# Patient Record
Sex: MALE | Race: WHITE | NOT HISPANIC OR LATINO | URBAN - METROPOLITAN AREA
[De-identification: names, ages, dates, MRNs, and addresses within clinical notes are randomized per-mention and may not be internally consistent; named-entity substitution may affect disease eponyms.]

---

## 2023-10-23 ENCOUNTER — HOSPITAL ENCOUNTER (INPATIENT)
Facility: CLINIC | Age: 58
LOS: 11 days | Discharge: NURSING, REHAB OR LONG-TERM CARE | End: 2023-11-03
Attending: EMERGENCY MEDICINE | Admitting: INTERNAL MEDICINE
Payer: MEDICAID

## 2023-10-23 ENCOUNTER — APPOINTMENT (OUTPATIENT)
Dept: GENERAL RADIOLOGY | Facility: CLINIC | Age: 58
End: 2023-10-23
Attending: EMERGENCY MEDICINE
Payer: MEDICAID

## 2023-10-23 ENCOUNTER — APPOINTMENT (OUTPATIENT)
Dept: CT IMAGING | Facility: CLINIC | Age: 58
End: 2023-10-23
Attending: EMERGENCY MEDICINE
Payer: MEDICAID

## 2023-10-23 DIAGNOSIS — K63.89 CECUM MASS: ICD-10-CM

## 2023-10-23 DIAGNOSIS — K56.609 SBO (SMALL BOWEL OBSTRUCTION) (H): ICD-10-CM

## 2023-10-23 DIAGNOSIS — C18.9 COLON ADENOCARCINOMA (H): Primary | ICD-10-CM

## 2023-10-23 DIAGNOSIS — D64.9 ANEMIA, UNSPECIFIED TYPE: ICD-10-CM

## 2023-10-23 DIAGNOSIS — R16.0 LIVER MASS: ICD-10-CM

## 2023-10-23 LAB
ABO/RH(D): NORMAL
ALBUMIN SERPL BCG-MCNC: 4 G/DL (ref 3.5–5.2)
ALP SERPL-CCNC: 135 U/L (ref 40–129)
ALT SERPL W P-5'-P-CCNC: 18 U/L (ref 0–70)
ANION GAP SERPL CALCULATED.3IONS-SCNC: 14 MMOL/L (ref 7–15)
ANTIBODY SCREEN: NEGATIVE
APTT PPP: 34 SECONDS (ref 22–38)
AST SERPL W P-5'-P-CCNC: 36 U/L (ref 0–45)
BASO+EOS+MONOS # BLD AUTO: ABNORMAL 10*3/UL
BASO+EOS+MONOS NFR BLD AUTO: ABNORMAL %
BASOPHILS # BLD AUTO: 0.1 10E3/UL (ref 0–0.2)
BASOPHILS NFR BLD AUTO: 1 %
BILIRUB SERPL-MCNC: 0.8 MG/DL
BLD PROD TYP BPU: NORMAL
BLOOD COMPONENT TYPE: NORMAL
BUN SERPL-MCNC: 22.4 MG/DL (ref 6–20)
CALCIUM SERPL-MCNC: 8.7 MG/DL (ref 8.6–10)
CEA SERPL-MCNC: 6.7 NG/ML
CHLORIDE SERPL-SCNC: 100 MMOL/L (ref 98–107)
CODING SYSTEM: NORMAL
CREAT SERPL-MCNC: 0.7 MG/DL (ref 0.67–1.17)
CROSSMATCH: NORMAL
DEPRECATED HCO3 PLAS-SCNC: 23 MMOL/L (ref 22–29)
EGFRCR SERPLBLD CKD-EPI 2021: >90 ML/MIN/1.73M2
EOSINOPHIL # BLD AUTO: 0.2 10E3/UL (ref 0–0.7)
EOSINOPHIL NFR BLD AUTO: 4 %
ERYTHROCYTE [DISTWIDTH] IN BLOOD BY AUTOMATED COUNT: 21.4 % (ref 10–15)
GLUCOSE SERPL-MCNC: 92 MG/DL (ref 70–99)
HCT VFR BLD AUTO: 23.4 % (ref 40–53)
HGB BLD-MCNC: 5.9 G/DL (ref 13.3–17.7)
IMM GRANULOCYTES # BLD: 0 10E3/UL
IMM GRANULOCYTES NFR BLD: 0 %
INR PPP: 1.18 (ref 0.85–1.15)
ISSUE DATE AND TIME: NORMAL
LIPASE SERPL-CCNC: 10 U/L (ref 13–60)
LYMPHOCYTES # BLD AUTO: 0.8 10E3/UL (ref 0.8–5.3)
LYMPHOCYTES NFR BLD AUTO: 19 %
MAGNESIUM SERPL-MCNC: 1.8 MG/DL (ref 1.7–2.3)
MCH RBC QN AUTO: 14.7 PG (ref 26.5–33)
MCHC RBC AUTO-ENTMCNC: 25.2 G/DL (ref 31.5–36.5)
MCV RBC AUTO: 58 FL (ref 78–100)
MONOCYTES # BLD AUTO: 0.6 10E3/UL (ref 0–1.3)
MONOCYTES NFR BLD AUTO: 14 %
NEUTROPHILS # BLD AUTO: 2.6 10E3/UL (ref 1.6–8.3)
NEUTROPHILS NFR BLD AUTO: 62 %
NRBC # BLD AUTO: 0 10E3/UL
NRBC BLD AUTO-RTO: 0 /100
PLATELET # BLD AUTO: 473 10E3/UL (ref 150–450)
POTASSIUM SERPL-SCNC: 3.7 MMOL/L (ref 3.4–5.3)
PROT SERPL-MCNC: 6.2 G/DL (ref 6.4–8.3)
RBC # BLD AUTO: 4.01 10E6/UL (ref 4.4–5.9)
SODIUM SERPL-SCNC: 137 MMOL/L (ref 135–145)
SPECIMEN EXPIRATION DATE: NORMAL
UNIT ABO/RH: NORMAL
UNIT NUMBER: NORMAL
UNIT STATUS: NORMAL
UNIT TYPE ISBT: 5100
WBC # BLD AUTO: 4.2 10E3/UL (ref 4–11)

## 2023-10-23 PROCEDURE — 999N000065 XR ABDOMEN PORT 1 VIEW

## 2023-10-23 PROCEDURE — 250N000013 HC RX MED GY IP 250 OP 250 PS 637: Performed by: COLON & RECTAL SURGERY

## 2023-10-23 PROCEDURE — 86923 COMPATIBILITY TEST ELECTRIC: CPT | Performed by: COLON & RECTAL SURGERY

## 2023-10-23 PROCEDURE — 82378 CARCINOEMBRYONIC ANTIGEN: CPT | Performed by: PHYSICIAN ASSISTANT

## 2023-10-23 PROCEDURE — 250N000011 HC RX IP 250 OP 636: Mod: JZ | Performed by: INTERNAL MEDICINE

## 2023-10-23 PROCEDURE — 258N000001 HC RX 258: Performed by: INTERNAL MEDICINE

## 2023-10-23 PROCEDURE — 74177 CT ABD & PELVIS W/CONTRAST: CPT

## 2023-10-23 PROCEDURE — 85025 COMPLETE CBC W/AUTO DIFF WBC: CPT | Performed by: EMERGENCY MEDICINE

## 2023-10-23 PROCEDURE — 83540 ASSAY OF IRON: CPT | Performed by: HOSPITALIST

## 2023-10-23 PROCEDURE — 85730 THROMBOPLASTIN TIME PARTIAL: CPT | Performed by: EMERGENCY MEDICINE

## 2023-10-23 PROCEDURE — 85610 PROTHROMBIN TIME: CPT | Performed by: EMERGENCY MEDICINE

## 2023-10-23 PROCEDURE — 250N000011 HC RX IP 250 OP 636: Performed by: COLON & RECTAL SURGERY

## 2023-10-23 PROCEDURE — 83550 IRON BINDING TEST: CPT | Performed by: HOSPITALIST

## 2023-10-23 PROCEDURE — 36415 COLL VENOUS BLD VENIPUNCTURE: CPT | Performed by: COLON & RECTAL SURGERY

## 2023-10-23 PROCEDURE — 85018 HEMOGLOBIN: CPT | Performed by: COLON & RECTAL SURGERY

## 2023-10-23 PROCEDURE — 99285 EMERGENCY DEPT VISIT HI MDM: CPT | Mod: 25

## 2023-10-23 PROCEDURE — 83690 ASSAY OF LIPASE: CPT | Performed by: EMERGENCY MEDICINE

## 2023-10-23 PROCEDURE — 250N000011 HC RX IP 250 OP 636: Performed by: EMERGENCY MEDICINE

## 2023-10-23 PROCEDURE — 36415 COLL VENOUS BLD VENIPUNCTURE: CPT | Performed by: EMERGENCY MEDICINE

## 2023-10-23 PROCEDURE — 120N000001 HC R&B MED SURG/OB

## 2023-10-23 PROCEDURE — 86923 COMPATIBILITY TEST ELECTRIC: CPT | Performed by: PHYSICIAN ASSISTANT

## 2023-10-23 PROCEDURE — P9016 RBC LEUKOCYTES REDUCED: HCPCS | Performed by: EMERGENCY MEDICINE

## 2023-10-23 PROCEDURE — 86900 BLOOD TYPING SEROLOGIC ABO: CPT | Performed by: EMERGENCY MEDICINE

## 2023-10-23 PROCEDURE — 86923 COMPATIBILITY TEST ELECTRIC: CPT | Performed by: EMERGENCY MEDICINE

## 2023-10-23 PROCEDURE — 83735 ASSAY OF MAGNESIUM: CPT | Performed by: EMERGENCY MEDICINE

## 2023-10-23 PROCEDURE — 250N000009 HC RX 250: Performed by: EMERGENCY MEDICINE

## 2023-10-23 PROCEDURE — 99223 1ST HOSP IP/OBS HIGH 75: CPT | Mod: AI | Performed by: INTERNAL MEDICINE

## 2023-10-23 PROCEDURE — P9016 RBC LEUKOCYTES REDUCED: HCPCS | Performed by: PHYSICIAN ASSISTANT

## 2023-10-23 PROCEDURE — 82247 BILIRUBIN TOTAL: CPT | Performed by: EMERGENCY MEDICINE

## 2023-10-23 RX ORDER — PROCHLORPERAZINE 25 MG
25 SUPPOSITORY, RECTAL RECTAL EVERY 12 HOURS PRN
Status: DISCONTINUED | OUTPATIENT
Start: 2023-10-23 | End: 2023-10-24

## 2023-10-23 RX ORDER — NALOXONE HYDROCHLORIDE 0.4 MG/ML
0.4 INJECTION, SOLUTION INTRAMUSCULAR; INTRAVENOUS; SUBCUTANEOUS
Status: DISCONTINUED | OUTPATIENT
Start: 2023-10-23 | End: 2023-11-03 | Stop reason: HOSPADM

## 2023-10-23 RX ORDER — DEXTROSE MONOHYDRATE, SODIUM CHLORIDE, AND POTASSIUM CHLORIDE 50; 1.49; 9 G/1000ML; G/1000ML; G/1000ML
100 INJECTION, SOLUTION INTRAVENOUS CONTINUOUS
Status: DISCONTINUED | OUTPATIENT
Start: 2023-10-23 | End: 2023-10-24

## 2023-10-23 RX ORDER — PROCHLORPERAZINE MALEATE 10 MG
10 TABLET ORAL EVERY 6 HOURS PRN
Status: DISCONTINUED | OUTPATIENT
Start: 2023-10-23 | End: 2023-10-24

## 2023-10-23 RX ORDER — ONDANSETRON 2 MG/ML
4 INJECTION INTRAMUSCULAR; INTRAVENOUS EVERY 6 HOURS PRN
Status: DISCONTINUED | OUTPATIENT
Start: 2023-10-23 | End: 2023-10-24

## 2023-10-23 RX ORDER — ONDANSETRON 4 MG/1
4 TABLET, ORALLY DISINTEGRATING ORAL EVERY 6 HOURS PRN
Status: DISCONTINUED | OUTPATIENT
Start: 2023-10-23 | End: 2023-10-24

## 2023-10-23 RX ORDER — NALOXONE HYDROCHLORIDE 0.4 MG/ML
0.2 INJECTION, SOLUTION INTRAMUSCULAR; INTRAVENOUS; SUBCUTANEOUS
Status: DISCONTINUED | OUTPATIENT
Start: 2023-10-23 | End: 2023-11-03 | Stop reason: HOSPADM

## 2023-10-23 RX ORDER — IOPAMIDOL 755 MG/ML
75 INJECTION, SOLUTION INTRAVASCULAR ONCE
Status: COMPLETED | OUTPATIENT
Start: 2023-10-23 | End: 2023-10-23

## 2023-10-23 RX ORDER — HYDROMORPHONE HYDROCHLORIDE 1 MG/ML
0.5 INJECTION, SOLUTION INTRAMUSCULAR; INTRAVENOUS; SUBCUTANEOUS
Status: DISCONTINUED | OUTPATIENT
Start: 2023-10-23 | End: 2023-10-24 | Stop reason: ALTCHOICE

## 2023-10-23 RX ADMIN — Medication 1 ML: at 22:15

## 2023-10-23 RX ADMIN — SODIUM CHLORIDE 62 ML: 9 INJECTION, SOLUTION INTRAVENOUS at 11:49

## 2023-10-23 RX ADMIN — IOPAMIDOL 75 ML: 755 INJECTION, SOLUTION INTRAVENOUS at 11:48

## 2023-10-23 RX ADMIN — POTASSIUM CHLORIDE, DEXTROSE MONOHYDRATE AND SODIUM CHLORIDE 100 ML/HR: 150; 5; 900 INJECTION, SOLUTION INTRAVENOUS at 16:56

## 2023-10-23 RX ADMIN — ONDANSETRON 4 MG: 2 INJECTION INTRAMUSCULAR; INTRAVENOUS at 14:17

## 2023-10-23 ASSESSMENT — ACTIVITIES OF DAILY LIVING (ADL)
ADLS_ACUITY_SCORE: 35
ADLS_ACUITY_SCORE: 18
ADLS_ACUITY_SCORE: 33
ADLS_ACUITY_SCORE: 18
ADLS_ACUITY_SCORE: 18
ADLS_ACUITY_SCORE: 35
ADLS_ACUITY_SCORE: 18

## 2023-10-23 NOTE — CONSULTS
[facility]  Colon and Rectal Surgery Consult Note  Name: Brady Lenz    MRN: [id number]  YOB: 1965    Age: 58 year old  Date of admission: 10/23/2023  Primary care provider: No Ref-Primary, Physician     Requesting Physician:  Dr. Saenz  Reason for consult:  SBO secondary to cecal mass           History of Present Illness:   Brady Lenz is a 58 year old male with no known past medical history who does not regularly see a doctor, seen at the request of Dr. Saenz, who presents with abdominal pain.  For the last 2 weeks he has had intermittent abdominal pain and worsening distention.  He had a small bowel movement this morning but prior to that his last normal bowel movement was about a week ago.  He has had similar symptoms as well as decreased appetite and diarrhea for the last 1 to 2 years.  He presented to the ED today for evaluation.  He was he hemodynamically stable upon presentation.  Labs in the ED were notable for Hgb 5.9, MCV 58, platelets 473k, normal WBC, alk phos 135, INR 1.18, otherwise unremarkable.  CT abdomen/pelvis was done which showed a 7 cm mass in the cecum causing a small bowel obstruction.  There are also masses in the liver measuring 10.6 cm and 4.9 cm concerning for metastatic disease. 1 U PRBC is being given in the ED and he is being admitted for further evaluation and management. An NGT has been placed.     This afternoon, Brady's answers are quite limited as he says it is difficult to talk with the NGT in place.  His abdominal pain is a little better this afternoon.  He is not passing any gas today.  He does endorse intermittent abdominal pain and discomfort for the last year or 2 and feels it has slowly gotten progressively worse.  He has noted some nausea lately and vomited Friday a few times, as well as a few weeks ago.  He has had intermittent diarrhea for a while, denies constipation, melena, or hematochezia.  He has had a poor appetite and has not  been able to eat much for the last few weeks. He has never had a colonoscopy.  Remains hemodynamically stable.      Colonoscopy History:  None    Past abdominal surgery: None            Past Medical History:   No past medical history on file.          Past Surgical History:   No past surgical history on file.            Social History:     Social History     Tobacco Use    Smoking status: Not on file    Smokeless tobacco: Not on file   Substance Use Topics    Alcohol use: Not on file             Family History:   No family history on file.          Allergies:   No Known Allergies          Medications:             Review of Systems:   A comprehensive greater than 10 system review of systems was carried out.  Pertinent positives and negatives are noted above.  Otherwise negative for contributory info.            Physical Exam:     Blood pressure 135/78, pulse 76, temperature 98.5  F (36.9  C), temperature source Oral, resp. rate 16, weight 68 kg (150 lb), SpO2 100%.  No intake or output data in the 24 hours ending 10/23/23 1454  Exam:  General - Awake alert and oriented, appears  stated age  Pulm - Non-labored breathing with normal respiratory effort  CVS - reg rate and rhythm, no peripheral edema  Abd - softly distended, minimally tender.  No guarding, rigidity or peritoneal signs. NGT in place with light clear/yellow output.   Neuro - CN II-XII grossly intact  Musculoskeletal - extremities with no clubbing, cyanosis or edema; able to ambulate  Psych - Flat affect. Responsive, alert, cooperative; oriented x3; appropriate mood and affect  External/skin - inspection reveals no rashes, lesions or ulcers, normal coloring             Data Reviewed:     Recent Results (from the past 24 hour(s))   CT Abdomen Pelvis w Contrast    Narrative    CT ABDOMEN PELVIS WITH CONTRAST 10/23/2023 11:57 AM    CLINICAL HISTORY: Abdominal pain, distension, rule out small-bowel  obstruction      TECHNIQUE: CT scan of the abdomen and pelvis  was performed following  injection of IV contrast. Multiplanar reformats were obtained. Dose  reduction techniques were used.  CONTRAST: 75mL Isovue-370    COMPARISON: None.    FINDINGS:   LOWER CHEST: No infiltrates or effusions.    HEPATOBILIARY: 10.6 cm mass in the right lobe of the liver. 4.9 cm  mass in the left lobe of the liver.    PANCREAS: No significant mass, duct dilatation, or inflammatory  change.    SPLEEN: Hypodensity in the spleen may be related to an infarct or  lesion. Probable cyst posteriorly in the spleen.     ADRENAL GLANDS: No significant nodules.    KIDNEYS/BLADDER: No significant mass, stones, or hydronephrosis. There  are simple or benign cysts. No follow up is needed.    BOWEL: Small bowel obstruction secondary to a cecal mass causing  obstruction at the ileocecal junction.    PELVIC ORGANS: No pelvic masses.    ADDITIONAL FINDINGS: Small amount of ascites in the pelvis and around  the liver.    MUSCULOSKELETAL: No frankly destructive bony lesions.      Impression    IMPRESSION:   1.  Mass in the cecum causing small bowel obstruction. This measures  up to 7 cm.  2.  Liver masses measuring up to 10.6 cm concerning for metastatic  disease.    MEAGHAN HATHAWAY MD         SYSTEM ID:  [id number]   XR Abdomen Port 1 View    Narrative    ABDOMEN ONE VIEW PORTABLE  10/23/2023 2:52 PM     HISTORY: Post NG placement.    COMPARISON: None.       Impression    IMPRESSION: The nasogastric tube is not seen. Multiple dilated loops  of bowel evident.    MEAGHAN HATHAWAY MD         SYSTEM ID:  [id number]   XR Abdomen Port 1 View    Narrative    ABDOMEN ONE VIEW PORTABLE  10/23/2023 4:07 PM     HISTORY: NGT    COMPARISON: Abdominal radiograph 10/23/2023.       Impression    IMPRESSION: Limited AP view demonstrates the gastric drainage tube tip  and side-port projecting over the proximal stomach. Similar upper  abdominal dilated small bowel loops.     ORIANA TRUJILLO MD         SYSTEM ID:  [id number]        Recent Labs   Lab 10/23/23  1036   WBC 4.2   HGB 5.9*   HCT 23.4*   MCV 58*   *     Recent Labs   Lab 10/23/23  1036      POTASSIUM 3.7   CHLORIDE 100   CO2 23   ANIONGAP 14   GLC 92   BUN 22.4*   CR 0.70   GFRESTIMATED >90   NAT 8.7   MAG 1.8   PROTTOTAL 6.2*   ALBUMIN 4.0   BILITOTAL 0.8   ALKPHOS 135*   AST 36   ALT 18         Assessment and Plan:   Brady Lenz is a 58 year old male with no known past medical history, although he does not go to the doctor regularly, who has had intermittent and slowly progressive abdominal discomfort and distention for the last 1 to 2 years as well as decreased appetite and some diarrhea.  Symptoms have been worse in the last week or 2 and are now accompanied by some nausea/vomiting and decreased flatus and bowel movements, so he presented to the ED today for evaluation.  Labs were notable for Hgb 5.9, MCV 58, INR 1.18, alk phos 135, otherwise unremarkable.  A CT abdomen/pelvis was done which unfortunately showed a small bowel obstruction secondary to an obstructing 7 cm mass in the cecum.  There are also multiple masses in the liver measuring up to 10.6 cm concerning for metastatic disease.  He is getting 1 U PRBC and an NGT has been placed in the ED. On exam he is softly distended but minimally tender.  He remains hemodynamically stable.    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  Typically with metastatic disease would begin with neoadjuvant chemotherapy, however in the setting of obstruction would recommend upfront surgery given concern for possible impending perforation.  Discussed that assuming the pathology is consistent with colon cancer, he will likely require adjuvant chemotherapy given suspicion for liver mets.  He does have severe anemia (5.9) likely secondary to the colon mass, so ideally we would transfuse him to increase Hgb prior to surgery.  He is tentatively  scheduled for a laparoscopic possible open right colectomy with possible stoma tomorrow at 4:00 pm with Dr. Moe, with a request to start earlier if possible.  We will ask the WOCN team to imani him today.  Continue NPO and NGT decompression in the meantime.  Will not plan for bowel prep given obstruction. We will continue to follow, call with concerns or changes in clinical status.     Plan:  Admit to medicine  Surgery: Scheduled for lap possible open right colectomy with possible stoma tomorrow with Dr. Moe at 4 pm (possibly sooner). Patient doesn't go to the doctor regularly, any necessary preop evaluation per medicine, appreciate assistance.  Diet: NPO. Continue NGT to LIWS for decompression.  IV Fluids: Continue   Antibiotics:  Will order pre-op Abx  Medications: Will order 2 additional units of PRBC given severe anemia and plans for surgery tomorrow with expectation of more blood loss intra-op.  I&O s:  strict I&O s   Labs:   - Reviewed: Yes  - Ordered: CEA, repeat Hgb & INR tomorrow AM   Imaging:   - Dr. Moe and myself have personally viewed: CT abd/pelvis  - Will need CT chest to complete staging workup eventually assuming malignancy, defer for now  Activity:  OOB, ambulate as able  DVT prophylaxis: SCD s  This plan has been discussed with Dr. Moe    Patient specific identified risk factors considered as part of today s evaluation include: no prior colonoscopy, severe anemia     Additional history obtained from patient, chart review.  Time spent on consultation: 50 minutes, greater than 50% spent on counseling and/or coordination of care.      Migel Manzanares PA-C  Colorectal Physician Assistant    [facility]  [address] [place] [address]  T: [phone]  F: [phone]

## 2023-10-23 NOTE — PHARMACY-ADMISSION MEDICATION HISTORY
Pharmacist Admission Medication History    Admission medication history is complete. The information provided in this note is only as accurate as the sources available at the time of the update.    Information Source(s): Patient via in-person    Pertinent Information: Patient was taking some supplements in past but hasn't been taking them recently. Wasn't added to the list for this reason.    Changes made to PTA medication list:  Added: None  Deleted: None  Changed: None    Allergies reviewed with patient and updates made in EHR: yes    Medication History Completed By: Margarita Hathaway RPH 10/23/2023 1:12 PM    No outpatient medications have been marked as taking for the 10/23/23 encounter (Hospital Encounter).

## 2023-10-23 NOTE — PROGRESS NOTES
"SPIRITUAL HEALTH SERVICES Progress Note  [facility] ED    Referral: Visit for emotional support.    I introduced myself and  Services.     Brady shared a little with me about his life; he said that he doesn't have anyone in his life, he's alone and \"it's all he's ever known,\" shrugging when I asked if he prefers solitude.    He said he's had a \"tough three years,\" not elaborating except that he's recently gotten work with [employer] but finds it difficult to work at times due to pain and nausea, naming IBS and SIBO.    Brady said he had been planning to move \"soon\" to \"someplace warm\" but now is unsure what will happen if he has surgery.    Upon inquiry, he shared he enjoys music and used to play guitar. He might like to again, but lives in an apartment and feels it would disturb others.    I noted the many unknowns and lack of control that he's holding, and observed his flat affect during the visit; I encouraged him to share with others as opportunities arise, inviting him to reach out to  as desired.    Visit ended for cares.    I then shared about the visit with Provider, who invited continued  support.     will follow.    Rev Christine Snell  Associate Chaplain [facility] Phone Line [phone]  Spiritual Health Pager [phone]  [facility] (Tuesdays & Thursdays) [phone]  "

## 2023-10-23 NOTE — PLAN OF CARE
Date & Time: 10/23/23 7817-5056  Surgery/POD#: N/A new cecal mas with possible mets to liver  Behavior & Aggression: green  Fall Risk: N/A  Orientation: AOx4  ABNL VS/O2: VSS on RA  ABNL Labs: Hgb: 5.9 in ED  Pain Management: Denies  Bowel/Bladder: no BM, urinal at bedside  Drains: NG to LIS, x2 PIV infusing D5% in NS +20 KCL @100 and 1 of 2 Units PRBC  Diet: NPO  Activity Level: SBA  Tests/Procedures: Possible bowl resection with stoma 10/24 with Dr. Moe  Anticipated  DC Date: Pending    Significant Information: Pt arrived from ED around 1630. VSS on RA. Received 1 Unit of blood in ED and CRS ordered two more for the floor. Unit 1 of 2 currently infusing. Surgery tomorrow with Dr. Moe. WOC consult for stoma placement. Pt has a flat affect, very little to say. NG in palace with x-ray conformation. No complaints of pain.

## 2023-10-23 NOTE — ED NOTES
The patient was given a urine sample cup prior to going to CT, in case the patient has to urinate otherwise can get a sample when they are back, instructed that the bathrooms are down the mays and to bring the cup back to the room.

## 2023-10-23 NOTE — ED TRIAGE NOTES
Pt c/o abd pain and bloating worse this past week. Pt has had abd pain for 2 yrs, diarrhea. Pt also has decreased appetite     Triage Assessment (Adult)       Row Name 10/23/23 1004          Triage Assessment    Airway WDL WDL        Respiratory WDL    Respiratory WDL WDL        Skin Circulation/Temperature WDL    Skin Circulation/Temperature WDL WDL        Peripheral/Neurovascular WDL    Peripheral Neurovascular WDL WDL        Cognitive/Neuro/Behavioral WDL    Cognitive/Neuro/Behavioral WDL WDL

## 2023-10-23 NOTE — H&P
[facility]    History and Physical - Hospitalist Service       Date of Admission:  10/23/2023    Assessment & Plan      Brady Lenz is a 58 year old male with no known past medical history who does not see a doctor on a regular basis who presented to the emergency room for evaluation of 2-week history of abdominal pain and distention.  He was found to have hemoglobin of 5.9 and imaging showed a cecal mass resulting in a small bowel obstruction a presumed liver met.was admitted on 10/23/2023 for ongoing evaluation and care.    Cecal mass with resultant small bowel obstruction  Liver masses, concerning for metastatic disease  *Patient does not see any physicians on a regular basis, he has not had any routine cancer screening exams including a colonoscopy. He presented to the ED for evaluation of a 2-week history of intermittent abdominal pain and worsening distention. Later went on to state he has had similar symptoms intermittently over the past 1-2 years, along with a decreased appetite.  *In the ED, he was afebrile and hemodynamically stable. He was ill-appearing. Labs were notable for hemoglobin of 5.9, MCV 58, platelet count of 473; alk phos 135, remainder of LFTs were stable. Electrolytes and renal function were stable. CT abd/pelvis obtained and showed a 7cm mass in the cecum causing small bowel obstruction and 2 liver masses measuring up to 10.6 cm in the right lobe and 4.9cm in the left lobe concerning for metastatic disease. NG tube placed in the ED.  Colorectal surgery notified of findings and plans for admission.  -- cont NG to LIS  -- NPO, supportive cares with IVFs, prns for pain/nausea  -- colorectal surgery consulted, will see today, anticipate taking patient to the OR tomorrow (10/24)  -- [facility] oncology consult     Suspected subacute to chronic microcytic anemia, secondary to blood loss from cecal mass  *With presentation and labs as above. Suspect some degree of subacute to  chronic blood loss given he is presently hemodynamically stable and denies any recent melanotic stools or BRBPR.  -- will be transfused 1U PRBC in the ED  -- repeat hemoglobin this evening  -- has conditional orders to transfuse 1U PRBCs to maintain hemoglobin greater than 7        Diet:  NPO  DVT Prophylaxis: Pneumatic Compression Devices  Fairchild Catheter: Not present  Lines: None     Cardiac Monitoring: None  Code Status:  Full code per my discussion with patient this afternoon.  He has no emergency contacts listed in his medical records.  He tells me he has no spouse or children and he is estranged from his family.  He does not have any close friends or acquaintances that he would like to designate as an emergency contact.  He reluctantly agreed to allowing his brother Tab Triplett (sp?) to be his contact.  Patient says he has not spoken with his brother in years, but that last he knew he lived locally.  He has no contact information for his brother.     CC/SW and spiritual health consults placed for ongoing support this stay.    Clinically Significant Risk Factors Present on Admission               # Coagulation Defect: INR = 1.18 (Ref range: 0.85 - 1.15) and/or PTT = 34 Seconds (Ref range: 22 - 38 Seconds), will monitor for bleeding                    Disposition Plan      Expected Discharge Date: 10/25/2023                  Evie Saenz DO  Hospitalist Service  [facility]  Securely message with [employer] (more info)  Text page via [employer] Paging/Directory     ______________________________________________________________________    Chief Complaint   Increased abdominal pain/distension    History is obtained from the patient    History of Present Illness   Bradyhamlet Lenz is a 58 year old male with no known past medical history who does not see a doctor on a regular basis who presented to the emergency room for evaluation of 2-week history of abdominal pain and distention. He  does not see any physicians on a regular basis, he has not had any routine cancer screening exams including a colonoscopy. He presented to the ED for evaluation of a 2-week history of intermittent abdominal pain and worsening distention. Later went on to state he has had similar symptoms intermittently over the past 1-2 years, along with a decreased appetite.  He denies any knowledge of melanotic stools or BRBPR.  No dizziness, lightheadedness, chest pain, n/v. He does report some recent onset mild shortness of breath.    In the ED, he was afebrile and hemodynamically stable. He was ill-appearing. Labs were notable for hemoglobin of 5.9, MCV 58, platelet count of 473; alk phos 135, remainder of LFTs were stable. Electrolytes and renal function were stable. CT abd/pelvis obtained and showed a 7cm mass in the cecum causing small bowel obstruction and 2 liver masses measuring up to 10.6 cm in the right lobe and 4.9cm in the left lobe concerning for metastatic disease. NG tube placed in the ED.  Colorectal surgery notified of findings and plans for admission.    Past Medical History    No past medical history on file.    Past Surgical History   No past surgical history on file.    Prior to Admission Medications   None        Review of Systems    The 10 point Review of Systems is negative other than noted in the HPI or here.      Physical Exam   Vital Signs: Temp: 98  F (36.7  C) Temp src: Oral BP: 127/73 Pulse: 69   Resp: 18 SpO2: 100 %      Weight: 150 lbs 0 oz    General Appearance: Flat affect, answers questions appropriately but not overly conversant, NAD  Respiratory: CTAB, no wheeze/rales/rhonchi, no increased work of breathing  Cardiovascular: HRRR, no MGR, no LE edema  GI: Distended, +TTP but no guarding/rigidity, +BS  Skin: warm/dry, appears tan    Medical Decision Making       75 MINUTES SPENT BY ME on the date of service doing chart review, history, exam, documentation & further activities per the note.       Data     I have personally reviewed the following data over the past 24 hrs:    4.2  \   5.9 (LL)   / 473 (H)     137 100 22.4 (H) /  92   3.7 23 0.70 \     ALT: 18 AST: 36 AP: 135 (H) TBILI: 0.8   ALB: 4.0 TOT PROTEIN: 6.2 (L) LIPASE: 10 (L)     INR:  1.18 (H) PTT:  34   D-dimer:  N/A Fibrinogen:  N/A       Imaging results reviewed over the past 24 hrs:   Recent Results (from the past 24 hour(s))   CT Abdomen Pelvis w Contrast    Narrative    CT ABDOMEN PELVIS WITH CONTRAST 10/23/2023 11:57 AM    CLINICAL HISTORY: Abdominal pain, distension, rule out small-bowel  obstruction      TECHNIQUE: CT scan of the abdomen and pelvis was performed following  injection of IV contrast. Multiplanar reformats were obtained. Dose  reduction techniques were used.  CONTRAST: 75mL Isovue-370    COMPARISON: None.    FINDINGS:   LOWER CHEST: No infiltrates or effusions.    HEPATOBILIARY: 10.6 cm mass in the right lobe of the liver. 4.9 cm  mass in the left lobe of the liver.    PANCREAS: No significant mass, duct dilatation, or inflammatory  change.    SPLEEN: Hypodensity in the spleen may be related to an infarct or  lesion. Probable cyst posteriorly in the spleen.     ADRENAL GLANDS: No significant nodules.    KIDNEYS/BLADDER: No significant mass, stones, or hydronephrosis. There  are simple or benign cysts. No follow up is needed.    BOWEL: Small bowel obstruction secondary to a cecal mass causing  obstruction at the ileocecal junction.    PELVIC ORGANS: No pelvic masses.    ADDITIONAL FINDINGS: Small amount of ascites in the pelvis and around  the liver.    MUSCULOSKELETAL: No frankly destructive bony lesions.      Impression    IMPRESSION:   1.  Mass in the cecum causing small bowel obstruction. This measures  up to 7 cm.  2.  Liver masses measuring up to 10.6 cm concerning for metastatic  disease.    MEAGHAN HATHAWAY MD         SYSTEM ID:  [id number]

## 2023-10-23 NOTE — ED PROVIDER NOTES
History     Chief Complaint:  Abdominal Pain       The history is provided by the patient.      Brady Lenz is a 58 year old male who presents with intermittent abdominal pain and bloating since a week ago. He suspects that his symptoms are from the SIBO, as he has been experiencing them intermittently for the past year. Another symptom mentioned was decreased appetite. He has not taken any medications at home for pain management. He had a small bowel movement this morning, but his last normal one was about a week ago. He has no prior abdominal surgeries, and does not have a history of liver problems. He has neve had a colonoscopy. He denies nausea, vomiting, pruritus, or rash.     Independent Historian:   None - Patient Only    Review of External Notes:   None     Medications:    The patient denies current use of medications.     [facility]    Past Surgical History:    The patient denies pertinent past surgical history.     Physical Exam   Patient Vitals for the past 24 hrs:   BP Temp Temp src Pulse Resp SpO2 Weight   10/23/23 1130 127/73 -- -- 69 -- 100 % --   10/23/23 1002 (!) 142/74 98  F (36.7  C) Oral 98 18 99 % 68 kg (150 lb)        Physical Exam  Vitals reviewed.   Constitutional:       General: He is not in acute distress.     Appearance: He is not ill-appearing.   HENT:      Head: Normocephalic and atraumatic.   Eyes:      Extraocular Movements: Extraocular movements intact.   Cardiovascular:      Rate and Rhythm: Normal rate and regular rhythm.   Pulmonary:      Effort: Pulmonary effort is normal. No respiratory distress.      Breath sounds: Normal breath sounds. No wheezing.   Abdominal:      General: Bowel sounds are absent. There is distension.      Palpations: Abdomen is soft.      Tenderness: There is no abdominal tenderness. There is no guarding.   Musculoskeletal:      Cervical back: Normal range of motion.   Skin:     General: Skin is warm and dry.   Neurological:      Mental  Status: He is alert and oriented to person, place, and time.      GCS: GCS eye subscore is 4. GCS verbal subscore is 5. GCS motor subscore is 6.   Psychiatric:         Behavior: Behavior normal.           Emergency Department Course   Imaging:  CT Abdomen Pelvis w Contrast  Preliminary Result  IMPRESSION:   1.  Mass in the cecum causing small bowel obstruction. This measures  up to 7 cm.  2.  Liver masses measuring up to 10.6 cm concerning for metastatic  disease.    Results per radiology     Laboratory:  Labs Ordered and Resulted from Time of ED Arrival to Time of ED Departure   COMPREHENSIVE METABOLIC PANEL - Abnormal       Result Value    Sodium 137      Potassium 3.7      Carbon Dioxide (CO2) 23      Anion Gap 14      Urea Nitrogen 22.4 (*)     Creatinine 0.70      GFR Estimate >90      Calcium 8.7      Chloride 100      Glucose 92      Alkaline Phosphatase 135 (*)     AST 36      ALT 18      Protein Total 6.2 (*)     Albumin 4.0      Bilirubin Total 0.8     LIPASE - Abnormal    Lipase 10 (*)    CBC WITH PLATELETS AND DIFFERENTIAL - Abnormal    WBC Count 4.2      RBC Count 4.01 (*)     Hemoglobin 5.9 (*)     Hematocrit 23.4 (*)     MCV 58 (*)     MCH 14.7 (*)     MCHC 25.2 (*)     RDW 21.4 (*)     Platelet Count 473 (*)     % Neutrophils 62      % Lymphocytes 19      % Monocytes 14      Mids % (Monos, Eos, Basos)        % Eosinophils 4      % Basophils 1      % Immature Granulocytes 0      NRBCs per 100 WBC 0      Absolute Neutrophils 2.6      Absolute Lymphocytes 0.8      Absolute Monocytes 0.6      Mids Abs (Monos, Eos, Basos)        Absolute Eosinophils 0.2      Absolute Basophils 0.1      Absolute Immature Granulocytes 0.0      Absolute NRBCs 0.0     INR - Abnormal    INR 1.18 (*)    MAGNESIUM - Normal    Magnesium 1.8     PARTIAL THROMBOPLASTIN TIME - Normal    aPTT 34     ROUTINE UA WITH MICROSCOPIC REFLEX TO CULTURE   TYPE AND SCREEN, ADULT    ABO/RH(D) O POS      Antibody Screen Negative      SPECIMEN  EXPIRATION DATE [id number]     PREPARE RED BLOOD CELLS (UNIT)    Blood Component Type Red Blood Cells      Product Code R1462A21      Unit Status Ready for issue      Unit Number [id number]      CROSSMATCH Compatible      CODING SYSTEM [id number]     PREPARE RED BLOOD CELLS (UNIT)   ABO/RH TYPE AND SCREEN      Emergency Department Course & Assessments:    Interventions:  Medications   iopamidol (ISOVUE-370) solution 75 mL (75 mLs Intravenous $Given 10/23/23 1148)   Saline (62 mLs Intravenous $Given 10/23/23 1149)      Assessments:  1010 I obtained history and examined the patient as noted above.  1103 I rechecked the patient and updated.   1226 I rechecked the patient and explained findings. I discussed plan for admission to the hospital.    Independent Interpretation (X-rays, CTs, rhythm strip):  None    Consultations/Discussion of Management or Tests:   ED Course as of 10/23/23 1309   Mon Oct 23, 2023   1104 Pt updated on HGB. Recommended PRBC transfusion states he wants to wait for CT to be resulted.    1231 Updated pt on CT findings. Pt consented for blood. Gen surg paged   1236 I spoke with Dr. Coleman with General Surgery, who suggests to consult Colorectal Surgery.   1300 I spoke with Rosendo-C for Dr. Moe from Colorectal Surgery. He suggests a colectomy tomorrow.   1307 I spoke with Dr. Saenz from Hospitalist Services, who accepts the patient for admission.     Social Determinants of Health affecting care:   None    Disposition:  The patient was admitted to the hospital under the care of Dr. Saenz.     Impression & Plan    Medical Decision Makin-year-old presenting today with abdominal distention, abdominal pain, decrease in bowel movements.  He reports over medical problems.  No prior abdominal surgeries.  States he has not seen a doctor in years.  Abdominal exam significant for distention, absent bowel sounds.  Concerning for SBO.  Basic blood work, CT abdomen pelvis ordered.  Hemoglobin  found to be low at 5.9.  Updated patient on these findings and recommended 1 unit of PRBCs however patient declined until CT was resulted.  CT showed concerns for liver masses as well as cecal mass with SBO.  Case discussed with general surgery they recommended colorectal surgery.  Discussed case with colorectal surgery they plan for possible colectomy tomorrow.  They will see him later today.  Discussed case with Dr. Saenz hospitalist.      Diagnosis:    ICD-10-CM    1. Anemia, unspecified type  D64.9       2. Liver mass  R16.0       3. SBO (small bowel obstruction) (H)  K56.609       4. Cecum mass  K63.89          Scribe Disclosure:  I, Norberto Mckeon, am serving as a scribe at 10:38 AM on 10/23/2023 to document services personally performed by Amanda Lange DO based on my observations and the provider's statements to me.   10/23/2023   Amanda Lange DO Doan, Tiffani, DO  10/23/23 1318

## 2023-10-23 NOTE — PLAN OF CARE
RECEIVING UNIT ED HANDOFF REVIEW    ED Nurse Handoff Report was reviewed by: Lili Russo RN on October 23, 2023 at 4:38 PM

## 2023-10-23 NOTE — ED NOTES
[facility]  ED Nurse Handoff Report    ED Chief complaint: Abdominal Pain      ED Diagnosis:   Final diagnoses:   Anemia, unspecified type   Liver mass   SBO (small bowel obstruction) (H)   Cecum mass       Code Status: not addressed at this time    Allergies: No Known Allergies    Patient Story: Pt c/o abd pain and bloating worse this past week. Pt has had abd pain for 2 yrs, diarrhea. Pt also has decreased appetite   Focused Assessment:  patient has flat affect and pale, thin looking. Patient has distended abd, and intermittent abd pain and nausea. Patient denies any respiratory symptoms    Treatments and/or interventions provided: 2 PIVs, NG, Blood transfusion, CT, Zofran  Patient's response to treatments and/or interventions: Decrease in nausea    To be done/followed up on inpatient unit:  Continue to monitor    Does this patient have any cognitive concerns?:  A/Ox4    Activity level - Baseline/Home:  Independent  Activity Level - Current:   Stand with Assist    Patient's Preferred language: English   Needed?: No    Isolation: None  Infection: Not Applicable  Patient tested for COVID 19 prior to admission: YES  Bariatric?: No    Vital Signs:   Vitals:    10/23/23 1340 10/23/23 1345 10/23/23 1415 10/23/23 1430   BP: 135/77 135/77 128/74 135/78   Pulse: 72  81 76   Resp: 16  16    Temp: 97.9  F (36.6  C)  98.5  F (36.9  C)    TempSrc:   Oral    SpO2:  100% 100% 100%   Weight:           Cardiac Rhythm:     Was the PSS-3 completed:   Yes  What interventions are required if any?               Family Comments: NA  OBS brochure/video discussed/provided to patient/family: N/A              Name of person given brochure if not patient: NA              Relationship to patient: NA    For the majority of the shift this patient's behavior was Green.   Behavioral interventions performed were None.    ED NURSE PHONE NUMBER: [phone]

## 2023-10-24 ENCOUNTER — ANESTHESIA EVENT (OUTPATIENT)
Dept: SURGERY | Facility: CLINIC | Age: 58
End: 2023-10-24
Payer: MEDICAID

## 2023-10-24 ENCOUNTER — ANESTHESIA (OUTPATIENT)
Dept: SURGERY | Facility: CLINIC | Age: 58
End: 2023-10-24
Payer: MEDICAID

## 2023-10-24 LAB
ALBUMIN SERPL BCG-MCNC: 3.6 G/DL (ref 3.5–5.2)
ALBUMIN SERPL BCG-MCNC: 3.8 G/DL (ref 3.5–5.2)
ALP SERPL-CCNC: 118 U/L (ref 40–129)
ALP SERPL-CCNC: 123 U/L (ref 40–129)
ALT SERPL W P-5'-P-CCNC: 14 U/L (ref 0–70)
ALT SERPL W P-5'-P-CCNC: 15 U/L (ref 0–70)
ANION GAP SERPL CALCULATED.3IONS-SCNC: 10 MMOL/L (ref 7–15)
ANION GAP SERPL CALCULATED.3IONS-SCNC: 10 MMOL/L (ref 7–15)
AST SERPL W P-5'-P-CCNC: 31 U/L (ref 0–45)
AST SERPL W P-5'-P-CCNC: 33 U/L (ref 0–45)
BILIRUB SERPL-MCNC: 1 MG/DL
BILIRUB SERPL-MCNC: 1 MG/DL
BUN SERPL-MCNC: 18.5 MG/DL (ref 6–20)
BUN SERPL-MCNC: 18.6 MG/DL (ref 6–20)
CALCIUM SERPL-MCNC: 8.3 MG/DL (ref 8.6–10)
CALCIUM SERPL-MCNC: 8.6 MG/DL (ref 8.6–10)
CEA SERPL-MCNC: 7.2 NG/ML
CHLORIDE SERPL-SCNC: 104 MMOL/L (ref 98–107)
CHLORIDE SERPL-SCNC: 105 MMOL/L (ref 98–107)
CREAT SERPL-MCNC: 0.63 MG/DL (ref 0.67–1.17)
CREAT SERPL-MCNC: 0.63 MG/DL (ref 0.67–1.17)
DEPRECATED HCO3 PLAS-SCNC: 25 MMOL/L (ref 22–29)
DEPRECATED HCO3 PLAS-SCNC: 25 MMOL/L (ref 22–29)
EGFRCR SERPLBLD CKD-EPI 2021: >90 ML/MIN/1.73M2
EGFRCR SERPLBLD CKD-EPI 2021: >90 ML/MIN/1.73M2
ERYTHROCYTE [DISTWIDTH] IN BLOOD BY AUTOMATED COUNT: 27.1 % (ref 10–15)
GLUCOSE SERPL-MCNC: 100 MG/DL (ref 70–99)
GLUCOSE SERPL-MCNC: 102 MG/DL (ref 70–99)
HCT VFR BLD AUTO: 29.9 % (ref 40–53)
HGB BLD-MCNC: 8.1 G/DL (ref 13.3–17.7)
HGB BLD-MCNC: 8.3 G/DL (ref 13.3–17.7)
HGB BLD-MCNC: 8.9 G/DL (ref 13.3–17.7)
INR PPP: 1.17 (ref 0.85–1.15)
IRON BINDING CAPACITY (ROCHE): 319 UG/DL (ref 240–430)
IRON SATN MFR SERPL: 2 % (ref 15–46)
IRON SERPL-MCNC: 6 UG/DL (ref 61–157)
MAGNESIUM SERPL-MCNC: 1.9 MG/DL (ref 1.7–2.3)
MCH RBC QN AUTO: 17.8 PG (ref 26.5–33)
MCHC RBC AUTO-ENTMCNC: 27.8 G/DL (ref 31.5–36.5)
MCV RBC AUTO: 64 FL (ref 78–100)
PHOSPHATE SERPL-MCNC: 2.7 MG/DL (ref 2.5–4.5)
PLATELET # BLD AUTO: 432 10E3/UL (ref 150–450)
PLATELET # BLD AUTO: 452 10E3/UL (ref 150–450)
POTASSIUM SERPL-SCNC: 3.8 MMOL/L (ref 3.4–5.3)
POTASSIUM SERPL-SCNC: 3.9 MMOL/L (ref 3.4–5.3)
PROT SERPL-MCNC: 5.6 G/DL (ref 6.4–8.3)
PROT SERPL-MCNC: 5.8 G/DL (ref 6.4–8.3)
RBC # BLD AUTO: 4.67 10E6/UL (ref 4.4–5.9)
SODIUM SERPL-SCNC: 139 MMOL/L (ref 135–145)
SODIUM SERPL-SCNC: 140 MMOL/L (ref 135–145)
TRIGL SERPL-MCNC: 59 MG/DL
WBC # BLD AUTO: 4.2 10E3/UL (ref 4–11)

## 2023-10-24 PROCEDURE — 250N000011 HC RX IP 250 OP 636: Performed by: NURSE ANESTHETIST, CERTIFIED REGISTERED

## 2023-10-24 PROCEDURE — 258N000003 HC RX IP 258 OP 636: Performed by: NURSE ANESTHETIST, CERTIFIED REGISTERED

## 2023-10-24 PROCEDURE — 88342 IMHCHEM/IMCYTCHM 1ST ANTB: CPT | Mod: 26 | Performed by: PATHOLOGY

## 2023-10-24 PROCEDURE — 250N000009 HC RX 250: Performed by: NURSE ANESTHETIST, CERTIFIED REGISTERED

## 2023-10-24 PROCEDURE — B4185 PARENTERAL SOL 10 GM LIPIDS: HCPCS | Mod: JZ | Performed by: COLON & RECTAL SURGERY

## 2023-10-24 PROCEDURE — 84134 ASSAY OF PREALBUMIN: CPT | Performed by: COLON & RECTAL SURGERY

## 2023-10-24 PROCEDURE — 84460 ALANINE AMINO (ALT) (SGPT): CPT | Performed by: COLON & RECTAL SURGERY

## 2023-10-24 PROCEDURE — 258N000001 HC RX 258: Performed by: INTERNAL MEDICINE

## 2023-10-24 PROCEDURE — 370N000017 HC ANESTHESIA TECHNICAL FEE, PER MIN: Performed by: COLON & RECTAL SURGERY

## 2023-10-24 PROCEDURE — 999N000040 HC STATISTIC CONSULT NO CHARGE VASC ACCESS

## 2023-10-24 PROCEDURE — 88305 TISSUE EXAM BY PATHOLOGIST: CPT | Mod: 26 | Performed by: PATHOLOGY

## 2023-10-24 PROCEDURE — 88309 TISSUE EXAM BY PATHOLOGIST: CPT | Mod: 26 | Performed by: PATHOLOGY

## 2023-10-24 PROCEDURE — 82378 CARCINOEMBRYONIC ANTIGEN: CPT | Performed by: INTERNAL MEDICINE

## 2023-10-24 PROCEDURE — 99255 IP/OBS CONSLTJ NEW/EST HI 80: CPT | Performed by: INTERNAL MEDICINE

## 2023-10-24 PROCEDURE — 999N000198 HC STATISTIC WOC PT EDUCATION, 16-30 MIN

## 2023-10-24 PROCEDURE — 36415 COLL VENOUS BLD VENIPUNCTURE: CPT | Performed by: HOSPITALIST

## 2023-10-24 PROCEDURE — 88360 TUMOR IMMUNOHISTOCHEM/MANUAL: CPT | Mod: TC | Performed by: COLON & RECTAL SURGERY

## 2023-10-24 PROCEDURE — 3E0436Z INTRODUCTION OF NUTRITIONAL SUBSTANCE INTO CENTRAL VEIN, PERCUTANEOUS APPROACH: ICD-10-PCS | Performed by: COLON & RECTAL SURGERY

## 2023-10-24 PROCEDURE — 88341 IMHCHEM/IMCYTCHM EA ADD ANTB: CPT | Mod: 26 | Performed by: PATHOLOGY

## 2023-10-24 PROCEDURE — 85018 HEMOGLOBIN: CPT | Performed by: COLON & RECTAL SURGERY

## 2023-10-24 PROCEDURE — 250N000011 HC RX IP 250 OP 636: Performed by: COLON & RECTAL SURGERY

## 2023-10-24 PROCEDURE — 250N000025 HC SEVOFLURANE, PER MIN: Performed by: COLON & RECTAL SURGERY

## 2023-10-24 PROCEDURE — 272N000452 HC KIT SHRLOCK 5FR POWER PICC TRIPLE LUMEN

## 2023-10-24 PROCEDURE — 250N000011 HC RX IP 250 OP 636: Performed by: HOSPITALIST

## 2023-10-24 PROCEDURE — 84155 ASSAY OF PROTEIN SERUM: CPT | Performed by: COLON & RECTAL SURGERY

## 2023-10-24 PROCEDURE — 999N000141 HC STATISTIC PRE-PROCEDURE NURSING ASSESSMENT: Performed by: COLON & RECTAL SURGERY

## 2023-10-24 PROCEDURE — 85610 PROTHROMBIN TIME: CPT | Performed by: HOSPITALIST

## 2023-10-24 PROCEDURE — 82040 ASSAY OF SERUM ALBUMIN: CPT | Performed by: HOSPITALIST

## 2023-10-24 PROCEDURE — 258N000003 HC RX IP 258 OP 636: Performed by: ANESTHESIOLOGY

## 2023-10-24 PROCEDURE — 84100 ASSAY OF PHOSPHORUS: CPT | Performed by: COLON & RECTAL SURGERY

## 2023-10-24 PROCEDURE — 99233 SBSQ HOSP IP/OBS HIGH 50: CPT | Performed by: HOSPITALIST

## 2023-10-24 PROCEDURE — 0DBB0ZZ EXCISION OF ILEUM, OPEN APPROACH: ICD-10-PCS | Performed by: COLON & RECTAL SURGERY

## 2023-10-24 PROCEDURE — 0DBW4ZX EXCISION OF PERITONEUM, PERCUTANEOUS ENDOSCOPIC APPROACH, DIAGNOSTIC: ICD-10-PCS | Performed by: COLON & RECTAL SURGERY

## 2023-10-24 PROCEDURE — 85027 COMPLETE CBC AUTOMATED: CPT | Performed by: HOSPITALIST

## 2023-10-24 PROCEDURE — 272N000001 HC OR GENERAL SUPPLY STERILE: Performed by: COLON & RECTAL SURGERY

## 2023-10-24 PROCEDURE — 88360 TUMOR IMMUNOHISTOCHEM/MANUAL: CPT | Mod: 26 | Performed by: PATHOLOGY

## 2023-10-24 PROCEDURE — 0DTF0ZZ RESECTION OF RIGHT LARGE INTESTINE, OPEN APPROACH: ICD-10-PCS | Performed by: COLON & RECTAL SURGERY

## 2023-10-24 PROCEDURE — 258N000001 HC RX 258: Performed by: COLON & RECTAL SURGERY

## 2023-10-24 PROCEDURE — 250N000011 HC RX IP 250 OP 636: Mod: JZ | Performed by: ANESTHESIOLOGY

## 2023-10-24 PROCEDURE — 36569 INSJ PICC 5 YR+ W/O IMAGING: CPT

## 2023-10-24 PROCEDURE — 250N000011 HC RX IP 250 OP 636: Performed by: INTERNAL MEDICINE

## 2023-10-24 PROCEDURE — 258N000003 HC RX IP 258 OP 636: Performed by: COLON & RECTAL SURGERY

## 2023-10-24 PROCEDURE — 710N000009 HC RECOVERY PHASE 1, LEVEL 1, PER MIN: Performed by: COLON & RECTAL SURGERY

## 2023-10-24 PROCEDURE — 120N000001 HC R&B MED SURG/OB

## 2023-10-24 PROCEDURE — 36415 COLL VENOUS BLD VENIPUNCTURE: CPT | Performed by: COLON & RECTAL SURGERY

## 2023-10-24 PROCEDURE — 250N000011 HC RX IP 250 OP 636: Mod: JZ | Performed by: COLON & RECTAL SURGERY

## 2023-10-24 PROCEDURE — 84478 ASSAY OF TRIGLYCERIDES: CPT | Performed by: COLON & RECTAL SURGERY

## 2023-10-24 PROCEDURE — 360N000077 HC SURGERY LEVEL 4, PER MIN: Performed by: COLON & RECTAL SURGERY

## 2023-10-24 PROCEDURE — 85049 AUTOMATED PLATELET COUNT: CPT | Performed by: COLON & RECTAL SURGERY

## 2023-10-24 PROCEDURE — 88307 TISSUE EXAM BY PATHOLOGIST: CPT | Mod: 26 | Performed by: PATHOLOGY

## 2023-10-24 PROCEDURE — 83735 ASSAY OF MAGNESIUM: CPT | Performed by: COLON & RECTAL SURGERY

## 2023-10-24 PROCEDURE — 250N000009 HC RX 250: Performed by: COLON & RECTAL SURGERY

## 2023-10-24 RX ORDER — VECURONIUM BROMIDE 1 MG/ML
INJECTION, POWDER, LYOPHILIZED, FOR SOLUTION INTRAVENOUS PRN
Status: DISCONTINUED | OUTPATIENT
Start: 2023-10-24 | End: 2023-10-24

## 2023-10-24 RX ORDER — DEXAMETHASONE SODIUM PHOSPHATE 4 MG/ML
INJECTION, SOLUTION INTRA-ARTICULAR; INTRALESIONAL; INTRAMUSCULAR; INTRAVENOUS; SOFT TISSUE PRN
Status: DISCONTINUED | OUTPATIENT
Start: 2023-10-24 | End: 2023-10-24

## 2023-10-24 RX ORDER — LORAZEPAM 0.5 MG/1
0.5 TABLET ORAL ONCE
Status: DISCONTINUED | OUTPATIENT
Start: 2023-10-24 | End: 2023-10-24

## 2023-10-24 RX ORDER — HYDROXYZINE HYDROCHLORIDE 25 MG/1
25 TABLET, FILM COATED ORAL EVERY 6 HOURS PRN
Status: DISCONTINUED | OUTPATIENT
Start: 2023-10-24 | End: 2023-10-24 | Stop reason: HOSPADM

## 2023-10-24 RX ORDER — MAGNESIUM HYDROXIDE 1200 MG/15ML
LIQUID ORAL PRN
Status: DISCONTINUED | OUTPATIENT
Start: 2023-10-24 | End: 2023-10-24 | Stop reason: HOSPADM

## 2023-10-24 RX ORDER — PROCHLORPERAZINE MALEATE 10 MG
10 TABLET ORAL EVERY 6 HOURS PRN
Status: DISCONTINUED | OUTPATIENT
Start: 2023-10-24 | End: 2023-11-03 | Stop reason: HOSPADM

## 2023-10-24 RX ORDER — HYDROMORPHONE HYDROCHLORIDE 1 MG/ML
INJECTION, SOLUTION INTRAMUSCULAR; INTRAVENOUS; SUBCUTANEOUS PRN
Status: DISCONTINUED | OUTPATIENT
Start: 2023-10-24 | End: 2023-10-24

## 2023-10-24 RX ORDER — DEXTROSE MONOHYDRATE, SODIUM CHLORIDE, AND POTASSIUM CHLORIDE 50; 1.49; 9 G/1000ML; G/1000ML; G/1000ML
INJECTION, SOLUTION INTRAVENOUS CONTINUOUS
Status: DISCONTINUED | OUTPATIENT
Start: 2023-10-24 | End: 2023-10-24

## 2023-10-24 RX ORDER — ONDANSETRON 4 MG/1
4 TABLET, ORALLY DISINTEGRATING ORAL EVERY 30 MIN PRN
Status: DISCONTINUED | OUTPATIENT
Start: 2023-10-24 | End: 2023-10-24 | Stop reason: HOSPADM

## 2023-10-24 RX ORDER — LIDOCAINE 40 MG/G
CREAM TOPICAL
Status: DISCONTINUED | OUTPATIENT
Start: 2023-10-24 | End: 2023-10-25

## 2023-10-24 RX ORDER — SODIUM CHLORIDE, SODIUM LACTATE, POTASSIUM CHLORIDE, CALCIUM CHLORIDE 600; 310; 30; 20 MG/100ML; MG/100ML; MG/100ML; MG/100ML
INJECTION, SOLUTION INTRAVENOUS CONTINUOUS
Status: ACTIVE | OUTPATIENT
Start: 2023-10-24 | End: 2023-10-26

## 2023-10-24 RX ORDER — FENTANYL CITRATE 0.05 MG/ML
50 INJECTION, SOLUTION INTRAMUSCULAR; INTRAVENOUS EVERY 5 MIN PRN
Status: DISCONTINUED | OUTPATIENT
Start: 2023-10-24 | End: 2023-10-24 | Stop reason: HOSPADM

## 2023-10-24 RX ORDER — SODIUM CHLORIDE, SODIUM LACTATE, POTASSIUM CHLORIDE, CALCIUM CHLORIDE 600; 310; 30; 20 MG/100ML; MG/100ML; MG/100ML; MG/100ML
INJECTION, SOLUTION INTRAVENOUS CONTINUOUS
Status: DISCONTINUED | OUTPATIENT
Start: 2023-10-24 | End: 2023-10-24 | Stop reason: HOSPADM

## 2023-10-24 RX ORDER — FENTANYL CITRATE 50 UG/ML
INJECTION, SOLUTION INTRAMUSCULAR; INTRAVENOUS PRN
Status: DISCONTINUED | OUTPATIENT
Start: 2023-10-24 | End: 2023-10-24

## 2023-10-24 RX ORDER — ONDANSETRON 2 MG/ML
4 INJECTION INTRAMUSCULAR; INTRAVENOUS ONCE
Status: COMPLETED | OUTPATIENT
Start: 2023-10-24 | End: 2023-10-24

## 2023-10-24 RX ORDER — KETOROLAC TROMETHAMINE 15 MG/ML
15 INJECTION, SOLUTION INTRAMUSCULAR; INTRAVENOUS EVERY 6 HOURS
Qty: 4 ML | Refills: 0 | Status: COMPLETED | OUTPATIENT
Start: 2023-10-24 | End: 2023-10-25

## 2023-10-24 RX ORDER — FENTANYL CITRATE 0.05 MG/ML
25 INJECTION, SOLUTION INTRAMUSCULAR; INTRAVENOUS EVERY 5 MIN PRN
Status: DISCONTINUED | OUTPATIENT
Start: 2023-10-24 | End: 2023-10-24 | Stop reason: HOSPADM

## 2023-10-24 RX ORDER — HEPARIN SODIUM 5000 [USP'U]/.5ML
5000 INJECTION, SOLUTION INTRAVENOUS; SUBCUTANEOUS
Status: COMPLETED | OUTPATIENT
Start: 2023-10-24 | End: 2023-10-24

## 2023-10-24 RX ORDER — PROCHLORPERAZINE 25 MG
25 SUPPOSITORY, RECTAL RECTAL EVERY 12 HOURS PRN
Status: DISCONTINUED | OUTPATIENT
Start: 2023-10-24 | End: 2023-11-03 | Stop reason: HOSPADM

## 2023-10-24 RX ORDER — ACETAMINOPHEN 325 MG/1
975 TABLET ORAL ONCE
Status: DISCONTINUED | OUTPATIENT
Start: 2023-10-24 | End: 2023-10-24 | Stop reason: HOSPADM

## 2023-10-24 RX ORDER — BUPIVACAINE HYDROCHLORIDE AND EPINEPHRINE 5; 5 MG/ML; UG/ML
INJECTION, SOLUTION PERINEURAL PRN
Status: DISCONTINUED | OUTPATIENT
Start: 2023-10-24 | End: 2023-10-24 | Stop reason: HOSPADM

## 2023-10-24 RX ORDER — HYDROMORPHONE HCL IN WATER/PF 6 MG/30 ML
0.4 PATIENT CONTROLLED ANALGESIA SYRINGE INTRAVENOUS EVERY 5 MIN PRN
Status: DISCONTINUED | OUTPATIENT
Start: 2023-10-24 | End: 2023-10-24 | Stop reason: HOSPADM

## 2023-10-24 RX ORDER — ONDANSETRON 2 MG/ML
4 INJECTION INTRAMUSCULAR; INTRAVENOUS EVERY 6 HOURS PRN
Status: DISCONTINUED | OUTPATIENT
Start: 2023-10-24 | End: 2023-10-24

## 2023-10-24 RX ORDER — DIPHENHYDRAMINE HYDROCHLORIDE 50 MG/ML
25 INJECTION INTRAMUSCULAR; INTRAVENOUS EVERY 6 HOURS PRN
Status: DISCONTINUED | OUTPATIENT
Start: 2023-10-24 | End: 2023-11-03 | Stop reason: HOSPADM

## 2023-10-24 RX ORDER — ONDANSETRON 2 MG/ML
INJECTION INTRAMUSCULAR; INTRAVENOUS PRN
Status: DISCONTINUED | OUTPATIENT
Start: 2023-10-24 | End: 2023-10-24

## 2023-10-24 RX ORDER — HYDROMORPHONE HCL IN WATER/PF 6 MG/30 ML
0.2 PATIENT CONTROLLED ANALGESIA SYRINGE INTRAVENOUS EVERY 5 MIN PRN
Status: DISCONTINUED | OUTPATIENT
Start: 2023-10-24 | End: 2023-10-24 | Stop reason: HOSPADM

## 2023-10-24 RX ORDER — PROPOFOL 10 MG/ML
INJECTION, EMULSION INTRAVENOUS CONTINUOUS PRN
Status: DISCONTINUED | OUTPATIENT
Start: 2023-10-24 | End: 2023-10-24

## 2023-10-24 RX ORDER — GLYCOPYRROLATE 0.2 MG/ML
INJECTION, SOLUTION INTRAMUSCULAR; INTRAVENOUS PRN
Status: DISCONTINUED | OUTPATIENT
Start: 2023-10-24 | End: 2023-10-24

## 2023-10-24 RX ORDER — METRONIDAZOLE 500 MG/100ML
500 INJECTION, SOLUTION INTRAVENOUS
Status: COMPLETED | OUTPATIENT
Start: 2023-10-24 | End: 2023-10-24

## 2023-10-24 RX ORDER — LIDOCAINE 40 MG/G
CREAM TOPICAL
Status: DISCONTINUED | OUTPATIENT
Start: 2023-10-24 | End: 2023-10-24

## 2023-10-24 RX ORDER — DEXTROSE MONOHYDRATE, SODIUM CHLORIDE, AND POTASSIUM CHLORIDE 50; 1.49; 9 G/1000ML; G/1000ML; G/1000ML
100 INJECTION, SOLUTION INTRAVENOUS CONTINUOUS
Status: DISCONTINUED | OUTPATIENT
Start: 2023-10-24 | End: 2023-10-24 | Stop reason: ALTCHOICE

## 2023-10-24 RX ORDER — NEOSTIGMINE METHYLSULFATE 1 MG/ML
VIAL (ML) INJECTION PRN
Status: DISCONTINUED | OUTPATIENT
Start: 2023-10-24 | End: 2023-10-24

## 2023-10-24 RX ORDER — LORAZEPAM 2 MG/ML
0.5 INJECTION INTRAMUSCULAR ONCE
Status: COMPLETED | OUTPATIENT
Start: 2023-10-24 | End: 2023-10-24

## 2023-10-24 RX ORDER — HYDRALAZINE HYDROCHLORIDE 20 MG/ML
2.5-5 INJECTION INTRAMUSCULAR; INTRAVENOUS EVERY 10 MIN PRN
Status: DISCONTINUED | OUTPATIENT
Start: 2023-10-24 | End: 2023-10-24 | Stop reason: HOSPADM

## 2023-10-24 RX ORDER — PROPOFOL 10 MG/ML
INJECTION, EMULSION INTRAVENOUS PRN
Status: DISCONTINUED | OUTPATIENT
Start: 2023-10-24 | End: 2023-10-24

## 2023-10-24 RX ORDER — ONDANSETRON 4 MG/1
4 TABLET, ORALLY DISINTEGRATING ORAL EVERY 6 HOURS PRN
Status: DISCONTINUED | OUTPATIENT
Start: 2023-10-24 | End: 2023-11-03 | Stop reason: HOSPADM

## 2023-10-24 RX ORDER — LIDOCAINE 40 MG/G
CREAM TOPICAL
Status: DISCONTINUED | OUTPATIENT
Start: 2023-10-24 | End: 2023-11-03 | Stop reason: HOSPADM

## 2023-10-24 RX ORDER — IBUPROFEN 600 MG/1
600 TABLET, FILM COATED ORAL EVERY 6 HOURS
Status: DISCONTINUED | OUTPATIENT
Start: 2023-10-25 | End: 2023-11-02

## 2023-10-24 RX ORDER — HEPARIN SODIUM 5000 [USP'U]/.5ML
5000 INJECTION, SOLUTION INTRAVENOUS; SUBCUTANEOUS EVERY 8 HOURS
Status: DISCONTINUED | OUTPATIENT
Start: 2023-10-25 | End: 2023-11-02

## 2023-10-24 RX ORDER — LIDOCAINE HYDROCHLORIDE 20 MG/ML
INJECTION, SOLUTION INFILTRATION; PERINEURAL PRN
Status: DISCONTINUED | OUTPATIENT
Start: 2023-10-24 | End: 2023-10-24

## 2023-10-24 RX ORDER — ONDANSETRON 2 MG/ML
4 INJECTION INTRAMUSCULAR; INTRAVENOUS EVERY 6 HOURS PRN
Status: DISCONTINUED | OUTPATIENT
Start: 2023-10-24 | End: 2023-11-03 | Stop reason: HOSPADM

## 2023-10-24 RX ORDER — ONDANSETRON 2 MG/ML
4 INJECTION INTRAMUSCULAR; INTRAVENOUS EVERY 30 MIN PRN
Status: DISCONTINUED | OUTPATIENT
Start: 2023-10-24 | End: 2023-10-24 | Stop reason: HOSPADM

## 2023-10-24 RX ORDER — ONDANSETRON 4 MG/1
4 TABLET, ORALLY DISINTEGRATING ORAL EVERY 6 HOURS PRN
Status: DISCONTINUED | OUTPATIENT
Start: 2023-10-24 | End: 2023-10-24

## 2023-10-24 RX ORDER — LORAZEPAM 2 MG/ML
0.5 INJECTION INTRAMUSCULAR 2 TIMES DAILY PRN
Status: DISCONTINUED | OUTPATIENT
Start: 2023-10-24 | End: 2023-11-03 | Stop reason: HOSPADM

## 2023-10-24 RX ORDER — DIMENHYDRINATE 50 MG/ML
25 INJECTION, SOLUTION INTRAMUSCULAR; INTRAVENOUS
Status: DISCONTINUED | OUTPATIENT
Start: 2023-10-24 | End: 2023-10-24 | Stop reason: HOSPADM

## 2023-10-24 RX ORDER — LABETALOL HYDROCHLORIDE 5 MG/ML
10 INJECTION, SOLUTION INTRAVENOUS
Status: DISCONTINUED | OUTPATIENT
Start: 2023-10-24 | End: 2023-10-24 | Stop reason: HOSPADM

## 2023-10-24 RX ORDER — CEFAZOLIN SODIUM/WATER 2 G/20 ML
2 SYRINGE (ML) INTRAVENOUS SEE ADMIN INSTRUCTIONS
Status: DISCONTINUED | OUTPATIENT
Start: 2023-10-24 | End: 2023-10-24 | Stop reason: HOSPADM

## 2023-10-24 RX ORDER — SODIUM CHLORIDE, SODIUM LACTATE, POTASSIUM CHLORIDE, CALCIUM CHLORIDE 600; 310; 30; 20 MG/100ML; MG/100ML; MG/100ML; MG/100ML
INJECTION, SOLUTION INTRAVENOUS CONTINUOUS PRN
Status: DISCONTINUED | OUTPATIENT
Start: 2023-10-24 | End: 2023-10-24

## 2023-10-24 RX ORDER — CEFAZOLIN SODIUM/WATER 2 G/20 ML
2 SYRINGE (ML) INTRAVENOUS
Status: COMPLETED | OUTPATIENT
Start: 2023-10-24 | End: 2023-10-24

## 2023-10-24 RX ORDER — DEXTROSE MONOHYDRATE 100 MG/ML
INJECTION, SOLUTION INTRAVENOUS CONTINUOUS PRN
Status: DISCONTINUED | OUTPATIENT
Start: 2023-10-24 | End: 2023-11-03 | Stop reason: HOSPADM

## 2023-10-24 RX ADMIN — OLIVE OIL AND SOYBEAN OIL 250 ML: 16; 4 INJECTION, EMULSION INTRAVENOUS at 21:13

## 2023-10-24 RX ADMIN — HYDROMORPHONE HYDROCHLORIDE 0.5 MG: 1 INJECTION, SOLUTION INTRAMUSCULAR; INTRAVENOUS; SUBCUTANEOUS at 10:38

## 2023-10-24 RX ADMIN — FENTANYL CITRATE 50 MCG: 50 INJECTION INTRAMUSCULAR; INTRAVENOUS at 14:44

## 2023-10-24 RX ADMIN — ASCORBIC ACID, VITAMIN A PALMITATE, CHOLECALCIFEROL, THIAMINE HYDROCHLORIDE, RIBOFLAVIN-5 PHOSPHATE SODIUM, PYRIDOXINE HYDROCHLORIDE, NIACINAMIDE, DEXPANTHENOL, ALPHA-TOCOPHEROL ACETATE, VITAMIN K1, FOLIC ACID, BIOTIN, CYANOCOBALAMIN: 200; 3300; 200; 6; 3.6; 6; 40; 15; 10; 150; 600; 60; 5 INJECTION, SOLUTION INTRAVENOUS at 21:14

## 2023-10-24 RX ADMIN — MIDAZOLAM 2 MG: 1 INJECTION INTRAMUSCULAR; INTRAVENOUS at 14:16

## 2023-10-24 RX ADMIN — PROPOFOL 30 MCG/KG/MIN: 10 INJECTION, EMULSION INTRAVENOUS at 14:31

## 2023-10-24 RX ADMIN — HYDROMORPHONE HYDROCHLORIDE 0.5 MG: 1 INJECTION, SOLUTION INTRAMUSCULAR; INTRAVENOUS; SUBCUTANEOUS at 15:01

## 2023-10-24 RX ADMIN — PHENYLEPHRINE HYDROCHLORIDE 100 MCG: 10 INJECTION INTRAVENOUS at 16:48

## 2023-10-24 RX ADMIN — HEPARIN SODIUM 5000 UNITS: 5000 INJECTION, SOLUTION INTRAVENOUS; SUBCUTANEOUS at 14:08

## 2023-10-24 RX ADMIN — ONDANSETRON 4 MG: 2 INJECTION INTRAMUSCULAR; INTRAVENOUS at 14:13

## 2023-10-24 RX ADMIN — PROPOFOL 150 MG: 10 INJECTION, EMULSION INTRAVENOUS at 14:26

## 2023-10-24 RX ADMIN — ROCURONIUM BROMIDE 50 MG: 50 INJECTION, SOLUTION INTRAVENOUS at 14:26

## 2023-10-24 RX ADMIN — ONDANSETRON 4 MG: 2 INJECTION INTRAMUSCULAR; INTRAVENOUS at 16:40

## 2023-10-24 RX ADMIN — NEOSTIGMINE METHYLSULFATE 3.5 MG: 1 INJECTION, SOLUTION INTRAVENOUS at 16:56

## 2023-10-24 RX ADMIN — SODIUM CHLORIDE, POTASSIUM CHLORIDE, SODIUM LACTATE AND CALCIUM CHLORIDE: 600; 310; 30; 20 INJECTION, SOLUTION INTRAVENOUS at 14:17

## 2023-10-24 RX ADMIN — SODIUM CHLORIDE, POTASSIUM CHLORIDE, SODIUM LACTATE AND CALCIUM CHLORIDE: 600; 310; 30; 20 INJECTION, SOLUTION INTRAVENOUS at 20:00

## 2023-10-24 RX ADMIN — METRONIDAZOLE 500 MG: 500 INJECTION, SOLUTION INTRAVENOUS at 13:43

## 2023-10-24 RX ADMIN — DEXAMETHASONE SODIUM PHOSPHATE 4 MG: 4 INJECTION, SOLUTION INTRA-ARTICULAR; INTRALESIONAL; INTRAMUSCULAR; INTRAVENOUS; SOFT TISSUE at 14:49

## 2023-10-24 RX ADMIN — Medication: at 20:57

## 2023-10-24 RX ADMIN — PHENYLEPHRINE HYDROCHLORIDE 100 MCG: 10 INJECTION INTRAVENOUS at 16:21

## 2023-10-24 RX ADMIN — VECURONIUM BROMIDE 1 MG: 1 INJECTION, POWDER, LYOPHILIZED, FOR SOLUTION INTRAVENOUS at 16:03

## 2023-10-24 RX ADMIN — Medication 2 G: at 14:30

## 2023-10-24 RX ADMIN — VECURONIUM BROMIDE 3 MG: 1 INJECTION, POWDER, LYOPHILIZED, FOR SOLUTION INTRAVENOUS at 14:45

## 2023-10-24 RX ADMIN — FENTANYL CITRATE 50 MCG: 50 INJECTION INTRAMUSCULAR; INTRAVENOUS at 14:26

## 2023-10-24 RX ADMIN — GLYCOPYRROLATE 0.5 MG: 0.2 INJECTION, SOLUTION INTRAMUSCULAR; INTRAVENOUS at 16:56

## 2023-10-24 RX ADMIN — PHENYLEPHRINE HYDROCHLORIDE 100 MCG: 10 INJECTION INTRAVENOUS at 16:03

## 2023-10-24 RX ADMIN — POTASSIUM CHLORIDE, DEXTROSE MONOHYDRATE AND SODIUM CHLORIDE 100 ML/HR: 150; 5; 900 INJECTION, SOLUTION INTRAVENOUS at 10:43

## 2023-10-24 RX ADMIN — HYDROMORPHONE HYDROCHLORIDE 0.4 MG: 0.2 INJECTION, SOLUTION INTRAMUSCULAR; INTRAVENOUS; SUBCUTANEOUS at 17:55

## 2023-10-24 RX ADMIN — LORAZEPAM 0.5 MG: 2 INJECTION INTRAMUSCULAR; INTRAVENOUS at 08:52

## 2023-10-24 RX ADMIN — LIDOCAINE HYDROCHLORIDE 2 ML: 10 INJECTION, SOLUTION INFILTRATION; PERINEURAL at 09:25

## 2023-10-24 RX ADMIN — SODIUM CHLORIDE, POTASSIUM CHLORIDE, SODIUM LACTATE AND CALCIUM CHLORIDE: 600; 310; 30; 20 INJECTION, SOLUTION INTRAVENOUS at 14:40

## 2023-10-24 RX ADMIN — KETOROLAC TROMETHAMINE 15 MG: 15 INJECTION, SOLUTION INTRAMUSCULAR; INTRAVENOUS at 20:02

## 2023-10-24 RX ADMIN — POTASSIUM CHLORIDE, DEXTROSE MONOHYDRATE AND SODIUM CHLORIDE 100 ML/HR: 150; 5; 900 INJECTION, SOLUTION INTRAVENOUS at 02:29

## 2023-10-24 RX ADMIN — LIDOCAINE HYDROCHLORIDE 100 MG: 20 INJECTION, SOLUTION INFILTRATION; PERINEURAL at 14:26

## 2023-10-24 ASSESSMENT — ACTIVITIES OF DAILY LIVING (ADL)
ADLS_ACUITY_SCORE: 18
ADLS_ACUITY_SCORE: 22
ADLS_ACUITY_SCORE: 22
ADLS_ACUITY_SCORE: 18
ADLS_ACUITY_SCORE: 22
ADLS_ACUITY_SCORE: 18
ADLS_ACUITY_SCORE: 22
ADLS_ACUITY_SCORE: 18
DEPENDENT_IADLS:: INDEPENDENT

## 2023-10-24 ASSESSMENT — ENCOUNTER SYMPTOMS: SEIZURES: 0

## 2023-10-24 ASSESSMENT — LIFESTYLE VARIABLES: TOBACCO_USE: 0

## 2023-10-24 NOTE — CONSULTS
"SPIRITUAL HEALTH SERVICES - Consult Note    Gen Surg  Saw pt Brady Lenz per consult request.     Patient/Family Understanding of Illness and Goals of Care - Brady stated that he has \"been suffering\" and it became \"too much\" so he came in for medical care yesterday. He reports that he will \"have surgery\" later today.      Distress and Loss - In addition to the physical pain, Brady also admitted to \"other kinds of suffering.\" He did not elaborate on that in our short time together.     Strengths, Coping, and Resources - Brady was unable to name coping mechanisms or strategies during this interaction. Another unit staff person mentioned a referral to psych and Brady expressed that he is \"wary\" of drugs used to treat depression and other mental health concerns.     Meaning, Beliefs, and Spirituality - Brady clearly stated that he does not consider himself either \"Anabaptist or spiritual.\"     Plan of Care - Our visit was interrupted by cares that needed to occur and we agreed to connect later. Brady expressed an openness to more conversation. When I checked back prior to surgery, he was either receiving care or sleeping.  will continue to follow patient.       Zoe Fox  Chaplain Resident    [facility] routine referrals *[phone]    [facility] available 24/7 for emergent requests/referrals, either by paging the on-call  or by entering an ASAP/STAT consult in Epic (this will also page the on-call ).   "

## 2023-10-24 NOTE — PROGRESS NOTES
[facility]  Hospitalist Progress Note   10/24/2023          Assessment and Plan:       Brady Lenz is a 58 year old male with no known past medical history who does not see a doctor on a regular basis admitted on 10/23/2023 with abdominal pain and distention.     Cecal mass with resultant small bowel obstruction  Liver masses, concerning for metastatic disease  *Patient does not see any physicians on a regular basis, he has not had any routine cancer screening exams including a colonoscopy. He presented to the ED for evaluation of a 2-week history of intermittent abdominal pain and worsening distention. Later went on to state he has had similar symptoms intermittently over the past 1-2 years, along with a decreased appetite.  *In the ED, he was afebrile and hemodynamically stable. He was ill-appearing. Labs were notable for hemoglobin of 5.9, MCV 58, platelet count of 473; alk phos 135, remainder of LFTs were stable. Electrolytes and renal function were stable. CT abd/pelvis obtained and showed a 7cm mass in the cecum causing small bowel obstruction and 2 liver masses measuring up to 10.6 cm in the right lobe and 4.9cm in the left lobe concerning for metastatic disease. NG tube placed in the ED.   -- Colorectal surgery following.  Appreciate comanagement.   -- cont NG to LIS  -- NPO, supportive cares with IVFs,   --/P.o. as needed pain meds.  IV/p.o. as needed antiemetics.  [place] oncology consult requested.  Appreciate comanagement.    CT Chest prior to discharge.    Suspected subacute to chronic microcytic anemia, secondary to blood loss from cecal mass  Status post 3 units of blood transfusion.  *With presentation and labs as above. Suspect some degree of subacute to chronic blood loss given he is presently hemodynamically stable and denies any recent melanotic stools or BRBPR.  -- Received total 3 units of blood transfusion on 10/23 to 10/24.  Hemoglobin this morning 8.3.    Monitor  hemoglobin levels every 12 hours.  Conditional order to transfuse for hemoglobin less than 8.  Will follow iron studies.    Acute anxiety   Concern for undiagnosed depression.  Patient having anxiety, flat affect.  As needed Ativan ordered.  Discussed regarding psychiatry evaluation -plan for surgery today, will consider postoperatively.  Patient declined discussing about his care with any of the family members.  Will consider CT of head If continues to have flat affect.  No neurological weakness at this time.    Social stressors.  Patient reports works as a / concerned with living situation.  Care coordinator / assistance with support requested.    Orders Placed This Encounter      NPO for Medical/Clinical Reasons Except for: No Exceptions      DVT Prophylaxis: SCDs, postprocedure pharmacological DVT prophylaxis per surgical team.  Code Status: Full Code  Disposition: Expected discharge in 1 to 2 days pending clinical improvement.    Discussed with patient, bedside RN.  High complexity. More than 70% of time spent in direct patient care, care coordination, patient counseling, and formalizing plan of care.      Brandt Macario MD        Interval History:      Patient lying in bed.  Calm, slow to respond.  Denies any thoughts of hurting himself.  Flat affect.  Complaining of abdominal pain.  Reports small bowel movement.  Able no issues with urination.  Denies any blood in the stool or blood in the urine at this time.  Received 3 units of blood transfusion since admission.  Continues to have output from NG.  Continue n.p.o., on IV fluids.         Physical Exam:        Physical Exam   Temp:  [97.8  F (36.6  C)-98.7  F (37.1  C)] 98.7  F (37.1  C)  Pulse:  [65-98] 71  Resp:  [16-18] 16  BP: (124-143)/(65-78) 124/69  SpO2:  [84 %-100 %] 99 %    Intake/Output Summary (Last 24 hours) at 10/24/2023 0892  Last data filed at 10/24/2023 0742  Gross per 24 hour   Intake 917 ml   Output 900 ml   Net 17 ml        Admission Weight: 68 kg (150 lb)  Current Weight: 68 kg (150 lb)    PHYSICAL EXAM  GENERAL: Patient flat affect, slow to respond.  HEENT: Oropharynx pink, Pupils equal  HEART: Regular rate and rhythm. S1S2. No murmurs  LUNGS: Clear to auscultation bilaterally. No expiratory wheeze.  Respirations unlabored  ABDOMEN: Distended, tender, sluggish bowel sounds.  NEURO: moving all extremities.  EXTREMITIES: No pedal edema.   SKIN: Warm, dry. No rash  PSYCHIATRY Cooperative       Medications:         LORazepam  0.5 mg Intravenous Once     HYDROmorphone, lidocaine 4%, lidocaine (buffered or not buffered), LORazepam, melatonin, naloxone **OR** naloxone **OR** naloxone **OR** naloxone, ondansetron **OR** ondansetron, phenol MT, prochlorperazine **OR** prochlorperazine **OR** prochlorperazine, sodium chloride (PF)         Data:      All new lab and imaging data was reviewed.

## 2023-10-24 NOTE — ANESTHESIA CARE TRANSFER NOTE
Patient: Brady Lenz    Procedure: Procedure(s):  Laparoscopic Peritoneal Biopsy Converted to  Open Right Colectomy       Diagnosis: Cecum mass [K63.89]  Diagnosis Additional Information: No value filed.    Anesthesia Type:   General     Note:    Oropharynx: oropharynx clear of all foreign objects  Level of Consciousness: awake and drowsy  Oxygen Supplementation: face mask    Independent Airway: airway patency satisfactory and stable  Dentition: dentition unchanged  Vital Signs Stable: post-procedure vital signs reviewed and stable  Report to RN Given: handoff report given  Patient transferred to: PACU    Handoff Report: Identifed the Patient, Identified the Reponsible Provider, Reviewed the pertinent medical history, Discussed the surgical course, Reviewed Intra-OP anesthesia mangement and issues during anesthesia, Set expectations for post-procedure period and Allowed opportunity for questions and acknowledgement of understanding      Vitals:  Vitals Value Taken Time   BP     Temp     Pulse     Resp     SpO2         Electronically Signed By: GABBY Johnston CRNA  October 24, 2023  5:24 PM

## 2023-10-24 NOTE — ANESTHESIA PREPROCEDURE EVALUATION
Anesthesia Pre-Procedure Evaluation    Patient: Brady Lenz   MRN: [id number] : 1965        Procedure : Procedure(s):  Laparoscopic Possible Open Right Colectomy possible stoma          No past medical history on file.   No past surgical history on file.   No Known Allergies   Social History     Tobacco Use    Smoking status: Not on file    Smokeless tobacco: Not on file   Substance Use Topics    Alcohol use: Not on file      Wt Readings from Last 1 Encounters:   10/23/23 68 kg (150 lb)        Anesthesia Evaluation   Pt has had prior anesthetic.     No history of anesthetic complications       ROS/MED HX  ENT/Pulmonary:    (-) tobacco use, asthma, sleep apnea and recent URI   Neurologic:    (-) no seizures and no CVA   Cardiovascular:  - neg cardiovascular ROS     METS/Exercise Tolerance:     Hematologic:     (+)      anemia (s/p PRBCs x3. Hgb 8.3 today),          Musculoskeletal:       GI/Hepatic: Comment: Malignant small bowel obstruction    (+) GERD,                   Renal/Genitourinary:       Endo:    (-) Type II DM and thyroid disease   Psychiatric/Substance Use:     (+) psychiatric history depression       Infectious Disease:       Malignancy:   (+) Malignancy (Presumed proximal colon cancer with liver mets),     Other: Comment: Cecal mass  Bowel obstruction           Physical Exam    Airway        Mallampati: I   TM distance: > 3 FB   Neck ROM: full   Mouth opening: > 3 cm    Respiratory Devices and Support         Dental       (+) Completely normal teeth      Cardiovascular   cardiovascular exam normal          Pulmonary   pulmonary exam normal            Other findings: Ng in place  OUTSIDE LABS:  CBC:   Lab Results   Component Value Date    WBC 4.2 10/24/2023    WBC 4.2 10/23/2023    HGB 8.3 (L) 10/24/2023    HGB 8.1 (L) 10/23/2023    HCT 29.9 (L) 10/24/2023    HCT 23.4 (L) 10/23/2023     10/24/2023     (H) 10/23/2023     BMP:   Lab Results   Component Value Date      "10/24/2023     10/24/2023    POTASSIUM 3.8 10/24/2023    POTASSIUM 3.9 10/24/2023    CHLORIDE 105 10/24/2023    CHLORIDE 104 10/24/2023    CO2 25 10/24/2023    CO2 25 10/24/2023    BUN 18.5 10/24/2023    BUN 18.6 10/24/2023    CR 0.63 (L) 10/24/2023    CR 0.63 (L) 10/24/2023     (H) 10/24/2023     (H) 10/24/2023     COAGS:   Lab Results   Component Value Date    PTT 34 10/23/2023    INR 1.17 (H) 10/24/2023     POC: No results found for: \"BGM\", \"HCG\", \"HCGS\"  HEPATIC:   Lab Results   Component Value Date    ALBUMIN 3.6 10/24/2023    PROTTOTAL 5.6 (L) 10/24/2023    ALT 14 10/24/2023    AST 31 10/24/2023    ALKPHOS 118 10/24/2023    BILITOTAL 1.0 10/24/2023     OTHER:   Lab Results   Component Value Date    NAT 8.3 (L) 10/24/2023    PHOS 2.7 10/24/2023    MAG 1.9 10/24/2023    LIPASE 10 (L) 10/23/2023       Anesthesia Plan    ASA Status:  3    NPO Status:  NPO Appropriate    Anesthesia Type: General.     - Airway: ETT   Induction: Intravenous, Propofol.   Maintenance: Balanced.   Techniques and Equipment:     - Lines/Monitors: 2nd IV     Consents    Anesthesia Plan(s) and associated risks, benefits, and realistic alternatives discussed. Questions answered and patient/representative(s) expressed understanding.     - Discussed: Risks, Benefits and Alternatives for the PROCEDURE were discussed     - Discussed with:  Patient       Use of blood products discussed: Yes.     Postoperative Care    Pain management: Multi-modal analgesia, IV analgesics, Oral pain medications.   PONV prophylaxis: Ondansetron (or other 5HT-3), Dexamethasone or Solumedrol, Background Propofol Infusion     Comments:    Other Comments: TAP blocks PRN            Edwige Patton MD  "

## 2023-10-24 NOTE — ANESTHESIA PROCEDURE NOTES
Airway       Patient location during procedure: OR       Procedure Start/Stop Times: 10/24/2023 2:29 PM  Staff -        Performed By: anesthesiologist and CRNA  Consent for Airway        Urgency: elective  Indications and Patient Condition       Indications for airway management: angela-procedural       Induction type:intravenous       Mask difficulty assessment: 2 - vent by mask + OA or adjuvant +/- NMBA    Final Airway Details       Final airway type: endotracheal airway       Successful airway: ETT - single  Endotracheal Airway Details        ETT size (mm): 8.0       Cuffed: yes       Successful intubation technique: video laryngoscopy       VL Blade Size: Glidescope 4       Grade View of Cords: 1       Adjucts: stylet       Position: Right       Measured from: lips       Secured at (cm): 24       Bite block used: None    Post intubation assessment        Placement verified by: capnometry, equal breath sounds and chest rise        Number of attempts at approach: 1       Number of other approaches attempted: 0       Secured with: silk tape       Ease of procedure: easy       Dentition: Intact and Unchanged    Medication(s) Administered   Medication Administration Time: 10/24/2023 2:29 PM

## 2023-10-24 NOTE — PLAN OF CARE
"Goal Outcome Evaluation: Pt A/O x4, flat affect, slow to respond trouble making decisions, endorsed depression \"for the past 3 years\" Pt appears cachectic, ABD soft, tender LLQ, BS hyperactive. Pt endorses soft small BM 10/23 AM. Skin WDL. RUE PICC placed with good blood return. L PIV infusions. IV Dilaudid for pain x1 and ativan x1 for anxiety/mild nausea. NG to LIS with copious brown/green output. Uses urinal at bedside. NPO-no exceptions. plan: Psych consult, LSW, potential colectomy w/ ostomy, colorectal and Hem/ONC.       Plan of Care Reviewed With: patient                 "

## 2023-10-24 NOTE — OP NOTE
OPERATIVE REPORT    PREOPERATIVE DIAGNOSIS:  Small bowel obstruction due to cecal mass     POSTOPERATIVE DIAGNOSIS:  same     PROCEDURE:     1.  Open right colectomy  2.  Laparoscopic peritoneal biopsy        SURGEON:  Jose Moe MD      FIRST ASSISTANT: Oma Metz MD [facility] corrective surgery fellow      ANESTHESIA:  General.      ESTIMATED BLOOD LOSS: 50 mL.      SPECIMENS: Right colon, terminal ileum     INDICATIONS: This is a 58-year-old gentleman who presents to the hospital with abdominal distention and abdominal pain.  Evaluation demonstrated a small bowel obstruction secondary to a large mass in the cecum with 2 large liver metastasis.  An NG tube was placed for decompression.  He also presented with significant anemia.  This was corrected with slow transfusion of 2 units of packed red blood cells.  I recommended a diagnostic laparoscopy with possible ileostomy and possible right colectomy.  The risks of surgery including bleeding, infection, injury to adjacent structures, need for an ostomy, complications the anesthesia and expectations of recovery were discussed.  He was marked by an ostomy nurse preoperatively.  He wishes to proceed.    FINDINGS: There is a large mass in the cecum.  The small bowel was quite dilated especially distally.  There were some abnormal lymph nodes within the terminal ileal small bowel mesentery.  Therefore an additional portion of the terminal ileum was resected.  2 large liver metastasis were identified.  We were able to mobilize the colon in a lateral to medial fashion and perform a side-to-side and functional end-to-end anastomosis.  There was a small implant on the peritoneum in the left upper quadrant over the spleen.  This was resected laparoscopically.  There is otherwise no evidence of carcinomatosis.     DESCRIPTION OF PROCEDURE: After obtaining form consent the patient brought to the operating room and laid supine on the operative table.  The  nasogastric tube was in place at this time.  General anesthesia was induced and he was intubated without difficulty.  A Fairchild catheter was placed under sterile conditions.  His right arm was out on an armboard.  He was secured to the table with tape across the chest and straps across the legs.  The abdomen is shaved prepped and draped in usual sterile fashion.  A timeout was undertaken which identified the patient correct procedure    We made a stab incision in the left upper quadrant.  A Veress needle was utilized to establish pneumoperitoneum.  A 12 mm trocar was placed in the supraumbilical position.  There is no injury to the underlying viscera.  We surveyed the abdomen with a 10 mm 30 degree scope.    We then performed a transversus abdominis plane block with a total of 30 cc half percent Marcaine with dilute epinephrine infiltrated in 4 quadrants of the abdomen with equal aliquots.  Two 5 mm ports were placed on the patient's right side under direct visualization.  We surveyed the abdomen.  There was no significant carcinomatosis other than a small implant along the peritoneum in the left upper quadrant.  We utilized these right-sided trocars to excise this peritoneal implant.  This was sent off for routine examination.  We then elected to proceed with an attempt at resection.  Given the significant dilation of the small bowel we did not feel that it was safe to proceed laparoscopically.  Therefore we converted to an open procedure via a midline laparotomy.  We entered into the abdomen without difficulty and the laparoscopic instruments were removed and sent off.  We utilized an Ivan wound retractor and a Bookwalter retractor from its exposure.  Small bowel was packed to the patient's left side.  The mass could be palpated in the cecum.  It was mobile.  With great care we incised the lateral attachments to mobilize the right colon in a lateral to medial fashion.  We first took the inferior attachments  elevating the small bowel mesentery up off the retroperitoneum.  We then took the lateral attachments along the white line of Toldt up and around the hepatic flexure.  This allowed us to identify the duodenum.  We then elevated the mass up off the retroperitoneum with great care to avoid injury to the ureter.  Once we had mobilized everything it medial to the duodenum we isolated the ileocolic pedicle and the mesentery was taken with a vessel sealing device.  A point for proximal transection about 5 cm proximal to the ileocecal valve was chosen the bowel was isolated here in the small bowel mesentery was taken with the vessel sealing device.  The bowel here was transected with a single firing of a blue load 100 mm SUNDAY stapler.    We turned attention to the transverse colon.  Point the proximal transverse colon was chosen for distal transection.  The intervening mesentery was taken with the vessel sealing device and then the bowel was transected with a single firing of a blue load 100 mm SUNDAY stapler.  The specimen was sent off for routine examination.    We had significant dilation of the small bowel at this time.  We eviscerated the terminal ileum over the right side of the patient's abdomen.  A portion of the staple line was cut and we milked out approximately 3 L of succus into a large basin which was suctioned.  Once this nicely decompressed the small bowel the enterotomy was closed with another application of the blue load 100 mm SUNDAY stapler.  I then changed my gloves.  We evaluated the terminal ileum and identified this cluster of lymph nodes that was concerning for metastatic disease.  I therefore elected to resect an additional 10 cm of the terminal ileum.  A point for proximal transection was chosen by the bowel was isolated with electrocautery.  The mesentery was taken between Carmalt clamps and controlled with Vicryl ties.    We then turned our attention to creating an anastomosis.  An antimesenteric  enterotomy was made where the bowel was isolated in the ileum.  An antimesenteric colotomy was made in the transverse colon.  A 75 mm blue load SUNDAY stapler was fashion within the lumen of the bowel to create a side-to-side functional end-to-end anastomosis.  The staple was closed and fired.  The staple line was hemostatic.  The common enterotomy was closed and the terminal ileal specimen was transected with 2 applications of a 90 mm blue load SUNDAY stapler.  The specimen was sent off.  The transverse staple line was reinforced with a running 3-0 PDS suture.  Other points of the staple line were reinforced with interrupted 3-0 Vicryl suture.  The anastomosis was widely patent and well vascularized.  There was no tension.    The mesentery was inspected for hemostasis.  Hemostasis was excellent.  We utilized a closing tray for closing.  The fascia was reapproximated to running sutures of single-stranded 0 PDS.  Skin and subcutaneous fat was irrigated.  Skin the midline was closed with staples.  The port sites was closed with 4-0 Monocryl in a subcuticular fashion Dermabond was placed as a dressing at the sites.  An island dressing was placed in the midline.  The nasogastric tube was  left in place.  The Fairchild catheter was left in place.  The patient was awakened, extubated, and transferred to the PACU in stable condition.  Counts were correct at the end of the case.    Jose Moe MD FACS FASCRS  Colorectal Surgeon       [facility]  [address], [address]  [place] [address]  T: [phone]  F: [phone]  Pager: [phone]  [email or web address]

## 2023-10-24 NOTE — PROGRESS NOTES
Date & Time: 10/23/23-10/24/23 0420-6653  Surgery/POD#: N/A new cecal mas with possible mets to liver  Behavior & Aggression: green  Fall Risk: N/A  Orientation: Aox4, very flat affect  ABNL VS/O2: VSS on RA  ABNL Labs: Hgb: 5.9 in ED, days transfused 1UPRB, Writer infused second unit, HGB 8.1  Pain Management: Denies  Bowel/Bladder: no BM, urinal at bedside, minimal urine output.  Drains: NG to LIS, x2 PIV infusing D5% in NS +20 KCL @100 and 2 of 2 Units PRBC  Diet: NPO, no exceptions  Activity Level: SBA  Tests/Procedures: Possible bowl resection with stoma 10/24 with Dr. Moe, pt stating he has not consented to this.   Anticipated  DC Date: Pending     Significant Information: NG intact, complaints of throat pain, chloracetic spray given. Writer educated pt on NPO status r/t surgery and pt stated he did not consent to having surgery. Writer educated pt and requested to cleanse with CHG wipes twice, and pt declined.

## 2023-10-24 NOTE — PROCEDURES
[facility]    Triple Lumen PICC Placement    Date/Time: 10/24/2023 9:30 AM    Performed by: Josie King RN  Authorized by: Jose Moe MD  Indications: vascular access      UNIVERSAL PROTOCOL   Site Marked: Yes  Prior Images Obtained and Reviewed:  Yes  Required items: Required blood products, implants, devices and special equipment available    Patient identity confirmed:  Verbally with patient, arm band and hospital-assigned identification number  NA - No sedation, light sedation, or local anesthesia  Confirmation Checklist:  Patient's identity using two indicators, relevant allergies, procedure was appropriate and matched the consent or emergent situation and correct equipment/implants were available  Time out: Immediately prior to the procedure a time out was called    Universal Protocol: the Joint Commission Universal Protocol was followed    Preparation: Patient was prepped and draped in usual sterile fashion    ESBL (mL):  1     ANESTHESIA    Anesthesia:  Local infiltration  Local Anesthetic:  Lidocaine 1% without epinephrine  Anesthetic Total (mL):  2      SEDATION    Patient Sedated: No        Preparation: skin prepped with ChloraPrep  Skin prep agent: skin prep agent completely dried prior to procedure  Sterile barriers: maximum sterile barriers were used: cap, mask, sterile gown, sterile gloves, and large sterile sheet  Hand hygiene: hand hygiene performed prior to central venous catheter insertion  Type of line used: PICC  Catheter type: triple lumen  Lumen type: valved and power PICC  Lumen Identification: Purple, Gray and White  Catheter size: 5 Fr  Brand: Bard  Lot number: [id number]  Placement method: venipuncture  Number of attempts: 1  Difficulty threading catheter: no  Successful placement: yes  Orientation: right    Location: basilic vein  Tip Location: SVC/RA Junction  Arm circumference: adults 10 cm  Extremity circumference: 25  Visible catheter length:  3  Total catheter length: 53  Dressing and securement: adhesive securement device, chlorhexidine disc applied, blood removed, blood cleaned with CHG, subcutaneous anchor securement system, alcohol impregnated caps, gloves changed prior to final dressing and occlusive dressing applied  Post procedure assessment: blood return through all ports and placement verified by 3CG technology  PROCEDURE Describe Procedure: Patient instruct on PICC placement. Patient verbalized an understanding. Patient gave verbal and written consent for PICC placement. VAT RN placed triple lumen 5FR catheter in right basilic vein. Patient tolerated well. Blood return noted. PICC placement was confirmed with 3 CG technology noting green highlights on rhythm strip. RN caring for patient updated that PICC is ok to use.   Disposal: sharps and needle count correct at the end of procedure, needles and guidewire disposed in sharps container

## 2023-10-24 NOTE — CONSULTS
"Triage and Transition - Consult and Liaison     Brady Lenz  October 24, 2023    Psychiatry consult acknowledged. I briefly saw patient, he requested to not meet today due to having surgery scheduled this afternoon. He states \"can we just wait until after surgery\". I indicated we would check in with him again tomorrow. A consult will be placed for follow up tomorrow.     Cheri Daniel, [facility]   Triage and Transition - Consult and Liaison   [phone]    "

## 2023-10-24 NOTE — CONSULTS
"[facility]  WO Nurse Ostomy Marking and Education         Data:     History:    58 year old male with no known past medical history who does not see a doctor on a regular basis who presented to the emergency room for evaluation of 2-week history of abdominal pain and distention.  He was found to have hemoglobin of 5.9 and imaging showed a cecal mass resulting in a small bowel obstruction a presumed liver met.was admitted on 10/23/2023 for ongoing evaluation and care.     Pt referred by Migel Manzanares PA-C  Who is present for marking and education: Patient  Type of ostomy surgery:  Ileostomy vs Colostomy  Abdomen:  Relatively flat, belt line over hip bones           Intervention:     Patient's chart evaluated.    Assessments done today:  Belt line, previous abdominal surgeries and scars, and abdominal muscles.  Pt observed lying, sitting and standing.    Education:  Reviewed upcoming surgery, stoma construction, post-op expectations, and general ostomy cares/concerns.    All patient / family questions answered.  Patient marked with \"x\" using surgical marker then allowed to dry 3 minutes and sealed with 3M Cavilon No Sting barrier film.  Pt marked RLQ and LLQ          Assessment:     Abdomen marked on the RLQ and LLQ  Learning needs/ comprehension: Reviewed anatomy, post op expectations, WO role, pouching products.          Plan:     Plan:   Surgery scheduled for:  10/24  Will plan to follow patient post operatively     Kaylee JOEL   Dept. Vocera- Contact [facility] Nurse (Regis) via  Vocera   Dept. Office Number: [phone]          "

## 2023-10-24 NOTE — CONSULTS
CLINICAL NUTRITION SERVICES  -  ASSESSMENT NOTE      RECOMMENDATIONS FOR MD/PROVIDER TO ORDER:   Consider modifying D5 IVF to one w/o dextrose when TPN starts     Recommendations Ordered by Registered Dietitian (RD):   Ordered TPN, labs, daily wts    Dosing wt: 68 kg  Begin Clinimix E (D5AA15) @ 40 mL/hr to provide 1182 kcal, 144 g Dex (GIR = 1.47), 48 g protein, 42% kcal from fat   Goal: Custom TPN @ 85 mL/hr = 1948 kcal (29 kcal/kg), 306 g dextrose (GIR = 3.1), 102 g AA (1.5 g/kg), and 250 mL 20% IV Lipids @ 21 mL/hr x12 hours x7 days/week (26% kcal from Fat)  Total IVF + PN = 100 mL/hr or per MD    Ordered admit wt and ht   RD introduced self to pt, discussed role in care. D/w pt the nutrition POC, plans to start TPN to provide adequate kcal/protein/vitamins/minerals while NPO. Diet per MD. Pt agreeable to plan, no questions.      Future/Additional Recommendations:   TPN advancement, goal rate--   After 24 hours, if labs (K, Mag, Phos) acceptable, advance rate to 65 mL/hr to increase provisions to 1608 kcal, 234 g Dex (GIR = 2.38), 78 g protein, 31% kcal from fat   After 24 hours, if labs (K, Mag, Phos) acceptable, switch to custom formula and advance rate to goal of 85 mL/hr, increase provisions to 102 g AA, 306 g dextrose      Malnutrition:   % Weight Loss:  unable to evaluate w/o wt hx on file, suspect significant wt loss over past 2 years  % Intake:  </= 50% for >/= 5 days (severe malnutrition) + suspect poor at baseline over past 1-2 years  Subcutaneous Fat Loss:  Orbital region mild depletion  Muscle Loss:  Temporal region severe depletion, Clavicle bone region moderate depletion, Acromion bone region severe depletion, Scapular bone region severe depletion, and Dorsal hand region moderate depletion  Fluid Retention:  None noted    Malnutrition Diagnosis: Severe malnutrition in the context of --  Acute illness or injury  Chronic illness or disease  Environmental or social circumstances         REASON FOR  "ASSESSMENT  Brady Lenz is a 58 year old male seen by Registered Dietitian for:   Admission Nutrition Risk Screen for positive. Recent wt loss of >34# w/o decreased appetite reported.    Pharmacy/Nutrition to Start and Manage PN      NUTRITION HISTORY  Information obtained from chart review and d/w pt    [facility] Patient does not see any physicians on a regular basis   Social hx-- currently living in car. Estranged from family. Feeling depressed for past 3 years.     ED note-- reported decreased appetite, abdominal pain, bloating intermittently x1-2 years prior to admission   CRS-- does endorse intermittent abdominal pain and discomfort for the last year or 2 and feels it has slowly gotten progressively worse. Noted some nausea lately and vomited Friday a few times, as well as a few weeks ago. Had intermittent diarrhea for a while. He has had a poor appetite and has not been able to eat much for the last few weeks.     Pt reported he has not been eating well for past 2 years. Stated he has IBS. Most recently, he has had \"barely anything\" for past week or so prior to admission. He doesn't weigh himself typically, but has noticed his wt trending down over past 2 years. UBW about 189-190#. Weighed 158# w/ his clothes on in the ED.     Reported the following allergies: fish, shellfish, almonds, kiwi --> cause a \"lump in the throat\" and difficulty swallowing  Intolerance to gluten, wheat, lactose      CURRENT NUTRITION ORDERS  Diet: NPO for Medical/Clinical Reasons Except for: No Exceptions      Current Intake/Tolerance:  NPO upon admit, no intakes   CRS note today = \"Malnutrition due to inadequate oral intake.\" Start TPN today.   RD/pharmacy consulted to start TPN   D/w pt the nutrition POC, plans to start TPN to provide adequate kcal/protein/vitamins/minerals while NPO. Diet per MD. Pt agreeable to plan, no questions.   Vascular services consulted to place PICC --> placed this AM  D5 started 10/23 @ 100 " "mL/hr = 120 g Dex --> 408 kcal/day      PER CHART REVIEW  Presented w/ intermittent/worsening abdominal pain and bloating x1 week prior to admission.    Admitted for:   Cecal mass with resultant small bowel obstruction  Liver masses, concerning for metastatic disease  Suspected subacute to chronic microcytic anemia, secondary to blood loss from cecal mass     10/23: abd CT =   1.  Mass in the cecum causing small bowel obstruction. This measures up to 7 cm.  2.  Liver masses measuring up to 10.6 cm concerning for metastatic disease.  10/23: NG placed for LIS  10/23: abd XR = Limited AP view demonstrates the gastric drainage tube tip and side-port projecting over the proximal stomach. Similar upper abdominal dilated small bowel loops.     10/24: scheduled procedure = laparoscopic, possible open, right colectomy, possible stoma       NUTRITION FOCUSED PHYSICAL ASSESSMENT FOR DIAGNOSING MALNUTRITION)  Yes-- visual         Observed:    Muscle wasting (refer to documentation in Malnutrition section)   Subcutaneous fat loss (refer to documentation in Malnutrition section)  Appears very thin    Obtained from Chart/Interdisciplinary Team:  None noted     ANTHROPOMETRICS  Height: 6' 1\"[pt report[  Weight: 150 lbs 0 oz  Body mass index is 19.79 kg/m .  Weight Status:  Normal BMI-- borderline underwt  IBW: 83.6 kg  % IBW: 81%  Weight History:   No wt hx on file to evaluate  10/23/23 68 kg (150 lb)     No additional wt hx per care everywhere    LABS   BUN 22.4 (H) 10/23/2023    ALKPHOS 135 (H) 10/23/2023     MEDICATIONS   dextrose 5% and 0.9% NaCl + KCL 20 mEq/L 100 mL/hr (10/24/23 1043)       ASSESSED NUTRITION NEEDS PER APPROVED PRACTICE GUIDELINES:  Dosing Weight: 68 kg (10/23)  Estimated Energy Needs: 1004-9204 kcal (30-35 Kcal/Kg)  Justification: <100% IBW   Estimated Protein Needs:  grams protein (1.2-1.5 g pro/Kg)  Justification: repletion, preservation of lean body mass  Estimated Fluid Needs: 1 " mL/kcal  Justification: maintenance       MALNUTRITION:  % Weight Loss:  unable to evaluate w/o wt hx on file, suspect significant wt loss over past 2 years  % Intake:  </= 50% for >/= 5 days (severe malnutrition) + suspect poor at baseline over past 1-2 years  Subcutaneous Fat Loss:  Orbital region mild depletion  Muscle Loss:  Temporal region severe depletion, Clavicle bone region moderate depletion, Acromion bone region severe depletion, Scapular bone region severe depletion, and Dorsal hand region moderate depletion  Fluid Retention:  None noted    Malnutrition Diagnosis: Severe malnutrition in the context of --  Acute illness or injury  Chronic illness or disease  Environmental or social circumstances        NUTRITION DIAGNOSIS:  Inadequate oral intake related to abdominal pain, bloating, decreased appetite, SBO and new dx of proximal colonic malignancy as evidenced by poor intake x1-2 years, minimal intake <50% x1 week prior to admission, mild-severe fat and muscle loss, IBW 81%, plans to start TPN        NUTRITION INTERVENTIONS  Recommendations / Nutrition Prescription  Ordered TPN, labs, daily wts  Ordered admit wt and ht   RD introduced self to pt, discussed role in care. D/w pt the nutrition POC, plans to start TPN to provide adequate kcal/protein/vitamins/minerals while NPO. Diet per MD. Pt agreeable to plan, no questions.     Implementation  Collaboration with other providers  Parenteral Nutrition/IV Fluids - Initiate  Nutrition education   Wt and ht order    Nutrition Goals  TPN to start and reach goal w/in 72 hours  TPN to provide % of estimated nutrition needs at goal provisions  Wt >68 kg      MONITORING AND EVALUATION:  Progress towards goals will be monitored and evaluated per protocol and Practice Guidelines      Any Arizmendi RD, LD   Pager: [phone]

## 2023-10-24 NOTE — CONSULTS
[facility] Hematology / Oncology  Initial Visit / Consultation Note Oct 23, 2023  Name: Brady Lenz  :  1965  MRN:  [id number]    --------------------    Assessment / Plan:  Presumed proximal colon cancer with metastatic disease to liver.  Malignant small bowel obstruction secondary to above.  Nausea, vomiting and pain secondary to above.  Severe iron deficiency anemia secondary to above status post 3 units of blood.  Severe depression and unstable social situation.    Agree with plans for laparoscopic right colectomy given obstruction and small bowel obstruction.  Reviewed plans for chemotherapy to follow likely on an indefinite basis.  Will need staging CT chest and port placement post-op.  We will request NGS testing to guide systemic therapy choices.  We will request testing for mention genetic testing given his family history.  Inpatient psychiatry consult given severe depression.  Venofer 3 mg x 3 days to follow postoperatively.  Social work, mental health resources.    Thank you for this consultation.  We will continue to follow..    Thank you kindly for this consultation.  Asa Grubbs MD    --------------------    Interval History:  Brady present for evaluation of presumed colon cancer.  He arrived to emergency room with a 2 to 3-month history of worsening nausea and vomiting and appetite.  It was accompanied by worsening abdominal pain.  T CT scan of the abdomen and pelvis revealed a small bowel obstruction secondary to cecal mass causing obstruction at the ileocecal junction.  Small amount of ascites was noted in the pelvis around the liver.  A 10.6 cm mass in the right lobe of the liver and a 4.9 cm mass in the left lobe of the liver are noted.  He has no nutrition and severe anemia with microcytosis consistent with iron deficiency.  He does have a family history of colon cancer on his mom's side and the grandmother.  He is estranged from his family.  He did deal with depression  for the last 3 years.    --------------------    Review of Systems:  10 point ROS negative except for that above.    Past Medical / Surgical History:  Patient Active Problem List   Diagnosis    SBO (small bowel obstruction) (H)    Liver mass    Cecum mass    Anemia, unspecified type     Family History:  Positive for colon cancer.    Social History:  Working w/ food delivery job.  Has housing, but described as unhealthy living.  Sleeping in car currently.  Estranged from family.    Medications / Allergies:  Reviewed in EMR.    --------------------    Physical Exam:  VS: /69 (BP Location: Right arm)   Pulse 71   Temp 98.7  F (37.1  C) (Oral)   Resp 16   Wt 68 kg (150 lb)   SpO2 99%   GEN: Well appearing.    Labs / Imaging / Path:  Reviewed CBC, CMP.   Reviewed CT chest w/ independent review.

## 2023-10-24 NOTE — PROGRESS NOTES
Colon and Rectal Surgery Progress Note          Assessment and Plan:     59 yo M with SBO, likely due to proximal colonic malignancy.  Malnutrition due to inadequate oral intake    Start TPN today  I have recommended laparoscopic, possible open, right colectomy, possible stoma    He has given verbal consent.  His questions were answered      Jose Moe MD FACS FASCRS  Colorectal Surgeon       [facility]  [address], [address]  [place] [address]  T: [phone]  F: [phone]  Pager: [phone]  [email or web address]                 Interval History:     Feeling better              Physical Exam:   Vitals were reviewed  Patient Vitals for the past 24 hrs:   BP Temp Temp src Pulse Resp SpO2 Weight   10/24/23 0736 124/69 98.7  F (37.1  C) Oral 71 16 99 % --   10/23/23 2217 130/78 97.8  F (36.6  C) Axillary 73 16 99 % --   10/23/23 2155 133/77 98.1  F (36.7  C) Axillary 68 17 99 % --   10/23/23 2059 130/70 98  F (36.7  C) Oral 69 16 100 % --   10/23/23 2000 132/74 97.8  F (36.6  C) Axillary 68 16 99 % --   10/23/23 1944 132/65 98  F (36.7  C) -- 69 17 -- --   10/23/23 1921 137/77 -- -- 72 16 99 % --   10/23/23 1748 (!) 141/75 97.9  F (36.6  C) Axillary 68 16 100 % --   10/23/23 1733 -- -- -- 67 -- -- --   10/23/23 1728 133/69 97.8  F (36.6  C) Axillary -- 16 98 % --   10/23/23 1631 (!) 142/73 97.8  F (36.6  C) Axillary 79 16 100 % --   10/23/23 1600 (!) 143/77 98.1  F (36.7  C) -- 69 16 100 % --   10/23/23 1529 130/76 -- -- 65 -- (!) 84 % --   10/23/23 1526 -- -- -- -- -- 94 % --   10/23/23 1500 136/77 -- -- 75 -- 93 % --   10/23/23 1431 135/78 -- -- 76 -- 100 % --   10/23/23 1430 135/78 -- -- 76 -- 100 % --   10/23/23 1415 128/74 98.5  F (36.9  C) Oral 81 16 100 % --   10/23/23 1345 135/77 -- -- -- -- 100 % --   10/23/23 1340 135/77 97.9  F (36.6  C) -- 72 16 -- --   10/23/23 1315 -- -- -- -- -- 99 % --   10/23/23 1130 127/73 -- -- 69 -- 100 % --   10/23/23 1002 (!) 142/74 98  F (36.7   C) Oral 98 18 99 % 68 kg (150 lb)         Intake/Output Summary (Last 24 hours) at 10/24/2023 0810  Last data filed at 10/24/2023 0742  Gross per 24 hour   Intake 917 ml   Output 900 ml   Net 17 ml       Head - Nomocephalic atraumatic  Sclera anicteric  Extremities warm and well perfused  Lungs - breathing non labored,   Abdomen - soft  Rectal exam - deferred  Skin - no rash  Psych - flat affec5             Data:   All laboratory and imaging data in the past 24 hours reviewed    CBC  Lab Results   Component Value Date    WBC 4.2 10/23/2023    HGB 8.1 (L) 10/23/2023    HGB 5.9 (LL) 10/23/2023    HCT 23.4 (L) 10/23/2023     (H) 10/23/2023       BMP  Recent Labs   Lab Test 10/23/23  1036      POTASSIUM 3.7   CHLORIDE 100   CO2 23   ANIONGAP 14   GLC 92   BUN 22.4*   CR 0.70   NAT 8.7       Liver Function Studies -   Recent Labs   Lab Test 10/23/23  1036   PROTTOTAL 6.2*   ALBUMIN 4.0   BILITOTAL 0.8   ALKPHOS 135*   AST 36   ALT 18       New imaging data reviewed:     Total time spent in counseling, direct patient care, coordination of care, and review of data no min

## 2023-10-24 NOTE — BRIEF OP NOTE
Brief Operative Note    Pre-operative diagnosis: Cecum mass [K63.89]  Post-operative diagnosis Same as pre-operative diagnosis    Procedure: Laparoscopic Peritoneal Biopsy Converted to  Open Right Colectomy, N/A - Abdomen    Surgeon: Surgeon(s) and Role:     * Jose Moe MD - Primary     * Oma Metz MD  Anesthesia: General   Estimated Blood Loss: 50    Drains: None  Specimens:   ID Type Source Tests Collected by Time Destination   1 : left upper qurdrant peritoneal biopsy Tissue Other SURGICAL PATHOLOGY EXAM Jose Moe MD 10/24/2023  2:58 PM    2 : right colon Tissue Large Intestine, Colon SURGICAL PATHOLOGY EXAM Jsoe Moe MD 10/24/2023  3:55 PM    3 : additional terminal ileum Tissue Other SURGICAL PATHOLOGY EXAM Jose Moe MD 10/24/2023  4:15 PM      Findings:   Peritoneal nodule in LUQ, sent for biopsy. Large cecal mass involving the ileocecal valve with gross lymphadenopathy. Large metastatic lesions in the left and right liver lobes  .    Complications: None.  Implants: * No implants in log *        Oma Metz MD      ADDENDUM:    PATIENT DATA  Indicate Y or N:  Home O2 No  Hemodialysis No  Transplant patient No  Cirrhosis No  Steroids in last 30 days No  Immunomodulators in last 30 days No  Anticoagulation at time of surgery No   List medication N/A  Prior abdominal surgery No  Pelvic irradiation No    Albumin within 30 days if known 3.6  Hgb within 30 days if known 8.9  Cr within 30 days if known 0.63  Body mass index is 19.79 kg/m .    OR DATA  Emergent Yes   <24 hours Yes   <1 week No  Bowel Prep (Y or N) No  Antibiotics (Y or N) Yes  DVT prophylaxis    Heparin Yes   SCD Yes   None No  Drain No  ASA (1,2,3,4,5 if unknown) 3  OR time (min) 132  Stents No  Transfuse >/= 2U Yes  Anastomosis   Stapled Yes   Handsewn No  Leak Test (pos, neg, not done) N/A    FOR CANCER ALSO COMPLETE:  Preoperative treatment (Y or N)   Chemo  No   Radiation No   Diversion No  Preoperative Stage   U/S No   MRI No   Clinical Unknown   Unknown Yes   T stage (1,2,3,4,5 if unknown) 5   N stage (0,1,2,3 if unknown) 3   M stage (0,1) 1  CEA 6.7  Metastatic disease at time of operation (Y or N) Yes

## 2023-10-25 ENCOUNTER — APPOINTMENT (OUTPATIENT)
Dept: CT IMAGING | Facility: CLINIC | Age: 58
End: 2023-10-25
Attending: COLON & RECTAL SURGERY
Payer: MEDICAID

## 2023-10-25 LAB
ALBUMIN SERPL BCG-MCNC: 3 G/DL (ref 3.5–5.2)
ALP SERPL-CCNC: 91 U/L (ref 40–129)
ALT SERPL W P-5'-P-CCNC: 13 U/L (ref 0–70)
ANION GAP SERPL CALCULATED.3IONS-SCNC: 9 MMOL/L (ref 7–15)
AST SERPL W P-5'-P-CCNC: 36 U/L (ref 0–45)
BILIRUB DIRECT SERPL-MCNC: 0.24 MG/DL (ref 0–0.3)
BILIRUB SERPL-MCNC: 0.7 MG/DL
BLD PROD TYP BPU: NORMAL
BLOOD COMPONENT TYPE: NORMAL
BUN SERPL-MCNC: 18.9 MG/DL (ref 6–20)
CALCIUM SERPL-MCNC: 8.1 MG/DL (ref 8.6–10)
CHLORIDE SERPL-SCNC: 108 MMOL/L (ref 98–107)
CODING SYSTEM: NORMAL
CREAT SERPL-MCNC: 0.82 MG/DL (ref 0.67–1.17)
CROSSMATCH: NORMAL
DEPRECATED HCO3 PLAS-SCNC: 25 MMOL/L (ref 22–29)
EGFRCR SERPLBLD CKD-EPI 2021: >90 ML/MIN/1.73M2
ERYTHROCYTE [DISTWIDTH] IN BLOOD BY AUTOMATED COUNT: 27.8 % (ref 10–15)
GLUCOSE BLDC GLUCOMTR-MCNC: 116 MG/DL (ref 70–99)
GLUCOSE BLDC GLUCOMTR-MCNC: 121 MG/DL (ref 70–99)
GLUCOSE BLDC GLUCOMTR-MCNC: 127 MG/DL (ref 70–99)
GLUCOSE BLDC GLUCOMTR-MCNC: 97 MG/DL (ref 70–99)
GLUCOSE SERPL-MCNC: 120 MG/DL (ref 70–99)
HBA1C MFR BLD: 5 %
HCT VFR BLD AUTO: 27.3 % (ref 40–53)
HGB BLD-MCNC: 7.6 G/DL (ref 13.3–17.7)
HGB BLD-MCNC: 8.2 G/DL (ref 13.3–17.7)
INR PPP: 1.38 (ref 0.85–1.15)
ISSUE DATE AND TIME: NORMAL
MAGNESIUM SERPL-MCNC: 1.8 MG/DL (ref 1.7–2.3)
MCH RBC QN AUTO: 18 PG (ref 26.5–33)
MCHC RBC AUTO-ENTMCNC: 27.8 G/DL (ref 31.5–36.5)
MCV RBC AUTO: 65 FL (ref 78–100)
PHOSPHATE SERPL-MCNC: 3.5 MG/DL (ref 2.5–4.5)
PLATELET # BLD AUTO: 367 10E3/UL (ref 150–450)
POTASSIUM SERPL-SCNC: 4.4 MMOL/L (ref 3.4–5.3)
PREALB SERPL IA-MCNC: 7 MG/DL (ref 15–45)
PREALB SERPL IA-MCNC: 8 MG/DL (ref 15–45)
PROT SERPL-MCNC: 4.8 G/DL (ref 6.4–8.3)
RBC # BLD AUTO: 4.22 10E6/UL (ref 4.4–5.9)
SODIUM SERPL-SCNC: 142 MMOL/L (ref 135–145)
UNIT ABO/RH: NORMAL
UNIT NUMBER: NORMAL
UNIT STATUS: NORMAL
UNIT TYPE ISBT: 5100
WBC # BLD AUTO: 11.2 10E3/UL (ref 4–11)

## 2023-10-25 PROCEDURE — 258N000003 HC RX IP 258 OP 636: Performed by: COLON & RECTAL SURGERY

## 2023-10-25 PROCEDURE — 82248 BILIRUBIN DIRECT: CPT | Performed by: COLON & RECTAL SURGERY

## 2023-10-25 PROCEDURE — 99254 IP/OBS CNSLTJ NEW/EST MOD 60: CPT

## 2023-10-25 PROCEDURE — 250N000013 HC RX MED GY IP 250 OP 250 PS 637: Performed by: HOSPITALIST

## 2023-10-25 PROCEDURE — 250N000009 HC RX 250: Performed by: HOSPITALIST

## 2023-10-25 PROCEDURE — 85048 AUTOMATED LEUKOCYTE COUNT: CPT | Performed by: COLON & RECTAL SURGERY

## 2023-10-25 PROCEDURE — 85610 PROTHROMBIN TIME: CPT | Performed by: HOSPITALIST

## 2023-10-25 PROCEDURE — 83036 HEMOGLOBIN GLYCOSYLATED A1C: CPT | Performed by: HOSPITALIST

## 2023-10-25 PROCEDURE — 250N000011 HC RX IP 250 OP 636: Mod: JZ | Performed by: COLON & RECTAL SURGERY

## 2023-10-25 PROCEDURE — 250N000013 HC RX MED GY IP 250 OP 250 PS 637: Performed by: COLON & RECTAL SURGERY

## 2023-10-25 PROCEDURE — 120N000001 HC R&B MED SURG/OB

## 2023-10-25 PROCEDURE — 85018 HEMOGLOBIN: CPT | Performed by: HOSPITALIST

## 2023-10-25 PROCEDURE — 84134 ASSAY OF PREALBUMIN: CPT | Performed by: COLON & RECTAL SURGERY

## 2023-10-25 PROCEDURE — 83735 ASSAY OF MAGNESIUM: CPT | Performed by: COLON & RECTAL SURGERY

## 2023-10-25 PROCEDURE — 80053 COMPREHEN METABOLIC PANEL: CPT | Performed by: COLON & RECTAL SURGERY

## 2023-10-25 PROCEDURE — 99233 SBSQ HOSP IP/OBS HIGH 50: CPT | Performed by: HOSPITALIST

## 2023-10-25 PROCEDURE — 71250 CT THORAX DX C-: CPT

## 2023-10-25 PROCEDURE — 250N000009 HC RX 250: Performed by: COLON & RECTAL SURGERY

## 2023-10-25 PROCEDURE — B4185 PARENTERAL SOL 10 GM LIPIDS: HCPCS | Mod: JZ | Performed by: COLON & RECTAL SURGERY

## 2023-10-25 PROCEDURE — 84100 ASSAY OF PHOSPHORUS: CPT | Performed by: COLON & RECTAL SURGERY

## 2023-10-25 PROCEDURE — P9016 RBC LEUKOCYTES REDUCED: HCPCS | Performed by: COLON & RECTAL SURGERY

## 2023-10-25 RX ORDER — ACETAMINOPHEN 325 MG/1
650 TABLET ORAL EVERY 4 HOURS PRN
Status: DISCONTINUED | OUTPATIENT
Start: 2023-10-25 | End: 2023-11-03 | Stop reason: HOSPADM

## 2023-10-25 RX ORDER — DEXTROSE MONOHYDRATE 25 G/50ML
25-50 INJECTION, SOLUTION INTRAVENOUS
Status: DISCONTINUED | OUTPATIENT
Start: 2023-10-25 | End: 2023-11-03 | Stop reason: HOSPADM

## 2023-10-25 RX ORDER — NICOTINE POLACRILEX 4 MG
15-30 LOZENGE BUCCAL
Status: DISCONTINUED | OUTPATIENT
Start: 2023-10-25 | End: 2023-11-03 | Stop reason: HOSPADM

## 2023-10-25 RX ORDER — FEXOFENADINE HCL 60 MG/1
60 TABLET, FILM COATED ORAL DAILY PRN
Status: DISCONTINUED | OUTPATIENT
Start: 2023-10-25 | End: 2023-11-03 | Stop reason: HOSPADM

## 2023-10-25 RX ADMIN — PHYTONADIONE 5 MG: 10 INJECTION, EMULSION INTRAMUSCULAR; INTRAVENOUS; SUBCUTANEOUS at 15:01

## 2023-10-25 RX ADMIN — IRON SUCROSE 300 MG: 20 INJECTION, SOLUTION INTRAVENOUS at 09:45

## 2023-10-25 RX ADMIN — KETOROLAC TROMETHAMINE 15 MG: 15 INJECTION, SOLUTION INTRAMUSCULAR; INTRAVENOUS at 02:24

## 2023-10-25 RX ADMIN — OLIVE OIL AND SOYBEAN OIL 250 ML: 16; 4 INJECTION, EMULSION INTRAVENOUS at 21:24

## 2023-10-25 RX ADMIN — ACETAMINOPHEN 650 MG: 325 TABLET, FILM COATED ORAL at 22:07

## 2023-10-25 RX ADMIN — SODIUM CHLORIDE, POTASSIUM CHLORIDE, SODIUM LACTATE AND CALCIUM CHLORIDE: 600; 310; 30; 20 INJECTION, SOLUTION INTRAVENOUS at 21:32

## 2023-10-25 RX ADMIN — BENZOCAINE 6 MG-MENTHOL 10 MG LOZENGES 1 LOZENGE: at 17:31

## 2023-10-25 RX ADMIN — ASCORBIC ACID, VITAMIN A PALMITATE, CHOLECALCIFEROL, THIAMINE HYDROCHLORIDE, RIBOFLAVIN-5 PHOSPHATE SODIUM, PYRIDOXINE HYDROCHLORIDE, NIACINAMIDE, DEXPANTHENOL, ALPHA-TOCOPHEROL ACETATE, VITAMIN K1, FOLIC ACID, BIOTIN, CYANOCOBALAMIN: 200; 3300; 200; 6; 3.6; 6; 40; 15; 10; 150; 600; 60; 5 INJECTION, SOLUTION INTRAVENOUS at 21:24

## 2023-10-25 RX ADMIN — LORAZEPAM 0.5 MG: 2 INJECTION INTRAMUSCULAR; INTRAVENOUS at 15:05

## 2023-10-25 ASSESSMENT — ACTIVITIES OF DAILY LIVING (ADL)
ADLS_ACUITY_SCORE: 24

## 2023-10-25 NOTE — ANESTHESIA POSTPROCEDURE EVALUATION
Patient: Brady Lenz    Procedure: Procedure(s):  Laparoscopic Peritoneal Biopsy Converted to  Open Right Colectomy       Anesthesia Type:  General    Note:  Disposition: Inpatient   Postop Pain Control: Uneventful            Sign Out: Well controlled pain   PONV:    Neuro/Psych: Uneventful            Sign Out: Acceptable/Baseline neuro status   Airway/Respiratory: Uneventful            Sign Out: Acceptable/Baseline resp. status   CV/Hemodynamics: Uneventful            Sign Out: Acceptable CV status   Other NRE: NONE   DID A NON-ROUTINE EVENT OCCUR? No           Last vitals:  Vitals Value Taken Time   /67 10/24/23 1830   Temp 36.7  C (98.1  F) 10/24/23 1815   Pulse 80 10/24/23 1830   Resp 18 10/24/23 1830   SpO2 98 % 10/24/23 1830   Vitals shown include unfiled device data.    Electronically Signed By: Jian Glez MD  October 24, 2023  7:03 PM

## 2023-10-25 NOTE — PROGRESS NOTES
[facility]  Hospitalist Progress Note   10/25/2023          Assessment and Plan:       Brady Lenz is a 58 year old male with no known past medical history who does not see a doctor on a regular basis, no previous cancer screening admitted on 10/23/2023 with 2-week history of intermittent abdominal pain and distention and decreased appetite.     Status post laparoscopic peritoneal biopsy, open right colectomy 10/24/2023.  Cecal mass with resultant small bowel obstruction  Liver masses, concerning for metastatic disease  -- At the time of admission frail-appearing, afebrile hemodynamically stable.    LFTs, electrolytes, renal function within normal limits.  CT abdomen and pelvis showed a 7cm mass in the cecum causing small bowel obstruction and 2 liver masses measuring up to 10.6 cm in the right lobe and 4.9cm in the left lobe concerning for metastatic disease.  -- NG tube placed in the ED on 10/23.  PICC line placed 10/24  --Status post laparoscopic peritoneal biopsy, open right colectomy 10/24/2023.  -Colorectal surgery following.  Defer perioperative care including antibiotics, pharmacological DVT prophylaxis, nutrition to CRS.    On TPN for nutrition through PICC line.  Encourage ambulation.    --IV /P.o. as needed pain meds.  IV/p.o. as needed antiemetics.  -- [place] oncology following.  Appreciate comanagement.  Follow-up pathology results.  Discontinue telemetry monitoring.  Needs CT chest with contrast in 1 to 2 days once stable.    Postoperative leukocytosis.  Postoperative mild coagulopathy  -- Noted leukocytosis postoperatively, antibiotics per CRS.  --Noted to mild coagulopathy with INR 1.38.  Vitamin K per CRS.    Suspected Subacute to chronic microcytic anemia, secondary to blood loss from cecal mass  Status post 3 units of blood transfusion.  Severe iron deficiency  -At the time of admission hemoglobin of 5.9, MCV 58, platelet count of 473; alk phos 135  --Suspect some degree of  subacute to chronic blood loss given he is presently hemodynamically stable and denies any recent melanotic stools or BRBPR.  -- Received total 3 units of blood transfusion on 10/23 to 10/24.  Iron saturation index of 2.  Documented EBL 50 mL.  Hemoglobin this morning 7.6.  Will transfuse 1 unit PRBC.  Monitor hemoglobin levels every 12 hours.  Conditional order to transfuse for hemoglobin less than 8.  3 doses of IV Venofer.    Acute anxiety   Concern for undiagnosed depression.  Patient having anxiety, flat affect.  As needed Ativan ordered.  Psychiatry evaluation requested.  Patient declined discussing about his care with any of the family members.  Will need to consider CT of head prior to discharge, no focal neurological weakness at this time     Hypocalcemia, hypoalbuminemia  Likely dilutional.  Serum calcium 8.1, albumin 3.0.  Monitor in AM.    Mild hyperglycemia.  Follow hemoglobin A1c.  On TPN.  Insulin sliding scale ordered    Social stressors.  Patient reported works as a / concerned with living situation.  Care coordinator / assisting with support requested.    Orders Placed This Encounter      NPO for Medical/Clinical Reasons Except for: Meds, Ice Chips      DVT Prophylaxis: SCDs, postprocedure pharmacological DVT prophylaxis per surgical team.  Code Status: Full Code  Disposition: Expected discharge in 1 to 2 days pending clinical improvement.    Discussed with patient, bedside RN.  Total time > 52 min. More than 70% of time spent in direct patient care, care coordination, patient counseling, and formalizing plan of care.      Brandt Macario MD        Interval History:      Patient lying in bed.  Calm, slow to respond. Flat affect.  Complaining of abdominal pain.  Fairchild catheter in place.  Kirstin-colored urine.  NG tube in place with bilious output  NPO.  On TPN.    Hemoglobin dropped this morning.  Has leukocytosis but denies any new cough or urinary disturbance.    Denies any  tingling or numbness.  Denies any chest pain or palpitations.  Telemetry normal sinus rhythm         Physical Exam:        Physical Exam   Temp:  [98  F (36.7  C)-99.8  F (37.7  C)] 98.5  F (36.9  C)  Pulse:  [65-88] 75  Resp:  [13-21] 20  BP: (108-136)/(57-86) 113/58  SpO2:  [94 %-100 %] 99 %    Intake/Output Summary (Last 24 hours) at 10/24/2023 0829  Last data filed at 10/24/2023 0742  Gross per 24 hour   Intake 917 ml   Output 900 ml   Net 17 ml       Admission Weight: 68 kg (150 lb)  Current Weight: 68 kg (150 lb)    PHYSICAL EXAM  GENERAL: Patient flat affect, slow to respond.  HEENT: Oropharynx pink, Pupils equal  HEART: Regular rate and rhythm. S1S2. No murmurs  LUNGS: Clear to auscultation bilaterally. No expiratory wheeze.  Respirations unlabored  ABDOMEN: Distended, abdominal dressing intact.  No guarding or rigidity.  NEURO: moving all extremities.  EXTREMITIES: No pedal edema.   SKIN: Warm, dry. No rash  PSYCHIATRY Cooperative       Medications:         [Held by provider] heparin ANTICOAGULANT  5,000 Units Subcutaneous Q8H    ketorolac  15 mg Intravenous Q6H    Followed by    ibuprofen  600 mg Oral Q6H    iron sucrose  300 mg Intravenous Daily    lipids plant base  250 mL Intravenous Q24H    sodium chloride (PF)  10-40 mL Intracatheter Q7 Days    sodium chloride (PF)  3 mL Intracatheter Q8H     sore throat, dextrose, diphenhydrAMINE, lidocaine 4%, lidocaine 4%, lidocaine (buffered or not buffered), lidocaine (buffered or not buffered), LORazepam, melatonin, naloxone **OR** naloxone **OR** naloxone **OR** naloxone, ondansetron **OR** ondansetron, phenol MT, prochlorperazine **OR** prochlorperazine **OR** prochlorperazine, sodium chloride (PF), sodium chloride (PF), sodium chloride (PF)         Data:      All new lab and imaging data was reviewed.

## 2023-10-25 NOTE — PROGRESS NOTES
Hem Onc Note  October 25, 2023    Will see Thursday to discuss systemic chemo pending recovery from surgery.    Asa Grubbs MD.

## 2023-10-25 NOTE — PROGRESS NOTES
Patient back POD0 of Lap Peritoneal biopsy converted to Open right colectomy. VSS on Ra. Midline incision with drainage marked on dressing and 2 port sites, drainage has slightly extended the markings. Denies N/V and NG tube in placed set to LIS, minimal output.  PICC in RUE with TPN infusing at 40 and lipids at 20.8ml/hr. . PIV infusing LR at 75ml/hr. PCA pump set up with pt education. Not oob yet.

## 2023-10-25 NOTE — PROGRESS NOTES
Care Management Follow Up    Length of Stay (days): 2    Expected Discharge Date: 10/30/2023     Concerns to be Addressed: adjustment to diagnosis/illness, basic needs, coping/stress, discharge planning, financial/insurance, homelessness, mental health     Patient plan of care discussed at interdisciplinary rounds: Yes    Anticipated Discharge Disposition: Homeless     Anticipated Discharge Services:    Anticipated Discharge DME:      Patient/family educated on Medicare website which has current facility and service quality ratings:    Education Provided on the Discharge Plan:    Patient/Family in Agreement with the Plan: yes    Referrals Placed by CM/SW: Financial Services  Private pay costs discussed: Not applicable    Additional Information:  Patient wanted to discuss financial concerns and food instability. Writer [facility] and if patient was interested in going. Patient replied yes.   Writer explained that [facility] would be assisting with Medical Assistance. Writer and patient discussed his situation and attempted to problem solve. Writer brought up food assistance, and [facility] support. 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 Patient needs some help for a short time until he is able to work again. Patient said he only has $20 in his account right now. ROSALEE following  for safe discharge planning.     ANA Garcia

## 2023-10-25 NOTE — CONSULTS
Initial Psychiatric Consult   Consult date: October 25, 2023         Reason for Consult, requesting source:    Depression  Requesting source: Brandt Macario    This note is being entered to supplement the psychiatry consultation note that was completed on October 25, 2023 by the licensed mental health professional Cheri Daniel. They have reviewed with me the pertinent clinical details related to their encounter. I am being consulted to offer additional guidance on psychiatric pharmacological interventions.         HPI:   Brady Lenz is a 58 year old male with no known past medical or psychiatric history (does not see a doctor regularly) who was admitted on 10/23/23 for abdominal pain, distention, decreased appetite which have been intermittent over the past year but worse in preceding 2 weeks. Found to have cecal mass with SBO, and liver masses; underwent colectomy on 10/24/23. Presumed colon cancer with metastases to liver. He is noted to appear depressed and anxious, psychiatry is consulted.    I met with Brady in his room, he is laying in bed currently receiving blood transfusion so RN is at bedside. He presents with blunted affect, poor eye contact, and very soft speech. He endorses more anxiety than depression, though he feels he has struggled with both for many years despite never receiving any sort of treatment. He has never seen a therapist or psychiatrist. He denies SI/HI/AVH. He is hesitant to take any psychotropic medication. Discussed with him that he currently has lorazepam ordered for anxiety which can be used in the short term, typically only while hospitalized, and that we could also start an antidepressant for longer term treatment. He would like to think about this before starting any new medication.           Physical ROS:   The 10 point Review of Systems is negative other than noted in the HPI or here.           Medications:      [Held by provider] heparin ANTICOAGULANT  5,000 Units  "Subcutaneous Q8H    ibuprofen  600 mg Oral Q6H    insulin aspart  1-4 Units Subcutaneous Q4H    iron sucrose  300 mg Intravenous Daily    lipids plant base  250 mL Intravenous Q24H    phytonadione  5 mg Oral Once    sodium chloride (PF)  10-40 mL Intracatheter Q7 Days    sodium chloride (PF)  3 mL Intracatheter Q8H            Physical and Psychiatric Examination:     /66   Pulse 86   Temp 98.7  F (37.1  C) (Axillary)   Resp 18   Ht 1.854 m (6' 1\")   Wt 68 kg (150 lb)   SpO2 98%   BMI 19.79 kg/m    Weight is 150 lbs 0 oz  Body mass index is 19.79 kg/m .    Physical Exam:  I have reviewed the physical exam as documented by by the medical team and agree with findings and assessment and have no additional findings to add at this time.    Mental Status Exam:  Appearance: awake, alert and thin and jaundiced in appearance, laying in bed  Attitude:  guarded and somewhat cooperative  Eye Contact:  poor   Mood:  anxious and depressed  Affect:  mood congruent and intensity is blunted  Speech:  clear, coherent and very soft  Psychomotor Behavior:  no evidence of tardive dyskinesia, dystonia, or tics  Throught Process:  linear and goal oriented  Associations:  no loose associations  Thought Content:  no evidence of suicidal ideation or homicidal ideation and no evidence of psychotic thought  Insight:  fair  Judgement:  fair  Oriented to:  time, person, and place  Attention Span and Concentration:  fair  Recent and Remote Memory:  fair  Language: able to name/identify objects without impairment  Fund of Knowledge: intact with awareness of current and past events             DSM-5 Diagnosis:   Unspecified depression  Unspecified anxiety          Assessment:   Brady JESUS Delfin is a 58 year old male with no prior known psychiatric or medical history who presents with depressed mood and anxiety following diagnosis of presumed cancer and undergoing colectomy. He endorses a long history of depression and anxiety, but is quite " "guarded and it is difficult to determine what his exact diagnosis may be with limited history and new stressor of cancer diagnosis.    I discussed with him recommendation to start sertraline for anxiety, depression. Discussed that this medication is typically safe to take with most chemotherapy drugs which is anticipated in the near future. He is hesitant to start any medication and would rather hold off on starting medication for now. Nursing to provide educational resources on sertraline at his request.          Summary of Recommendations:   Continue lorazepam PRN for anxiety here in the hospital setting, he has yet to need this medication.  Recommended sertraline, however he is hesitant about starting any antidepressant. If he decides he wants to take it, could start at 50mg daily.      GABBY [facility]   Consult/Liaison Psychiatry   [facility]    Contact information available via [facility] Paging/Directory  If I am not available, then [facility] CL line ([phone]) should know who is covering our consult service.           \"This dictation was performed with voice recognition software and may contain errors,  omissions and inadvertent word substitution.\"           "

## 2023-10-25 NOTE — PROGRESS NOTES
COLON & RECTAL SURGERY  PROGRESS NOTE    October 25, 2023  Post-op Day # 1    SUBJECTIVE:  Abd pain overall controlled, using PCA intermittently. No gas or BM. Denies n/v. No NGT output recorded since yesterday morning. Has not been out of bed yet. 1L UOP. AVSS. Hgb 7.6 from 8.9 post op from 8.3 pre op. WBC 11.2 from 4.2. Albumin 3.0, prealbumin 8. INR 1.38.     OBJECTIVE:  Temp:  [98  F (36.7  C)-99.8  F (37.7  C)] 98.5  F (36.9  C)  Pulse:  [65-88] 75  Resp:  [13-21] 20  BP: (108-136)/(57-86) 113/58  SpO2:  [94 %-100 %] 99 %    Intake/Output Summary (Last 24 hours) at 10/25/2023 0831  Last data filed at 10/25/2023 0600  Gross per 24 hour   Intake 3152 ml   Output 1250 ml   Net 1902 ml       GENERAL:  Awake, alert, no acute distress  HEAD: Normocephalic atraumatic  SCLERA: Anicteric  EXTREMITIES: Warm and well perfused  ABDOMEN:  Soft, appropriately tender, minimally distended. No guarding, rigidity, or peritoneal signs. NGT in place with about 50cc of bilious output in canister.  INCISION:  Island dressing in place with serosang drainage, staples intact    LABS:  Lab Results   Component Value Date    WBC 11.2 10/25/2023     Lab Results   Component Value Date    HGB 7.6 10/25/2023     Lab Results   Component Value Date    HCT 27.3 10/25/2023     Lab Results   Component Value Date     10/25/2023     Last Basic Metabolic Panel:  Lab Results   Component Value Date     10/25/2023      Lab Results   Component Value Date    POTASSIUM 4.4 10/25/2023     Lab Results   Component Value Date    CHLORIDE 108 10/25/2023     Lab Results   Component Value Date    NAT 8.1 10/25/2023     Lab Results   Component Value Date    CO2 25 10/25/2023     Lab Results   Component Value Date    BUN 18.9 10/25/2023     Lab Results   Component Value Date    CR 0.82 10/25/2023     Lab Results   Component Value Date     10/25/2023     10/25/2023       ASSESSMENT/PLAN: 57 yo M admitted with SBO due to obstructing cecal  mass, now POD#1 s/p lap converted to open right colectomy and excision of LUQ peritoneal implant. Multiple large liver metastases noted on CT and intra-op.     - NPO, continue NGT to LIWS  - Continue TPN  - PRN pain meds, PCA, multimodal pain control  - Continue IVF  - Remove Fairchild today  - OOB, ambulate  - Psych & SW consulted for psychosocial concerns  - Await pathology. Oncology following, will need CT chest to complete staging at some point.  - SQH ordered for DVT ppx, but will hold today given drop in Hgb. CBC ordered for tomorrow AM.    Discussed with Dr. Moe.    For questions/paging, please contact the CRS office at [phone].    Migel Manzanares PA-C  Colorectal Physician Assistant    [facility]  [address]  [place] [address]  T: [phone]  F: [phone]    CRS Staff.  Seen and examined independently  Agree with above.    Reviewed operative findings.  Will see if vitamin K can be added to tpn. Hold heparin today and toradol today.  Recheck hgb tomorrow.    I performed a history and physical examination of the patient and discussed their management with the physician assistant. I reviewed the physician assistants note and agree with the documented findings and plan of care.     Jose Moe MD FACS FASCRS  Colorectal Surgeon       [facility]  [address], [address]  [place] [address]  T: [phone]  F: [phone]  Pager: [phone]  [email or web address]

## 2023-10-25 NOTE — PLAN OF CARE
Goal Outcome Evaluation:       [id number]  Orientation: AOX4, flat affect  Activity: A1  Diet/BS Checks: npo ice chips, TPN AND Lipids  Tele:  NA  IV Access/Drains: PICC INFUSING TPN LIPIDS, PIV infusing LR  Pain Management: SCHED Toradol, PCA pump, ice applied on abd  Abnormal VS/Results: HGB 8.9  Bowel/Bladder: Fairchild with rosendo UOP, no bm during shift  Skin/Wounds: Midline abd incision, dressing with serosanguinous drainage.  Consults: CRS  D/C Disposition: Discharge pending  Other Info: NG to LIS, POD 0 lap peritoneal biopsy to open right colectomy. Denies nausea. Not passing flatus. Encouraged pt to use IS. Also encouraged pt to ambulate OOB but pt kept refusing and saying he will do it during day shift.

## 2023-10-25 NOTE — PROGRESS NOTES
SPIRITUAL HEALTH SERVICES - Progress Note   SH Gen Surg    Referral Source: follow up from consult   Saw pt Brady Lenz. I re-introduced SHS and he declined spiritual or emotional support at this time. I reminded him how to contact us through his bedside nurse if his needs change and he expressed understanding. No additional plans to follow up at this time, per his request.    Plan: Spiritual Health remains available.      Zoe Fox  Chaplain Resident    [facility] routine referrals *[phone]    [facility] available 24/7 for emergent requests/referrals, either by paging the on-call  or by entering an ASAP/STAT consult in Epic (this will also page the on-call ).

## 2023-10-25 NOTE — PROGRESS NOTES
Report received from PACU. Patient back POD0 of Lap Peritoneal biopsy converted to Open right colectomy. VSS on Ra. Midline incision with drainage marked on dressing and 2 port sites. Denies N/V and NG tube in placed set to LIS. Skin WDL and double check with fellow RN. PICC in [facility]. PIV infusing LR at 75ml/hr. Report given to oncoming nurse.

## 2023-10-25 NOTE — CONSULTS
Triage and Transition - Consult and Liaison     Brady Lenz  October 25, 2023    Session start: 1:15 pm  Session end: 1:22 pm  Session duration in minutes: 7   CPT utilized: not billable  Patient was seen virtually (Ippies cart or other teleconferencing device).    Diagnosis:   300.00 (F41.9) Unspecified Anxiety Disorder,    ;      Plan/Recommendations:   Patient to be seen by psychiatric medication provider. Reluctant to engage today.     Reason for consult: Psychiatry consult was requested due to depression. Patient was seen by Triage and Transition Consult & Liaison team.     Identifying information: Brady is 58 year old White  male   followed related to cecal mass, post surgery.     Brief Psychosocial History  Patient is single and has no children. Patient works as a .      Summary of Patient Situation  Patient has had acute anxiety while admitted, flat affect, provider was concerned for possible depression. Patient reports anxiety is high. He has had down mood for many years. He doesn't feel like he has a lot to be happy about. He denies all safety concerns. He has never engaged in any type of therapy or treatment. He is reluctant to engage.     Significant Clinical History  There is no history listed within the medical chart.     Mental Status Exam   Affect: Flat  Appearance: Appropriate   Attention Span/Concentration: Attentive    Eye Contact: Variable  Fund of Knowledge: Appropriate   Language /Speech Content: Fluent  Language /Speech Volume: Normal   Language /Speech Rate/Productions: Normal   Recent Memory: Intact  Remote Memory: Intact  Mood: Apathetic   Orientation:   Person: Yes   Place: Yes  Time of Day: Yes   Date: Yes   Situation (Do they understand why they are here?): Yes   Psychomotor Behavior: Normal   Thought Content: Clear  Thought Form: Intact    Current medications:   Current Facility-Administered Medications   Medication    benzocaine-menthol (CHLORASEPTIC) 6-10 MG lozenge 1 lozenge     dextrose 10% infusion    diphenhydrAMINE (BENADRYL) injection 25 mg    heparin ANTICOAGULANT injection 5,000 Units    HYDROmorphone (DILAUDID) PCA 0.2 mg/mL OPIOID NAIVE (age less than 65 years)    ketorolac (TORADOL) injection 15 mg    Followed by    ibuprofen (ADVIL/MOTRIN) tablet 600 mg    iron sucrose (VENOFER) 300 mg in sodium chloride 0.9 % 290 mL intermittent infusion    lactated ringers infusion    lidocaine (LMX4) cream    lidocaine (LMX4) cream    lidocaine 1 % 0.1-1 mL    lidocaine 1 % 0.1-5 mL    lipids plant base (CLINOLIPID) 20 % infusion 250 mL    LORazepam (ATIVAN) injection 0.5 mg    melatonin tablet 1 mg    naloxone (NARCAN) injection 0.2 mg    Or    naloxone (NARCAN) injection 0.4 mg    Or    naloxone (NARCAN) injection 0.2 mg    Or    naloxone (NARCAN) injection 0.4 mg    ondansetron (ZOFRAN ODT) ODT tab 4 mg    Or    ondansetron (ZOFRAN) injection 4 mg    parenteral nutrition - Clinimix E    phenol (CHLORASEPTIC) 1.4 % spray 1 mL    prochlorperazine (COMPAZINE) injection 10 mg    Or    prochlorperazine (COMPAZINE) tablet 10 mg    Or    prochlorperazine (COMPAZINE) suppository 25 mg    sodium chloride (PF) 0.9% PF flush 10-20 mL    sodium chloride (PF) 0.9% PF flush 10-40 mL    sodium chloride (PF) 0.9% PF flush 10-40 mL    sodium chloride (PF) 0.9% PF flush 3 mL    sodium chloride (PF) 0.9% PF flush 3 mL        Therapeutic intervention and progress:  Therapeutic intervention consisted of building therapeutic rapport, active listening, and validation.     Cheri Daniel, [facility]   Triage and Transition - Consult and Liaison   [phone]

## 2023-10-26 ENCOUNTER — CARE COORDINATION (OUTPATIENT)
Dept: ONCOLOGY | Facility: CLINIC | Age: 58
End: 2023-10-26
Payer: COMMERCIAL

## 2023-10-26 DIAGNOSIS — C18.9 COLON ADENOCARCINOMA (H): Primary | ICD-10-CM

## 2023-10-26 LAB
ANION GAP SERPL CALCULATED.3IONS-SCNC: 6 MMOL/L (ref 7–15)
BUN SERPL-MCNC: 16.1 MG/DL (ref 6–20)
CALCIUM SERPL-MCNC: 8.3 MG/DL (ref 8.6–10)
CHLORIDE SERPL-SCNC: 107 MMOL/L (ref 98–107)
CREAT SERPL-MCNC: 0.65 MG/DL (ref 0.67–1.17)
DEPRECATED HCO3 PLAS-SCNC: 27 MMOL/L (ref 22–29)
EGFRCR SERPLBLD CKD-EPI 2021: >90 ML/MIN/1.73M2
ERYTHROCYTE [DISTWIDTH] IN BLOOD BY AUTOMATED COUNT: 29.3 % (ref 10–15)
GLUCOSE BLDC GLUCOMTR-MCNC: 110 MG/DL (ref 70–99)
GLUCOSE BLDC GLUCOMTR-MCNC: 114 MG/DL (ref 70–99)
GLUCOSE BLDC GLUCOMTR-MCNC: 114 MG/DL (ref 70–99)
GLUCOSE BLDC GLUCOMTR-MCNC: 120 MG/DL (ref 70–99)
GLUCOSE BLDC GLUCOMTR-MCNC: 120 MG/DL (ref 70–99)
GLUCOSE BLDC GLUCOMTR-MCNC: 122 MG/DL (ref 70–99)
GLUCOSE SERPL-MCNC: 115 MG/DL (ref 70–99)
HCT VFR BLD AUTO: 27.7 % (ref 40–53)
HGB BLD-MCNC: 7.8 G/DL (ref 13.3–17.7)
MAGNESIUM SERPL-MCNC: 2 MG/DL (ref 1.7–2.3)
MCH RBC QN AUTO: 18.8 PG (ref 26.5–33)
MCHC RBC AUTO-ENTMCNC: 28.2 G/DL (ref 31.5–36.5)
MCV RBC AUTO: 67 FL (ref 78–100)
PHOSPHATE SERPL-MCNC: 2.6 MG/DL (ref 2.5–4.5)
PLATELET # BLD AUTO: 308 10E3/UL (ref 150–450)
POTASSIUM SERPL-SCNC: 4 MMOL/L (ref 3.4–5.3)
RBC # BLD AUTO: 4.16 10E6/UL (ref 4.4–5.9)
SODIUM SERPL-SCNC: 140 MMOL/L (ref 135–145)
WBC # BLD AUTO: 9.1 10E3/UL (ref 4–11)

## 2023-10-26 PROCEDURE — B4185 PARENTERAL SOL 10 GM LIPIDS: HCPCS | Mod: JZ | Performed by: COLON & RECTAL SURGERY

## 2023-10-26 PROCEDURE — 250N000011 HC RX IP 250 OP 636: Mod: JZ | Performed by: COLON & RECTAL SURGERY

## 2023-10-26 PROCEDURE — 99232 SBSQ HOSP IP/OBS MODERATE 35: CPT | Performed by: INTERNAL MEDICINE

## 2023-10-26 PROCEDURE — 250N000013 HC RX MED GY IP 250 OP 250 PS 637: Performed by: COLON & RECTAL SURGERY

## 2023-10-26 PROCEDURE — 83735 ASSAY OF MAGNESIUM: CPT | Performed by: COLON & RECTAL SURGERY

## 2023-10-26 PROCEDURE — 80048 BASIC METABOLIC PNL TOTAL CA: CPT | Performed by: COLON & RECTAL SURGERY

## 2023-10-26 PROCEDURE — 84100 ASSAY OF PHOSPHORUS: CPT | Performed by: COLON & RECTAL SURGERY

## 2023-10-26 PROCEDURE — 85027 COMPLETE CBC AUTOMATED: CPT | Performed by: PHYSICIAN ASSISTANT

## 2023-10-26 PROCEDURE — 99232 SBSQ HOSP IP/OBS MODERATE 35: CPT | Performed by: HOSPITALIST

## 2023-10-26 PROCEDURE — 250N000009 HC RX 250: Performed by: HOSPITALIST

## 2023-10-26 PROCEDURE — 250N000013 HC RX MED GY IP 250 OP 250 PS 637: Performed by: HOSPITALIST

## 2023-10-26 PROCEDURE — 258N000003 HC RX IP 258 OP 636: Performed by: COLON & RECTAL SURGERY

## 2023-10-26 PROCEDURE — 120N000001 HC R&B MED SURG/OB

## 2023-10-26 PROCEDURE — 250N000009 HC RX 250: Mod: JZ | Performed by: COLON & RECTAL SURGERY

## 2023-10-26 RX ORDER — OXYCODONE HYDROCHLORIDE 5 MG/1
5-10 TABLET ORAL EVERY 4 HOURS PRN
Status: DISCONTINUED | OUTPATIENT
Start: 2023-10-26 | End: 2023-11-03 | Stop reason: HOSPADM

## 2023-10-26 RX ORDER — HYDROMORPHONE HYDROCHLORIDE 1 MG/ML
.3-.5 INJECTION, SOLUTION INTRAMUSCULAR; INTRAVENOUS; SUBCUTANEOUS
Status: DISCONTINUED | OUTPATIENT
Start: 2023-10-26 | End: 2023-11-03 | Stop reason: HOSPADM

## 2023-10-26 RX ORDER — SODIUM CHLORIDE, SODIUM LACTATE, POTASSIUM CHLORIDE, CALCIUM CHLORIDE 600; 310; 30; 20 MG/100ML; MG/100ML; MG/100ML; MG/100ML
INJECTION, SOLUTION INTRAVENOUS CONTINUOUS
Status: DISCONTINUED | OUTPATIENT
Start: 2023-10-26 | End: 2023-11-03

## 2023-10-26 RX ADMIN — BENZOCAINE 6 MG-MENTHOL 10 MG LOZENGES 1 LOZENGE: at 02:11

## 2023-10-26 RX ADMIN — IRON SUCROSE 300 MG: 20 INJECTION, SOLUTION INTRAVENOUS at 09:38

## 2023-10-26 RX ADMIN — HEPARIN SODIUM 5000 UNITS: 5000 INJECTION, SOLUTION INTRAVENOUS; SUBCUTANEOUS at 16:05

## 2023-10-26 RX ADMIN — OXYCODONE HYDROCHLORIDE 5 MG: 5 TABLET ORAL at 13:56

## 2023-10-26 RX ADMIN — IBUPROFEN 600 MG: 600 TABLET ORAL at 08:16

## 2023-10-26 RX ADMIN — IBUPROFEN 600 MG: 600 TABLET ORAL at 13:56

## 2023-10-26 RX ADMIN — MAGNESIUM SULFATE HEPTAHYDRATE: 500 INJECTION, SOLUTION INTRAMUSCULAR; INTRAVENOUS at 20:24

## 2023-10-26 RX ADMIN — OLIVE OIL AND SOYBEAN OIL 250 ML: 16; 4 INJECTION, EMULSION INTRAVENOUS at 20:24

## 2023-10-26 RX ADMIN — HEPARIN SODIUM 5000 UNITS: 5000 INJECTION, SOLUTION INTRAVENOUS; SUBCUTANEOUS at 23:43

## 2023-10-26 RX ADMIN — FEXOFENADINE HYDROCHLORIDE 60 MG: 60 TABLET ORAL at 11:10

## 2023-10-26 ASSESSMENT — ACTIVITIES OF DAILY LIVING (ADL)
ADLS_ACUITY_SCORE: 24
ADLS_ACUITY_SCORE: 24
ADLS_ACUITY_SCORE: 20
ADLS_ACUITY_SCORE: 24
ADLS_ACUITY_SCORE: 20
ADLS_ACUITY_SCORE: 20

## 2023-10-26 NOTE — PLAN OF CARE
Goal Outcome Evaluation: Progressing    Reason for Admission: Cecum Mass    Evaluation of Goal:   Patient is A&Ox 4. Vitals are stable on RA. Tele NSR. Patient is up with SBA. Patient reporting 3/10 - continues to use PCA and ice applied. Patient voiding in urinal - bladder scan following last post-void was 250 ml's. Midline incision dressing remains intact. TPN and lipids running through PICC; blood sugars have been within parameters. NPO except for sips with meds and ice chips. Repeat hemoglobin following blood on dayshift was 8.2; morning labs pending. Sleep Quality green. Patient scoring moderate on the Aggression Stoplight Tool. Pt calm and cooperative with cares, able to make needs known, call light within reach, bed alarm on for safety. Plan is to monitor NG output.     Jeanette Howard RN on 10/26/2023 at 6:51 AM

## 2023-10-26 NOTE — PROGRESS NOTES
[facility]  Hospitalist Progress Note   10/26/2023          Assessment and Plan:       Brady Lenz is a 58 year old male with no known past medical history who does not see a doctor on a regular basis, no previous cancer screening admitted on 10/23/2023 with 2-week history of intermittent abdominal pain and distention and decreased appetite.     Status post laparoscopic peritoneal biopsy, open right colectomy 10/24/2023.  Cecal mass with resultant small bowel obstruction  Liver masses, concerning for metastatic disease  -- At the time of admission frail-appearing, afebrile hemodynamically stable.    LFTs, electrolytes, renal function within normal limits.  CT abdomen and pelvis showed a 7cm mass in the cecum causing small bowel obstruction and 2 liver masses measuring up to 10.6 cm in the right lobe and 4.9cm in the left lobe concerning for metastatic disease.  Telemetry monitoring normal sinus rhythm, discontinued 10/25.  -- NG tube placed in the ED on 10/23.  PICC line placed 10/24  --Status post laparoscopic peritoneal biopsy, open right colectomy 10/24/2023.  -Colorectal surgery following.  Defer perioperative care including antibiotics, pharmacological DVT prophylaxis, nutrition to CRS.    On TPN for nutrition through PICC line.  PCA discontinued 10/26.  IV/p.o. as needed pain meds.  Encourage ambulation.    IV/p.o. as needed antiemetics.  -- [place] [facility] following. Follow-up pathology results.  Appreciate comanagement.   Will consider CT chest with contrast in 1 to 2 days once stable prior to discharge     Postoperative leukocytosis resolved   Postoperative mild coagulopathy  WBC 11.2 > 9.1.  No signs or symptoms of infection. Defer perioperative antibiotics to CRS  --Noted to mild coagulopathy with INR 1.38.  Vitamin K per CRS.    Suspected Subacute to chronic microcytic anemia, secondary to blood loss from cecal mass  Status post 4 units of blood transfusion.  Severe iron  deficiency  -At the time of admission hemoglobin of 5.9, MCV 58, platelet count of 473; alk phos 135  --Suspect some degree of subacute to chronic blood loss given he is presently hemodynamically stable and denies any recent melanotic stools or BRBPR. Documented EBL 50 mL.  -- Received total 3 units of blood transfusion on 10/23 to 10/24.  And 1 unit of PRBC on 10/25.  Iron saturation index of 2.  3 doses of IV Venofer.  Hemoglobin this morning 7.8.    Monitor hemoglobin levels in AM.  Conditional order to transfuse for hemoglobin less than 7.    Acute anxiety   Concern for undiagnosed depression.  Patient having anxiety, flat affect.  As needed Ativan   Psychiatry evaluated,  recommended sertraline.  Patient declined.  Supportive care.  Offered to discuss with patient's family members or friends regarding care patient declined.    Mild hyperglycemia while on TPN.  hemoglobin A1c.  5.0.  On TPN.  Insulin sliding scale q4 hrs     Social stressors.  Patient reported works as a / concerned with living situation/finances..  Care coordinator / assisting with support requested.    Orders Placed This Encounter      NPO for Medical/Clinical Reasons Except for: Meds, Ice Chips      DVT Prophylaxis: SCDs, postprocedure pharmacological DVT prophylaxis per surgical team.  Code Status: Full Code  Disposition: Expected discharge in 2 days pending clinical improvement, safe discharge plan in place.    Discussed with patient, bedside RN.  Total time > 35 min. More than 70% of time spent in direct patient care, care coordination, patient counseling, and formalizing plan of care.      Brandt Macario MD        Interval History:      Patient lying in bed.  Calm, slow to respond. Flat affect.  Complaining of abdominal pain -was on PCA, switched to oral/IV pain meds this morning  NG tube in place with bilious output  NPO. On TPN.    Denies any new cough or urinary disturbance.    Denies any tingling or  numbness.  Denies any chest pain or palpitations.         Physical Exam:        Physical Exam   Temp:  [97.9  F (36.6  C)-100.1  F (37.8  C)] 97.9  F (36.6  C)  Pulse:  [72-88] 73  Resp:  [18] 18  BP: (102-127)/(58-72) 110/64  SpO2:  [96 %-98 %] 96 %    Intake/Output Summary (Last 24 hours) at 10/24/2023 0829  Last data filed at 10/24/2023 0742  Gross per 24 hour   Intake 917 ml   Output 900 ml   Net 17 ml       Admission Weight: 68 kg (150 lb)  Current Weight: 68 kg (150 lb)    PHYSICAL EXAM  GENERAL: Patient flat affect, slow to respond.  HEENT: Oropharynx pink, Pupils equal  HEART: Regular rate and rhythm. S1S2. No murmurs  LUNGS: Clear to auscultation bilaterally. No expiratory wheeze.  Respirations unlabored  ABDOMEN: Distended, abdominal dressing intact.  No guarding or rigidity.  NEURO: moving all extremities.  EXTREMITIES: No pedal edema.   SKIN: Warm, dry. No rash  PSYCHIATRY Cooperative       Medications:         heparin ANTICOAGULANT  5,000 Units Subcutaneous Q8H    ibuprofen  600 mg Oral Q6H    insulin aspart  1-4 Units Subcutaneous Q4H    iron sucrose  300 mg Intravenous Daily    lipids plant base  250 mL Intravenous Q24H    sodium chloride (PF)  10-40 mL Intracatheter Q7 Days    sodium chloride (PF)  3 mL Intracatheter Q8H     acetaminophen, sore throat, dextrose, glucose **OR** dextrose **OR** glucagon, diphenhydrAMINE, fexofenadine, HYDROmorphone, lidocaine 4%, lidocaine (buffered or not buffered), LORazepam, melatonin, naloxone **OR** naloxone **OR** naloxone **OR** naloxone, ondansetron **OR** ondansetron, oxyCODONE, phenol MT, prochlorperazine **OR** prochlorperazine **OR** prochlorperazine, sodium chloride (PF), sodium chloride (PF), sodium chloride (PF)         Data:      All new lab and imaging data was reviewed.

## 2023-10-26 NOTE — PROGRESS NOTES
"[facility] Hematology / Oncology  Progress Note 2023  Name: Brady Lenz  :  1965    MRN:  [id number]    --------------------    Assessment / Plan:  Presumed proximal colon cancer with metastatic disease to liver.  Malignant small bowel obstruction secondary to above.  Status post open right colectomy w/ excision peritoneal implant.  Nausea, vomiting and pain secondary to above.  NGT in place.  Severe iron deficiency anemia secondary to above status post 3 units of blood.  Parenteral iron ongoing.  Severe depression and unstable social situation.    Reviewed clear CT chest.  Reviewed focus right now is on surgery recovery.  Reviewed plan would be for indefinite chemotherapy w/ FOLFOX to start; NGS testing will guide choice of 3rd agent.  Requested surgery consult at  for liver directed therapies (likely to follow chemotherapy).  Formal pathology report pending; will need Mehta / microsatellite testing.  Mental health and unstable social situation represent major barriers to ongoing care; appreciate ongoing support.  Continue Venofer 300 mg x 3 days; anticipate till \"take effect\" in 1-2 weeks.  Messaging sent to our clinic schedulers to aid in arranging follow-up.  Will sign off for now; please contact w/ questions at any time.    Asa Grubbs MD    --------------------    Interval History:  Brady returns for follow up of colon cancer.  Recovering post-op.  Doing well.  Pain is well controlled.  No nausea, vomiting.  NGT in place.    --------------------    Physical Exam:  VS: /64 (BP Location: Left arm)   Pulse 73   Temp 97.9  F (36.6  C) (Axillary)   Resp 18   Ht 1.854 m (6' 1\")   Wt 68 kg (150 lb)   SpO2 96%   BMI 19.79 kg/m  .  GEN: Well appearing.    Labs / Imaging:  Reviewed CBC, CMP, CEA.  Reviewed CT chest w/ independent review.    "

## 2023-10-26 NOTE — PROGRESS NOTES
Colon and Rectal Surgery Progress Note          Assessment and Plan:     POD #2    Hgb stable - hold on transfusion for now  Restart heparin subcutaneous  Cont tpn and NG tube  Stop PCA      Jose Moe MD FACS FASCRS  Colorectal Surgeon       [facility]  [address], [address]  [place] [address]  T: [phone]  F: [phone]  Pager: [phone]  [email or web address]                 Interval History:     No flatus.  Pain controlled.  Walked.                Physical Exam:   Vitals were reviewed  Patient Vitals for the past 24 hrs:   BP Temp Temp src Pulse Resp SpO2   10/26/23 0456 114/69 98  F (36.7  C) Axillary 72 18 97 %   10/25/23 2115 117/71 100.1  F (37.8  C) Axillary 81 18 96 %   10/25/23 1507 120/72 98.5  F (36.9  C) Axillary 88 -- --   10/25/23 1428 127/66 98.7  F (37.1  C) Axillary 86 -- 98 %   10/25/23 1412 119/66 -- -- 78 -- --   10/25/23 1411 119/66 98.2  F (36.8  C) Axillary 78 -- 97 %   10/25/23 1142 102/58 98.8  F (37.1  C) Axillary 79 18 98 %   10/25/23 0817 113/58 98.5  F (36.9  C) Oral 75 20 99 %         Intake/Output Summary (Last 24 hours) at 10/26/2023 0736  Last data filed at 10/26/2023 0634  Gross per 24 hour   Intake 28 ml   Output 1275 ml   Net -1247 ml       Head - Nomocephalic atraumatic  Sclera anicteric  Extremities warm and well perfused  Lungs - breathing non labored,   Abdomen - soft, flat, minimally tender  Rectal exam - deferred  Skin - no rash  Psych - affect appropriate  Neuro - no focal deficits             Data:   All laboratory and imaging data in the past 24 hours reviewed    CBC  Lab Results   Component Value Date    WBC 9.1 10/26/2023    WBC 11.2 (H) 10/25/2023    WBC 4.2 10/24/2023    HGB 7.8 (L) 10/26/2023    HGB 8.2 (L) 10/25/2023    HGB 7.6 (L) 10/25/2023    HCT 27.7 (L) 10/26/2023    HCT 27.3 (L) 10/25/2023    HCT 29.9 (L) 10/24/2023     10/26/2023     10/25/2023     (H) 10/24/2023       BMP  Recent Labs   Lab Test  10/26/23  0633 10/26/23  0453 10/25/23  0617 10/25/23  0525     --   --  142   POTASSIUM 4.0  --   --  4.4   CHLORIDE 107  --   --  108*   CO2 27  --   --  25   ANIONGAP 6*  --   --  9   * 122*   < > 120*   BUN 16.1  --   --  18.9   CR 0.65*  --   --  0.82   NAT 8.3*  --   --  8.1*    < > = values in this interval not displayed.       Liver Function Studies -   Recent Labs   Lab Test 10/25/23  0525   PROTTOTAL 4.8*   ALBUMIN 3.0*   BILITOTAL 0.7   ALKPHOS 91   AST 36   ALT 13       New imaging data reviewed: no    Total time spent in counseling, direct patient care, coordination of care, and review of data 15 min

## 2023-10-26 NOTE — PROVIDER NOTIFICATION
"MD Notification    Notified Person: MD    Notified Person Name:Brandt Macario     Notification Date/Time: 10/26/23 07:30    Notification Interaction: Vocera message    Purpose of Notification:\"Patient with hgb of 7.8 this morning. Has conditional order to transfuse blood if below 8. Alright with you to go ahead and give 1 unit of RBC? \"    Orders Received: Will hold until surgery team rounds and ask their opinion    Comments:     @07:35 - Discussed with Dr. Moe and will hold off on blood transfusion as hgb is stable at 7.8  "

## 2023-10-26 NOTE — PROGRESS NOTES
Care Management Follow Up    Length of Stay (days): 3    Expected Discharge Date: 10/30/2023     Concerns to be Addressed: adjustment to diagnosis/illness, basic needs, coping/stress, discharge planning, financial/insurance, homelessness, mental health     Patient plan of care discussed at interdisciplinary rounds: Yes    Anticipated Discharge Disposition: Homeless     Anticipated Discharge Services:    Anticipated Discharge DME:  None    Patient/family educated on Medicare website which has current facility and service quality ratings:    Education Provided on the Discharge Plan:  Yes  Patient/Family in Agreement with the Plan: yes    Referrals Placed by CM/SW: Financial Services  Private pay costs discussed: Not applicable    Additional Information:  Follow up with patient today. Patient remains interested in medical respite bed at [facility]. Step one of application process started with referral sent. Await to hear about bed availability and appropriateness of referral.     Case management will continue to follow along for disposition planning.       Christiane Rosa RN   [facility]   Phone [phone] or [phone]

## 2023-10-26 NOTE — PLAN OF CARE
Date & Time: 10/26/2023 2407-5602  Surgery/POD#: POD 2 of (R)open colectomy, lap peritoneal biopsy  Behavior & Aggression: Green   Fall Risk: Yes  Orientation:A&OX4, Flat and withdrawn   ABNL VS/O2: VSS on Ra  ABNL Labs: Creat 0.65 and hgb 7.8 (no need to transfuse)  Pain Management:PCA discontinued this am. PRN oxy available and scheduled Ibuprofen given  Bowel/Bladder: Continent using urinal at bedside, dark rosendo output  Drains: RUE triple PICC, NG at LIS, PIV SL  Diet:NPO (TPN) ex ice chips   Activity Level: SBA  Tests/Procedures: Biopsy pending   Anticipated  DC Date: pending ROBF and safe discharge  Significant Information: Patient cms intact. Midline incision with marked drainage and X2 CDI port sites. NG tube to LIS with brownish output. Pain rated at 1-2/10 for pain and PCA discontinued this am, patient currently only taking PO scheduled ibuprofen but has oxy prn available. BS hypoactive with no gas or bm yet. Hgb this morning at 7.8, per surgery team will not need to transfuse at this time and will continue to monitor. Psych, CC/SW, Oncology, Hospitalist, and  all following.

## 2023-10-27 ENCOUNTER — PATIENT OUTREACH (OUTPATIENT)
Dept: ONCOLOGY | Facility: CLINIC | Age: 58
End: 2023-10-27

## 2023-10-27 DIAGNOSIS — C18.9 COLON ADENOCARCINOMA (H): Primary | ICD-10-CM

## 2023-10-27 LAB
ALBUMIN SERPL BCG-MCNC: 2.9 G/DL (ref 3.5–5.2)
ALP SERPL-CCNC: 88 U/L (ref 40–129)
ALT SERPL W P-5'-P-CCNC: 10 U/L (ref 0–70)
ANION GAP SERPL CALCULATED.3IONS-SCNC: 7 MMOL/L (ref 7–15)
AST SERPL W P-5'-P-CCNC: 36 U/L (ref 0–45)
BILIRUB SERPL-MCNC: 0.5 MG/DL
BUN SERPL-MCNC: 16.8 MG/DL (ref 6–20)
CALCIUM SERPL-MCNC: 8.3 MG/DL (ref 8.6–10)
CHLORIDE SERPL-SCNC: 104 MMOL/L (ref 98–107)
CREAT SERPL-MCNC: 0.48 MG/DL (ref 0.67–1.17)
DEPRECATED HCO3 PLAS-SCNC: 26 MMOL/L (ref 22–29)
EGFRCR SERPLBLD CKD-EPI 2021: >90 ML/MIN/1.73M2
GLUCOSE BLDC GLUCOMTR-MCNC: 113 MG/DL (ref 70–99)
GLUCOSE BLDC GLUCOMTR-MCNC: 116 MG/DL (ref 70–99)
GLUCOSE BLDC GLUCOMTR-MCNC: 124 MG/DL (ref 70–99)
GLUCOSE BLDC GLUCOMTR-MCNC: 82 MG/DL (ref 70–99)
GLUCOSE SERPL-MCNC: 110 MG/DL (ref 70–99)
HGB BLD-MCNC: 7.8 G/DL (ref 13.3–17.7)
INTERPRETATION: NORMAL
LAB DIRECTOR COMMENTS: NORMAL
LAB DIRECTOR DISCLAIMER: NORMAL
LAB DIRECTOR INTERPRETATION: NORMAL
LAB DIRECTOR METHODOLOGY: NORMAL
LAB DIRECTOR RESULTS: NORMAL
MAGNESIUM SERPL-MCNC: 2 MG/DL (ref 1.7–2.3)
PHOSPHATE SERPL-MCNC: 2.7 MG/DL (ref 2.5–4.5)
PLATELET # BLD AUTO: 296 10E3/UL (ref 150–450)
POTASSIUM SERPL-SCNC: 3.7 MMOL/L (ref 3.4–5.3)
PROT SERPL-MCNC: 5.2 G/DL (ref 6.4–8.3)
SIGNIFICANT RESULTS: NORMAL
SODIUM SERPL-SCNC: 137 MMOL/L (ref 135–145)
SPECIMEN DESCRIPTION: NORMAL
SPECIMEN DESCRIPTION: NORMAL
TEST DETAILS, MDL: NORMAL

## 2023-10-27 PROCEDURE — G0452 MOLECULAR PATHOLOGY INTERPR: HCPCS | Mod: 26 | Performed by: STUDENT IN AN ORGANIZED HEALTH CARE EDUCATION/TRAINING PROGRAM

## 2023-10-27 PROCEDURE — 81455 SO/HL 51/>GSAP DNA/DNA&RNA: CPT | Performed by: COLON & RECTAL SURGERY

## 2023-10-27 PROCEDURE — 99232 SBSQ HOSP IP/OBS MODERATE 35: CPT | Performed by: INTERNAL MEDICINE

## 2023-10-27 PROCEDURE — 250N000011 HC RX IP 250 OP 636: Mod: JZ | Performed by: COLON & RECTAL SURGERY

## 2023-10-27 PROCEDURE — 250N000013 HC RX MED GY IP 250 OP 250 PS 637: Performed by: COLON & RECTAL SURGERY

## 2023-10-27 PROCEDURE — 84100 ASSAY OF PHOSPHORUS: CPT | Performed by: COLON & RECTAL SURGERY

## 2023-10-27 PROCEDURE — 999N000040 HC STATISTIC CONSULT NO CHARGE VASC ACCESS

## 2023-10-27 PROCEDURE — 120N000001 HC R&B MED SURG/OB

## 2023-10-27 PROCEDURE — 300N000001 HC INTERCOMPANY SERVICE, LAB 300 OPNP

## 2023-10-27 PROCEDURE — 258N000003 HC RX IP 258 OP 636: Performed by: COLON & RECTAL SURGERY

## 2023-10-27 PROCEDURE — 250N000013 HC RX MED GY IP 250 OP 250 PS 637: Performed by: HOSPITALIST

## 2023-10-27 PROCEDURE — 999N000190 HC STATISTIC VAT ROUNDS

## 2023-10-27 PROCEDURE — 250N000009 HC RX 250: Performed by: HOSPITALIST

## 2023-10-27 PROCEDURE — 250N000009 HC RX 250: Mod: JZ | Performed by: COLON & RECTAL SURGERY

## 2023-10-27 PROCEDURE — 80053 COMPREHEN METABOLIC PANEL: CPT | Performed by: HOSPITALIST

## 2023-10-27 PROCEDURE — 85018 HEMOGLOBIN: CPT | Performed by: HOSPITALIST

## 2023-10-27 PROCEDURE — 83735 ASSAY OF MAGNESIUM: CPT | Performed by: COLON & RECTAL SURGERY

## 2023-10-27 PROCEDURE — B4185 PARENTERAL SOL 10 GM LIPIDS: HCPCS | Mod: JZ | Performed by: COLON & RECTAL SURGERY

## 2023-10-27 PROCEDURE — 85049 AUTOMATED PLATELET COUNT: CPT | Performed by: COLON & RECTAL SURGERY

## 2023-10-27 RX ORDER — LIDOCAINE 4 G/G
2 PATCH TOPICAL
Status: COMPLETED | OUTPATIENT
Start: 2023-10-27 | End: 2023-11-02

## 2023-10-27 RX ADMIN — IRON SUCROSE 300 MG: 20 INJECTION, SOLUTION INTRAVENOUS at 08:38

## 2023-10-27 RX ADMIN — OXYCODONE HYDROCHLORIDE 10 MG: 5 TABLET ORAL at 16:20

## 2023-10-27 RX ADMIN — MAGNESIUM SULFATE HEPTAHYDRATE: 500 INJECTION, SOLUTION INTRAMUSCULAR; INTRAVENOUS at 20:21

## 2023-10-27 RX ADMIN — HEPARIN SODIUM 5000 UNITS: 5000 INJECTION, SOLUTION INTRAVENOUS; SUBCUTANEOUS at 23:58

## 2023-10-27 RX ADMIN — OXYCODONE HYDROCHLORIDE 5 MG: 5 TABLET ORAL at 01:33

## 2023-10-27 RX ADMIN — HEPARIN SODIUM 5000 UNITS: 5000 INJECTION, SOLUTION INTRAVENOUS; SUBCUTANEOUS at 16:21

## 2023-10-27 RX ADMIN — LIDOCAINE 2 PATCH: 4 PATCH TOPICAL at 12:00

## 2023-10-27 RX ADMIN — HEPARIN SODIUM 5000 UNITS: 5000 INJECTION, SOLUTION INTRAVENOUS; SUBCUTANEOUS at 06:37

## 2023-10-27 RX ADMIN — OXYCODONE HYDROCHLORIDE 10 MG: 5 TABLET ORAL at 20:43

## 2023-10-27 RX ADMIN — OLIVE OIL AND SOYBEAN OIL 250 ML: 16; 4 INJECTION, EMULSION INTRAVENOUS at 20:21

## 2023-10-27 RX ADMIN — ACETAMINOPHEN 650 MG: 325 TABLET, FILM COATED ORAL at 16:20

## 2023-10-27 ASSESSMENT — ACTIVITIES OF DAILY LIVING (ADL)
ADLS_ACUITY_SCORE: 20
ADLS_ACUITY_SCORE: 22
ADLS_ACUITY_SCORE: 20
ADLS_ACUITY_SCORE: 22
ADLS_ACUITY_SCORE: 20
ADLS_ACUITY_SCORE: 26
ADLS_ACUITY_SCORE: 20
ADLS_ACUITY_SCORE: 22
ADLS_ACUITY_SCORE: 20
ADLS_ACUITY_SCORE: 20
ADLS_ACUITY_SCORE: 22
ADLS_ACUITY_SCORE: 22

## 2023-10-27 NOTE — PROGRESS NOTES
[facility]  Hospitalist Progress Note   10/27/2023          Assessment and Plan:       Brady Lenz is a 58 year old male with no known past medical history who does not see a doctor on a regular basis, no previous cancer screening admitted on 10/23/2023 with 2-week history of intermittent abdominal pain and distention and decreased appetite.     Cecal mass with small bowel obstruction  Status post laparoscopic peritoneal biopsy, open right colectomy 10/24/2023.  Likely metastatic colon cancer with liver mets    -- At the time of admission frail-appearing, afebrile hemodynamically stable.    LFTs, electrolytes, renal function within normal limits.  CT abdomen and pelvis showed a 7cm mass in the cecum causing small bowel obstruction and 2 liver masses measuring up to 10.6 cm in the right lobe and 4.9cm in the left lobe concerning for metastatic disease.  -- NG tube placed in the ED on 10/23.  PICC line placed 10/24  --Status post laparoscopic peritoneal biopsy, open right colectomy 10/24/2023.    On TPN for nutrition through PICC line.  PCA discontinued 10/26.  IV/p.o. as needed pain meds.  Encourage ambulation.    IV/p.o. as needed antiemetics.  -- [place] oncology following. Follow-up pathology results.   Further management as per the colorectal surgery.  Pathology pending at this time.  CT chest negative for any metastatic disease      Severe iron deficiency anemia secondary to blood loss from cecal mass  Status post 4 units of blood transfusion.    -At the time of admission hemoglobin of 5.9, MCV 58, platelet count of 473; alk phos 135  --Suspect some degree of subacute to chronic blood loss given he is presently hemodynamically stable and denies any recent melanotic stools or BRBPR. Documented EBL 50 mL.  -- Received total 3 units of blood transfusion on 10/23 to 10/24.  And 1 unit of PRBC on 10/25.  Iron saturation index of 2.  3 doses of IV Venofer.  Hemoglobin stable and improved to 7.8 at  this time    Acute anxiety   Possible underlying depression  Patient having anxiety, flat affect.  As needed Ativan   Psychiatry evaluated,  recommended sertraline.  Patient declined.  Stable at this time    Social stressors.  Patient reported works as a / concerned with living situation/finances..  Care coordinator / assisting with support requested.    Orders Placed This Encounter      NPO for Medical/Clinical Reasons Except for: Meds, Ice Chips      DVT Prophylaxis: SCDs, postprocedure pharmacological DVT prophylaxis per surgical team.  Code Status: Full Code  Disposition: Expected discharge in 2-3 days once able to eat orally and remained stable.      Eleazar Ferrara MD        Interval History:        Patient seen and evaluated in his room, standing and trying to walk, complains some back pain not able to sleep well because of the back pain.  No fever chills headache dizziness lightheadedness, does have some abdominal pain as well.    No other significant event overnight         Physical Exam:        Physical Exam   Temp:  [97.8  F (36.6  C)-99.6  F (37.6  C)] 97.8  F (36.6  C)  Pulse:  [67-82] 67  Resp:  [16-18] 16  BP: (110-117)/(62-68) 116/62  SpO2:  [95 %-98 %] 98 %    Intake/Output Summary (Last 24 hours) at 10/24/2023 0889  Last data filed at 10/24/2023 0742  Gross per 24 hour   Intake 917 ml   Output 900 ml   Net 17 ml       Admission Weight: 68 kg (150 lb)  Current Weight: 68 kg (150 lb)    PHYSICAL EXAM  GENERAL: Awake, alert, standing in his room in no acute distress  HEENT: Unremarkable  HEART: Regular rate and rhythm. S1S2. No murmurs  LUNGS: Diminished air entry bilaterally.  Respirations unlabored  ABDOMEN: Soft but slightly distended, sluggish bowel sounds, mild tenderness  NEURO: No focal deficit  EXTREMITIES: No pedal edema.   SKIN: Warm, dry. No rash  PSYCHIATRY Cooperative       Medications:         heparin ANTICOAGULANT  5,000 Units Subcutaneous Q8H    ibuprofen  600 mg Oral  Q6H    insulin aspart  1-4 Units Subcutaneous Q4H    lidocaine  2 patch Transdermal Q24H    lipids plant base  250 mL Intravenous Q24H    sodium chloride (PF)  10-40 mL Intracatheter Q7 Days    sodium chloride (PF)  3 mL Intracatheter Q8H     acetaminophen, sore throat, dextrose, glucose **OR** dextrose **OR** glucagon, diphenhydrAMINE, fexofenadine, HYDROmorphone, lidocaine 4%, LORazepam, melatonin, naloxone **OR** naloxone **OR** naloxone **OR** naloxone, ondansetron **OR** ondansetron, oxyCODONE, phenol MT, prochlorperazine **OR** prochlorperazine **OR** prochlorperazine, sodium chloride (PF), sodium chloride (PF), sodium chloride (PF)         Data:      All new lab and imaging data was reviewed.

## 2023-10-27 NOTE — PROGRESS NOTES
Care Management Follow Up    Length of Stay (days): 4    Expected Discharge Date: 10/31/2023     Concerns to be Addressed: adjustment to diagnosis/illness, basic needs, coping/stress, discharge planning, financial/insurance, homelessness, mental health     Patient plan of care discussed at interdisciplinary rounds: Yes    Anticipated Discharge Disposition: Homeless     Anticipated Discharge Services:    Anticipated Discharge DME:      Patient/family educated on Medicare website which has current facility and service quality ratings:    Education Provided on the Discharge Plan:    Patient/Family in Agreement with the Plan: yes    Referrals Placed by CM/SW: Financial Services  Private pay costs discussed: Not applicable    Additional Information:  Reviewed second step in application process for [facility]. Application submitted with patient input. Case management supervisors aware of bed request.    Writer also faxed initial referral documents via [facility] to [facility].     Christiane Rosa RN   [facility]   Phone [phone] or [phone]

## 2023-10-27 NOTE — PLAN OF CARE
Date & Time: 10/27/23 7434-5208  Surgery/POD#: POD3 open colectomy   Behavior & Aggression: Green  Fall Risk: yes  Orientation:A+Ox4  ABNL VS/O2:VSS  ABNL Labs: Creatinine .48, Ca 8.3, Albumin 2.9, Protein total 5.2  Pain Management:Oxycodone, lidocaine patches   Bowel/Bladder: Continent of bowel and bladder  Drains: None  Diet:NPO  Activity Level: SBA  Tests/Procedures: None  Anticipated  DC Date: Pending  Significant Information: TPN @ 85mL/hr, LR @ 15mL/hr

## 2023-10-27 NOTE — PLAN OF CARE
Goal Outcome Evaluation:      Plan of Care Reviewed With: patient    Overall Patient Progress: improvingOverall Patient Progress: improving     POD #3 Laparoscopic Peritoneal Biopsy Converted to  Open Right Colectomy. A&Ox4- flat affect. VSS on RA. Pain managed with PRN Oxycodone & heat packs. NPO except for meds & ice chips. Denies N/V. NG to LIS- brown/green output. PICC in place- LR at 15 mL/hr; TPN at 85 mL/hr; Lipids at 20.8 mL/hr. Adequate rosendo colored UOP. BS hypoactive- reports not passing gas. Abdominal midline incision with staples- island dressing changed x1. Abdominal port sites liquid bandage LYNETTE & intact. Up SBA- ambulated in halls x1. Continue to monitor.

## 2023-10-27 NOTE — PROGRESS NOTES
Colon and Rectal Surgery Progress Note          Assessment and Plan:     POD #3    Overall stable but still has ileus  Cont TPN and NG tube  Cont heparin for dvt prophlyaxis  Await return of bowel function  Lidocaine patch for neck pain      Jose Moe MD FACS FASCRS  Colorectal Surgeon       [facility]  [address], [address]  [place] [address]  T: [phone]  F: [phone]  Pager: [phone]  [email or web address]                 Interval History:     No flatus.  Some neck pain, walking              Physical Exam:   Vitals were reviewed  Patient Vitals for the past 24 hrs:   BP Temp Temp src Pulse Resp SpO2 Weight   10/27/23 0733 116/62 97.8  F (36.6  C) Oral 67 16 98 % --   10/27/23 0524 -- -- -- -- -- -- 69.8 kg (153 lb 14.4 oz)   10/27/23 0459 113/68 98.2  F (36.8  C) Oral 73 16 98 % --   10/27/23 0218 -- -- -- -- 16 -- --   10/26/23 2350 110/67 98.2  F (36.8  C) Oral 82 16 95 % --   10/26/23 2052 117/62 99.6  F (37.6  C) Oral 75 18 95 % --   10/26/23 1536 116/66 99.5  F (37.5  C) Oral 79 18 95 % --         Intake/Output Summary (Last 24 hours) at 10/27/2023 1125  Last data filed at 10/27/2023 1046  Gross per 24 hour   Intake 3228 ml   Output 1150 ml   Net 2078 ml       Head - Nomocephalic atraumatic  Sclera anicteric  Extremities warm and well perfused  Lungs - breathing non labored,   Abdomen - soft, mildly distended, appropriately tender  Rectal exam - deferred  Skin - no rash  Psych - affect appropriate  Neuro - no focal deficits             Data:   All laboratory and imaging data in the past 24 hours reviewed    CBC  Lab Results   Component Value Date    WBC 9.1 10/26/2023    WBC 11.2 (H) 10/25/2023    WBC 4.2 10/24/2023    HGB 7.8 (L) 10/27/2023    HGB 7.8 (L) 10/26/2023    HGB 8.2 (L) 10/25/2023    HCT 27.7 (L) 10/26/2023    HCT 27.3 (L) 10/25/2023    HCT 29.9 (L) 10/24/2023     10/27/2023     10/26/2023     10/25/2023       [facility]  Recent Labs   Lab  Test 10/27/23  0632 10/27/23  0340 10/26/23  1113 10/26/23  0633     --   --  140   POTASSIUM 3.7  --   --  4.0   CHLORIDE 104  --   --  107   CO2 26  --   --  27   ANIONGAP 7  --   --  6*   * 124*   < > 115*   BUN 16.8  --   --  16.1   CR 0.48*  --   --  0.65*   NAT 8.3*  --   --  8.3*    < > = values in this interval not displayed.       Liver Function Studies -   Recent Labs   Lab Test 10/27/23  0632   PROTTOTAL 5.2*   ALBUMIN 2.9*   BILITOTAL 0.5   ALKPHOS 88   AST 36   ALT 10       New imaging data reviewed: no    Total time spent in counseling, direct patient care, coordination of care, and review of data 10 min

## 2023-10-27 NOTE — PROGRESS NOTES
New Patient Oncology Nurse Navigator Note     Referring provider: Dr. Asa Grubbs    Referring Clinic/Organization: [facility]  Referred to: Medical Oncology at [facility]  Requested provider (if applicable): First available - did not specify   Referral Received: 10/27/23       Evaluation for :   Diagnosis   C18.9 (ICD-10-CM) - Colon adenocarcinoma (H)     My Clinical Question Is: colon cancer     Clinical History (per Nurse review of records provided):     Latest Reference Range & Units 10/23/23 10:36   CEA ng/mL 6.7     10/23/2023 CT Abdomen Pelvis (bookmarked) showed:   IMPRESSION:   1.  Mass in the cecum causing small bowel obstruction. This measures  up to 7 cm.  2.  Liver masses measuring up to 10.6 cm concerning for metastatic  disease.    10/24/2023 Surgical Pathology (bookmarked) showed:   Final Diagnosis   A-C.  Right colon with separately submitted terminal ileum and left upper quadrant peritoneal tissue, right hemicolectomy with follow on partial ileal resection and left upper quadrant peritoneal biopsy:  -Peritoneal biopsy tissue positive for metastatic adenocarcinoma   -Intestinal resection margins viable and negative for malignancy  -Moderately differentiated adenocarcinoma, 6.8 cm, invasive into pericolonic soft tissue with 8 of 29 examined lymph nodes positive for metastatic carcinoma, please see synoptic tumor template within this report  -Mismatch repair protein and HER2/rowan study in process with results to be reported separately as an addendum  -Colon cancer molecular evaluation panel in process by next generation sequencing with separate report to be issued by the performing laboratory when completed      Electronically signed by Florence Hoang MD on 10/27/2023 at  1:15 PM       10/25/2023 CT Chest w/o contrast (bookmarked) showed:   IMPRESSION:  1.  Minimal pleural fluid bilaterally right worse than left.  2.  Otherwise no convincing metastatic disease demonstrated in  the  chest.     Latest Reference Range & Units 10/27/23 06:32   Sodium 135 - 145 mmol/L 137   Potassium 3.4 - 5.3 mmol/L 3.7   Chloride 98 - 107 mmol/L 104   Carbon Dioxide (CO2) 22 - 29 mmol/L 26   Urea Nitrogen 6.0 - 20.0 mg/dL 16.8   Creatinine 0.67 - 1.17 mg/dL 0.48 (L)   GFR Estimate >60 mL/min/1.73m2 >90   Calcium 8.6 - 10.0 mg/dL 8.3 (L)   Anion Gap 7 - 15 mmol/L 7   Magnesium 1.7 - 2.3 mg/dL 2.0   Phosphorus 2.5 - 4.5 mg/dL 2.7   Albumin 3.5 - 5.2 g/dL 2.9 (L)   Protein Total 6.4 - 8.3 g/dL 5.2 (L)   Alkaline Phosphatase 40 - 129 U/L 88   ALT 0 - 70 U/L 10   AST 0 - 45 U/L 36   Bilirubin Total <=1.2 mg/dL 0.5   Glucose 70 - 99 mg/dL 110 (H)   (L): Data is abnormally low  (H): Data is abnormally high     Latest Reference Range & Units 10/27/23 06:32   Hemoglobin 13.3 - 17.7 g/dL 7.8 (L)   Platelet Count 150 - 450 10e3/uL 296   (L): Data is abnormally low  Clinical Assessment / Barriers to Care (Per Nurse):      Records Location: [facility]   Records Needed: Colorectal NGS pending  Additional testing needed prior to consult: TBD  Referral updates and Plan:   Per inbasket from Dr. Grubbs, pt needs med onc at [facility] in approximately 14 days. Holding [facility] 11/13. Pt currently inpatient as of 10/27/23.     Kaylee Liu, BSN, RN, OCN  [facility] Oncology Nurse Navigator  [phone] / [phone]

## 2023-10-27 NOTE — PROGRESS NOTES
MD Notification    Notified Person: MD    Notified Person Name: Dr. Kb MD    Notification Date/Time: 2058 on 10/26/2023    Notification Interaction: Textpage    Purpose of Notification: Pt requesting a lidocaine patch be ordered for L shoulder soreness.     Orders Received:    Comments:

## 2023-10-28 LAB
ALBUMIN SERPL BCG-MCNC: 2.9 G/DL (ref 3.5–5.2)
ANION GAP SERPL CALCULATED.3IONS-SCNC: 10 MMOL/L (ref 7–15)
BASOPHILS # BLD AUTO: 0 10E3/UL (ref 0–0.2)
BASOPHILS NFR BLD AUTO: 0 %
BUN SERPL-MCNC: 15.1 MG/DL (ref 6–20)
CALCIUM SERPL-MCNC: 8.2 MG/DL (ref 8.6–10)
CHLORIDE SERPL-SCNC: 102 MMOL/L (ref 98–107)
CREAT SERPL-MCNC: 0.48 MG/DL (ref 0.67–1.17)
DEPRECATED HCO3 PLAS-SCNC: 25 MMOL/L (ref 22–29)
EGFRCR SERPLBLD CKD-EPI 2021: >90 ML/MIN/1.73M2
EOSINOPHIL # BLD AUTO: 0.5 10E3/UL (ref 0–0.7)
EOSINOPHIL NFR BLD AUTO: 6 %
ERYTHROCYTE [DISTWIDTH] IN BLOOD BY AUTOMATED COUNT: 31.3 % (ref 10–15)
GLUCOSE BLDC GLUCOMTR-MCNC: 102 MG/DL (ref 70–99)
GLUCOSE BLDC GLUCOMTR-MCNC: 103 MG/DL (ref 70–99)
GLUCOSE BLDC GLUCOMTR-MCNC: 107 MG/DL (ref 70–99)
GLUCOSE BLDC GLUCOMTR-MCNC: 120 MG/DL (ref 70–99)
GLUCOSE BLDC GLUCOMTR-MCNC: 124 MG/DL (ref 70–99)
GLUCOSE BLDC GLUCOMTR-MCNC: 99 MG/DL (ref 70–99)
GLUCOSE SERPL-MCNC: 119 MG/DL (ref 70–99)
HCT VFR BLD AUTO: 26.6 % (ref 40–53)
HGB BLD-MCNC: 7.5 G/DL (ref 13.3–17.7)
IMM GRANULOCYTES # BLD: 0.1 10E3/UL
IMM GRANULOCYTES NFR BLD: 1 %
LYMPHOCYTES # BLD AUTO: 0.4 10E3/UL (ref 0.8–5.3)
LYMPHOCYTES NFR BLD AUTO: 5 %
MCH RBC QN AUTO: 18.9 PG (ref 26.5–33)
MCHC RBC AUTO-ENTMCNC: 28.2 G/DL (ref 31.5–36.5)
MCV RBC AUTO: 67 FL (ref 78–100)
MONOCYTES # BLD AUTO: 1.2 10E3/UL (ref 0–1.3)
MONOCYTES NFR BLD AUTO: 13 %
NEUTROPHILS # BLD AUTO: 6.9 10E3/UL (ref 1.6–8.3)
NEUTROPHILS NFR BLD AUTO: 75 %
NRBC # BLD AUTO: 0 10E3/UL
NRBC BLD AUTO-RTO: 0 /100
PHOSPHATE SERPL-MCNC: 3 MG/DL (ref 2.5–4.5)
PLATELET # BLD AUTO: 278 10E3/UL (ref 150–450)
POTASSIUM SERPL-SCNC: 4.3 MMOL/L (ref 3.4–5.3)
RBC # BLD AUTO: 3.97 10E6/UL (ref 4.4–5.9)
SODIUM SERPL-SCNC: 137 MMOL/L (ref 135–145)
WBC # BLD AUTO: 9.1 10E3/UL (ref 4–11)

## 2023-10-28 PROCEDURE — 99232 SBSQ HOSP IP/OBS MODERATE 35: CPT | Performed by: INTERNAL MEDICINE

## 2023-10-28 PROCEDURE — 250N000011 HC RX IP 250 OP 636: Performed by: COLON & RECTAL SURGERY

## 2023-10-28 PROCEDURE — 250N000013 HC RX MED GY IP 250 OP 250 PS 637: Performed by: HOSPITALIST

## 2023-10-28 PROCEDURE — 85049 AUTOMATED PLATELET COUNT: CPT | Performed by: INTERNAL MEDICINE

## 2023-10-28 PROCEDURE — 250N000013 HC RX MED GY IP 250 OP 250 PS 637: Performed by: COLON & RECTAL SURGERY

## 2023-10-28 PROCEDURE — 120N000001 HC R&B MED SURG/OB

## 2023-10-28 PROCEDURE — 250N000009 HC RX 250: Mod: JZ | Performed by: COLON & RECTAL SURGERY

## 2023-10-28 PROCEDURE — B4185 PARENTERAL SOL 10 GM LIPIDS: HCPCS | Mod: JZ | Performed by: COLON & RECTAL SURGERY

## 2023-10-28 PROCEDURE — 250N000009 HC RX 250: Performed by: HOSPITALIST

## 2023-10-28 PROCEDURE — 80069 RENAL FUNCTION PANEL: CPT | Performed by: INTERNAL MEDICINE

## 2023-10-28 RX ORDER — FEXOFENADINE HCL 60 MG/1
60 TABLET, FILM COATED ORAL DAILY PRN
Status: DISCONTINUED | OUTPATIENT
Start: 2023-10-28 | End: 2023-10-28

## 2023-10-28 RX ADMIN — OXYCODONE HYDROCHLORIDE 10 MG: 5 TABLET ORAL at 22:53

## 2023-10-28 RX ADMIN — OXYCODONE HYDROCHLORIDE 5 MG: 5 TABLET ORAL at 08:45

## 2023-10-28 RX ADMIN — LIDOCAINE 2 PATCH: 4 PATCH TOPICAL at 08:32

## 2023-10-28 RX ADMIN — MAGNESIUM SULFATE HEPTAHYDRATE: 500 INJECTION, SOLUTION INTRAMUSCULAR; INTRAVENOUS at 19:50

## 2023-10-28 RX ADMIN — OXYCODONE HYDROCHLORIDE 5 MG: 5 TABLET ORAL at 16:39

## 2023-10-28 RX ADMIN — FEXOFENADINE HYDROCHLORIDE 60 MG: 60 TABLET ORAL at 08:45

## 2023-10-28 RX ADMIN — HEPARIN SODIUM 5000 UNITS: 5000 INJECTION, SOLUTION INTRAVENOUS; SUBCUTANEOUS at 14:40

## 2023-10-28 RX ADMIN — OLIVE OIL AND SOYBEAN OIL 250 ML: 16; 4 INJECTION, EMULSION INTRAVENOUS at 19:50

## 2023-10-28 RX ADMIN — OXYCODONE HYDROCHLORIDE 10 MG: 5 TABLET ORAL at 04:20

## 2023-10-28 RX ADMIN — HEPARIN SODIUM 5000 UNITS: 5000 INJECTION, SOLUTION INTRAVENOUS; SUBCUTANEOUS at 06:55

## 2023-10-28 RX ADMIN — HEPARIN SODIUM 5000 UNITS: 5000 INJECTION, SOLUTION INTRAVENOUS; SUBCUTANEOUS at 22:53

## 2023-10-28 ASSESSMENT — ACTIVITIES OF DAILY LIVING (ADL)
ADLS_ACUITY_SCORE: 22

## 2023-10-28 NOTE — PROGRESS NOTES
Colon and Rectal Surgery  Daily Progress Note    Subjective  Patient reports doing well. Had a small BM and passing little gas. NGT 1300. Out. Ambulating.     Objective  Intake/Output last 24 hrs:  Temp:  [97.7  F (36.5  C)-98.9  F (37.2  C)] 98.9  F (37.2  C)  Pulse:  [71-87] 71  Resp:  [16] 16  BP: (121-140)/(67-76) 133/71  SpO2:  [96 %-100 %] 99 %         Physical Exam:  General: awake, alert, in no acute distress  Respiratory: non-labored breathing  Abdomen: soft, appropriately tender, non-distended              Incisions: clean, dry and intact, staples in place.    Pertinent Labs  Lab Results: personally reviewed.  Lab Results   Component Value Date     10/28/2023     10/27/2023     10/26/2023    CO2 25 10/28/2023    CO2 26 10/27/2023    CO2 27 10/26/2023    BUN 15.1 10/28/2023    BUN 16.8 10/27/2023    BUN 16.1 10/26/2023     Lab Results   Component Value Date    WBC 9.1 10/28/2023    WBC 9.1 10/26/2023    WBC 11.2 10/25/2023    HGB 7.5 10/28/2023    HGB 7.8 10/27/2023    HGB 7.8 10/26/2023    HCT 26.6 10/28/2023    HCT 27.7 10/26/2023    HCT 27.3 10/25/2023    MCV 67 10/28/2023    MCV 67 10/26/2023    MCV 65 10/25/2023     10/28/2023     10/27/2023     10/26/2023       Assessment/Plan: This is a 58 year old male POD #4 s/p open right hemicolectomy. NGT still bilious and high output despite some ROBF.     - Cont TPN and NG tube  - Cont heparin for dvt prophlyaxis  - Await return of bowel function.     Discussed with Dr. Reece Gurrola MD on 10/28/2023 at 3:02 PM   Colon and rectal surgery fellow

## 2023-10-28 NOTE — PLAN OF CARE
Date & Time: 10/28/23 5275-9127  Surgery/POD#: POD 4 open colectomy   Behavior & Aggression: Green  Fall Risk: yes  Orientation:A+Ox4  ABNL VS/O2:VSS  ABNL Labs: Creatinine .48, Ca 8.2, Albumin 2.9, Heme 7.5  Pain Management: Oxycodone, lidocaine patches   Bowel/Bladder: Continent of bowel and bladder  Drains: None  Diet:NPO  Activity Level: SBA  Tests/Procedures: None  Anticipated  DC Date: Pending  Significant Information: TPN @ 85mL/hr, LR @ 15mL/hr

## 2023-10-28 NOTE — PLAN OF CARE
Date & Time: 10/27-10/28  8765-8130    Surgery/POD#: POD4 open colectomy   Behavior & Aggression: Green, flat affect, frustrated   Fall Risk: yes  Orientation:A&O x4  ABNL VS/O2: VSS on RA  ABNL Labs: Creatinine .48, Ca 8.3, Albumin 2.9, Protein total 5.2, Hemoglobin 7.8  Pain Management: Oxycodone x1 , lidocaine patches removed on bilat shoulders  Bowel/Bladder: Continent of bowel and bladder  IV/Drains: NG tube, PICC, PIV  Diet: NPO  Activity Level: SBA  Tests/Procedures: None  Anticipated  DC Date: Pending ability to tolerate oral intake   Significant Information: TPN @ 85mL/hr, Lipids @ 20.8mL/hr, LR @ 15mL/hr

## 2023-10-28 NOTE — PROGRESS NOTES
[facility]  Hospitalist Progress Note   10/28/2023          Assessment and Plan:       Brady Lenz is a 58 year old male with no known past medical history who does not see a doctor on a regular basis, no previous cancer screening admitted on 10/23/2023 with 2-week history of intermittent abdominal pain and distention and decreased appetite.     Cecal mass with small bowel obstruction  Status post laparoscopic peritoneal biopsy, open right colectomy 10/24/2023.  Metastatic colon cancer with liver mets    -- At the time of admission frail-appearing, afebrile hemodynamically stable.    LFTs, electrolytes, renal function within normal limits.  CT abdomen and pelvis showed a 7cm mass in the cecum causing small bowel obstruction and 2 liver masses measuring up to 10.6 cm in the right lobe and 4.9cm in the left lobe concerning for metastatic disease.  -- NG tube placed in the ED on 10/23.  PICC line placed 10/24  --Status post laparoscopic peritoneal biopsy, open right colectomy 10/24/2023.  Biopsy shows moderately differentiated adenocarcinoma, invasive into the pericolonic soft tissue age of 31 examined lymph node positive for metastatic carcinoma.  Margins negative, uterine biopsy tissue positive for metastatic adenocarcinoma,    On TPN for nutrition through PICC line.  PCA discontinued 10/26.  IV/p.o. as needed pain meds.  Encourage ambulation.    IV/p.o. as needed antiemetics.  -- [place] oncology following.  Thoracic back as above.  Further management as per the colorectal surgery.    CT chest negative for any metastatic disease  Still having significant output from NG tube, continue NG to low intermittent wall suction.  Ambulate patient, is passing gas but still no BM.  Keep him n.p.o. for now      Severe iron deficiency anemia secondary to blood loss from cecal mass  Status post 4 units of blood transfusion.    -At the time of admission hemoglobin of 5.9, MCV 58, platelet count of 473; alk phos  135  --Suspect some degree of subacute to chronic blood loss given he is presently hemodynamically stable and denies any recent melanotic stools or BRBPR. Documented EBL 50 mL.  -- Received total 3 units of blood transfusion on 10/23 to 10/24.  And 1 unit of PRBC on 10/25.  Iron saturation index of 2.  3 doses of IV Venofer.  Hemoglobin stable and improved to 7.8 at this time    Acute anxiety   Possible underlying depression  Patient having anxiety, flat affect.  [facility]   Psychiatry evaluated,  recommended sertraline.  Patient declined.  Stable at this time    Social stressors.  Patient reported works as a / concerned with living situation/finances..  Care coordinator / assisting with support requested.    Orders Placed This Encounter      NPO for Medical/Clinical Reasons Except for: Meds, Ice Chips      DVT Prophylaxis: SCDs, postprocedure pharmacological DVT prophylaxis per surgical team.  Code Status: Full Code  Disposition: Expected discharge in 2-3 days once able to eat orally and remained stable.      Eleazar Ferrara MD        Interval History:        Passing gas but do not have any bowel movement today.  Continuous abdominal pain, no fever chills chest pain headache dizziness or lightheadedness.    High NG output about 1300 mL in the last 24 hours    No other significant event overnight         Physical Exam:        Physical Exam   Temp:  [97.7  F (36.5  C)-98.9  F (37.2  C)] 98.9  F (37.2  C)  Pulse:  [71-87] 71  Resp:  [16] 16  BP: (121-140)/(67-76) 133/71  SpO2:  [96 %-100 %] 99 %    Intake/Output Summary (Last 24 hours) at 10/24/2023 0829  Last data filed at 10/24/2023 0742  Gross per 24 hour   Intake 917 ml   Output 900 ml   Net 17 ml       Admission Weight: 68 kg (150 lb)  Current Weight: 68 kg (150 lb)    PHYSICAL EXAM  GENERAL: Awake, alert, standing in his room in no acute distress  HEENT: Unremarkable  HEART: Regular rate and rhythm. S1S2. No murmurs  LUNGS: Diminished air  entry bilaterally.  Respirations unlabored  ABDOMEN: Soft but slightly distended, sluggish bowel sounds, mild tenderness  NEURO: No focal deficit  EXTREMITIES: No pedal edema.   SKIN: Warm, dry. No rash  PSYCHIATRY Cooperative       Medications:         heparin ANTICOAGULANT  5,000 Units Subcutaneous Q8H    ibuprofen  600 mg Oral Q6H    insulin aspart  1-4 Units Subcutaneous Q4H    lidocaine  2 patch Transdermal Q24H    lipids plant base  250 mL Intravenous Q24H    sodium chloride (PF)  10-40 mL Intracatheter Q7 Days    sodium chloride (PF)  3 mL Intracatheter Q8H     acetaminophen, sore throat, dextrose, glucose **OR** dextrose **OR** glucagon, diphenhydrAMINE, fexofenadine, HYDROmorphone, lidocaine 4%, LORazepam, melatonin, naloxone **OR** naloxone **OR** naloxone **OR** naloxone, ondansetron **OR** ondansetron, oxyCODONE, phenol MT, prochlorperazine **OR** prochlorperazine **OR** prochlorperazine, sodium chloride (PF), sodium chloride (PF), sodium chloride (PF)         Data:      All new lab and imaging data was reviewed.

## 2023-10-28 NOTE — PROGRESS NOTES
[id number]:  Ambulated hallways x1. NGT to LIS with green/brown output. Abdominal incision with staples, cdi. TPN running. Pain managed with Oxy 5mg x1 and ice packs. Up with SBA. NPO

## 2023-10-29 ENCOUNTER — APPOINTMENT (OUTPATIENT)
Dept: GENERAL RADIOLOGY | Facility: CLINIC | Age: 58
End: 2023-10-29
Attending: SURGERY
Payer: MEDICAID

## 2023-10-29 LAB
ALBUMIN SERPL BCG-MCNC: 2.8 G/DL (ref 3.5–5.2)
ANION GAP SERPL CALCULATED.3IONS-SCNC: 11 MMOL/L (ref 7–15)
BASOPHILS # BLD AUTO: 0.1 10E3/UL (ref 0–0.2)
BASOPHILS NFR BLD AUTO: 1 %
BUN SERPL-MCNC: 14.5 MG/DL (ref 6–20)
CALCIUM SERPL-MCNC: 8.3 MG/DL (ref 8.6–10)
CHLORIDE SERPL-SCNC: 102 MMOL/L (ref 98–107)
CREAT SERPL-MCNC: 0.51 MG/DL (ref 0.67–1.17)
DEPRECATED HCO3 PLAS-SCNC: 25 MMOL/L (ref 22–29)
EGFRCR SERPLBLD CKD-EPI 2021: >90 ML/MIN/1.73M2
EOSINOPHIL # BLD AUTO: 0.6 10E3/UL (ref 0–0.7)
EOSINOPHIL NFR BLD AUTO: 6 %
ERYTHROCYTE [DISTWIDTH] IN BLOOD BY AUTOMATED COUNT: 32.2 % (ref 10–15)
GLUCOSE BLDC GLUCOMTR-MCNC: 104 MG/DL (ref 70–99)
GLUCOSE BLDC GLUCOMTR-MCNC: 107 MG/DL (ref 70–99)
GLUCOSE BLDC GLUCOMTR-MCNC: 113 MG/DL (ref 70–99)
GLUCOSE BLDC GLUCOMTR-MCNC: 113 MG/DL (ref 70–99)
GLUCOSE BLDC GLUCOMTR-MCNC: 115 MG/DL (ref 70–99)
GLUCOSE BLDC GLUCOMTR-MCNC: 116 MG/DL (ref 70–99)
GLUCOSE BLDC GLUCOMTR-MCNC: 91 MG/DL (ref 70–99)
GLUCOSE SERPL-MCNC: 115 MG/DL (ref 70–99)
HCT VFR BLD AUTO: 28 % (ref 40–53)
HGB BLD-MCNC: 7.9 G/DL (ref 13.3–17.7)
IMM GRANULOCYTES # BLD: 0.1 10E3/UL
IMM GRANULOCYTES NFR BLD: 1 %
LYMPHOCYTES # BLD AUTO: 0.6 10E3/UL (ref 0.8–5.3)
LYMPHOCYTES NFR BLD AUTO: 6 %
MAGNESIUM SERPL-MCNC: 2 MG/DL (ref 1.7–2.3)
MCH RBC QN AUTO: 19.1 PG (ref 26.5–33)
MCHC RBC AUTO-ENTMCNC: 28.2 G/DL (ref 31.5–36.5)
MCV RBC AUTO: 68 FL (ref 78–100)
MONOCYTES # BLD AUTO: 1.5 10E3/UL (ref 0–1.3)
MONOCYTES NFR BLD AUTO: 15 %
NEUTROPHILS # BLD AUTO: 7.5 10E3/UL (ref 1.6–8.3)
NEUTROPHILS NFR BLD AUTO: 71 %
NRBC # BLD AUTO: 0 10E3/UL
NRBC BLD AUTO-RTO: 0 /100
PHOSPHATE SERPL-MCNC: 3.1 MG/DL (ref 2.5–4.5)
PLATELET # BLD AUTO: 293 10E3/UL (ref 150–450)
POTASSIUM SERPL-SCNC: 5 MMOL/L (ref 3.4–5.3)
RBC # BLD AUTO: 4.13 10E6/UL (ref 4.4–5.9)
SODIUM SERPL-SCNC: 138 MMOL/L (ref 135–145)
WBC # BLD AUTO: 10.3 10E3/UL (ref 4–11)

## 2023-10-29 PROCEDURE — 99232 SBSQ HOSP IP/OBS MODERATE 35: CPT | Performed by: INTERNAL MEDICINE

## 2023-10-29 PROCEDURE — 74018 RADEX ABDOMEN 1 VIEW: CPT

## 2023-10-29 PROCEDURE — 120N000001 HC R&B MED SURG/OB

## 2023-10-29 PROCEDURE — B4185 PARENTERAL SOL 10 GM LIPIDS: HCPCS | Mod: JZ | Performed by: COLON & RECTAL SURGERY

## 2023-10-29 PROCEDURE — 250N000009 HC RX 250: Mod: JZ | Performed by: COLON & RECTAL SURGERY

## 2023-10-29 PROCEDURE — 80069 RENAL FUNCTION PANEL: CPT | Performed by: INTERNAL MEDICINE

## 2023-10-29 PROCEDURE — 83735 ASSAY OF MAGNESIUM: CPT | Performed by: HOSPITALIST

## 2023-10-29 PROCEDURE — 250N000013 HC RX MED GY IP 250 OP 250 PS 637: Performed by: INTERNAL MEDICINE

## 2023-10-29 PROCEDURE — 250N000011 HC RX IP 250 OP 636: Performed by: COLON & RECTAL SURGERY

## 2023-10-29 PROCEDURE — 250N000013 HC RX MED GY IP 250 OP 250 PS 637: Performed by: HOSPITALIST

## 2023-10-29 PROCEDURE — 71045 X-RAY EXAM CHEST 1 VIEW: CPT

## 2023-10-29 PROCEDURE — 258N000003 HC RX IP 258 OP 636: Performed by: HOSPITALIST

## 2023-10-29 PROCEDURE — 250N000009 HC RX 250: Performed by: HOSPITALIST

## 2023-10-29 PROCEDURE — 85025 COMPLETE CBC W/AUTO DIFF WBC: CPT | Performed by: INTERNAL MEDICINE

## 2023-10-29 PROCEDURE — 250N000013 HC RX MED GY IP 250 OP 250 PS 637: Performed by: COLON & RECTAL SURGERY

## 2023-10-29 RX ORDER — BENZONATATE 100 MG/1
100 CAPSULE ORAL 3 TIMES DAILY PRN
Status: DISCONTINUED | OUTPATIENT
Start: 2023-10-29 | End: 2023-11-03 | Stop reason: HOSPADM

## 2023-10-29 RX ADMIN — HEPARIN SODIUM 5000 UNITS: 5000 INJECTION, SOLUTION INTRAVENOUS; SUBCUTANEOUS at 06:22

## 2023-10-29 RX ADMIN — OXYCODONE HYDROCHLORIDE 5 MG: 5 TABLET ORAL at 21:21

## 2023-10-29 RX ADMIN — HEPARIN SODIUM 5000 UNITS: 5000 INJECTION, SOLUTION INTRAVENOUS; SUBCUTANEOUS at 14:21

## 2023-10-29 RX ADMIN — OXYCODONE HYDROCHLORIDE 10 MG: 5 TABLET ORAL at 04:58

## 2023-10-29 RX ADMIN — MAGNESIUM SULFATE HEPTAHYDRATE: 500 INJECTION, SOLUTION INTRAMUSCULAR; INTRAVENOUS at 20:00

## 2023-10-29 RX ADMIN — OLIVE OIL AND SOYBEAN OIL 250 ML: 16; 4 INJECTION, EMULSION INTRAVENOUS at 20:00

## 2023-10-29 RX ADMIN — BENZONATATE 100 MG: 100 CAPSULE ORAL at 21:21

## 2023-10-29 RX ADMIN — Medication 1 ML: at 21:23

## 2023-10-29 RX ADMIN — LIDOCAINE 2 PATCH: 4 PATCH TOPICAL at 08:29

## 2023-10-29 RX ADMIN — ACETAMINOPHEN 650 MG: 325 TABLET, FILM COATED ORAL at 14:30

## 2023-10-29 RX ADMIN — HEPARIN SODIUM 5000 UNITS: 5000 INJECTION, SOLUTION INTRAVENOUS; SUBCUTANEOUS at 22:29

## 2023-10-29 RX ADMIN — SODIUM CHLORIDE, POTASSIUM CHLORIDE, SODIUM LACTATE AND CALCIUM CHLORIDE: 600; 310; 30; 20 INJECTION, SOLUTION INTRAVENOUS at 04:51

## 2023-10-29 RX ADMIN — OXYCODONE HYDROCHLORIDE 5 MG: 5 TABLET ORAL at 16:10

## 2023-10-29 RX ADMIN — OXYCODONE HYDROCHLORIDE 5 MG: 5 TABLET ORAL at 11:55

## 2023-10-29 RX ADMIN — FEXOFENADINE HYDROCHLORIDE 60 MG: 60 TABLET ORAL at 08:29

## 2023-10-29 ASSESSMENT — ACTIVITIES OF DAILY LIVING (ADL)
ADLS_ACUITY_SCORE: 22

## 2023-10-29 NOTE — PROGRESS NOTES
[facility]  Hospitalist Progress Note   10/29/2023          Assessment and Plan:       Brady Lenz is a 58 year old male with no known past medical history who does not see a doctor on a regular basis, no previous cancer screening admitted on 10/23/2023 with 2-week history of intermittent abdominal pain and distention and decreased appetite.     Cecal mass with small bowel obstruction  Status post laparoscopic peritoneal biopsy, open right colectomy 10/24/2023.  Metastatic colon cancer with liver mets    -- At the time of admission frail-appearing, afebrile hemodynamically stable.    LFTs, electrolytes, renal function within normal limits.  CT abdomen and pelvis showed a 7cm mass in the cecum causing small bowel obstruction and 2 liver masses measuring up to 10.6 cm in the right lobe and 4.9cm in the left lobe concerning for metastatic disease.  -- NG tube placed in the ED on 10/23.  PICC line placed 10/24  --Status post laparoscopic peritoneal biopsy, open right colectomy 10/24/2023.  Biopsy shows moderately differentiated adenocarcinoma, invasive into the pericolonic soft tissue age of 31 examined lymph node positive for metastatic carcinoma.  Margins negative, uterine biopsy tissue positive for metastatic adenocarcinoma,    On TPN for nutrition through PICC line.  PCA discontinued 10/26.  IV/p.o. as needed pain meds.  Encourage ambulation.    IV/p.o. as needed antiemetics.  -- [place] oncology following.  Thoracic back as above.  Further management as per the colorectal surgery.    CT chest negative for any metastatic disease  Still having significant output from NG tube, continue NG to low intermittent wall suction.  Ambulate patient, continue high NG outputs we will continue NG tube to low intermittent wall suction.  N.p.o. for now continue TPN.  Colorectal surgery following      Severe iron deficiency anemia secondary to blood loss from cecal mass  Status post 4 units of blood  transfusion.    -At the time of admission hemoglobin of 5.9, MCV 58, platelet count of 473; alk phos 135  --Suspect some degree of subacute to chronic blood loss given he is presently hemodynamically stable and denies any recent melanotic stools or BRBPR. Documented EBL 50 mL.  -- Received total 3 units of blood transfusion on 10/23 to 10/24.  And 1 unit of PRBC on 10/25.  Iron saturation index of 2.  3 doses of IV Venofer.  Hemoglobin stable and improved to 7.9 at this time    Acute anxiety: Improved  Possible underlying depression  Patient having anxiety, flat affect.  [facility]   Psychiatry evaluated,  recommended sertraline.  Patient declined.  Stable at this time    Social stressors.  Patient reported works as a / concerned with living situation/finances..  Care coordinator / assisting with support requested.    Orders Placed This Encounter      NPO for Medical/Clinical Reasons Except for: Meds, Ice Chips      DVT Prophylaxis: SCDs, postprocedure pharmacological DVT prophylaxis per surgical team.  Code Status: Full Code  Disposition: Expected discharge once have bowel function, NG tube out and tolerate orally      Eleazar Ferrara MD        Interval History:        Offers no complaint, mild abdominal pain, no fever chills chest pain dysuria hematuria or diarrhea.  Continues to have high NG output about 1400 mL in last 24-hour    No other significant event overnight         Physical Exam:        Physical Exam   Temp:  [98.2  F (36.8  C)-99.7  F (37.6  C)] 98.2  F (36.8  C)  Pulse:  [76-78] 78  Resp:  [16] 16  BP: (116-140)/(65-73) 116/65  SpO2:  [94 %-98 %] 96 %    Intake/Output Summary (Last 24 hours) at 10/24/2023 0842  Last data filed at 10/24/2023 0742  Gross per 24 hour   Intake 917 ml   Output 900 ml   Net 17 ml       Admission Weight: 68 kg (150 lb)  Current Weight: 68 kg (150 lb)    PHYSICAL EXAM  GENERAL: Awake, alert, standing in his room in no acute distress  HEENT:  Unremarkable  HEART: Regular rate and rhythm. S1S2. No murmurs  LUNGS: Diminished air entry bilaterally.  Respirations unlabored  ABDOMEN soft,, sluggish bowel sounds, mild tenderness, no organomegaly binder in place  NEURO: No focal deficit  EXTREMITIES: No pedal edema.   SKIN: Warm, dry. No rash  PSYCHIATRY Cooperative       Medications:         heparin ANTICOAGULANT  5,000 Units Subcutaneous Q8H    ibuprofen  600 mg Oral Q6H    insulin aspart  1-4 Units Subcutaneous Q4H    lidocaine  2 patch Transdermal Q24H    lipids plant base  250 mL Intravenous Q24H    sodium chloride (PF)  10-40 mL Intracatheter Q7 Days    sodium chloride (PF)  3 mL Intracatheter Q8H     acetaminophen, sore throat, dextrose, glucose **OR** dextrose **OR** glucagon, diphenhydrAMINE, fexofenadine, HYDROmorphone, lidocaine 4%, LORazepam, melatonin, naloxone **OR** naloxone **OR** naloxone **OR** naloxone, ondansetron **OR** ondansetron, oxyCODONE, phenol MT, prochlorperazine **OR** prochlorperazine **OR** prochlorperazine, sodium chloride (PF), sodium chloride (PF), sodium chloride (PF)         Data:      All new lab and imaging data was reviewed.

## 2023-10-29 NOTE — PROGRESS NOTES
Colon and Rectal Surgery  Daily Progress Note    Subjective  Patient reports feeling ok. Coughing up some phlegm. Stopped passing gas. NGT remains high output. CXR and AXR done showing unremarkable lungs and NGT in appropriate position.     Objective  Intake/Output last 24 hrs:  Temp:  [98.2  F (36.8  C)-99.7  F (37.6  C)] 98.2  F (36.8  C)  Pulse:  [71-78] 78  Resp:  [16] 16  BP: (116-140)/(65-73) 116/65  SpO2:  [94 %-99 %] 96 %         Physical Exam:  General: awake, alert, in no acute distress  Respiratory: non-labored breathing  Abdomen: soft, appropriately tender, non-distended. NGT bilious               Incisions: clean, dry and intact    Pertinent Labs  Lab Results: personally reviewed.  Lab Results   Component Value Date     10/29/2023     10/28/2023     10/27/2023    CO2 25 10/29/2023    CO2 25 10/28/2023    CO2 26 10/27/2023    BUN 14.5 10/29/2023    BUN 15.1 10/28/2023    BUN 16.8 10/27/2023     Lab Results   Component Value Date    WBC 10.3 10/29/2023    WBC 9.1 10/28/2023    WBC 9.1 10/26/2023    HGB 7.9 10/29/2023    HGB 7.5 10/28/2023    HGB 7.8 10/27/2023    HCT 28.0 10/29/2023    HCT 26.6 10/28/2023    HCT 27.7 10/26/2023    MCV 68 10/29/2023    MCV 67 10/28/2023    MCV 67 10/26/2023     10/29/2023     10/28/2023     10/27/2023       Assessment/Plan: This is a 58 year old male POD #5 s/p open right hemicolectomy. NGT still bilious and high output. AXR showing tube in correct position and some dilated loops of bowel suggesting ongoing ileus.      - Cont TPN and NG tube  - Cont heparin for dvt prophlyaxis  - Await return of bowel function.  - If no ROBF in the next 1-2 days, we will consider CT abdomen and pelvis      Discussed with Dr. Reece Gurrola MD on 10/29/2023 at 9:35 AM   Colon and rectal surgery fellow

## 2023-10-29 NOTE — PLAN OF CARE
Date & Time: 10/28/23 5363-0414  Surgery/POD#: POD 5 open colectomy   Behavior & Aggression: Green  Fall Risk: yes  Orientation:A+Ox4  ABNL VS/O2:VSS  ABNL Labs:  None  Pain Management: Oxycodone, lidocaine patches, cold pack, tylenol  Bowel/Bladder: Continent of bowel and bladder  Drains: NG with green output  Diet:NPO  Activity Level: SBA  Tests/Procedures: Chest x- ray done today to assess NG placement   Anticipated  DC Date: Pending  Significant Information: TPN @ 85mL/hr, LR @ 15mL/hr, refuses scheduled Advil

## 2023-10-29 NOTE — PLAN OF CARE
Date & Time: 10/28-10/29  2415-8630    Surgery/POD#: POD 5 open colectomy   Behavior & Aggression: Green, flat affect, frustrated   Fall Risk: yes  Orientation:A&O x4  ABNL VS/O2: VSS on RA  ABNL Labs: Creatinine .48, Ca 8.2, Albumin 2.9, Hemoglobin 7.5, , 113, 115  Pain Management: Oxycodone x2 , lidocaine patches removed on bilat shoulders  Bowel/Bladder: Continent of bowel and bladder, urinal at bedside  IV/Drains: NG tube, PICC, PIV  Diet: NPO  Activity Level: SBA  Tests/Procedures: None  Anticipated  DC Date: Pending ability to tolerate oral intake   Significant Information: TPN @ 85mL/hr, Lipids @ 20.8mL/hr, LR @ 15mL/hr

## 2023-10-30 LAB
ALBUMIN SERPL BCG-MCNC: 2.8 G/DL (ref 3.5–5.2)
ALP SERPL-CCNC: 146 U/L (ref 40–129)
ALT SERPL W P-5'-P-CCNC: 27 U/L (ref 0–70)
ANION GAP SERPL CALCULATED.3IONS-SCNC: 10 MMOL/L (ref 7–15)
AST SERPL W P-5'-P-CCNC: 69 U/L (ref 0–45)
BILIRUB SERPL-MCNC: 1 MG/DL
BUN SERPL-MCNC: 17.1 MG/DL (ref 6–20)
CALCIUM SERPL-MCNC: 8.3 MG/DL (ref 8.6–10)
CHLORIDE SERPL-SCNC: 97 MMOL/L (ref 98–107)
CREAT SERPL-MCNC: 0.51 MG/DL (ref 0.67–1.17)
DEPRECATED HCO3 PLAS-SCNC: 24 MMOL/L (ref 22–29)
EGFRCR SERPLBLD CKD-EPI 2021: >90 ML/MIN/1.73M2
GLUCOSE BLDC GLUCOMTR-MCNC: 112 MG/DL (ref 70–99)
GLUCOSE BLDC GLUCOMTR-MCNC: 115 MG/DL (ref 70–99)
GLUCOSE BLDC GLUCOMTR-MCNC: 116 MG/DL (ref 70–99)
GLUCOSE BLDC GLUCOMTR-MCNC: 119 MG/DL (ref 70–99)
GLUCOSE BLDC GLUCOMTR-MCNC: 128 MG/DL (ref 70–99)
GLUCOSE BLDC GLUCOMTR-MCNC: 90 MG/DL (ref 70–99)
GLUCOSE SERPL-MCNC: 112 MG/DL (ref 70–99)
INR PPP: 1.24 (ref 0.85–1.15)
MAGNESIUM SERPL-MCNC: 2.1 MG/DL (ref 1.7–2.3)
PHOSPHATE SERPL-MCNC: 3.4 MG/DL (ref 2.5–4.5)
PLATELET # BLD AUTO: 281 10E3/UL (ref 150–450)
POTASSIUM SERPL-SCNC: 4.3 MMOL/L (ref 3.4–5.3)
PREALB SERPL IA-MCNC: 5 MG/DL (ref 15–45)
PROT SERPL-MCNC: 5.5 G/DL (ref 6.4–8.3)
SODIUM SERPL-SCNC: 131 MMOL/L (ref 135–145)
TRIGL SERPL-MCNC: 58 MG/DL

## 2023-10-30 PROCEDURE — 120N000001 HC R&B MED SURG/OB

## 2023-10-30 PROCEDURE — 250N000011 HC RX IP 250 OP 636: Performed by: COLON & RECTAL SURGERY

## 2023-10-30 PROCEDURE — 250N000013 HC RX MED GY IP 250 OP 250 PS 637: Performed by: COLON & RECTAL SURGERY

## 2023-10-30 PROCEDURE — 250N000013 HC RX MED GY IP 250 OP 250 PS 637: Performed by: HOSPITALIST

## 2023-10-30 PROCEDURE — 250N000009 HC RX 250: Performed by: HOSPITALIST

## 2023-10-30 PROCEDURE — 84478 ASSAY OF TRIGLYCERIDES: CPT | Performed by: COLON & RECTAL SURGERY

## 2023-10-30 PROCEDURE — 250N000009 HC RX 250: Mod: JZ | Performed by: COLON & RECTAL SURGERY

## 2023-10-30 PROCEDURE — 99232 SBSQ HOSP IP/OBS MODERATE 35: CPT | Performed by: INTERNAL MEDICINE

## 2023-10-30 PROCEDURE — 84100 ASSAY OF PHOSPHORUS: CPT | Performed by: COLON & RECTAL SURGERY

## 2023-10-30 PROCEDURE — 83735 ASSAY OF MAGNESIUM: CPT | Performed by: COLON & RECTAL SURGERY

## 2023-10-30 PROCEDURE — 85049 AUTOMATED PLATELET COUNT: CPT | Performed by: COLON & RECTAL SURGERY

## 2023-10-30 PROCEDURE — 85610 PROTHROMBIN TIME: CPT | Performed by: COLON & RECTAL SURGERY

## 2023-10-30 PROCEDURE — B4185 PARENTERAL SOL 10 GM LIPIDS: HCPCS | Mod: JZ | Performed by: COLON & RECTAL SURGERY

## 2023-10-30 PROCEDURE — 82040 ASSAY OF SERUM ALBUMIN: CPT | Performed by: COLON & RECTAL SURGERY

## 2023-10-30 PROCEDURE — 250N000013 HC RX MED GY IP 250 OP 250 PS 637: Performed by: INTERNAL MEDICINE

## 2023-10-30 PROCEDURE — 84134 ASSAY OF PREALBUMIN: CPT | Performed by: COLON & RECTAL SURGERY

## 2023-10-30 RX ADMIN — FEXOFENADINE HYDROCHLORIDE 60 MG: 60 TABLET ORAL at 15:11

## 2023-10-30 RX ADMIN — OXYCODONE HYDROCHLORIDE 5 MG: 5 TABLET ORAL at 09:46

## 2023-10-30 RX ADMIN — HEPARIN SODIUM 5000 UNITS: 5000 INJECTION, SOLUTION INTRAVENOUS; SUBCUTANEOUS at 06:33

## 2023-10-30 RX ADMIN — LIDOCAINE 2 PATCH: 4 PATCH TOPICAL at 08:07

## 2023-10-30 RX ADMIN — HEPARIN SODIUM 5000 UNITS: 5000 INJECTION, SOLUTION INTRAVENOUS; SUBCUTANEOUS at 15:11

## 2023-10-30 RX ADMIN — BENZONATATE 100 MG: 100 CAPSULE ORAL at 21:21

## 2023-10-30 RX ADMIN — HEPARIN SODIUM 5000 UNITS: 5000 INJECTION, SOLUTION INTRAVENOUS; SUBCUTANEOUS at 22:45

## 2023-10-30 RX ADMIN — OXYCODONE HYDROCHLORIDE 5 MG: 5 TABLET ORAL at 03:40

## 2023-10-30 RX ADMIN — ACETAMINOPHEN 650 MG: 325 TABLET, FILM COATED ORAL at 15:10

## 2023-10-30 RX ADMIN — OXYCODONE HYDROCHLORIDE 5 MG: 5 TABLET ORAL at 15:11

## 2023-10-30 RX ADMIN — MAGNESIUM SULFATE HEPTAHYDRATE: 500 INJECTION, SOLUTION INTRAMUSCULAR; INTRAVENOUS at 19:58

## 2023-10-30 RX ADMIN — OXYCODONE HYDROCHLORIDE 5 MG: 5 TABLET ORAL at 21:21

## 2023-10-30 RX ADMIN — OLIVE OIL AND SOYBEAN OIL 250 ML: 16; 4 INJECTION, EMULSION INTRAVENOUS at 19:58

## 2023-10-30 ASSESSMENT — ACTIVITIES OF DAILY LIVING (ADL)
ADLS_ACUITY_SCORE: 22

## 2023-10-30 NOTE — PROGRESS NOTES
Alert and oriented x 4. VSS, room air. NPO maintained, TPN/Lipids/LR infusing, PICC 3 lumen. Lab specimens collected. NGT to low int suction, 150 ml dark green o/p. Abd incision, approximated, staples intact, ABD.  Intermittent productive cough, yellow sputum.  Slight edema to daniel LE. Abd pain yolette ged with Oxycodone, last given at 0340H. BG q 4H. Will continue to monitor.

## 2023-10-30 NOTE — PROVIDER NOTIFICATION
RM 2217 BY Pt has a  frequent cough, and would like a PRN cough suppressant to help with rest, also he has more pain when coughing due to his abdominal incision. Leif Jewell [phone]    Received order for Tessalon pearls

## 2023-10-30 NOTE — PROGRESS NOTES
[facility]  Hospitalist Progress Note   10/30/2023          Assessment and Plan:       Brady Lenz is a 58 year old male with no known past medical history who does not see a doctor on a regular basis, no previous cancer screening admitted on 10/23/2023 with 2-week history of intermittent abdominal pain and distention and decreased appetite.     Cecal mass with small bowel obstruction  Status post laparoscopic peritoneal biopsy, open right colectomy 10/24/2023.  Metastatic colon cancer with liver mets    -- At the time of admission frail-appearing, afebrile hemodynamically stable.    LFTs, electrolytes, renal function within normal limits.  CT abdomen and pelvis showed a 7cm mass in the cecum causing small bowel obstruction and 2 liver masses measuring up to 10.6 cm in the right lobe and 4.9cm in the left lobe concerning for metastatic disease.  -- NG tube placed in the ED on 10/23.  PICC line placed 10/24  --Status post laparoscopic peritoneal biopsy, open right colectomy 10/24/2023.  Biopsy shows moderately differentiated adenocarcinoma, invasive into the pericolonic soft tissue age of 31 examined lymph node positive for metastatic carcinoma.  Margins negative, uterine biopsy tissue positive for metastatic adenocarcinoma,    On TPN for nutrition through PICC line.  PCA discontinued 10/26.  IV/p.o. as needed pain meds.  Encourage ambulation.    IV/p.o. as needed antiemetics.  -- [facility] oncology following.    Further management as per the colorectal surgery.    CT chest negative for any metastatic disease  NG output improved, passing gas.  Plan is to do the clamping trial tomorrow 10/31/2023  Ambulate patient,   N.p.o. for now continue TPN.  Colorectal surgery following      Severe iron deficiency anemia secondary to blood loss from cecal mass  Status post 4 units of blood transfusion.    -At the time of admission hemoglobin of 5.9, MCV 58, platelet count of 473; alk phos 135  --Suspect some  degree of subacute to chronic blood loss given he is presently hemodynamically stable and denies any recent melanotic stools or BRBPR. Documented EBL 50 mL.  -- Received total 3 units of blood transfusion on 10/23 to 10/24.  And 1 unit of PRBC on 10/25.  Iron saturation index of 2.  3 doses of IV Venofer.  Hemoglobin stable and improved to 7.9 at this time    Acute anxiety: Improved  Possible underlying depression  Patient having anxiety, flat affect.  [facility]   Psychiatry evaluated,  recommended sertraline.  Patient declined.  Stable at this time    Social stressors.  Patient reported works as a / concerned with living situation/finances..  Care coordinator / assisting with support requested.    Orders Placed This Encounter      NPO for Medical/Clinical Reasons Except for: Meds, Ice Chips      DVT Prophylaxis: SCDs, postprocedure pharmacological DVT prophylaxis per surgical team.  Code Status: Full Code  Disposition: Expected discharge once have bowel function, NG tube out and tolerate orally      Eleazar Ferrara MD        Interval History:        Decreased NG output last 24-hour, passing gas.  No bowel movement though  Abdominal pain control with pain medication no fever chills nausea vomiting at this time.    No other significant event overnight         Physical Exam:        Physical Exam   Temp:  [98.1  F (36.7  C)-99.3  F (37.4  C)] 99.3  F (37.4  C)  Pulse:  [76-81] 81  Resp:  [16-18] 17  BP: (107-128)/(56-69) 107/56  SpO2:  [93 %-95 %] 93 %      Admission Weight: 68 kg (150 lb)  Current Weight: 68 kg (150 lb)    PHYSICAL EXAM  GENERAL: Awake, alert, standing in his room in no acute distress  HEENT: Unremarkable  HEART: Regular rate and rhythm. S1S2. No murmurs  LUNGS: Diminished air entry bilaterally.  Respirations unlabored  ABDOMEN soft,, sluggish bowel sounds, mild tenderness, no organomegaly binder in place  NEURO: No focal deficit  EXTREMITIES: No pedal edema.   SKIN: Warm, dry.  No rash  PSYCHIATRY Cooperative       Medications:         heparin ANTICOAGULANT  5,000 Units Subcutaneous Q8H    ibuprofen  600 mg Oral Q6H    insulin aspart  1-4 Units Subcutaneous Q4H    lidocaine  2 patch Transdermal Q24H    lipids plant base  250 mL Intravenous Q24H    sodium chloride (PF)  10-40 mL Intracatheter Q7 Days    sodium chloride (PF)  3 mL Intracatheter Q8H     acetaminophen, sore throat, benzonatate, dextrose, glucose **OR** dextrose **OR** glucagon, diphenhydrAMINE, fexofenadine, HYDROmorphone, lidocaine 4%, LORazepam, melatonin, naloxone **OR** naloxone **OR** naloxone **OR** naloxone, ondansetron **OR** ondansetron, oxyCODONE, phenol MT, prochlorperazine **OR** prochlorperazine **OR** prochlorperazine, sodium chloride (PF), sodium chloride (PF), sodium chloride (PF)         Data:      All new lab and imaging data was reviewed.

## 2023-10-30 NOTE — PLAN OF CARE
Date & Time: 10/30/23 7033-7325  Surgery/POD#: POD 6 Lap Peritoneal biopsy and Open R colectomy  Behavior & Aggression: Green, flat affect, frustrated   Fall Risk: yes  Orientation:A&Ox4  ABNL VS/O2: VSS on RA  ABNL Labs:See results, bg checks q4hr cause on TPN  Pain Management: Oxycodone x2 , lidocaine on Bilateral shoulders  Bowel/Bladder: Continent of bowel and bladder, urinal at bedside  IV/Drains: NG tube to LIS, PICC in RUE, PIV  Diet: NPO ex ice chips   Activity Level: SBA  Tests/Procedures: None  Anticipated  DC Date: Pending ROBF  Significant Information: TPN @ 85mL/hr and LR@15ml/hr. NG tube with less output, flushed with 25cc per Dr. Moe to ensure NG is still operating well. Plan for clamp trial tomorrow. Abd midline incision with staples CDI. Active BS and +gas but no BM during shift. Using urinal at bedside.

## 2023-10-30 NOTE — PLAN OF CARE
Goal Outcome Evaluation:                    Summary Cecal mass with small bowel obstruction  Status post laparoscopic peritoneal biopsy, open right colectomy 10/24/2023. Metastatic colon cancer with liver mets    10/29/2023 2875-3834    Orientation A& O x 4     Vitals/Tele VSS on rm air     IV Access/drains TPN& Lipids running & LR @ 15 , NG low intermittent suction dark green/ brown output, did not empty on my shift     Diet Ice chips & meds     Mobility SBA    GI/ using bedside urinal, no BM hypoactive bowel sounds     Wound/Skin abdominal incision staples, and lap sites no drainage      Consults Colorectal Surgery     Pain managed with PRN tylenol and oxycodone Pain spikes up when coughing gave PRN cough suppressant  Ambulated in the mays X 2     Discharge Plan Pending ( needs bowel function to improve)       See Flow sheets for assessment

## 2023-10-30 NOTE — PROGRESS NOTES
Colon and Rectal Surgery Progress Note          Assessment and Plan:     POD #6    Ileus seems to be resolving - NG output is less, reports passing some gas.  Exam is totally benign.  Xrays reviewed.    Likely clamp NG tomorrow    Cont TPN and follow up on labs today      Jose Moe MD FACS FASCRS  Colorectal Surgeon       [facility]  [address], [address]  [place] [address]  T: [phone]  F: [phone]  Pager: [phone]  [email or web address]                 Interval History:     Passed some gas.  Pain only with moving              Physical Exam:   Vitals were reviewed  Patient Vitals for the past 24 hrs:   BP Temp Temp src Pulse Resp SpO2 Weight   10/30/23 0741 107/56 99.3  F (37.4  C) Oral 81 17 93 % --   10/30/23 0654 -- -- -- -- -- -- 62.8 kg (138 lb 7.2 oz)   10/30/23 0004 128/67 98.1  F (36.7  C) Oral 76 18 95 % --   10/29/23 1530 124/69 98.3  F (36.8  C) Oral 77 16 95 % --   10/29/23 1100 -- -- -- -- -- -- 68.3 kg (150 lb 9.2 oz)         Intake/Output Summary (Last 24 hours) at 10/30/2023 0832  Last data filed at 10/30/2023 0654  Gross per 24 hour   Intake 120 ml   Output 1300 ml   Net -1180 ml       Head - Nomocephalic atraumatic  Sclera anicteric  Extremities warm and well perfused  Lungs - breathing non labored,   Abdomen - soft, flat, non tender, wound looks good  Rectal exam - deferred  Skin - no rash  Psych - flat affect  Neuro - no focal deficits             Data:   All laboratory and imaging data in the past 24 hours reviewed    CBC  Lab Results   Component Value Date    WBC 10.3 10/29/2023    WBC 9.1 10/28/2023    WBC 9.1 10/26/2023    HGB 7.9 (L) 10/29/2023    HGB 7.5 (L) 10/28/2023    HGB 7.8 (L) 10/27/2023    HCT 28.0 (L) 10/29/2023    HCT 26.6 (L) 10/28/2023    HCT 27.7 (L) 10/26/2023     10/30/2023     10/29/2023     10/28/2023       BMP  Recent Labs   Lab Test 10/30/23  0741 10/30/23  0645 10/29/23  0810 10/29/23  0609   NA  --  131*   --  138   POTASSIUM  --  4.3  --  5.0   CHLORIDE  --  97*  --  102   CO2  --  24  --  25   ANIONGAP  --  10  --  11   * 112*   < > 115*   BUN  --  17.1  --  14.5   CR  --  0.51*  --  0.51*   NAT  --  8.3*  --  8.3*    < > = values in this interval not displayed.       Liver Function Studies -   Recent Labs   Lab Test 10/30/23  0645   PROTTOTAL 5.5*   ALBUMIN 2.8*   BILITOTAL 1.0   ALKPHOS 146*   AST 69*   ALT 27       New imaging data reviewed: no    Total time spent in counseling, direct patient care, coordination of care, and review of data 15 min

## 2023-10-31 ENCOUNTER — PATIENT OUTREACH (OUTPATIENT)
Dept: CARE COORDINATION | Facility: CLINIC | Age: 58
End: 2023-10-31
Payer: COMMERCIAL

## 2023-10-31 LAB
ANION GAP SERPL CALCULATED.3IONS-SCNC: 8 MMOL/L (ref 7–15)
BUN SERPL-MCNC: 18 MG/DL (ref 6–20)
CALCIUM SERPL-MCNC: 8.3 MG/DL (ref 8.6–10)
CHLORIDE SERPL-SCNC: 97 MMOL/L (ref 98–107)
CREAT SERPL-MCNC: 0.49 MG/DL (ref 0.67–1.17)
DEPRECATED HCO3 PLAS-SCNC: 26 MMOL/L (ref 22–29)
EGFRCR SERPLBLD CKD-EPI 2021: >90 ML/MIN/1.73M2
GLUCOSE BLDC GLUCOMTR-MCNC: 111 MG/DL (ref 70–99)
GLUCOSE BLDC GLUCOMTR-MCNC: 117 MG/DL (ref 70–99)
GLUCOSE BLDC GLUCOMTR-MCNC: 90 MG/DL (ref 70–99)
GLUCOSE BLDC GLUCOMTR-MCNC: 94 MG/DL (ref 70–99)
GLUCOSE SERPL-MCNC: 104 MG/DL (ref 70–99)
PATH REPORT.ADDENDUM SPEC: ABNORMAL
PATH REPORT.COMMENTS IMP SPEC: ABNORMAL
PATH REPORT.COMMENTS IMP SPEC: ABNORMAL
PATH REPORT.COMMENTS IMP SPEC: YES
PATH REPORT.FINAL DX SPEC: ABNORMAL
PATH REPORT.GROSS SPEC: ABNORMAL
PATH REPORT.MICROSCOPIC SPEC OTHER STN: ABNORMAL
PATH REPORT.RELEVANT HX SPEC: ABNORMAL
PATHOLOGY SYNOPTIC REPORT: ABNORMAL
PHOTO IMAGE: ABNORMAL
POTASSIUM SERPL-SCNC: 4.4 MMOL/L (ref 3.4–5.3)
SODIUM SERPL-SCNC: 131 MMOL/L (ref 135–145)

## 2023-10-31 PROCEDURE — 250N000013 HC RX MED GY IP 250 OP 250 PS 637: Performed by: HOSPITALIST

## 2023-10-31 PROCEDURE — 250N000011 HC RX IP 250 OP 636: Performed by: COLON & RECTAL SURGERY

## 2023-10-31 PROCEDURE — B4185 PARENTERAL SOL 10 GM LIPIDS: HCPCS | Mod: JZ | Performed by: COLON & RECTAL SURGERY

## 2023-10-31 PROCEDURE — 80048 BASIC METABOLIC PNL TOTAL CA: CPT | Performed by: HOSPITALIST

## 2023-10-31 PROCEDURE — 258N000003 HC RX IP 258 OP 636: Performed by: HOSPITALIST

## 2023-10-31 PROCEDURE — 250N000009 HC RX 250: Mod: JZ | Performed by: COLON & RECTAL SURGERY

## 2023-10-31 PROCEDURE — 120N000001 HC R&B MED SURG/OB

## 2023-10-31 PROCEDURE — 99232 SBSQ HOSP IP/OBS MODERATE 35: CPT | Performed by: INTERNAL MEDICINE

## 2023-10-31 PROCEDURE — 250N000013 HC RX MED GY IP 250 OP 250 PS 637: Performed by: COLON & RECTAL SURGERY

## 2023-10-31 PROCEDURE — 250N000009 HC RX 250: Performed by: HOSPITALIST

## 2023-10-31 RX ADMIN — HEPARIN SODIUM 5000 UNITS: 5000 INJECTION, SOLUTION INTRAVENOUS; SUBCUTANEOUS at 15:10

## 2023-10-31 RX ADMIN — MAGNESIUM SULFATE HEPTAHYDRATE: 500 INJECTION, SOLUTION INTRAMUSCULAR; INTRAVENOUS at 20:47

## 2023-10-31 RX ADMIN — IBUPROFEN 600 MG: 600 TABLET ORAL at 20:46

## 2023-10-31 RX ADMIN — OLIVE OIL AND SOYBEAN OIL 250 ML: 16; 4 INJECTION, EMULSION INTRAVENOUS at 21:06

## 2023-10-31 RX ADMIN — OXYCODONE HYDROCHLORIDE 5 MG: 5 TABLET ORAL at 08:10

## 2023-10-31 RX ADMIN — ACETAMINOPHEN 650 MG: 325 TABLET, FILM COATED ORAL at 15:15

## 2023-10-31 RX ADMIN — HEPARIN SODIUM 5000 UNITS: 5000 INJECTION, SOLUTION INTRAVENOUS; SUBCUTANEOUS at 06:07

## 2023-10-31 RX ADMIN — SODIUM CHLORIDE, POTASSIUM CHLORIDE, SODIUM LACTATE AND CALCIUM CHLORIDE: 600; 310; 30; 20 INJECTION, SOLUTION INTRAVENOUS at 21:04

## 2023-10-31 RX ADMIN — OXYCODONE HYDROCHLORIDE 5 MG: 5 TABLET ORAL at 08:45

## 2023-10-31 RX ADMIN — OXYCODONE HYDROCHLORIDE 5 MG: 5 TABLET ORAL at 15:15

## 2023-10-31 RX ADMIN — FEXOFENADINE HYDROCHLORIDE 60 MG: 60 TABLET ORAL at 08:44

## 2023-10-31 RX ADMIN — OXYCODONE HYDROCHLORIDE 5 MG: 5 TABLET ORAL at 21:24

## 2023-10-31 RX ADMIN — ACETAMINOPHEN 650 MG: 325 TABLET, FILM COATED ORAL at 20:46

## 2023-10-31 RX ADMIN — HEPARIN SODIUM 5000 UNITS: 5000 INJECTION, SOLUTION INTRAVENOUS; SUBCUTANEOUS at 22:55

## 2023-10-31 RX ADMIN — LIDOCAINE 2 PATCH: 4 PATCH TOPICAL at 08:10

## 2023-10-31 RX ADMIN — OXYCODONE HYDROCHLORIDE 5 MG: 5 TABLET ORAL at 03:11

## 2023-10-31 ASSESSMENT — ACTIVITIES OF DAILY LIVING (ADL)
ADLS_ACUITY_SCORE: 22

## 2023-10-31 NOTE — PLAN OF CARE
[id number]  Surgery/POD#: POD 7 Lap Peritoneal biopsy and Open R colectomy  Behavior & Aggression: Green, flat affect, frustrated   Fall Risk: yes  Orientation:A&Ox4  ABNL VS/O2: VSS on RA  ABNL Labs:See results, bg checks q4hr cause on TPN  Pain Management: Oxycodone x2 , uses lidocaine patches on shoulders   Bowel/Bladder: Continent of bowel and bladder, urinal at bedside  IV/Drains: NG tube to LIS, likely clamp today, PICC in RUE  Diet: NPO ex ice chips   Activity Level: SBA  Tests/Procedures: None  Anticipated  DC Date: Pending ROBF  Significant Information: TPN @ 85mL/hr and LR@15ml/hr, Lipids 20.8. Plan for NG clamp trial today. Abd midline incision with staples CDI. Active BS and +gas but no BM during shift. Using urinal at bedside.

## 2023-10-31 NOTE — PLAN OF CARE
Date & Time: 10/31/23  Surgery/POD#: POD 7 from a R colectomy   Behavior & Aggression: Green  Fall Risk: Yes  Orientation:A+Ox4  ABNL VS/O2:VSS  ABNL Labs: Na 131, Cl 97, 8.3   Pain Management:Prn oxycodone and Tylenol  Bowel/Bladder: Continent   Drains: None  Diet:NPO   Activity Level: SBA  Tests/Procedures: None  Anticipated  DC Date: 11/2/23  Significant Information: NG removed this shift after clamp trial; TPN running @ 85mL/hr LR running @ 15mL/hr

## 2023-10-31 NOTE — PROGRESS NOTES
Social Work - Intervention  [facility]    Data/Intervention: Brady is a 58-year-old gentleman with a diagnosis of colon cancer with metastatic disease to liver who has been followed by Dr. Grubbs while admitted at [facility].     Patient Name: Brady Lenz Goes By: Brady    /Age: 1965 (58 year old)     Referral Source: Asa Grubbs MD  Reason for Referral:   Reason for Referral: Community Resources  Transportation Resources    Specify Community Resources: continuation of care with cancer clinic      Collaborated With:    - Brittny Estevez (inpatient social work)     Psychosocial Information/Concerns:  Brady is a homeless gentleman with a new advanced cancer diagnosis, who is tentatively scheduled with Dr. Limon at [facility] on 23 at 1:00 pm.     Outpatient oncology has received this referral. Due to inpatient social work/RNCC engagement, this clinician will await outreach until he is discharged from hospital. Per chart review[facility]      Assessment/Plan:  This clinician will plan for psychosocial outreach and support after upcoming visit with Dr. Limon. Inpatient team aware of need for transportation support to outpatient oncology department.      Provided patient/family with contact information and availability.    Deborah Yuan, MSW, LICSW, OSW-C  Clinical - Adult Oncology  She/Her/Hers  Phone: [phone]  [facility]: M, Thu  *every other Tue, 8am-4:30pm  [facility]: W, F, *every other Tue, 8am-4:30pm

## 2023-10-31 NOTE — PROGRESS NOTES
Care Management Follow Up    Length of Stay (days): 8    Expected Discharge Date: 11/03/2023     Concerns to be Addressed: adjustment to diagnosis/illness, basic needs, coping/stress, discharge planning, financial/insurance, homelessness, mental health     Patient plan of care discussed at interdisciplinary rounds: Yes    Anticipated Discharge Disposition: Homeless     Anticipated Discharge Services:    Anticipated Discharge DME:      Patient/family educated on Medicare website which has current facility and service quality ratings:    Education Provided on the Discharge Plan:    Patient/Family in Agreement with the Plan: yes    Referrals Placed by CM/SW: Financial Services  Private pay costs discussed: Not applicable    Additional Information:  Writer updated Sapna at [facility] on Pt's progress. The Pt as a clamping trial today and  if he fails, they will need to do another ABD CT.  If he passes the clamping trial it will be another couple days to advance his diet.  Ptis also on TPN. Writer will continue to update Sapna on Pt's progress and anticipated discharge date.      Brian Verduzco RN, BSN, Care Coordinator

## 2023-10-31 NOTE — PROGRESS NOTES
CLINICAL NUTRITION SERVICES - REASSESSMENT NOTE      Recommendations Ordered by Registered Dietitian (RD):   Continue TPN @ goal for energy and protein needs  Monitor weight and labs  Advance oral diet as medically able   Future/Additional Recommendations:   Upon advancement of oral diet, increase oral intake to >60% of needs with food/supplements and wean TPN   Malnutrition: % Weight Loss:  None noted at this time, and unable to evaluate dt sparse wt hx on file, but suspected significant weight loss over 2 years  % Intake:   </= 50% for >/= 5 days (severe malnutrition) + suspect poor at baseline over past 1-2 years   Subcutaneous Fat Loss:  Orbital region mild depletion  Muscle Loss:  Temporal region severe depletion, Clavicle bone region moderate depletion, Acromion bone region severe depletion, Scapular bone region severe depletion, and Dorsal hand region moderate depletion (carried from 10/24 nutrition eval)  Fluid Retention:  None noted    Malnutrition Diagnosis: Severe malnutrition in the context of social circumstances, chronic disease, and acute illness     EVALUATION OF PROGRESS TOWARD GOALS   Diet:  NPO except: Meds, Ice chips    Nutrition Support:    TPN running @ goal  Route: PICC  Custom TPN @ 85 mL/hr = 1948 kcal (29 kcal/kg), 306 g dextrose (GIR = 3.1), 102 g AA (1.5 g/kg), and 250 mL 20% IV Lipids @ 21 mL/hr x12 hours x7 days/week (26% kcal from Fat)   Total fluids = 100 mL/hr or per MD    Intake/Tolerance:    Appears to be tolerating TPN, NPO since 10/23 >7 days.    ASSESSED NUTRITION NEEDS:  Dosing Weight: 68 kg (10/23)  Estimated Energy Needs: 5566-9153 kcal (30-35 Kcal/Kg)  Justification: <100% IBW   Estimated Protein Needs:  grams protein (1.2-1.5 g pro/Kg)  Justification: repletion, preservation of lean body mass  Estimated Fluid Needs: 1 mL/kcal  Justification: maintenance     NEW FINDINGS:   10/24 per Colorectal Surgery: Laparoscopic Peritoneal Biopsy Converted to Open Right Colectomy    10/28 per Physician: Biopsy positive for metastatic carcinoma, NG output 1300 mL in 24 hrs with improvement 10/30  10/31 per Provider: NGT clamp trial planned     Today, Brady appeared to be doing well and had no questions surrounding his TPN. He inquired about advancement of diet, in which we discussed the provider's role in advancement and the dietitian's role in providing guidance and education on current diets when diet is changed. Brady understands if there are questions or concerns nutrition is available.    Weight    Vitals:    10/28/23 0645 10/29/23 1100 10/30/23 0654 10/30/23 0852   Weight: 70 kg (154 lb 5.2 oz) 68.3 kg (150 lb 9.2 oz) 62.8 kg (138 lb 7.2 oz) 68.9 kg (151 lb 12.8 oz)    10/31/23 0745   Weight: 68.3 kg (150 lb 8 oz)     No edema noted, per chart. Weight remains relatively stable.  Noted >30 lb weight loss prior to admission per pt report, see nutrition note 10/24.    GI  10/28 per Provider: First BM and flatus    Labs  Na 131 - L  K, Mg, Phos - WNL   - H but remains stable ()  Alk Phos 146 - H     Meds  Dextrose 10% infusion, continuous (1000 mL) ---> If TPN is off  Glucose gel PRN  Dextrose 50% injection PRN  Glucagon injection PRN  Zofran PRN  Compazine PRN    Previous Goals:   TPN to start and reach goal w/in 72 hours  Evaluation: Met    TPN to provide % of estimated nutrition needs at goal provisions  Evaluation: Met    Wt >68 kg  Evaluation: Met, Ongoing    Previous Nutrition Diagnosis:   Inadequate oral intake related to abdominal pain, bloating, decreased appetite, SBO and new dx of proximal colonic malignancy as evidenced by poor intake x1-2 years, minimal intake <50% x1 week prior to admission, mild-severe fat and muscle loss, IBW 81%, plans to start TPN   Evaluation: Completed, see modified diagnosis below      MALNUTRITION  % Weight Loss:  None noted at this time, and unable to evaluate dt sparse wt hx on file, but suspected significant weight loss over 2  years  % Intake:   </= 50% for >/= 5 days (severe malnutrition) + suspect poor at baseline over past 1-2 years   Subcutaneous Fat Loss:  Orbital region mild depletion  Muscle Loss:  Temporal region severe depletion, Clavicle bone region moderate depletion, Acromion bone region severe depletion, Scapular bone region severe depletion, and Dorsal hand region moderate depletion (carried from 10/24 nutrition eval)  Fluid Retention:  None noted    Malnutrition Diagnosis: Severe malnutrition in the context of social circumstances, chronic disease, and acute illness    CURRENT NUTRITION DIAGNOSIS  Inadequate oral intake related to NPO status as evidenced by NPO >7 days and continued nee for TPN to meet 100% of estimated needs.    INTERVENTIONS  Recommendations / Nutrition Prescription  Continue TPN at goal for energy and protein needs  Monitor weight and labs  Advance diet as medically able    Implementation  PN Schedule: Continue TPN at goal rate  Collaboration and Referral of Nutrition care: Recommend advancement of diet as soon as medically able    Goals  Wt >68 kg  Upon advancement of diet, increase oral intake with goal of >60% of needs after 3-4 days through food or supplements    MONITORING AND EVALUATION:  Progress towards goals will be monitored and evaluated per protocol and Practice Guidelines    Gloria Licea  Dietetic Intern

## 2023-10-31 NOTE — PROGRESS NOTES
Colon and Rectal Surgery  Daily Progress Note    Subjective  Patient reports feeling about the same. Passing flatus. No BM. Ambulating.     Objective  Intake/Output last 24 hrs:  Temp:  [98.2  F (36.8  C)-99.3  F (37.4  C)] 98.3  F (36.8  C)  Pulse:  [76-81] 76  Resp:  [17-18] 18  BP: (107-118)/(56-63) 114/59  SpO2:  [93 %-95 %] 95 %         Physical Exam:  General: awake, alert, in no acute distress  Respiratory: non-labored breathing  Abdomen: soft, appropriately tender, non-distended              Incisions: clean, dry and intact    Pertinent Labs  Lab Results: personally reviewed.  Lab Results   Component Value Date     10/30/2023     10/29/2023     10/28/2023    CO2 24 10/30/2023    CO2 25 10/29/2023    CO2 25 10/28/2023    BUN 17.1 10/30/2023    BUN 14.5 10/29/2023    BUN 15.1 10/28/2023     Lab Results   Component Value Date    WBC 10.3 10/29/2023    WBC 9.1 10/28/2023    WBC 9.1 10/26/2023    HGB 7.9 10/29/2023    HGB 7.5 10/28/2023    HGB 7.8 10/27/2023    HCT 28.0 10/29/2023    HCT 26.6 10/28/2023    HCT 27.7 10/26/2023    MCV 68 10/29/2023    MCV 67 10/28/2023    MCV 67 10/26/2023     10/30/2023     10/29/2023     10/28/2023       Assessment/Plan: This is a 58 year old male POD #7 s/p open right colectomy. NGT still bilious but output has decreased significantly.       - Cont TPN  - NGT clamp trial today   - Cont heparin for dvt prophlyaxis  - If cannot pass clamp trial, we will consider CT abdomen and pelvis        Discussed with Dr. Payal Gurrola MD on 10/31/2023 at 7:18 AM   Colon and rectal surgery fellow

## 2023-10-31 NOTE — PROGRESS NOTES
[facility]  Hospitalist Progress Note   10/31/2023          Assessment and Plan:       Brady Lenz is a 58 year old male with no known past medical history who does not see a doctor on a regular basis, no previous cancer screening admitted on 10/23/2023 with 2-week history of intermittent abdominal pain and distention and decreased appetite.     Cecal mass with small bowel obstruction  Status post laparoscopic peritoneal biopsy, open right colectomy 10/24/2023.  Metastatic colon cancer with liver mets    -- At the time of admission frail-appearing, afebrile hemodynamically stable.    LFTs, electrolytes, renal function within normal limits.  CT abdomen and pelvis showed a 7cm mass in the cecum causing small bowel obstruction and 2 liver masses measuring up to 10.6 cm in the right lobe and 4.9cm in the left lobe concerning for metastatic disease.  -- NG tube placed in the ED on 10/23.  PICC line placed 10/24  --Status post laparoscopic peritoneal biopsy, open right colectomy 10/24/2023.  Biopsy shows moderately differentiated adenocarcinoma, invasive into the pericolonic soft tissue age of 31 examined lymph node positive for metastatic carcinoma.  Margins negative, uterine biopsy tissue positive for metastatic adenocarcinoma,    On TPN for nutrition through PICC line.  PCA discontinued 10/26.  IV/p.o. as needed pain meds.  Encourage ambulation.    IV/p.o. as needed antiemetics.  -- [place] oncology following.      CT chest negative for any metastatic disease  NG output improved, passing gas.    Ambulate patient,   N.p.o. for now continue TPN.  Colorectal surgery following  Clamping trial today, if he  fails clamping trial then repeat the CT scan abdomen pelvis.  Overall stable, will defer further management to colorectal surgery      Severe iron deficiency anemia secondary to blood loss from cecal mass  Status post 4 units of blood transfusion.    -At the time of admission hemoglobin of 5.9, MCV  58, platelet count of 473; alk phos 135  --Suspect some degree of subacute to chronic blood loss given he is presently hemodynamically stable and denies any recent melanotic stools or BRBPR. Documented EBL 50 mL.  -- Received total 3 units of blood transfusion on 10/23 to 10/24.  And 1 unit of PRBC on 10/25.  Iron saturation index of 2.  3 doses of IV Venofer.  Hemoglobin stable and improved to 7.9 at this time    Acute anxiety: Improved  Possible underlying depression  Patient having anxiety, flat affect.  [facility]   Psychiatry evaluated,  recommended sertraline.  Patient declined.  Stable at this time    Social stressors.  Patient reported works as a / concerned with living situation/finances..  Care coordinator / assisting with support requested.    Orders Placed This Encounter      NPO for Medical/Clinical Reasons Except for: Meds, Ice Chips      DVT Prophylaxis: SCDs, postprocedure pharmacological DVT prophylaxis per surgical team.  Code Status: Full Code  Disposition: Expected discharge once have bowel function, NG tube out and tolerate orally      Eleazar Ferrara MD        Interval History:        Patient seen and evaluated today, was walking this morning, passing gas, no fever chills chest pain headache dizziness lightheadedness abdominal pain is reasonably controlled at this time    No other significant event overnight         Physical Exam:        Physical Exam   Temp:  [98.2  F (36.8  C)-98.9  F (37.2  C)] 98.9  F (37.2  C)  Pulse:  [74-80] 74  Resp:  [18] 18  BP: (112-118)/(59-63) 112/61  SpO2:  [93 %-95 %] 93 %      Admission Weight: 68 kg (150 lb)  Current Weight: 68 kg (150 lb)    PHYSICAL EXAM  GENERAL: Awake, alert, standing in his room in no acute distress  HEENT: Unremarkable  HEART: Regular rate and rhythm. S1S2. No murmurs  LUNGS: Diminished air entry bilaterally.  Respirations unlabored  ABDOMEN soft,, sluggish bowel sounds, mild tenderness, no organomegaly binder in  place  NEURO: No focal deficit  EXTREMITIES: No pedal edema.   SKIN: Warm, dry. No rash  PSYCHIATRY Cooperative       Medications:         heparin ANTICOAGULANT  5,000 Units Subcutaneous Q8H    ibuprofen  600 mg Oral Q6H    insulin aspart  1-4 Units Subcutaneous Q4H    lidocaine  2 patch Transdermal Q24H    lipids plant base  250 mL Intravenous Q24H    sodium chloride (PF)  10-40 mL Intracatheter Q7 Days    sodium chloride (PF)  3 mL Intracatheter Q8H     acetaminophen, sore throat, benzonatate, dextrose, glucose **OR** dextrose **OR** glucagon, diphenhydrAMINE, fexofenadine, HYDROmorphone, lidocaine 4%, LORazepam, melatonin, naloxone **OR** naloxone **OR** naloxone **OR** naloxone, ondansetron **OR** ondansetron, oxyCODONE, phenol MT, prochlorperazine **OR** prochlorperazine **OR** prochlorperazine, sodium chloride (PF), sodium chloride (PF), sodium chloride (PF)         Data:      All new lab and imaging data was reviewed.

## 2023-11-01 LAB
ANION GAP SERPL CALCULATED.3IONS-SCNC: 12 MMOL/L (ref 7–15)
BUN SERPL-MCNC: 16.3 MG/DL (ref 6–20)
CALCIUM SERPL-MCNC: 8.7 MG/DL (ref 8.6–10)
CHLORIDE SERPL-SCNC: 94 MMOL/L (ref 98–107)
CREAT SERPL-MCNC: 0.49 MG/DL (ref 0.67–1.17)
DEPRECATED HCO3 PLAS-SCNC: 24 MMOL/L (ref 22–29)
EGFRCR SERPLBLD CKD-EPI 2021: >90 ML/MIN/1.73M2
GLUCOSE BLDC GLUCOMTR-MCNC: 102 MG/DL (ref 70–99)
GLUCOSE BLDC GLUCOMTR-MCNC: 105 MG/DL (ref 70–99)
GLUCOSE BLDC GLUCOMTR-MCNC: 108 MG/DL (ref 70–99)
GLUCOSE BLDC GLUCOMTR-MCNC: 113 MG/DL (ref 70–99)
GLUCOSE BLDC GLUCOMTR-MCNC: 117 MG/DL (ref 70–99)
GLUCOSE BLDC GLUCOMTR-MCNC: 120 MG/DL (ref 70–99)
GLUCOSE BLDC GLUCOMTR-MCNC: 125 MG/DL (ref 70–99)
GLUCOSE SERPL-MCNC: 115 MG/DL (ref 70–99)
MAGNESIUM SERPL-MCNC: 2.1 MG/DL (ref 1.7–2.3)
PHOSPHATE SERPL-MCNC: 3.6 MG/DL (ref 2.5–4.5)
POTASSIUM SERPL-SCNC: 4.3 MMOL/L (ref 3.4–5.3)
SODIUM SERPL-SCNC: 130 MMOL/L (ref 135–145)

## 2023-11-01 PROCEDURE — 80048 BASIC METABOLIC PNL TOTAL CA: CPT | Performed by: HOSPITALIST

## 2023-11-01 PROCEDURE — 120N000001 HC R&B MED SURG/OB

## 2023-11-01 PROCEDURE — 250N000011 HC RX IP 250 OP 636: Performed by: COLON & RECTAL SURGERY

## 2023-11-01 PROCEDURE — 250N000009 HC RX 250: Performed by: HOSPITALIST

## 2023-11-01 PROCEDURE — 250N000013 HC RX MED GY IP 250 OP 250 PS 637: Performed by: COLON & RECTAL SURGERY

## 2023-11-01 PROCEDURE — 84100 ASSAY OF PHOSPHORUS: CPT | Performed by: COLON & RECTAL SURGERY

## 2023-11-01 PROCEDURE — 99232 SBSQ HOSP IP/OBS MODERATE 35: CPT | Performed by: HOSPITALIST

## 2023-11-01 PROCEDURE — 83735 ASSAY OF MAGNESIUM: CPT | Performed by: COLON & RECTAL SURGERY

## 2023-11-01 RX ADMIN — HEPARIN SODIUM 5000 UNITS: 5000 INJECTION, SOLUTION INTRAVENOUS; SUBCUTANEOUS at 06:34

## 2023-11-01 RX ADMIN — HEPARIN SODIUM 5000 UNITS: 5000 INJECTION, SOLUTION INTRAVENOUS; SUBCUTANEOUS at 15:18

## 2023-11-01 RX ADMIN — HEPARIN SODIUM 5000 UNITS: 5000 INJECTION, SOLUTION INTRAVENOUS; SUBCUTANEOUS at 22:48

## 2023-11-01 RX ADMIN — LIDOCAINE 2 PATCH: 4 PATCH TOPICAL at 08:41

## 2023-11-01 RX ADMIN — OXYCODONE HYDROCHLORIDE 5 MG: 5 TABLET ORAL at 06:34

## 2023-11-01 RX ADMIN — MAGNESIUM SULFATE HEPTAHYDRATE: 500 INJECTION, SOLUTION INTRAMUSCULAR; INTRAVENOUS at 19:56

## 2023-11-01 ASSESSMENT — ACTIVITIES OF DAILY LIVING (ADL)
ADLS_ACUITY_SCORE: 22

## 2023-11-01 NOTE — PROGRESS NOTES
[facility]  Hospitalist Progress Note   11/01/2023          Assessment and Plan:       Brady Lenz is a 58 year old male with no known past medical history who does not see a doctor on a regular basis, no previous cancer screening admitted on 10/23/2023 with 2-week history of intermittent abdominal pain and distention and decreased appetite.     SBO by cecal obstruction from metatstatic colon adenocarcinoma with liver mets  Status post laparoscopic peritoneal biopsy, open right colectomy 10/24/2023.  At the time of admission frail-appearing, afebrile hemodynamically stable.    LFTs, electrolytes, renal function within normal limits.  CT abdomen and pelvis showed a 7cm mass in the cecum causing small bowel obstruction and 2 liver masses measuring up to 10.6 cm in the right lobe and 4.9cm in the left lobe concerning for metastatic disease.  NG tube placed in the ED on 10/23.  PICC line placed 10/24  Status post laparoscopic peritoneal biopsy, open right colectomy 10/24/2023.  Biopsy shows moderately differentiated adenocarcinoma, invasive into the pericolonic soft tissue age of 31 examined lymph node positive for metastatic carcinoma.  Margins negative, uterine biopsy tissue positive for metastatic adenocarcinoma,  On TPN for nutrition through PICC line.  PCA discontinued 10/26.  IV/p.o. as needed pain meds.  Encourage ambulation.    IV/p.o. as needed antiemetics.  Clamping trial on 10/31/2023  Plan  - starting liquid diet today  - to medical respite bed when tolerating diet and off TPN, Friday?  - noted oncological plan to follow-up in clinic      Severe iron deficiency anemia secondary to blood loss from cecal mass  Status post 4 units of blood transfusion.  At the time of admission hemoglobin of 5.9, MCV 58, platelet count of 473; alk phos 135  --Suspect some degree of subacute to chronic blood loss given he is presently hemodynamically stable and denies any recent melanotic stools or BRBPR.  Documented EBL 50 mL.  -- Received total 3 units of blood transfusion on 10/23 to 10/24.  And 1 unit of PRBC on 10/25.  Iron saturation index of 2.  3 doses of IV Venofer.  Hemoglobin stable and improved to 7.9 at this time    Acute anxiety: Improved  Possible underlying depression  Patient having anxiety, flat affect.  As needed Ativan   Psychiatry evaluated,  recommended sertraline.  Patient declined.  Stable at this time    Social stressors.  Patient reported works as a / concerned with living situation/finances..  Care coordinator / assisting with support requested.    Orders Placed This Encounter      NPO for Medical/Clinical Reasons Except for: Meds, Ice Chips      DVT Prophylaxis: SCDs, postprocedure pharmacological DVT prophylaxis per surgical team.  Code Status: Full Code  Disposition: Expected discharge once have bowel function, NG tube out and tolerate orally      Sudhir Bee,         Interval History:        Still some pain in the abdomen that make it difficult to take deep breaths due to pressure (on DVT ppx). Noted starting a diet.         Physical Exam:        Physical Exam   Temp:  [98  F (36.7  C)-99  F (37.2  C)] 98.5  F (36.9  C)  Pulse:  [72-73] 72  Resp:  [16-18] 16  BP: (115-125)/(60-66) 115/60  SpO2:  [92 %-97 %] 92 %      Admission Weight: 68 kg (150 lb)  Current Weight: 68 kg (150 lb)    PHYSICAL EXAM  GENERAL: Awake, alert, standing in his room in no acute distress  HEENT: Unremarkable  HEART: Regular rate and rhythm. S1S2. No murmurs  LUNGS: Diminished air entry bilaterally.  Respirations unlabored  ABDOMEN soft,, sluggish bowel sounds, mild tenderness, no organomegaly binder in place  NEURO: No focal deficit  EXTREMITIES: No pedal edema.   SKIN: Warm, dry. No rash  PSYCHIATRY Cooperative       Medications:         heparin ANTICOAGULANT  5,000 Units Subcutaneous Q8H    ibuprofen  600 mg Oral Q6H    insulin aspart  1-4 Units Subcutaneous Q4H    lidocaine  2  patch Transdermal Q24H    lipids plant base  250 mL Intravenous Q24H    sodium chloride (PF)  10-40 mL Intracatheter Q7 Days    sodium chloride (PF)  3 mL Intracatheter Q8H     acetaminophen, sore throat, benzonatate, dextrose, glucose **OR** dextrose **OR** glucagon, diphenhydrAMINE, fexofenadine, HYDROmorphone, lidocaine 4%, LORazepam, melatonin, naloxone **OR** naloxone **OR** naloxone **OR** naloxone, ondansetron **OR** ondansetron, oxyCODONE, phenol MT, prochlorperazine **OR** prochlorperazine **OR** prochlorperazine, sodium chloride (PF), sodium chloride (PF), sodium chloride (PF)         Data:      All new lab and imaging data was reviewed.

## 2023-11-01 NOTE — PLAN OF CARE
Goal Outcome Evaluation:    A&ox4, VSS on RA. SBA. NPO except ice chips. Pain managed with prn 5mg oxy and tylenol. Lidocaine patch removed overnight.POD 7 colectomy. PICC RUE infusing TPN 85ml/hr, LR 15ml/hr lipids 20.9 ml/hr.  Continue to monitor. Discharge pending.

## 2023-11-01 NOTE — PROGRESS NOTES
Colon and Rectal Surgery  Daily Progress Note    Subjective  Patient reports doing well. NGT came out and feels better without it. Passing gas and having BM. Endorses some pain under ribs with passing gas. Denies N/V.     Objective  Intake/Output last 24 hrs:  Temp:  [98  F (36.7  C)-99  F (37.2  C)] 98.5  F (36.9  C)  Pulse:  [72-73] 72  Resp:  [16-18] 16  BP: (115-125)/(60-66) 115/60  SpO2:  [92 %-97 %] 92 %         Physical Exam:  General: awake, alert, in no acute distress  Respiratory: non-labored breathing  Abdomen: soft, appropriately tender, non-distended              Incisions: clean, dry and intact    Pertinent Labs  Lab Results: personally reviewed.  Lab Results   Component Value Date     11/01/2023     10/31/2023     10/30/2023    CO2 24 11/01/2023    CO2 26 10/31/2023    CO2 24 10/30/2023    BUN 16.3 11/01/2023    BUN 18.0 10/31/2023    BUN 17.1 10/30/2023     Lab Results   Component Value Date    WBC 10.3 10/29/2023    WBC 9.1 10/28/2023    WBC 9.1 10/26/2023    HGB 7.9 10/29/2023    HGB 7.5 10/28/2023    HGB 7.8 10/27/2023    HCT 28.0 10/29/2023    HCT 26.6 10/28/2023    HCT 27.7 10/26/2023    MCV 68 10/29/2023    MCV 67 10/28/2023    MCV 67 10/26/2023     10/30/2023     10/29/2023     10/28/2023       Assessment/Plan: This is a 58 year old male POD #8 s/p open right colectomy. Doing well.      - Cont TPN  - FLD today with magic cup and ensure   - OOB/ ambulate  - Cont heparin for dvt prophlyaxis    Discussed with Dr. Payal Gurrola MD on 11/1/2023 at 8:38 AM   Colon and rectal surgery fellow     CRS Staff  Seen and examined independently.  Agree with above.  Likely stop tpn tomorrow.  Full liquid diet today.    Jose Moe MD FACS FASCRS  Colorectal Surgeon       [facility]  [address], [address]  [place] [address]  T: [phone]  F: [phone]  Pager: [phone]  [email or web address]

## 2023-11-01 NOTE — PLAN OF CARE
Date & Time: 11/ 01/23 0521-2734  Surgery/POD#: POD 8 from a R colectomy   Behavior & Aggression: Green  Fall Risk: Yes  Orientation:A+Ox4  ABNL VS/O2:VSS  ABNL Labs: Na 130, Cl 94, Creatinine 0.49  Pain Management:Prn oxycodone  Bowel/Bladder: Continent   Drains: None  Diet; Full liquid  Activity Level: SBA  Tests/Procedures: None  Anticipated  DC Date: 11/2/23  Significant Information:TPN running @ 85mL/hr LR running @ 15mL/hr

## 2023-11-01 NOTE — PROGRESS NOTES
CLINICAL NUTRITION SERVICES - BRIEF NOTE         Nutrition Prescription     Recommendations already ordered by Registered Dietitian (RD):  Discontinued daily lipids to taper TPN.  [employer] supplement ordered to replace Ensure Enlive         See RD note yesterday for most recent full assessment     EVALUATION OF THE PROGRESS TOWARD GOALS   Diet: Full Liquid Diet  Snacks/Supplements Adult: Magic Cup, Ensure Enlive      Nutrition Support: Custom TPN @ 35 mL/hr via PICC = 1448 kcal (21 kcal/kg), 306 g dextrose (GIR = 3.1), 102 g AA (1.5 g/kg)  Total fluids = 100 mL/hr     NEW FINDINGS   Diet advancement to Full Liquids 11/1  Additions of multiple allergies added to HT after supplement orders; changed to [employer]  Discontinuation of TPN lipids to promote weaning of TPN dt diet advancement.     INTERVENTIONS  Collaboration and Referral of Nutrition care  Medical Nutrition Supplements     Monitoring/Evaluation  Progress toward goals will be monitored and evaluated per protocol.     Gloria Licea  Dietetic Intern

## 2023-11-02 LAB
GLUCOSE BLDC GLUCOMTR-MCNC: 106 MG/DL (ref 70–99)
GLUCOSE BLDC GLUCOMTR-MCNC: 109 MG/DL (ref 70–99)
GLUCOSE BLDC GLUCOMTR-MCNC: 115 MG/DL (ref 70–99)
GLUCOSE BLDC GLUCOMTR-MCNC: 128 MG/DL (ref 70–99)
GLUCOSE BLDC GLUCOMTR-MCNC: 78 MG/DL (ref 70–99)
GLUCOSE SERPL-MCNC: 114 MG/DL (ref 70–99)
PLATELET # BLD AUTO: 340 10E3/UL (ref 150–450)
SODIUM SERPL-SCNC: 132 MMOL/L (ref 135–145)

## 2023-11-02 PROCEDURE — 250N000013 HC RX MED GY IP 250 OP 250 PS 637: Performed by: HOSPITALIST

## 2023-11-02 PROCEDURE — 120N000001 HC R&B MED SURG/OB

## 2023-11-02 PROCEDURE — 82947 ASSAY GLUCOSE BLOOD QUANT: CPT | Performed by: HOSPITALIST

## 2023-11-02 PROCEDURE — 99232 SBSQ HOSP IP/OBS MODERATE 35: CPT | Performed by: HOSPITALIST

## 2023-11-02 PROCEDURE — 84295 ASSAY OF SERUM SODIUM: CPT | Performed by: HOSPITALIST

## 2023-11-02 PROCEDURE — 250N000011 HC RX IP 250 OP 636: Performed by: COLON & RECTAL SURGERY

## 2023-11-02 PROCEDURE — 250N000013 HC RX MED GY IP 250 OP 250 PS 637: Performed by: COLON & RECTAL SURGERY

## 2023-11-02 PROCEDURE — 85049 AUTOMATED PLATELET COUNT: CPT | Performed by: COLON & RECTAL SURGERY

## 2023-11-02 RX ORDER — ENOXAPARIN SODIUM 100 MG/ML
40 INJECTION SUBCUTANEOUS EVERY 24 HOURS
Status: DISCONTINUED | OUTPATIENT
Start: 2023-11-02 | End: 2023-11-03 | Stop reason: HOSPADM

## 2023-11-02 RX ADMIN — FEXOFENADINE HYDROCHLORIDE 60 MG: 60 TABLET ORAL at 14:46

## 2023-11-02 RX ADMIN — ENOXAPARIN SODIUM 40 MG: 40 INJECTION SUBCUTANEOUS at 14:44

## 2023-11-02 RX ADMIN — HEPARIN SODIUM 5000 UNITS: 5000 INJECTION, SOLUTION INTRAVENOUS; SUBCUTANEOUS at 06:03

## 2023-11-02 RX ADMIN — LIDOCAINE 2 PATCH: 4 PATCH TOPICAL at 08:00

## 2023-11-02 RX ADMIN — ACETAMINOPHEN 650 MG: 325 TABLET, FILM COATED ORAL at 14:46

## 2023-11-02 ASSESSMENT — ACTIVITIES OF DAILY LIVING (ADL)
ADLS_ACUITY_SCORE: 22

## 2023-11-02 NOTE — PROGRESS NOTES
Colon and Rectal Surgery  Daily Progress Note    Subjective  Patient reports doing well. Denies pain. Ambulating. Somewhat tolerating diet and shakes. Passing gas. Liquid BM.     Objective  Intake/Output last 24 hrs:  Temp:  [98  F (36.7  C)-98.5  F (36.9  C)] 98  F (36.7  C)  Pulse:  [79-83] 79  Resp:  [16] 16  BP: (115-118)/(58-60) 115/60  SpO2:  [96 %] 96 %         Physical Exam:  General: awake, alert, in no acute distress  Respiratory: non-labored breathing  Abdomen: soft, appropriately tender, non-distended              Incisions: clean, dry and intact    Pertinent Labs  Lab Results: personally reviewed.  Lab Results   Component Value Date     11/02/2023     11/01/2023     10/31/2023    CO2 24 11/01/2023    CO2 26 10/31/2023    CO2 24 10/30/2023    BUN 16.3 11/01/2023    BUN 18.0 10/31/2023    BUN 17.1 10/30/2023     Lab Results   Component Value Date    WBC 10.3 10/29/2023    WBC 9.1 10/28/2023    WBC 9.1 10/26/2023    HGB 7.9 10/29/2023    HGB 7.5 10/28/2023    HGB 7.8 10/27/2023    HCT 28.0 10/29/2023    HCT 26.6 10/28/2023    HCT 27.7 10/26/2023    MCV 68 10/29/2023    MCV 67 10/28/2023    MCV 67 10/26/2023     11/02/2023     10/30/2023     10/29/2023       Assessment/Plan: This is a 58 year old male POD #9 s/p open right colectomy. Doing well.      - Wean TPN  - Low fiber diet today plus nutritional shakes   - OOB/ ambulate  - Cont heparin for dvt prophlyaxis     Discussed with Dr. Payal Gurrola MD on 11/2/2023 at 7:22 AM   Colon and rectal surgery fellow     CRS Staff  Doing well.  Stop TPN.  Regular diet.  Switch to lovenox as he will need this after discharge.    Jose Moe MD FACS FASCRS  Colorectal Surgeon       [facility]  [address], [address]  [place] [address]  T: [phone]  F: [phone]  Pager: [phone]  [email or web address]

## 2023-11-02 NOTE — PROGRESS NOTES
CLINICAL NUTRITION SERVICES - BRIEF NOTE     Nutrition Prescription    RECOMMENDATIONS FOR MD/PROVIDER TO ORDER:  Denzel is wondering if we have a gastroenterologist on staff he could speak with.    Recommendations already ordered by Registered Dietitian (RD):  Modify composition Medical food supplement therapy to assess tolerance  - brought a [employer] 1.4 (5 gm fiber) to trial vs a [employer] 1.0 (6 gm fiber)        NEW FINDINGS   Stopped in to check on tolerance of the [employer] 1.0 (6 gm fiber) supplements yesterday and Denzel hadn't tried them yet because of the total fiber content. Denzel did say he hasn't had any formed stools and that his BM was more gas with some liquids. I let him know the plan was to ween off the TPN this evening and brought up a [employer] 1.4 (5 gm fiber). I explained this has slightly less fiber but more calories, protein, and is the most nutrient dense supplement we have within his allergy/intolerances limitation. I encouraged him to stay in touch with providers about the tolerance of either oral nutrition supplements. Denzel asked if we had any gastroenterologist on site and I let him know I would pass the request on to the providers.     INTERVENTIONS  Implementation  Modify composition Medical food supplement therapy to assess tolerance    Monitoring/Evaluation  Will continue to monitor and evaluate per protocol.     Morgan Sullivan RD, LD

## 2023-11-02 NOTE — PLAN OF CARE
Date & Time: 11/ 02/23 4811-1582  Surgery/POD#: POD 9 from a R colectomy   Behavior & Aggression: Green  Fall Risk: Yes  Orientation:A+Ox4  ABNL VS/O2:VSS  ABNL Labs: Na 132  Pain Management:None  Bowel/Bladder: Continent   Drains: None  Diet; Low Fiber  Activity Level: Independent  Tests/Procedures: None  Anticipated  DC Date: 11/2/23  Significant Information:TPN running @ 85mL/hr LR running @ 15mL plan to discontinue tonight. Starting Lovenox; initial education done with patient.

## 2023-11-02 NOTE — PROGRESS NOTES
Care Management Follow Up    Length of Stay (days): 10    Expected Discharge Date: 11/03/2023     Concerns to be Addressed: adjustment to diagnosis/illness, basic needs, coping/stress, discharge planning, financial/insurance, homelessness, mental health     Patient plan of care discussed at interdisciplinary rounds: Yes    Anticipated Discharge Disposition: Homeless     Anticipated Discharge Services:    Anticipated Discharge DME:      Patient/family educated on Medicare website which has current facility and service quality ratings:    Education Provided on the Discharge Plan:    Patient/Family in Agreement with the Plan: yes    Referrals Placed by CM/SW: Financial Services  Private pay costs discussed: Not applicable    Additional Information:  Writer sent update of Pt to [facility] on Pt. Anticipate discharge tomorrow and Writer will set up a ride for 12:30 once I get the ok from [facility].    ADDM: Pt was accepted at [facility]. Pt will discharge via taxi and Writer will set that up tomorrow with [facility] account.     Pt will need to be able to administer the lovenox himself. Please send a 30day supply of all medications with Pt at discharge       Brian Verduzco, RN, BSN, Care Coordinator

## 2023-11-02 NOTE — PROGRESS NOTES
[facility]  Hospitalist Progress Note   11/02/2023          Assessment and Plan:       Brady Lenz is a 58 year old male with no known past medical history who does not see a doctor on a regular basis, no previous cancer screening admitted on 10/23/2023 with 2-week history of intermittent abdominal pain and distention and decreased appetite.     SBO by cecal obstruction from metatstatic colon adenocarcinoma with liver mets  Status post laparoscopic peritoneal biopsy, open right colectomy 10/24/2023.  At the time of admission frail-appearing, afebrile hemodynamically stable.    LFTs, electrolytes, renal function within normal limits.  CT abdomen and pelvis showed a 7cm mass in the cecum causing small bowel obstruction and 2 liver masses measuring up to 10.6 cm in the right lobe and 4.9cm in the left lobe concerning for metastatic disease.  NG tube placed in the ED on 10/23.  PICC line placed 10/24  Status post laparoscopic peritoneal biopsy, open right colectomy 10/24/2023.  Biopsy shows moderately differentiated adenocarcinoma, invasive into the pericolonic soft tissue age of 31 examined lymph node positive for metastatic carcinoma.  Margins negative, uterine biopsy tissue positive for metastatic adenocarcinoma,  On TPN for nutrition through PICC line.  PCA discontinued 10/26.  IV/p.o. as needed pain meds.  Encourage ambulation.    IV/p.o. as needed antiemetics.  Clamping trial on 10/31/2023  Plan  - low fiber diet  - to medical respite bed when tolerating diet and off TPN, Friday possible if ok from surgical standpoint  - noted oncological plan to follow-up in clinic:   Reviewed plan would be for indefinite chemotherapy w/ FOLFOX to start; NGS testing will guide choice of 3rd agent.  Requested surgery consult at  for liver directed therapies (likely to follow chemotherapy).  Formal pathology report pending; will need Mehta / microsatellite testing.  Mental health and unstable social situation  "represent major barriers to ongoing care; appreciate ongoing support.  Continue Venofer 300 mg x 3 days; anticipate till \"take effect\" in 1-2 weeks.  Messaging sent to our clinic schedulers to aid in arranging follow-up.      Severe iron deficiency anemia secondary to blood loss from cecal mass  Status post 4 units of blood transfusion.  At the time of admission hemoglobin of 5.9, MCV 58, platelet count of 473; alk phos 135  --Suspect some degree of subacute to chronic blood loss given he is presently hemodynamically stable and denies any recent melanotic stools or BRBPR. Documented EBL 50 mL.  -- Received total 3 units of blood transfusion on 10/23 to 10/24.  And 1 unit of PRBC on 10/25.  Iron saturation index of 2.  3 doses of IV Venofer.  Hemoglobin stable and improved to 7.9 at this time    Acute anxiety: Improved  Possible underlying depression  Patient having anxiety, flat affect.  As needed Ativan   Psychiatry evaluated,  recommended sertraline.  Patient declined.  Stable at this time    Social stressors.  Patient reported works as a / concerned with living situation/finances..  Care coordinator / assisting with support requested.    Orders Placed This Encounter      Low Fiber Diet      DVT Prophylaxis: SCDs, postprocedure pharmacological DVT prophylaxis per surgical team.  Code Status: Full Code  Disposition: Expected discharge once have bowel function, NG tube out and tolerate orally      Sudhir Bee, DO        Interval History:        Tolerating diet and stable         Physical Exam:        Physical Exam   Temp:  [98  F (36.7  C)-98.5  F (36.9  C)] 98.2  F (36.8  C)  Pulse:  [72-83] 72  Resp:  [16] 16  BP: (115-123)/(58-69) 123/69  SpO2:  [95 %-96 %] 95 %      Admission Weight: 68 kg (150 lb)  Current Weight: 68 kg (150 lb)    PHYSICAL EXAM  GENERAL: Awake, alert, standing in his room in no acute distress  HEENT: Unremarkable  HEART: Regular rate and rhythm. S1S2. No " murmurs  LUNGS: Diminished air entry bilaterally.  Respirations unlabored  ABDOMEN soft,, sluggish bowel sounds, mild tenderness, no organomegaly binder in place  NEURO: No focal deficit  EXTREMITIES: No pedal edema.   SKIN: Warm, dry. No rash  PSYCHIATRY Cooperative       Medications:         enoxaparin ANTICOAGULANT  40 mg Subcutaneous Q24H    insulin aspart  1-4 Units Subcutaneous Q4H    lidocaine  2 patch Transdermal Q24H    sodium chloride (PF)  10-40 mL Intracatheter Q7 Days    sodium chloride (PF)  3 mL Intracatheter Q8H     acetaminophen, sore throat, benzonatate, dextrose, glucose **OR** dextrose **OR** glucagon, diphenhydrAMINE, fexofenadine, HYDROmorphone, lidocaine 4%, LORazepam, melatonin, naloxone **OR** naloxone **OR** naloxone **OR** naloxone, ondansetron **OR** ondansetron, oxyCODONE, phenol MT, prochlorperazine **OR** prochlorperazine **OR** prochlorperazine, sodium chloride (PF), sodium chloride (PF), sodium chloride (PF)         Data:      All new lab and imaging data was reviewed.

## 2023-11-03 VITALS
OXYGEN SATURATION: 91 % | HEIGHT: 73 IN | SYSTOLIC BLOOD PRESSURE: 116 MMHG | WEIGHT: 146.83 LBS | RESPIRATION RATE: 17 BRPM | BODY MASS INDEX: 19.46 KG/M2 | TEMPERATURE: 97.7 F | DIASTOLIC BLOOD PRESSURE: 63 MMHG | HEART RATE: 80 BPM

## 2023-11-03 LAB
ANION GAP SERPL CALCULATED.3IONS-SCNC: 11 MMOL/L (ref 7–15)
BUN SERPL-MCNC: 18.3 MG/DL (ref 6–20)
CALCIUM SERPL-MCNC: 8.3 MG/DL (ref 8.6–10)
CHLORIDE SERPL-SCNC: 99 MMOL/L (ref 98–107)
CREAT SERPL-MCNC: 0.52 MG/DL (ref 0.67–1.17)
DEPRECATED HCO3 PLAS-SCNC: 23 MMOL/L (ref 22–29)
EGFRCR SERPLBLD CKD-EPI 2021: >90 ML/MIN/1.73M2
GLUCOSE BLDC GLUCOMTR-MCNC: 79 MG/DL (ref 70–99)
GLUCOSE BLDC GLUCOMTR-MCNC: 89 MG/DL (ref 70–99)
GLUCOSE BLDC GLUCOMTR-MCNC: 89 MG/DL (ref 70–99)
GLUCOSE BLDC GLUCOMTR-MCNC: 93 MG/DL (ref 70–99)
GLUCOSE SERPL-MCNC: 85 MG/DL (ref 70–99)
MAGNESIUM SERPL-MCNC: 2.1 MG/DL (ref 1.7–2.3)
PHOSPHATE SERPL-MCNC: 3.7 MG/DL (ref 2.5–4.5)
POTASSIUM SERPL-SCNC: 4.4 MMOL/L (ref 3.4–5.3)
SODIUM SERPL-SCNC: 133 MMOL/L (ref 135–145)
T4 FREE SERPL-MCNC: 1.12 NG/DL (ref 0.9–1.7)
TSH SERPL DL<=0.005 MIU/L-ACNC: 4.3 UIU/ML (ref 0.3–4.2)

## 2023-11-03 PROCEDURE — 250N000013 HC RX MED GY IP 250 OP 250 PS 637: Performed by: HOSPITALIST

## 2023-11-03 PROCEDURE — 84443 ASSAY THYROID STIM HORMONE: CPT | Performed by: HOSPITALIST

## 2023-11-03 PROCEDURE — 80048 BASIC METABOLIC PNL TOTAL CA: CPT | Performed by: HOSPITALIST

## 2023-11-03 PROCEDURE — 83735 ASSAY OF MAGNESIUM: CPT | Performed by: COLON & RECTAL SURGERY

## 2023-11-03 PROCEDURE — 999N000040 HC STATISTIC CONSULT NO CHARGE VASC ACCESS

## 2023-11-03 PROCEDURE — 84100 ASSAY OF PHOSPHORUS: CPT | Performed by: COLON & RECTAL SURGERY

## 2023-11-03 PROCEDURE — 250N000011 HC RX IP 250 OP 636: Mod: JZ | Performed by: COLON & RECTAL SURGERY

## 2023-11-03 PROCEDURE — 84439 ASSAY OF FREE THYROXINE: CPT | Performed by: HOSPITALIST

## 2023-11-03 PROCEDURE — 99239 HOSP IP/OBS DSCHRG MGMT >30: CPT | Performed by: HOSPITALIST

## 2023-11-03 RX ORDER — ENOXAPARIN SODIUM 100 MG/ML
40 INJECTION SUBCUTANEOUS EVERY 24 HOURS
Qty: 12 ML | Refills: 0 | Status: SHIPPED | OUTPATIENT
Start: 2023-11-03 | End: 2023-11-03

## 2023-11-03 RX ORDER — OXYCODONE HYDROCHLORIDE 5 MG/1
5-10 TABLET ORAL EVERY 4 HOURS PRN
Qty: 10 TABLET | Refills: 0 | Status: SHIPPED | OUTPATIENT
Start: 2023-11-03 | End: 2023-11-27

## 2023-11-03 RX ORDER — ENOXAPARIN SODIUM 100 MG/ML
40 INJECTION SUBCUTANEOUS EVERY 24 HOURS
Qty: 7.2 ML | Refills: 0 | Status: SHIPPED | OUTPATIENT
Start: 2023-11-03 | End: 2023-11-21

## 2023-11-03 RX ORDER — ACETAMINOPHEN 325 MG/1
650 TABLET ORAL EVERY 4 HOURS PRN
Qty: 100 TABLET | Refills: 0 | Status: SHIPPED | OUTPATIENT
Start: 2023-11-03 | End: 2024-03-25

## 2023-11-03 RX ORDER — FEXOFENADINE HCL 60 MG/1
60 TABLET, FILM COATED ORAL DAILY PRN
Qty: 30 TABLET | Refills: 0 | Status: SHIPPED | OUTPATIENT
Start: 2023-11-03 | End: 2024-01-15

## 2023-11-03 RX ADMIN — ENOXAPARIN SODIUM 40 MG: 40 INJECTION SUBCUTANEOUS at 14:42

## 2023-11-03 RX ADMIN — FEXOFENADINE HYDROCHLORIDE 60 MG: 60 TABLET ORAL at 08:59

## 2023-11-03 RX ADMIN — ACETAMINOPHEN 650 MG: 325 TABLET, FILM COATED ORAL at 08:58

## 2023-11-03 ASSESSMENT — ACTIVITIES OF DAILY LIVING (ADL)
ADLS_ACUITY_SCORE: 22

## 2023-11-03 NOTE — PLAN OF CARE
Alert and oriented x4. VSS on RA. Denies chest pain and SOB. Midline staples removed and applied steri-strips by surgery team. Lap sites CDI. +BS. Passing gas. Denies nausea. Tolerating low fiber. Independent. Using IS. PICC line removed by IV team. Instruction provided on Lovenox injection. Patient was able to self administer Lovenox at 1430. Verbalized and demonstrated understanding.     Patient discharged at 1640. Drives self to [facility] per unit CC, and hospitalist aware.  Discharge instructions reviewed and questions answered. Sent all personal belongings and medications with patient.

## 2023-11-03 NOTE — PROGRESS NOTES
Colon and Rectal Surgery Progress Note          Assessment and Plan:     POD #10 open right colectomy    OK to discharge today  No shower for 24 hours, but ok to shower after that  Steri strips will fall off on their own or remove in 2 weeks  Activity and diet as tolerated, no restrictions.  I encouraged high protein diet.  Follow up with Oncology  Follow up with us on Dec 20th at 9:40.  Call [phone] for questions  Lovenox for 18 more days      Jose Moe MD FACS FASCRS  Colorectal Surgeon       [facility]  [address], [address]  [place] [address]  T: [phone]  F: [phone]  Pager: [phone]  [email or web address]                 Interval History:     Doing well.  Tolerating diet              Physical Exam:   Vitals were reviewed  Patient Vitals for the past 24 hrs:   BP Temp Temp src Pulse Resp SpO2 Weight   11/03/23 0752 116/63 97.7  F (36.5  C) Oral 80 17 91 % --   11/03/23 0600 -- -- -- -- -- -- 66.6 kg (146 lb 13.2 oz)   11/03/23 0235 103/52 97.9  F (36.6  C) Oral 62 16 95 % --   11/02/23 1551 113/61 98.9  F (37.2  C) Oral 76 18 94 % --         Intake/Output Summary (Last 24 hours) at 11/3/2023 1006  Last data filed at 11/2/2023 2200  Gross per 24 hour   Intake --   Output 400 ml   Net -400 ml       Head - Nomocephalic atraumatic  Sclera anicteric  Extremities warm and well perfused  Lungs - breathing non labored,   Abdomen - soft, flat, staples removed, steri strips applied  Rectal exam - deferred  Skin - no rash  Psych - affect appropriate  Neuro - no focal deficits           Data:   All laboratory and imaging data in the past 24 hours reviewed    CBC  Lab Results   Component Value Date    WBC 10.3 10/29/2023    WBC 9.1 10/28/2023    WBC 9.1 10/26/2023    HGB 7.9 (L) 10/29/2023    HGB 7.5 (L) 10/28/2023    HGB 7.8 (L) 10/27/2023    HCT 28.0 (L) 10/29/2023    HCT 26.6 (L) 10/28/2023    HCT 27.7 (L) 10/26/2023     11/02/2023     10/30/2023      10/29/2023       [facility]  Recent Labs   Lab Test 11/03/23  0704 11/03/23  0659 11/02/23  1025 11/02/23  0607 11/01/23  0816 11/01/23  0643   *  --   --  132*  --  130*   POTASSIUM 4.4  --   --   --   --  4.3   CHLORIDE 99  --   --   --   --  94*   CO2 23  --   --   --   --  24   ANIONGAP 11  --   --   --   --  12   GLC 85 89   < > 114*   < > 115*   BUN 18.3  --   --   --   --  16.3   CR 0.52*  --   --   --   --  0.49*   NAT 8.3*  --   --   --   --  8.7    < > = values in this interval not displayed.       Liver Function Studies -   Recent Labs   Lab Test 10/30/23  0645   PROTTOTAL 5.5*   ALBUMIN 2.8*   BILITOTAL 1.0   ALKPHOS 146*   AST 69*   ALT 27       New imaging data reviewed: no    Total time spent in counseling, direct patient care, coordination of care, and review of data 15 min

## 2023-11-03 NOTE — PROGRESS NOTES
Care Management Discharge Note    Discharge Date: 11/03/2023       Discharge Disposition: Homeless    Discharge Services:      Discharge DME:      Discharge Transportation:      Private pay costs discussed: Not applicable    Does the patient's insurance plan have a 3 day qualifying hospital stay waiver?  No    PAS Confirmation Code:    Patient/family educated on Medicare website which has current facility and service quality ratings:      Education Provided on the Discharge Plan:    Persons Notified of Discharge Plans: Bedside RN, Hospitalist  Patient/Family in Agreement with the Plan: yes    Handoff Referral Completed: No    Additional Information:  Pt will be discharging today and can drive himself to [facility]. Care Management gave him a parking voucher and a gas card.   Writer also set up follow up appointments and also gave the information to the Pt as well.     CM and SW were able to get the Pt a parking voucher and $100 is gas cards. Also gave Pt the address to [facility].    Pt will also have a 30 day supply of all his medications sent with him. Pt is currently applying for MA and once he is approved the Pharmacy will re-bill him and MA can back date to pay for the medication for the Pt.         Brian Verduzco, RN, BSN, Care Coordinator

## 2023-11-03 NOTE — PLAN OF CARE
Goal Outcome Evaluation:    Surgery/POD#: POD 9 from a R colectomy   Orientation: A&Ox4, flat affect.  ABNL VS/O2: VSS on RA.  ABNL Labs: Na 132; BG  128,78.  Pain Management: denies  Bowel/Bladder: Continent   Lines/Drains: PICC WDL; LR running @ 15mL. TPN was finished tonight.  Diet: Low Fiber, tolerating  Activity Level: Independent  Tests/Procedures: None  Anticipated  DC Date: 11/2/23, ASHELY setting up ride for 12:30pm to [facility].

## 2023-11-03 NOTE — PLAN OF CARE
Goal Outcome Evaluation:       [id number]  Summary: POD#10 , open right colectomy  Orientation/Cognitive: A&OX4/flat affect  Mobility Level: independent  Pain Management: denies  Tele/VS/O2: VSS@RA  Diet: low fiber  Bowel/Bladder: continent, voiding in BR  Skin: midline abd incision, abd binder  Drains/Devices: PICC R arm, LR@15 infusing  Other info: BG checks 93, 89  Discharge date/time: pending today  See flow sheet for assessments-

## 2023-11-03 NOTE — DISCHARGE SUMMARY
[facility]  Hospitalist Discharge Summary      Date of Admission:  10/23/2023  Date of Discharge:  11/3/2023  Discharging Provider: Sudhir Bee DO  Discharge Service: Hospitalist Service    Discharge Diagnoses     SBO by cecal obstruction from metatstatic colon adenocarcinoma with liver mets  Status post laparoscopic peritoneal biopsy, open right colectomy 10/24/2023.    Clinically Significant Risk Factors     # Severe Malnutrition: based on nutrition assessment      Follow-ups Needed After Discharge   Follow-up Appointments     Follow-up and recommended labs and tests       Colorectal Follow up appointment:  Follow up appointment for Pt in 6 weeks with  (In-Person)  Wednesday December 20th at 9:40 AM.    Sue  [address]  [place] [address]    P  [phone]  F  [phone]        PLEASE CALL OFFICE PRIOR TO GOING TO THE APPOINTMENT. THEY WILL BE IN THE   PROCESS OF MOVING.        Follow-up and recommended labs and tests       Follow up with primary care provider, Physician No Ref-Primary, within 7   days to evaluate medication change and for hospital follow- up.  Repeat   bmp and cbc in 1 week            Unresulted Labs Ordered in the Past 30 Days of this Admission       Date and Time Order Name Status Description    11/3/2023 10:04 AM T4 free In process     10/23/2023  4:57 PM Prepare red blood cells (unit) Preliminary         These results will be followed up by oncology    Discharge Disposition   Discharged to home  Condition at discharge: Stable    Hospital Course   SBO by cecal obstruction from metatstatic colon adenocarcinoma with liver mets  Status post laparoscopic peritoneal biopsy, open right colectomy 10/24/2023.  At the time of admission frail-appearing, afebrile hemodynamically stable.    LFTs, electrolytes, renal function within normal limits.  CT abdomen and pelvis showed a 7cm mass in the cecum causing small bowel obstruction and 2  "liver masses measuring up to 10.6 cm in the right lobe and 4.9cm in the left lobe concerning for metastatic disease.  NG tube placed in the ED on 10/23.  PICC line placed 10/24  Status post laparoscopic peritoneal biopsy, open right colectomy 10/24/2023.  Biopsy shows moderately differentiated adenocarcinoma, invasive into the pericolonic soft tissue age of 31 examined lymph node positive for metastatic carcinoma.  Margins negative, uterine biopsy tissue positive for metastatic adenocarcinoma,  On TPN for nutrition through PICC line.  PCA discontinued 10/26.  IV/p.o. as needed pain meds.  Encourage ambulation.    IV/p.o. as needed antiemetics.  Clamping trial on 10/31/2023  Plan  - low fiber diet  - to medical respite bed on discharge  - noted oncological plan to follow-up in clinic and discussed with them the day of discharge. The patients phone number was ensured was accurate. He does have a car and will be able to drive to the appointments  Reviewed plan would be for indefinite chemotherapy w/ FOLFOX to start; NGS testing will guide choice of 3rd agent.  Requested surgery consult at  for liver directed therapies (likely to follow chemotherapy).  Formal pathology report pending; will need Mehta / microsatellite testing.  Mental health and unstable social situation represent major barriers to ongoing care; appreciate ongoing support.  Continue Venofer 300 mg x 3 days; anticipate till \"take effect\" in 1-2 weeks.  Messaging sent to our clinic schedulers to aid in arranging follow-up.        Severe iron deficiency anemia secondary to blood loss from cecal mass  Status post 4 units of blood transfusion.  At the time of admission hemoglobin of 5.9, MCV 58, platelet count of 473; alk phos 135  --Suspect some degree of subacute to chronic blood loss given he is presently hemodynamically stable and denies any recent melanotic stools or BRBPR. Documented EBL 50 mL.  -- Received total 3 units of blood transfusion on 10/23 " to 10/24.  And 1 unit of PRBC on 10/25.  Iron saturation index of 2.  3 doses of IV Venofer.  Hemoglobin stable and improved to 7.9 at this time     Acute anxiety: Improved  Possible underlying depression  Patient having anxiety, flat affect.  As needed Ativan   Psychiatry evaluated,  recommended sertraline.  Patient declined.  Stable at this time  He declined again on discharge     Social stressors.  Patient reported works as a / concerned with living situation/finances..  Care coordinator / assisting with support requested.       Consultations This Hospital Stay   WOUND OSTOMY CONTINENCE NURSE  IP CONSULT  COLORECTAL SURGERY IP CONSULT  HEMATOLOGY & ONCOLOGY IP CONSULT  CARE MANAGEMENT / SOCIAL WORK IP CONSULT  SPIRITUAL HEALTH SERVICES IP CONSULT  VASCULAR ACCESS ADULT IP CONSULT  PHARMACY/NUTRITION TO START AND MANAGE TPN  CARE MANAGEMENT / SOCIAL WORK IP CONSULT  PSYCHIATRY IP CONSULT  PHARMACY IP CONSULT  PHARMACY IP CONSULT  PHARMACY IP CONSULT  PHARMACY IP CONSULT  PSYCHIATRY IP CONSULT  CARE MANAGEMENT / SOCIAL WORK IP CONSULT  VASCULAR ACCESS ADULT IP CONSULT  PHARMACY IP CONSULT  PHARMACY IP CONSULT    Code Status   Full Code    Time Spent on this Encounter   I, Sudhir Bee DO, personally saw the patient today and spent greater than 30 minutes discharging this patient.       Sudhir Bee DO  [address] [address]  Phone: [phone]  Fax: [phone]  ______________________________________________________________________    Physical Exam   Vital Signs: Temp: 97.7  F (36.5  C) Temp src: Oral BP: 116/63 Pulse: 80   Resp: 17 SpO2: 91 % O2 Device: None (Room air)    Weight: 146 lbs 13.22 oz    GENERAL: Awake, alert, standing in his room in no acute distress  HEENT: Unremarkable  HEART: Regular rate and rhythm. S1S2. No murmurs  LUNGS: Diminished air entry bilaterally.  Respirations unlabored  ABDOMEN soft,,  sluggish bowel sounds, mild tenderness, no organomegaly binder in place  NEURO: No focal deficit  EXTREMITIES: No pedal edema.   SKIN: Warm, dry. No rash  PSYCHIATRY Cooperative       Primary Care Physician   Physician No Ref-Primary    Discharge Orders      Follow-up and recommended labs and tests     Colorectal Follow up appointment:  Follow up appointment for Pt in 6 weeks with  (In-Person)  Wednesday December 20th at 9:40 AM.    Sue  [address] [place] [address]. [address]  [place] [address]    P  [phone]  F  [phone]        PLEASE CALL OFFICE PRIOR TO GOING TO THE APPOINTMENT. THEY WILL BE IN THE PROCESS OF MOVING.     Reason for your hospital stay    Colon cancer with obstruction     Follow-up and recommended labs and tests     Follow up with primary care provider, Physician No Ref-Primary, within 7 days to evaluate medication change and for hospital follow- up.  Repeat bmp and cbc in 1 week     Activity    Your activity upon discharge: activity as tolerated     Diet    Follow this diet upon discharge: Orders Placed This Encounter      Snacks/Supplements Adult: Michelle Obdulia Standard Oral Supplement; Between Meals      Low Fiber Diet       Significant Results and Procedures   Most Recent 3 CBC's:  Recent Labs   Lab Test 11/02/23  0607 10/30/23  0645 10/29/23  0609 10/28/23  0557 10/27/23  0632 10/26/23  0633   WBC  --   --  10.3 9.1  --  9.1   HGB  --   --  7.9* 7.5* 7.8* 7.8*   MCV  --   --  68* 67*  --  67*    281 293 278 296 308     Most Recent 3 BMP's:  Recent Labs   Lab Test 11/03/23  1003 11/03/23  0704 11/03/23  0659 11/02/23  1025 11/02/23  0607 11/01/23  0816 11/01/23  0643 10/31/23  1112 10/31/23  1008   NA  --  133*  --   --  132*  --  130*  --  131*   POTASSIUM  --  4.4  --   --   --   --  4.3  --  4.4   CHLORIDE  --  99  --   --   --   --  94*  --  97*   CO2  --  23  --   --   --   --  24  --  26   BUN  --  18.3  --   --   --   --  16.3  --  18.0   CR  --  0.52*  --   --   --   --   0.49*  --  0.49*   ANIONGAP  --  11  --   --   --   --  12  --  8   NAT  --  8.3*  --   --   --   --  8.7  --  8.3*   GLC 79 85 89   < > 114*   < > 115*   < > 104*    < > = values in this interval not displayed.     Most Recent 2 LFT's:  Recent Labs   Lab Test 10/30/23  0645 10/27/23  0632   AST 69* 36   ALT 27 10   ALKPHOS 146* 88   BILITOTAL 1.0 0.5   ,   Results for orders placed or performed during the hospital encounter of 10/23/23   CT Abdomen Pelvis w Contrast    Narrative    CT ABDOMEN PELVIS WITH CONTRAST 10/23/2023 11:57 AM    CLINICAL HISTORY: Abdominal pain, distension, rule out small-bowel  obstruction      TECHNIQUE: CT scan of the abdomen and pelvis was performed following  injection of IV contrast. Multiplanar reformats were obtained. Dose  reduction techniques were used.  CONTRAST: 75mL Isovue-370    COMPARISON: None.    FINDINGS:   LOWER CHEST: No infiltrates or effusions.    HEPATOBILIARY: 10.6 cm mass in the right lobe of the liver. 4.9 cm  mass in the left lobe of the liver.    PANCREAS: No significant mass, duct dilatation, or inflammatory  change.    SPLEEN: Hypodensity in the spleen may be related to an infarct or  lesion. Probable cyst posteriorly in the spleen.     ADRENAL GLANDS: No significant nodules.    KIDNEYS/BLADDER: No significant mass, stones, or hydronephrosis. There  are simple or benign cysts. No follow up is needed.    BOWEL: Small bowel obstruction secondary to a cecal mass causing  obstruction at the ileocecal junction.    PELVIC ORGANS: No pelvic masses.    ADDITIONAL FINDINGS: Small amount of ascites in the pelvis and around  the liver.    MUSCULOSKELETAL: No frankly destructive bony lesions.      Impression    IMPRESSION:   1.  Mass in the cecum causing small bowel obstruction. This measures  up to 7 cm.  2.  Liver masses measuring up to 10.6 cm concerning for metastatic  disease.    MEAGHAN HATHAWAY MD         SYSTEM ID:  [id number]   XR Abdomen Port 1 View    Narrative     ABDOMEN ONE VIEW PORTABLE  10/23/2023 2:52 PM     HISTORY: Post NG placement.    COMPARISON: None.       Impression    IMPRESSION: The nasogastric tube is not seen. Multiple dilated loops  of bowel evident.    MEAGHAN HATHAWAY MD         SYSTEM ID:  [id number]   XR Abdomen Port 1 View    Narrative    ABDOMEN ONE VIEW PORTABLE  10/23/2023 4:07 PM     HISTORY: NGT    COMPARISON: Abdominal radiograph 10/23/2023.       Impression    IMPRESSION: Limited AP view demonstrates the gastric drainage tube tip  and side-port projecting over the proximal stomach. Similar upper  abdominal dilated small bowel loops.     ORIANA TRUJILLO MD         SYSTEM ID:  [id number]   CT Chest w/o Contrast    Narrative    CT CHEST WITHOUT CONTRAST 10/25/2023 9:09 AM     HISTORY: Staging.    COMPARISON: None.    TECHNIQUE: Volumetric helical acquisition of CT images of the chest  from the clavicles to the kidneys were acquired without IV contrast.  Radiation dose for this scan was reduced using automated exposure  control, adjustment of the mA and/or kV according to patient size, or  iterative reconstruction technique.    FINDINGS:     LUNGS AND PLEURA: Minimal pleural fluid bilaterally right worse than  left. No pulmonary masses.    MEDIASTINUM/AXILLAE: No adenopathy demonstrated in the absence of  contrast.    CORONARY ARTERY CALCIFICATIONS: Moderate.    UPPER ABDOMEN: Free air compatible with recent surgery.    MUSCULOSKELETAL: No frankly destructive bony lesions.      Impression    IMPRESSION:  1.  Minimal pleural fluid bilaterally right worse than left.  2.  Otherwise no convincing metastatic disease demonstrated in the  chest.    MEAGHAN HATHAWAY MD         SYSTEM ID:  [id number]   XR Chest 1 View    Narrative    EXAM: XR CHEST 1 VIEW  LOCATION: [facility]  DATE: 10/29/2023    INDICATION: Evaluate for position of the NGT and for lung infiltrates to suggest pneumonia.  COMPARISON: None.      Impression    IMPRESSION:  Enteric tube tip below the margin of the study. Free air under the diaphragm compatible with recent surgery. No infiltrates.     XR Abdomen 1 View    Narrative    EXAM: XR ABDOMEN 1 VIEW  LOCATION: [facility]  DATE: 10/29/2023    INDICATION: Evaluate position of the NGT.  COMPARISON: 10/23/2023.      Impression    IMPRESSION: Enteric tube tip in the expected location of the body of the stomach.         Discharge Medications   Current Discharge Medication List        START taking these medications    Details   acetaminophen (TYLENOL) 325 MG tablet Take 2 tablets (650 mg) by mouth every 4 hours as needed for mild pain or fever  Qty: 100 tablet, Refills: 0    Associated Diagnoses: Colon adenocarcinoma (H)      enoxaparin ANTICOAGULANT (LOVENOX) 40 MG/0.4ML syringe Inject 0.4 mLs (40 mg) Subcutaneous every 24 hours for 18 days  Qty: 7.2 mL, Refills: 0    Associated Diagnoses: Colon adenocarcinoma (H)      fexofenadine (ALLEGRA) 60 MG tablet Take 1 tablet (60 mg) by mouth daily as needed for allergies  Qty: 30 tablet, Refills: 0    Associated Diagnoses: Colon adenocarcinoma (H)      oxyCODONE (ROXICODONE) 5 MG tablet Take 1-2 tablets (5-10 mg) by mouth every 4 hours as needed for moderate pain  Qty: 10 tablet, Refills: 0    Comments: Do not take and operate a motor vehicle or heavy machinery  Associated Diagnoses: Colon adenocarcinoma (H)           Allergies   No Known Allergies

## 2023-11-03 NOTE — PROGRESS NOTES
Care Management Follow Up    Length of Stay (days): 11    Expected Discharge Date: 11/03/2023     Concerns to be Addressed: adjustment to diagnosis/illness, basic needs, coping/stress, discharge planning, financial/insurance, homelessness, mental health     Patient plan of care discussed at interdisciplinary rounds: Yes    Anticipated Discharge Disposition: Homeless     Anticipated Discharge Services:    Anticipated Discharge DME:      Patient/family educated on Medicare website which has current facility and service quality ratings:    Education Provided on the Discharge Plan:    Patient/Family in Agreement with the Plan: yes    Referrals Placed by CM/SW: Financial Services  Private pay costs discussed: Not applicable    Additional Information:  Set up follow up appointment for Pt in 6 weeks with  (In-Person)  Wednesday December 20th at 9:40 AM.  Office Phone- [phone]    Writer also called the Oncology office to schedule follow up appointments for the Pt and they stated that the Pt HAD an appointment set up for Nov 13th @1PM with  and the Pt had cancelled it and didn't want it.   Writer then talked to the Pt and asked if he would like follow up appointments for Oncology and the Pt stated that he does and that he will follow up on his own time. Writer will attach the Oncology phone number to the Pt's discharge paperwork.       Brian Verduzco, RN, BSN, Care Coordinator

## 2023-11-14 ENCOUNTER — DOCUMENTATION ONLY (OUTPATIENT)
Dept: CARE COORDINATION | Facility: CLINIC | Age: 58
End: 2023-11-14

## 2023-11-14 NOTE — PROGRESS NOTES
Met with Brady in nursing office. Introduced self and nursing role. Flat affect. Temp.97.6, /75. Weight 155.9lbs. He is pleased to have gained weight. States hospital discharge weight 149lbs. He inquired about diet. States he is eating low fiber yet finds it somewhat difficult with food choices here in shelter with food allergies/intolerances. Neck upper back pain. Surgical incision site clean, dry, intact with surgical glue. No s/s of surgical site infection. He is managing his own meds. Having some difficulties with sleep at night. Taking oxycodone to help with sleep. Allergies are acting up.  Provided information on upcoming appts with oncology and GI. Provided oncology social work contact information.Will notify case management for non-medical needs. [facility] phone number [phone]

## 2023-11-15 ENCOUNTER — PATIENT OUTREACH (OUTPATIENT)
Dept: CARE COORDINATION | Facility: CLINIC | Age: 58
End: 2023-11-15
Payer: MEDICAID

## 2023-11-15 ENCOUNTER — ONCOLOGY VISIT (OUTPATIENT)
Dept: ONCOLOGY | Facility: CLINIC | Age: 58
End: 2023-11-15
Attending: INTERNAL MEDICINE
Payer: MEDICAID

## 2023-11-15 VITALS
HEART RATE: 75 BPM | WEIGHT: 158.6 LBS | SYSTOLIC BLOOD PRESSURE: 117 MMHG | BODY MASS INDEX: 20.92 KG/M2 | OXYGEN SATURATION: 98 % | RESPIRATION RATE: 16 BRPM | TEMPERATURE: 97.9 F | DIASTOLIC BLOOD PRESSURE: 73 MMHG

## 2023-11-15 DIAGNOSIS — C18.9 COLON CARCINOMA METASTATIC TO LIVER (H): Primary | ICD-10-CM

## 2023-11-15 DIAGNOSIS — C78.7 COLON CARCINOMA METASTATIC TO LIVER (H): Primary | ICD-10-CM

## 2023-11-15 DIAGNOSIS — C18.9 COLON ADENOCARCINOMA (H): ICD-10-CM

## 2023-11-15 LAB
ALBUMIN SERPL BCG-MCNC: 3.5 G/DL (ref 3.5–5.2)
ALP SERPL-CCNC: 267 U/L (ref 40–150)
ALT SERPL W P-5'-P-CCNC: 43 U/L (ref 0–70)
ANION GAP SERPL CALCULATED.3IONS-SCNC: 9 MMOL/L (ref 7–15)
AST SERPL W P-5'-P-CCNC: 62 U/L (ref 0–45)
BILIRUB SERPL-MCNC: 0.2 MG/DL
BUN SERPL-MCNC: 15.3 MG/DL (ref 6–20)
CALCIUM SERPL-MCNC: 8.9 MG/DL (ref 8.6–10)
CHLORIDE SERPL-SCNC: 106 MMOL/L (ref 98–107)
CREAT SERPL-MCNC: 0.48 MG/DL (ref 0.67–1.17)
DEPRECATED HCO3 PLAS-SCNC: 27 MMOL/L (ref 22–29)
EGFRCR SERPLBLD CKD-EPI 2021: >90 ML/MIN/1.73M2
ERYTHROCYTE [DISTWIDTH] IN BLOOD BY AUTOMATED COUNT: ABNORMAL %
FERRITIN SERPL-MCNC: 165 NG/ML (ref 31–409)
GLUCOSE SERPL-MCNC: 90 MG/DL (ref 70–99)
HCT VFR BLD AUTO: 34.5 % (ref 40–53)
HGB BLD-MCNC: 9.7 G/DL (ref 13.3–17.7)
IRON BINDING CAPACITY (ROCHE): 279 UG/DL (ref 240–430)
IRON SATN MFR SERPL: 9 % (ref 15–46)
IRON SERPL-MCNC: 26 UG/DL (ref 61–157)
MCH RBC QN AUTO: 21.4 PG (ref 26.5–33)
MCHC RBC AUTO-ENTMCNC: 28.1 G/DL (ref 31.5–36.5)
MCV RBC AUTO: 76 FL (ref 78–100)
PLATELET # BLD AUTO: 853 10E3/UL (ref 150–450)
POTASSIUM SERPL-SCNC: 4.4 MMOL/L (ref 3.4–5.3)
PROT SERPL-MCNC: 6.8 G/DL (ref 6.4–8.3)
RBC # BLD AUTO: 4.54 10E6/UL (ref 4.4–5.9)
SODIUM SERPL-SCNC: 142 MMOL/L (ref 135–145)
WBC # BLD AUTO: 9 10E3/UL (ref 4–11)

## 2023-11-15 PROCEDURE — G0463 HOSPITAL OUTPT CLINIC VISIT: HCPCS | Performed by: INTERNAL MEDICINE

## 2023-11-15 PROCEDURE — 83550 IRON BINDING TEST: CPT | Performed by: INTERNAL MEDICINE

## 2023-11-15 PROCEDURE — 82728 ASSAY OF FERRITIN: CPT | Performed by: INTERNAL MEDICINE

## 2023-11-15 PROCEDURE — 85027 COMPLETE CBC AUTOMATED: CPT | Performed by: INTERNAL MEDICINE

## 2023-11-15 PROCEDURE — 99205 OFFICE O/P NEW HI 60 MIN: CPT | Performed by: INTERNAL MEDICINE

## 2023-11-15 PROCEDURE — 36415 COLL VENOUS BLD VENIPUNCTURE: CPT | Performed by: INTERNAL MEDICINE

## 2023-11-15 PROCEDURE — 99213 OFFICE O/P EST LOW 20 MIN: CPT | Performed by: INTERNAL MEDICINE

## 2023-11-15 PROCEDURE — 80053 COMPREHEN METABOLIC PANEL: CPT | Performed by: INTERNAL MEDICINE

## 2023-11-15 ASSESSMENT — PAIN SCALES - GENERAL: PAINLEVEL: NO PAIN (1)

## 2023-11-15 NOTE — LETTER
"    11/15/2023         RE: Brady Lenz  [address] [address]  [facility] [address]      Oncology Rooming Note    November 15, 2023 3:11 PM   Brady Lenz is a 58 year old male who presents for:    Chief Complaint   Patient presents with     Oncology Clinic Visit     Initial Vitals: /73   Pulse 75   Temp 97.9  F (36.6  C) (Oral)   Resp 16   Wt 71.9 kg (158 lb 9.6 oz)   SpO2 98%   BMI 20.92 kg/m   Estimated body mass index is 20.92 kg/m  as calculated from the following:    Height as of 10/24/23: 1.854 m (6' 1\").    Weight as of this encounter: 71.9 kg (158 lb 9.6 oz). Body surface area is 1.92 meters squared.  No Pain (1) Comment: Data Unavailable   No LMP for male patient.  Allergies reviewed: Yes  Medications reviewed: Yes    Medications: Medication refills not needed today.  Pharmacy name entered into EPIC: Data Unavailable    Clinical concerns: no       Shari J. Schoenberger, [facility]              ONCOLOGY HISTORY: Mr. Lenz is a gentleman with metastatic right colon cancer.  MMR intact.  HER2/rowan negative (score 1+).  -NGS panel: A pathogenic KRAS mutation was identified: KRAS G12D   1.  He was brought to emergency room on 10/23/2023 with abdominal pain and distention.  Multiple investigations done.  -Hemoglobin of 5.9 with MCV of 58.  -Iron of 6 with saturation index of 2%.  -CMP reveals minor abnormalities.  -CEA of 6.7.  -CT abdomen and pelvis revealed mass in the cecum causing small bowel obstruction.  Also revealed 10.6 cm mass in right lobe of the liver and 4.9 cm mass in the left lobe of the liver.    2.  On 10/24/2023, he had open right colectomy and laparoscopic peritoneal biopsy.  There was large mass in the cecum.  There were 2 large liver metastasis.  There was a small implant on the peritoneum in left upper quadrant over the spleen.  There was no evidence of carcinomatosis.  -Peritoneal biopsy is positive for metastatic adenocarcinoma.  -Colectomy specimen reveals moderately differentiated " adenocarcinoma measuring 6.8 cm.  8 of 29 lymph nodes positive.  pT3 pN2b.  MMR intact.  HER2/rowan negative (score 1+).    3.  CT chest on 10/25/2023 does not reveal any evidence of malignancy.    Subjective:  Mr. Lenz is a 58-year-old gentleman with newly diagnosed metastatic colon cancer with liver metastasis.  Patient had right colectomy done.  He is recovering well from surgery.    Patient says that he has been having bowel problem for 2 to 3 years.  He was having abdominal pain and distention.  No blood in the stool.  Appetite was decreasing.  He had not felt any lump.    No headache.  No dizziness.  He has some generalized weakness. No chest pain.  No shortness of breath.  No bleeding.  All other review of system is negative.    Exam:  He is alert oriented x3 not in distress  Vitals: Reviewed.    System not examined.    Labs: Reviewed.    Assessment:  1.  A 58-year-old gentleman with metastatic colon cancer.  MMR intact.  HER2/rowan negative.  KRAS mutated.  2.  Iron deficiency anemia from colon cancer.  3.  Thrombocytosis, reactive.    Plan:  Start xeloda, oxaliplatin and avastin when he agreeable.  Port placement.  Labs today.   See me or BEBETO when he comes to start chemotherapy.  PET scan.    Discussion:  1.  I had a long discussion with the patient.  Labs, imaging studies and surgical pathology was reviewed in detail.  Explained to him that he has metastatic colon cancer with liver metastasis.    Discussed regarding further imaging studies.  We will get a PET scan to make sure there is no extrahepatic metastasis.    2.  Discussed regarding treatment.  He had hemicolectomy done.  Explained to the patient he needs a chemotherapy.  Chemotherapy will be palliative.  It will help to control disease, prolong life and palliate symptoms.    We discussed regarding different options.  After discussion, I will favor Xeloda, oxaliplatin and Avastin.  Regimen was discussed.  Side effects reviewed.    Patient wants to  think about chemotherapy.  He will let us know of his decision.    Explained to the patient that if there is significant response to chemotherapy, we will have him evaluated by hepatobiliary surgeon regarding liver metastasis.    3.  We will get a port placed.    4.  Labs done today reveals improvement in anemia.  Hemoglobin is 9.7.  Platelet elevated which is reactive.  Will monitor CBC.    5.  We will see him when he comes to start chemotherapy.  He had few questions all answered.   is also going to meet with him today.    Total visit time of 45 minutes.  Time spent in today's visit, review of chart/investigations today, discussing regarding chemotherapy and documentation today.              Maryann Limon MD

## 2023-11-15 NOTE — LETTER
"    11/15/2023         RE: Brady Lenz  [address] [address]  [facility] [address]        Dear Colleague,    Thank you for referring your patient, Brady Lenz, to the [facility]. Please see a copy of my visit note below.    Oncology Rooming Note    November 15, 2023 3:11 PM   Brady Lenz is a 58 year old male who presents for:    Chief Complaint   Patient presents with     Oncology Clinic Visit     Initial Vitals: /73   Pulse 75   Temp 97.9  F (36.6  C) (Oral)   Resp 16   Wt 71.9 kg (158 lb 9.6 oz)   SpO2 98%   BMI 20.92 kg/m   Estimated body mass index is 20.92 kg/m  as calculated from the following:    Height as of 10/24/23: 1.854 m (6' 1\").    Weight as of this encounter: 71.9 kg (158 lb 9.6 oz). Body surface area is 1.92 meters squared.  No Pain (1) Comment: Data Unavailable   No LMP for male patient.  Allergies reviewed: Yes  Medications reviewed: Yes    Medications: Medication refills not needed today.  Pharmacy name entered into EPIC: Data Unavailable    Clinical concerns: no       Shari J. Schoenberger, [facility]              ONCOLOGY HISTORY: Mr. Lenz is a gentleman with metastatic right colon cancer.  MMR intact.  HER2/rowan negative (score 1+).  -NGS panel: A pathogenic KRAS mutation was identified: KRAS G12D   1.  He was brought to emergency room on 10/23/2023 with abdominal pain and distention.  Multiple investigations done.  -Hemoglobin of 5.9 with MCV of 58.  -Iron of 6 with saturation index of 2%.  -CMP reveals minor abnormalities.  -CEA of 6.7.  -CT abdomen and pelvis revealed mass in the cecum causing small bowel obstruction.  Also revealed 10.6 cm mass in right lobe of the liver and 4.9 cm mass in the left lobe of the liver.    2.  On 10/24/2023, he had open right colectomy and laparoscopic peritoneal biopsy.  There was large mass in the cecum.  There were 2 large liver metastasis.  There was a small implant on the peritoneum in left upper quadrant over the spleen.  " There was no evidence of carcinomatosis.  -Peritoneal biopsy is positive for metastatic adenocarcinoma.  -Colectomy specimen reveals moderately differentiated adenocarcinoma measuring 6.8 cm.  8 of 29 lymph nodes positive.  pT3 pN2b.  MMR intact.  HER2/rowan negative (score 1+).    3.  CT chest on 10/25/2023 does not reveal any evidence of malignancy.    Subjective:  Mr. Lenz is a 58-year-old gentleman with newly diagnosed metastatic colon cancer with liver metastasis.  Patient had right colectomy done.  He is recovering well from surgery.    Patient says that he has been having bowel problem for 2 to 3 years.  He was having abdominal pain and distention.  No blood in the stool.  Appetite was decreasing.  He had not felt any lump.    No headache.  No dizziness.  He has some generalized weakness. No chest pain.  No shortness of breath.  No bleeding.  All other review of system is negative.    Exam:  He is alert oriented x3 not in distress  Vitals: Reviewed.    System not examined.    Labs: Reviewed.    Assessment:  1.  A 58-year-old gentleman with metastatic colon cancer.  MMR intact.  HER2/rowan negative.  KRAS mutated.  2.  Iron deficiency anemia from colon cancer.  3.  Thrombocytosis, reactive.    Plan:  Start xeloda, oxaliplatin and avastin when he agreeable.  Port placement.  Labs today.   See me or BEBETO when he comes to start chemotherapy.  PET scan.    Discussion:  1.  I had a long discussion with the patient.  Labs, imaging studies and surgical pathology was reviewed in detail.  Explained to him that he has metastatic colon cancer with liver metastasis.    Discussed regarding further imaging studies.  We will get a PET scan to make sure there is no extrahepatic metastasis.    2.  Discussed regarding treatment.  He had hemicolectomy done.  Explained to the patient he needs a chemotherapy.  Chemotherapy will be palliative.  It will help to control disease, prolong life and palliate symptoms.    We discussed  regarding different options.  After discussion, I will favor Xeloda, oxaliplatin and Avastin.  Regimen was discussed.  Side effects reviewed.    Patient wants to think about chemotherapy.  He will let us know of his decision.    Explained to the patient that if there is significant response to chemotherapy, we will have him evaluated by hepatobiliary surgeon regarding liver metastasis.    3.  We will get a port placed.    4.  Labs done today reveals improvement in anemia.  Hemoglobin is 9.7.  Platelet elevated which is reactive.  Will monitor CBC.    5.  We will see him when he comes to start chemotherapy.  He had few questions all answered.   is also going to meet with him today.    Total visit time of 45 minutes.  Time spent in today's visit, review of chart/investigations today, discussing regarding chemotherapy and documentation today.            Again, thank you for allowing me to participate in the care of your patient.        Sincerely,        Maryann Limon MD

## 2023-11-15 NOTE — PROGRESS NOTES
ONCOLOGY HISTORY: Mr. Lenz is a gentleman with metastatic right colon cancer.  MMR intact.  HER2/rowan negative (score 1+).  -NGS panel: A pathogenic KRAS mutation was identified: KRAS G12D   1.  He was brought to emergency room on 10/23/2023 with abdominal pain and distention.  Multiple investigations done.  -Hemoglobin of 5.9 with MCV of 58.  -Iron of 6 with saturation index of 2%.  -CMP reveals minor abnormalities.  -CEA of 6.7.  -CT abdomen and pelvis revealed mass in the cecum causing small bowel obstruction.  Also revealed 10.6 cm mass in right lobe of the liver and 4.9 cm mass in the left lobe of the liver.    2.  On 10/24/2023, he had open right colectomy and laparoscopic peritoneal biopsy.  There was large mass in the cecum.  There were 2 large liver metastasis.  There was a small implant on the peritoneum in left upper quadrant over the spleen.  There was no evidence of carcinomatosis.  -Peritoneal biopsy is positive for metastatic adenocarcinoma.  -Colectomy specimen reveals moderately differentiated adenocarcinoma measuring 6.8 cm.  8 of 29 lymph nodes positive.  pT3 pN2b.  MMR intact.  HER2/rowan negative (score 1+).    3.  CT chest on 10/25/2023 does not reveal any evidence of malignancy.    Subjective:  Mr. Lenz is a 58-year-old gentleman with newly diagnosed metastatic colon cancer with liver metastasis.  Patient had right colectomy done.  He is recovering well from surgery.    Patient says that he has been having bowel problem for 2 to 3 years.  He was having abdominal pain and distention.  No blood in the stool.  Appetite was decreasing.  He had not felt any lump.    No headache.  No dizziness.  He has some generalized weakness. No chest pain.  No shortness of breath.  No bleeding.  All other review of system is negative.    Exam:  He is alert oriented x3 not in distress  Vitals: Reviewed.    System not examined.    Labs: Reviewed.    Assessment:  1.  A 58-year-old gentleman with metastatic colon  cancer.  MMR intact.  HER2/rowan negative.  KRAS mutated.  2.  Iron deficiency anemia from colon cancer.  3.  Thrombocytosis, reactive.    Plan:  Start xeloda, oxaliplatin and avastin when he agreeable.  Port placement.  Labs today.   See me or BEBETO when he comes to start chemotherapy.  PET scan.    Discussion:  1.  I had a long discussion with the patient.  Labs, imaging studies and surgical pathology was reviewed in detail.  Explained to him that he has metastatic colon cancer with liver metastasis.    Discussed regarding further imaging studies.  We will get a PET scan to make sure there is no extrahepatic metastasis.    2.  Discussed regarding treatment.  He had hemicolectomy done.  Explained to the patient he needs a chemotherapy.  Chemotherapy will be palliative.  It will help to control disease, prolong life and palliate symptoms.    We discussed regarding different options.  After discussion, I will favor Xeloda, oxaliplatin and Avastin.  Regimen was discussed.  Side effects reviewed.    Patient wants to think about chemotherapy.  He will let us know of his decision.    Explained to the patient that if there is significant response to chemotherapy, we will have him evaluated by hepatobiliary surgeon regarding liver metastasis.    3.  We will get a port placed.    4.  Labs done today reveals improvement in anemia.  Hemoglobin is 9.7.  Platelet elevated which is reactive.  Will monitor CBC.    5.  We will see him when he comes to start chemotherapy.  He had few questions all answered.   is also going to meet with him today.    Total visit time of 45 minutes.  Time spent in today's visit, review of chart/investigations today, discussing regarding chemotherapy and documentation today.
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
## 2023-11-15 NOTE — PROGRESS NOTES
"Oncology Rooming Note    November 15, 2023 3:11 PM   Brady Lenz is a 58 year old male who presents for:    Chief Complaint   Patient presents with    Oncology Clinic Visit     Initial Vitals: /73   Pulse 75   Temp 97.9  F (36.6  C) (Oral)   Resp 16   Wt 71.9 kg (158 lb 9.6 oz)   SpO2 98%   BMI 20.92 kg/m   Estimated body mass index is 20.92 kg/m  as calculated from the following:    Height as of 10/24/23: 1.854 m (6' 1\").    Weight as of this encounter: 71.9 kg (158 lb 9.6 oz). Body surface area is 1.92 meters squared.  No Pain (1) Comment: Data Unavailable   No LMP for male patient.  Allergies reviewed: Yes  Medications reviewed: Yes    Medications: Medication refills not needed today.  Pharmacy name entered into EPIC: Data Unavailable    Clinical concerns: no       Shari J. Schoenberger, CMA            "

## 2023-11-15 NOTE — PATIENT INSTRUCTIONS
Start xeloda, oxaliplatin and avastin when he agreeable.  Port placement.  Labs today. Done sjs  See me or BEBETO when he comes to start chemotherapy.  PET scan.

## 2023-11-16 ENCOUNTER — PATIENT OUTREACH (OUTPATIENT)
Dept: CARE COORDINATION | Facility: CLINIC | Age: 58
End: 2023-11-16
Payer: MEDICAID

## 2023-11-16 NOTE — PROGRESS NOTES
Social Work - Follow-Up  [facility]    Data/Intervention: Brady is a 58-year-old gentleman with a diagnosis of colon cancer with metastatic disease to liver who met today with Dr. Limon to establish care.      Patient Name: Brady Lenz Goes By: Brady    /Age: 1965 (58 year old)    Reason for Follow-Up:  Planned psychosocial outreach    Intervention/Education/Resources Provided:  This clinician update Brady that  Financial Counselor Laura Lan () that Medicaid application was re-submitted 23, and that she would outreach directly to Brady when approved.     SW sent Brady email with additional resource information per his request:    Financial Counselor  Financial Assistance ([employer] [place], [facility], [employer])  Jennifer  [facility] Resource List  Buddy Program through [facility]  [facility] [employer] [facility]  Onc SW contact information and availability.     Assessment/Plan:  Onc SW will continue to be available as needed for ongoing psychosocial support.     Previously provided patient/family with writer's contact information and availability.    Deborah Yuan, JERMAIN, LICSW, OSW-C  Clinical - Adult Oncology  She/Her/Hers  Phone: [phone]  [facility]: M, Thu  *every other Tue, 8am-4:30pm  [facility]: W, F, *every other Tue, 8am-4:30pm

## 2023-11-17 NOTE — RESULT ENCOUNTER NOTE
Let him know that hemoglobin and iron are low. Better than before. He should take OTC oral iron pill once a day.    Maryann Limon MD

## 2023-11-21 ENCOUNTER — PATIENT OUTREACH (OUTPATIENT)
Dept: ONCOLOGY | Facility: CLINIC | Age: 58
End: 2023-11-21
Payer: MEDICAID

## 2023-11-21 NOTE — PROGRESS NOTES
Writer called Brady to follow up on if he would like to go forward with Dr. Limon's recommendation of treatment     Patient was seen on 11/15 and wanted to think about it.    Writer requested a return call.     Tanisha Ward RN

## 2023-11-27 ENCOUNTER — APPOINTMENT (OUTPATIENT)
Dept: GENERAL RADIOLOGY | Facility: CLINIC | Age: 58
End: 2023-11-27
Attending: EMERGENCY MEDICINE
Payer: MEDICAID

## 2023-11-27 ENCOUNTER — TELEPHONE (OUTPATIENT)
Dept: CARE COORDINATION | Facility: CLINIC | Age: 58
End: 2023-11-27
Payer: MEDICAID

## 2023-11-27 ENCOUNTER — HOSPITAL ENCOUNTER (EMERGENCY)
Facility: CLINIC | Age: 58
Discharge: HOME | End: 2023-11-27
Attending: EMERGENCY MEDICINE | Admitting: EMERGENCY MEDICINE
Payer: MEDICAID

## 2023-11-27 VITALS
DIASTOLIC BLOOD PRESSURE: 74 MMHG | HEART RATE: 74 BPM | RESPIRATION RATE: 18 BRPM | SYSTOLIC BLOOD PRESSURE: 135 MMHG | OXYGEN SATURATION: 99 %

## 2023-11-27 DIAGNOSIS — S42.034A CLOSED NONDISPLACED FRACTURE OF ACROMIAL END OF RIGHT CLAVICLE, INITIAL ENCOUNTER: ICD-10-CM

## 2023-11-27 PROCEDURE — 99285 EMERGENCY DEPT VISIT HI MDM: CPT | Mod: 25

## 2023-11-27 PROCEDURE — 73030 X-RAY EXAM OF SHOULDER: CPT | Mod: RT

## 2023-11-27 PROCEDURE — 73110 X-RAY EXAM OF WRIST: CPT | Mod: RT

## 2023-11-27 PROCEDURE — 250N000013 HC RX MED GY IP 250 OP 250 PS 637: Performed by: EMERGENCY MEDICINE

## 2023-11-27 PROCEDURE — 71101 X-RAY EXAM UNILAT RIBS/CHEST: CPT | Mod: RT

## 2023-11-27 PROCEDURE — 72170 X-RAY EXAM OF PELVIS: CPT

## 2023-11-27 PROCEDURE — 23500 CLTX CLAVICULAR FX W/O MNPJ: CPT | Mod: RT

## 2023-11-27 PROCEDURE — 73552 X-RAY EXAM OF FEMUR 2/>: CPT | Mod: RT

## 2023-11-27 RX ORDER — OXYCODONE HYDROCHLORIDE 5 MG/1
5 TABLET ORAL EVERY 6 HOURS PRN
Qty: 12 TABLET | Refills: 0 | Status: SHIPPED | OUTPATIENT
Start: 2023-11-27 | End: 2023-11-30

## 2023-11-27 RX ORDER — ACETAMINOPHEN 500 MG
1000 TABLET ORAL ONCE
Status: COMPLETED | OUTPATIENT
Start: 2023-11-27 | End: 2023-11-27

## 2023-11-27 RX ADMIN — ACETAMINOPHEN 1000 MG: 500 TABLET, FILM COATED ORAL at 13:59

## 2023-11-27 ASSESSMENT — ACTIVITIES OF DAILY LIVING (ADL): ADLS_ACUITY_SCORE: 35

## 2023-11-27 NOTE — ED NOTES
"Tele-PIT/Intake Evaluation      Video-Visit Details    Type of service:  Video Visit    Video Start Time (time video started): 1:51 PM  Video End Time (time video stopped): 1:54 PM   Originating Location (pt. Location): [facility]  Distant Location (provider location):  [facility]  Mode of Communication:  Video Conference via [employer]  Patient verbally consented to [facility] televisit.    History:  58-year-old male presents after a fall.  Last night at 8 PM he tripped on a curb that he did not see.  He landed on his right side.  He denies head trauma.  He reports that he is on a \"blood thinner\" which she has not taken in 2 days.  He is uncertain which anticoagulant he is taking.  Since the fall he has been experiencing right shoulder, right rib and right thigh pain.  He denies additional areas of trauma.    Exam:    Patient Vitals for the past 24 hrs:   BP Temp src Pulse Resp SpO2   11/27/23 1351 135/74 Temporal 74 18 99 %       Appropriate interventions for symptom management were initiated if applicable.  Appropriate diagnostic tests were initiated if indicated.    Important information for subsequent clinician:    X-rays of the right ribs, shoulder, femur/pelvis ordered and pending.    I briefly evaluated the patient and developed an initial plan of care. I discussed this plan and explained that this brief interaction does not constitute a full evaluation. Patient/family understands that they should wait to be fully evaluated and discuss any test results with another clinician prior to leaving the hospital.       Lee Cunningham MD  11/27/23 1356    "

## 2023-11-27 NOTE — ED PROVIDER NOTES
History     Chief Complaint:  Fall       The history is provided by the patient.      Brady Lenz is a 58 year old male, anticoagulated on Lovenox, with a history of colon cancer who presents with a mechanical fall. Last night at approximately 2000, he was out for a while when he tripped over a curb and fell on his right side. He did not hit his head at that moment. Since then, he has been experiencing right shoulder pain and swelling, right chest wall pain with breathing, and lateral right thigh pain. He is still able to bear weight on the right leg despite the pain. Another symptom mentioned was slight neck stiffness, but he denies neck pain. He further denies vomiting or photophobia.     Independent Historian:   None - Patient Only    Review of External Notes:   None    Medications:    Oxycodone   Lovenox     Past Medical History:    Asthma   SBO   Colon carcinoma metastatic to liver   Anemia   SIBO     Past Surgical History:    R colectomy      Physical Exam   Patient Vitals for the past 24 hrs:   BP Temp src Pulse Resp SpO2   11/27/23 1351 135/74 Temporal 74 18 99 %        Physical Exam    General: Sitting on the ED chair  HEENT: Normocephalic, atraumatic  Cardiac: Warm and well perfused  Pulm: Breathing comfortably, no accessory muscle usage, no conversational dyspnea  MSK: No bony deformities, C-spine nontender and with nonpainful range of motion.  Mild tenderness to palpation of the right anterior axillary ribs near the fifth or sixth intercostal space.  Tender to palpation of the right AC joint as well as the right ulnar styloid.  Otherwise, right upper extremity is nontender to palpation, without instability or crepitus on exam.  Right hip nontender to palpation.  Tender to palpation of the right mid posterior thigh without deformity or instability.  Skin: Warm and dry  Neuro: Moves all extremities  Psych: Pleasant mood and affect    Emergency Department Course   Imaging:  XR Femur Right 2 Views  Final  Result  IMPRESSION: Normal joint spaces and alignment. No fracture. Superior  patellar enthesophyte.  SAAD GALLEGOS, DO     XR Wrist Right G/E 3 Views  Preliminary Result  IMPRESSION: Normal bones, joint spaces and alignment. There is no  evidence of fracture.    XR Pelvis 1/2 Views  Preliminary Result  IMPRESSION: Normal. There is no evidence of fracture or osteonecrosis  on this single view.    Ribs XR, unilat 3 views + PA chest, right  Preliminary Result  IMPRESSION:  1. There is a questionable mildly displaced distal right clavicle  fracture. Clinical correlation needed.  2. The cardiomediastinal silhouette and pulmonary vasculature appear  normal. No infiltrates or effusions.  3. There is no evidence of rib fracture or pneumothorax.     XR Shoulder Right G/E 3 Views  Preliminary Result  IMPRESSION: Minimally displaced distal clavicle fracture. Alignment is  anatomic. No angulation. Normal otherwise.     Results per radiology     Laboratory:  Labs Ordered and Resulted from Time of ED Arrival to Time of ED Departure - No data to display     Emergency Department Course & Assessments:    Interventions:  Medications   acetaminophen (TYLENOL) tablet 1,000 mg (1,000 mg Oral $Given 23 8518)      Assessments:  1405 I obtained history and examined the patient as noted above.  1552 I rechecked the patient and explained findings. I discussed plan for discharge home.    Independent Interpretation (X-rays, CTs, rhythm strip):  Right shoulder film shows a distal right clavicle fracture    Consultations/Discussion of Management or Tests:  None      Social Determinants of Health affecting care:   None    Disposition:  The patient was discharged to home.     Impression & Plan    Medical Decision Makin-year-old male presents with mechanical fall yesterday evening and right-sided pain as above.  Vital signs are stable.  I discussed CT head with the patient including his anticoagulated status and he declines for now  which I believe is reasonable.  No clinical signs of C-spine injury and no indication for imaging there at this time.  He denies any vomiting or headache or other signs of acute head injury. Exam findings suggest and AC joint injury as well as right wrist and right chest wall injuries.  X-rays ordered as such and significant for a distal right clavicle fracture.  Other x-rays show no acute injuries.  The patient was placed in a sling and plan is for discharge home with a short course of oxycodone for pain and orthopedic referral for follow-up.  The patient expressed understanding of and agreement with the plan.         Diagnosis:    ICD-10-CM    1. Closed nondisplaced fracture of acromial end of right clavicle, initial encounter  S42.034A Orthopedic  Referral           Discharge Medications:  New Prescriptions    OXYCODONE (ROXICODONE) 5 MG TABLET    Take 1 tablet (5 mg) by mouth every 6 hours as needed for pain      Scribe Disclosure:  I, Norberto Mckeon, am serving as a scribe at 2:17 PM on 11/27/2023 to document services personally performed by Nicolas Reyna MD based on my observations and the provider's statements to me.   11/27/2023   Nicolas Reyna MD King, Colin, MD  11/27/23 3642

## 2023-11-27 NOTE — ED TRIAGE NOTES
Trip/fall on the curb after tripping on a step last night around 2000.  Denies head/neck pain. No LOC.  States hit R side of body c/o R shoulder/rib pain. Ambulatory into triage.   States on a blood thinner but hasn't taken in 2 days. Unsure which one.     Triage Assessment (Adult)       Row Name 11/27/23 5857          Triage Assessment    Airway WDL WDL        Respiratory WDL    Respiratory WDL WDL        Skin Circulation/Temperature WDL    Skin Circulation/Temperature WDL WDL        Cardiac WDL    Cardiac WDL WDL        Peripheral/Neurovascular WDL    Peripheral Neurovascular WDL WDL        Cognitive/Neuro/Behavioral WDL    Cognitive/Neuro/Behavioral WDL WDL

## 2023-11-28 NOTE — TELEPHONE ENCOUNTER
DIAGNOSIS: Closed nondisplaced fracture of acromial end of right clavicle    APPOINTMENT DATE: 11/29/2023    NOTES STATUS DETAILS   OFFICE NOTE from referring provider N/A    OFFICE NOTE from other specialist N/A    DISCHARGE SUMMARY from hospital N/A    DISCHARGE REPORT from the ER Internal 11/27/2023 [facility] ED    OPERATIVE REPORT N/A    EMG report N/A    MEDICATION LIST N/A    MRI N/A    DEXA (osteoporosis/bone health) N/A    CT SCAN N/A    XRAYS (IMAGES & REPORTS) Internal 11/27/2023 [facility] shoulder, wrist, chest and ribs

## 2023-11-29 ENCOUNTER — PRE VISIT (OUTPATIENT)
Dept: ORTHOPEDICS | Facility: CLINIC | Age: 58
End: 2023-11-29

## 2023-11-29 ENCOUNTER — OFFICE VISIT (OUTPATIENT)
Dept: ORTHOPEDICS | Facility: CLINIC | Age: 58
End: 2023-11-29
Attending: EMERGENCY MEDICINE
Payer: MEDICAID

## 2023-11-29 DIAGNOSIS — S42.034A CLOSED NONDISPLACED FRACTURE OF ACROMIAL END OF RIGHT CLAVICLE, INITIAL ENCOUNTER: Primary | ICD-10-CM

## 2023-11-29 DIAGNOSIS — S42.034A CLOSED NONDISPLACED FRACTURE OF ACROMIAL END OF RIGHT CLAVICLE, INITIAL ENCOUNTER: ICD-10-CM

## 2023-11-29 PROCEDURE — 99203 OFFICE O/P NEW LOW 30 MIN: CPT | Performed by: PREVENTIVE MEDICINE

## 2023-11-29 NOTE — LETTER
11/29/2023         RE: Brady Lenz  [address] [address]  [facility] [address]        Dear Colleague,    Thank you for referring your patient, Brady Lenz, to the [facility] [place]. Please see a copy of my visit note below.    HISTORY OF PRESENT ILLNESS  Mr. Lenz is a pleasant 58 year old year old male who presents to clinic today with the following:  What problem are you here for? Right shoulder clavicle   Injury that occurred while he was working and slipped on stairs and fell    How long have you had this problem? 2 days     Have you had any recent imaging of this problem? Xrays/MRI/CT scans? Xr     Have you had treatments for this problem in the past?  -Medications? no  -Physical therapy? no  -Injections? no  -Surgery? no    How severe is this problem today? 0-10 scale? 3    How severe has this problem been at WORST in the past? 0-10 scale? NA    What do you think caused this problem? Fell on his shoulder     Does this problem or its symptoms cause difficulty for you falling asleep or staying asleep? no    Anything else you want us to know about this problem? no          MEDICAL HISTORY  Patient Active Problem List   Diagnosis     SBO (small bowel obstruction) (H)     Liver mass     Cecum mass     Anemia, unspecified type     Colon carcinoma metastatic to liver (H)       Current Outpatient Medications   Medication Sig Dispense Refill     acetaminophen (TYLENOL) 325 MG tablet Take 2 tablets (650 mg) by mouth every 4 hours as needed for mild pain or fever 100 tablet 0     fexofenadine (ALLEGRA) 60 MG tablet Take 1 tablet (60 mg) by mouth daily as needed for allergies 30 tablet 0     oxyCODONE (ROXICODONE) 5 MG tablet Take 1 tablet (5 mg) by mouth every 6 hours as needed for pain 12 tablet 0       No Known Allergies    No family history on file.  Social History     Socioeconomic History     Marital status: Single   Tobacco Use     Smoking status: Never     Smokeless tobacco:  Never   Vaping Use     Vaping Use: Never used   Substance and Sexual Activity     Alcohol use: Not Currently     Drug use: Not Currently       Additional medical/Social/Surgical histories reviewed in [facility] and updated as appropriate.     REVIEW OF SYSTEMS (11/29/2023)  10 point ROS of systems including Constitutional, Eyes, Respiratory, Cardiovascular, Gastroenterology, Genitourinary, Integumentary, Musculoskeletal, Psychiatric, Allergic/Immunologic were all negative except for pertinent positives noted in my HPI.     PHYSICAL EXAM  VSS  Vital Signs: There were no vitals taken for this visit. Patient declined being weighed. There is no height or weight on file to calculate BMI.    General  - normal appearance, in no obvious distress  HEENT  - conjunctivae not injected, moist mucous membranes, normocephalic/atraumatic head, ears normal appearance, no lesions, mouth normal appearance, no scars, normal dentition and teeth present  CV  - normal radial pulse  Pulm  - normal respiratory pattern, non-labored  Musculoskeletal - right shoulder  - inspection: normal bone and joint alignment, no obvious deformity, no scapular winging, no AC step-off, mild swelling over clavicle  - palpation: tender over clavicle at fracture site, clavicle does not displace when pressed  - ROM:  painful passive flexion past 90 deg, painful adduction  - strength: 5/5  strength, 5/5 in all shoulder planes  - special tests:  (+) crossarm  (-) Speed's  (-) Neer  (-) Hawkin's  (-) Barbie  (-) Frontier's  (-) apprehension  Neuro  - no sensory or motor deficit, grossly normal coordination, normal muscle tone  Skin  - no ecchymosis, erythema, warmth, or induration, no obvious rash  Psych  - interactive, appropriate, normal mood and affect  ASSESSMENT & PLAN  59 yo male with right clavicle fracture, minimally displaced    I independently reviewed the following imaging studies:  Right clavicle xray: shows stable distal right clavicle fracture  Sling  PRN  Ibuprofen and tylenol PRN  Ice PRN  Follow up 1 week for exam and repeat xrays        Appropriate PPE was utilized for prevention of spread of Covid-19.  Hever Dobbins MD, [facility]        Again, thank you for allowing me to participate in the care of your patient.        Sincerely,        Hever Dobbins MD

## 2023-11-29 NOTE — PROGRESS NOTES
HISTORY OF PRESENT ILLNESS  Mr. Lenz is a pleasant 58 year old year old male who presents to clinic today with the following:  What problem are you here for? Right shoulder clavicle   Injury that occurred while he was working and slipped on stairs and fell    How long have you had this problem? 2 days     Have you had any recent imaging of this problem? Xrays/MRI/CT scans? Xr     Have you had treatments for this problem in the past?  -Medications? no  -Physical therapy? no  -Injections? no  -Surgery? no    How severe is this problem today? 0-10 scale? 3    How severe has this problem been at WORST in the past? 0-10 scale? NA    What do you think caused this problem? Fell on his shoulder     Does this problem or its symptoms cause difficulty for you falling asleep or staying asleep? no    Anything else you want us to know about this problem? no          MEDICAL HISTORY  Patient Active Problem List   Diagnosis     SBO (small bowel obstruction) (H)     Liver mass     Cecum mass     Anemia, unspecified type     Colon carcinoma metastatic to liver (H)       Current Outpatient Medications   Medication Sig Dispense Refill     acetaminophen (TYLENOL) 325 MG tablet Take 2 tablets (650 mg) by mouth every 4 hours as needed for mild pain or fever 100 tablet 0     fexofenadine (ALLEGRA) 60 MG tablet Take 1 tablet (60 mg) by mouth daily as needed for allergies 30 tablet 0     oxyCODONE (ROXICODONE) 5 MG tablet Take 1 tablet (5 mg) by mouth every 6 hours as needed for pain 12 tablet 0       No Known Allergies    No family history on file.  Social History     Socioeconomic History     Marital status: Single   Tobacco Use     Smoking status: Never     Smokeless tobacco: Never   Vaping Use     Vaping Use: Never used   Substance and Sexual Activity     Alcohol use: Not Currently     Drug use: Not Currently       Additional medical/Social/Surgical histories reviewed in [facility] and updated as appropriate.     REVIEW OF SYSTEMS  (11/29/2023)  10 point ROS of systems including Constitutional, Eyes, Respiratory, Cardiovascular, Gastroenterology, Genitourinary, Integumentary, Musculoskeletal, Psychiatric, Allergic/Immunologic were all negative except for pertinent positives noted in my HPI.     PHYSICAL EXAM  VSS  Vital Signs: There were no vitals taken for this visit. Patient declined being weighed. There is no height or weight on file to calculate BMI.    General  - normal appearance, in no obvious distress  HEENT  - conjunctivae not injected, moist mucous membranes, normocephalic/atraumatic head, ears normal appearance, no lesions, mouth normal appearance, no scars, normal dentition and teeth present  CV  - normal radial pulse  Pulm  - normal respiratory pattern, non-labored  Musculoskeletal - right shoulder  - inspection: normal bone and joint alignment, no obvious deformity, no scapular winging, no AC step-off, mild swelling over clavicle  - palpation: tender over clavicle at fracture site, clavicle does not displace when pressed  - ROM:  painful passive flexion past 90 deg, painful adduction  - strength: 5/5  strength, 5/5 in all shoulder planes  - special tests:  (+) crossarm  (-) Speed's  (-) Neer  (-) Hawkin's  (-) Barbie  (-) Sumner's  (-) apprehension  Neuro  - no sensory or motor deficit, grossly normal coordination, normal muscle tone  Skin  - no ecchymosis, erythema, warmth, or induration, no obvious rash  Psych  - interactive, appropriate, normal mood and affect  ASSESSMENT & PLAN  57 yo male with right clavicle fracture, minimally displaced    I independently reviewed the following imaging studies:  Right clavicle xray: shows stable distal right clavicle fracture  Sling PRN  Ibuprofen and tylenol PRN  Ice PRN  Follow up 1 week for exam and repeat xrays        Appropriate PPE was utilized for prevention of spread of Covid-19.  Hever Dobbins MD, CAQSM

## 2023-11-29 NOTE — PROGRESS NOTES
Writer called again and spoke with patient . Patient states he had a fall 2 days ago an broke his collar bone.  Patient does verbalize that he is ready to schedule treatment. He did seem hesitant to the writer.  Patient was provided the patient education from [facility]  and writer will review that with patient once he has been scheduled along with port placement. Patient would states ok to schedule for the week of 12/18.     Writer provided scheduling team the update to reach out to patient tomorrow to assist in scheduling.    Tanisha Ward RN

## 2023-11-30 ENCOUNTER — HOSPITAL ENCOUNTER (OUTPATIENT)
Facility: AMBULATORY SURGERY CENTER | Age: 58
End: 2023-11-30
Attending: RADIOLOGY

## 2023-12-04 ENCOUNTER — PATIENT OUTREACH (OUTPATIENT)
Dept: CARE COORDINATION | Facility: CLINIC | Age: 58
End: 2023-12-04
Payer: MEDICAID

## 2023-12-04 ENCOUNTER — DOCUMENTATION ONLY (OUTPATIENT)
Dept: CARE COORDINATION | Facility: CLINIC | Age: 58
End: 2023-12-04

## 2023-12-04 NOTE — PROGRESS NOTES
Social Work - Follow-Up  [facility]    Data/Intervention: Brady is a 58-year-old gentleman with a diagnosis of colon cancer with metastatic disease to liver who met today with Dr. Limon to establish care.      Patient Name: Brady Lenz Goes By: Brady    KENYA/Age: 1965 (58 year old)    Reason for Follow-Up:  Returning call from Brady    Intervention/Education/Resources Provided:  Brady calling to inquire about status of Medicaid, as he heard from [facility] that MA application was not received. Brady reported that he is concerned about this as he is scheduled for C1D1 23. SW updated Brady that Laura Lan ( Financial Counselor) will check status tomorrow and re-fax application if needed, she reported that she will outreach directly to Brady regarding status update.     Assessment/Plan:  1) Onc SW will update care team of current health insurance barrier.   2) Onc SW will plan for in-person psychosocial connection at C1D1.     Previously provided patient/family with writer's contact information and availability.      Deborah Yuan, MSW, LICSW, OSW-C  Clinical - Adult Oncology  She/Her/Hers  Phone: [phone]  [facility]: M, Thu  *every other Tue, 8am-4:30pm  [facility]: W, F, *every other Tue, 8am-4:30pm

## 2023-12-04 NOTE — PROGRESS NOTES
Our [facility] Medical Respite Program: Shelter Progress Note:    Met with Brady in his room. Discussed recent ED visit and orthopedic follow up visit. Was unable to  oxycodone from pharmacy due to insurance/inability to pay. Discussed Good Rx option for prescriptions moving forward until MA application is reviewed. His pain is tolerable. No longer wearing sling as he reports causes worsening pain. Still not able to drive due to mobility. He is able to take public transportation to appointments. Ortho follow up appointment he said is scheduled for 12/7. Port-a-cath placement scheduled for 12/15. His biggest concern is the status of medical assistance application.  Reviewed financial assistance documentation in [employer]. MA remains pending per documentation. Provided him with [facility] medical assistance phone number. Encouraged him to tap into resources provided from Deborah, oncology social worker and provided contact information. He is attempting to scheduled PET scan but is running into scheduling barriers due to lack of insurance to date. Emailed Deborah to pursue additional options in scheduling PET scan due to financial barriers.

## 2023-12-07 ENCOUNTER — OFFICE VISIT (OUTPATIENT)
Dept: ORTHOPEDICS | Facility: CLINIC | Age: 58
End: 2023-12-07
Payer: MEDICAID

## 2023-12-07 ENCOUNTER — ANCILLARY PROCEDURE (OUTPATIENT)
Dept: GENERAL RADIOLOGY | Facility: CLINIC | Age: 58
End: 2023-12-07
Attending: PREVENTIVE MEDICINE
Payer: MEDICAID

## 2023-12-07 DIAGNOSIS — S42.034A CLOSED NONDISPLACED FRACTURE OF ACROMIAL END OF RIGHT CLAVICLE, INITIAL ENCOUNTER: Primary | ICD-10-CM

## 2023-12-07 DIAGNOSIS — S42.034A CLOSED NONDISPLACED FRACTURE OF ACROMIAL END OF RIGHT CLAVICLE, INITIAL ENCOUNTER: ICD-10-CM

## 2023-12-07 PROCEDURE — 73000 X-RAY EXAM OF COLLAR BONE: CPT | Mod: RT | Performed by: RADIOLOGY

## 2023-12-07 PROCEDURE — 99213 OFFICE O/P EST LOW 20 MIN: CPT | Performed by: PREVENTIVE MEDICINE

## 2023-12-07 NOTE — LETTER
12/7/2023      RE: Brady Lenz  [address] [address]  [place] [facility] [address]     Dear Colleague,    Thank you for referring your patient, Brady Lenz, to the [facility] [place]. Please see a copy of my visit note below.    HISTORY OF PRESENT ILLNESS  Mr. Lenz is a pleasant 58 year old year old male who presents to clinic today with the following:  What problem are you here for: Follow-up for his right distal clavicle fracture. The patient was last seen on 11/29/2023 for this injury. Since that appointment, he has noticed a decrease in pain and an increase of shoulder motion.     How long have you had this problem: 11/26/2023, 11 days    Have you had any recent imaging of this problem? Xrays/MRI/CT scans:  - Updated XR of right clavicle taken at appointment.    Have you had treatments for this problem in the past?  -Medications: Tylenol prn  -Physical therapy: None  -Injections: None  -Surgery: None  - Sling: he will wear prn.     How severe is this problem today? 0-10 scale: 3/10    What do you think caused this problem: Fall    Does this problem or its symptoms cause difficulty for you falling asleep or staying asleep: No    MEDICAL HISTORY  Patient Active Problem List   Diagnosis     SBO (small bowel obstruction) (H)     Liver mass     Cecum mass     Anemia, unspecified type     Colon carcinoma metastatic to liver (H)       Current Outpatient Medications   Medication Sig Dispense Refill     acetaminophen (TYLENOL) 325 MG tablet Take 2 tablets (650 mg) by mouth every 4 hours as needed for mild pain or fever 100 tablet 0     fexofenadine (ALLEGRA) 60 MG tablet Take 1 tablet (60 mg) by mouth daily as needed for allergies 30 tablet 0       No Known Allergies    No family history on file.  Social History     Socioeconomic History     Marital status: Single   Tobacco Use     Smoking status: Never     Smokeless tobacco: Never   Vaping Use     Vaping Use: Never used   Substance and Sexual  Activity     Alcohol use: Not Currently     Drug use: Not Currently       Additional medical/Social/Surgical histories reviewed in [facility] and updated as appropriate.     REVIEW OF SYSTEMS (12/7/2023)  10 point ROS of systems including Constitutional, Eyes, Respiratory, Cardiovascular, Gastroenterology, Genitourinary, Integumentary, Musculoskeletal, Psychiatric, Allergic/Immunologic were all negative except for pertinent positives noted in my HPI.     PHYSICAL EXAM  VSS  Vital Signs: There were no vitals taken for this visit. Patient declined being weighed. There is no height or weight on file to calculate BMI.    General  - normal appearance, in no obvious distress  HEENT  - conjunctivae not injected, moist mucous membranes, normocephalic/atraumatic head, ears normal appearance, no lesions, mouth normal appearance, no scars, normal dentition and teeth present  CV  - normal radial pulse  Pulm  - normal respiratory pattern, non-labored  Musculoskeletal - right shoulder  - inspection: normal bone and joint alignment, no obvious deformity, no scapular winging, no AC step-off, mild swelling over AC joint, normal clavicle  - palpation: tender over AC joint, clavicle does not displace when pressed, is tender at mid clavicle at fracture site  - ROM:  painful passive flexion past 90 deg, painful adduction  - strength: 5/5  strength, 5/5 in all shoulder planes  - special tests:  (+) crossarm  (-) Speed's  (-) Neer  (-) Hawkin's  (-) Barbie  (-) Nobles's  (-) apprehension  Neuro  - no sensory or motor deficit, grossly normal coordination, normal muscle tone  Skin  - no ecchymosis, erythema, warmth, or induration, no obvious rash  Psych  - interactive, appropriate, normal mood and affect   ASSESSMENT & PLAN  59 yo male with right clavicle fracture, healing    I independently reviewed the following imaging studies:  Right shoulder xray: shows healing clavicle fracture  Cont. HEP   Use medications PRN  Followup in 3-4 weeks for  repeat xrays        Appropriate PPE was utilized for prevention of spread of Covid-19.  Hever Dobbins MD, CAM      Again, thank you for allowing me to participate in the care of your patient.      Sincerely,    Hever Dobbins MD

## 2023-12-07 NOTE — PATIENT INSTRUCTIONS
CERVICAL (Neck) STRAIN    WHAT IS NECK STRAIN?    A neck strain is a stretch or tear of a muscle in your neck.    WHAT IS THE CAUSE?    Neck strains usually happen when the head and neck are hit or forcibly moved, such as during rough contact sports or a whiplash injury (like a car accident or a fall) where your head and neck are snapped back and forth. Sometimes strains happen from an awkward position during sleep or poor posture while you work at a desk.    WHAT ARE THE SYMPTOMS?    The main symptom is pain in your neck. You may have pain when you move your head to the side or when you try to move your head up or down. The neck muscles may tighten (spasm) and feel hard and very tender to the touch. You may feel pain right after an injury or not until a few hours or days after the injury.    Other symptoms may include:    Stiff neck  Dizziness  Unusual feelings, like burning or a pins-and-needles feeling  Headache    HOW IS IT DIAGNOSED?    Your healthcare provider will ask about your symptoms, medical history, and activities and examine your neck. You may have X-rays to make sure the bones in your neck (vertebrae) are not injured.    HOW IS IT TREATED?    Your healthcare provider may recommend stretching and strengthening exercises and other types of physical therapy to help you heal.    The pain often gets better within a few weeks with self-care, but some injuries may take longer to heal. It s important to follow all of your healthcare provider s instructions.    HOW CAN I HELP TAKE CARE OF MYSELF?    To help relieve pain:    Put an ice pack, gel pack, or package of frozen vegetables wrapped in a cloth on the injured area every 3 to 4 hours for up to 20 minutes at a time for the first day or two after the injury.    Take nonprescription pain medicine, such as acetaminophen, ibuprofen, or naproxen. Read the label and take as directed. Nonsteroidal anti-inflammatory medicines (NSAIDs), such as ibuprofen or  naproxen, may cause stomach bleeding and other problems. These risks increase with age. Unless recommended by your healthcare provider, do not take an NSAID for more than 10 days.    Moist heat may help relax your muscles and make it easier to move your neck. Put moist heat on the sore area for 10 to 15 minutes before you do warm-up and stretching exercises. Moist heat includes heat patches or moist heating pads that you can purchase at most [employer], a wet washcloth or towel that has been heated in a microwave or the dryer, or a hot shower. Don t use heat if you have swelling.    You may find that it helps to alternate putting heat and ice on your neck.    HOW CAN I HELP PREVENT NECK STRAIN?    Neck strain may be prevented by keeping your neck muscles strong and flexible. Ask your healthcare provider about stretching and exercises that may help. If you have a job or hobby that requires you to hold your neck in one position for long hours, like working at a computer all day, it s very important to take breaks and stretch your neck muscles.    Developed by [employer].  Published by [employer].  Copyright  2014 [employer] and/or one of its subsidiaries. All rights reserved.      Myofascial Release    Tennis balls can provide myofascial release through myofascial therapy. You can perform self-myofascial release by placing a tennis ball on the tender area and leaning against a wall or the floor to apply light pressure until you feel the release. Or you can put two tennis balls in a sock, leaving a small space between the balls, and tying the open end closed. Lie on the floor and put the tennis balls behind your neck, one on either side of your spine. Press your neck against the tennis balls until you feel the release. Do not press hard enough to cause pain.    Neck Spasm Exercises    You may do all of these exercises right away but avoid any movements that increase your pain.    Neck rotation with  flexion  Right side: Turn your head to the right and clasp your hands behind your head. Let the weight of your arms pull your chin to the right side of your chest. Relax. Hold for a count of 15. Do this 3 times.    Left side: Turn your head to the left and clasp your hands behind your head. Let the weight of your arms pull your chin to the left side of your chest. Relax. Hold for a count of 15. Do this 3 times.    Chin tuck: Place your fingertips on your chin and gently push your head straight back as if you are trying to make a double chin. Keep looking forward as your head moves back. Hold 5 seconds and repeat 5 times.    Scalene stretch: Sit or stand and clasp both hands behind your back. Lower your left shoulder and tilt your head toward the right until you feel a stretch. Hold this position for 15 to 30 seconds and then come back to the starting position. Then lower your right shoulder and tilt your head toward the left. Hold for 15 to 30 seconds. Repeat 3 times on each side.    Neck rotation stretch    Right side: Rotate your neck by looking over your right shoulder. Lift your right hand and place your palm on the left side of your chin. Push your chin with your palm toward your right shoulder. Hold for a count of 10. Do this 3 times.    Left side: Rotate your neck by looking over your left shoulder. Lift your left hand and place your palm on the right side of your chin. Push your chin with your palm toward your left shoulder. Hold for a count of 10. Do this 3 times.    Scapular squeeze: While sitting or standing with your arms by your sides, squeeze your shoulder blades together and hold for 5 seconds. Do 2 sets of 15.    Thoracic extension: Sit in a chair and clasp both arms behind your head. Gently arch backward and look up toward the ceiling. Repeat 10 times. Do this several times each day.    Head lift with neck curl: Lie on your back with your knees bent and your feet flat on the floor. Tuck your chin  and lift your head about 3 inches off the floor, keeping your shoulders flat on the floor. Hold for 10 seconds. Repeat 5 times. Try to work up to holding for 20 to 30 seconds.

## 2023-12-07 NOTE — PROGRESS NOTES
HISTORY OF PRESENT ILLNESS  Mr. Lenz is a pleasant 58 year old year old male who presents to clinic today with the following:  What problem are you here for: Follow-up for his right distal clavicle fracture. The patient was last seen on 11/29/2023 for this injury. Since that appointment, he has noticed a decrease in pain and an increase of shoulder motion.     How long have you had this problem: 11/26/2023, 11 days    Have you had any recent imaging of this problem? Xrays/MRI/CT scans:  - Updated XR of right clavicle taken at appointment.    Have you had treatments for this problem in the past?  -Medications: Tylenol prn  -Physical therapy: None  -Injections: None  -Surgery: None  - Sling: he will wear prn.     How severe is this problem today? 0-10 scale: 3/10    What do you think caused this problem: Fall    Does this problem or its symptoms cause difficulty for you falling asleep or staying asleep: No    MEDICAL HISTORY  Patient Active Problem List   Diagnosis    SBO (small bowel obstruction) (H)    Liver mass    Cecum mass    Anemia, unspecified type    Colon carcinoma metastatic to liver (H)       Current Outpatient Medications   Medication Sig Dispense Refill    acetaminophen (TYLENOL) 325 MG tablet Take 2 tablets (650 mg) by mouth every 4 hours as needed for mild pain or fever 100 tablet 0    fexofenadine (ALLEGRA) 60 MG tablet Take 1 tablet (60 mg) by mouth daily as needed for allergies 30 tablet 0       No Known Allergies    No family history on file.  Social History     Socioeconomic History    Marital status: Single   Tobacco Use    Smoking status: Never    Smokeless tobacco: Never   Vaping Use    Vaping Use: Never used   Substance and Sexual Activity    Alcohol use: Not Currently    Drug use: Not Currently       Additional medical/Social/Surgical histories reviewed in [facility] and updated as appropriate.     REVIEW OF SYSTEMS (12/7/2023)  10 point ROS of systems including Constitutional, Eyes, Respiratory,  Cardiovascular, Gastroenterology, Genitourinary, Integumentary, Musculoskeletal, Psychiatric, Allergic/Immunologic were all negative except for pertinent positives noted in my HPI.     PHYSICAL EXAM  VSS  Vital Signs: There were no vitals taken for this visit. Patient declined being weighed. There is no height or weight on file to calculate BMI.    General  - normal appearance, in no obvious distress  HEENT  - conjunctivae not injected, moist mucous membranes, normocephalic/atraumatic head, ears normal appearance, no lesions, mouth normal appearance, no scars, normal dentition and teeth present  CV  - normal radial pulse  Pulm  - normal respiratory pattern, non-labored  Musculoskeletal - right shoulder  - inspection: normal bone and joint alignment, no obvious deformity, no scapular winging, no AC step-off, mild swelling over AC joint, normal clavicle  - palpation: tender over AC joint, clavicle does not displace when pressed, is tender at mid clavicle at fracture site  - ROM:  painful passive flexion past 90 deg, painful adduction  - strength: 5/5  strength, 5/5 in all shoulder planes  - special tests:  (+) crossarm  (-) Speed's  (-) Neer  (-) Hawkin's  (-) Barbie  (-) [place]'s  (-) apprehension  Neuro  - no sensory or motor deficit, grossly normal coordination, normal muscle tone  Skin  - no ecchymosis, erythema, warmth, or induration, no obvious rash  Psych  - interactive, appropriate, normal mood and affect   ASSESSMENT & PLAN  57 yo male with right clavicle fracture, healing    I independently reviewed the following imaging studies:  Right shoulder xray: shows healing clavicle fracture  Cont. HEP   Use medications PRN  Followup in 3-4 weeks for repeat xrays        Appropriate PPE was utilized for prevention of spread of Covid-19.  Hever Dobbins MD, CAQSM

## 2023-12-10 ENCOUNTER — HEALTH MAINTENANCE LETTER (OUTPATIENT)
Age: 58
End: 2023-12-10

## 2023-12-13 ENCOUNTER — PATIENT OUTREACH (OUTPATIENT)
Dept: CARE COORDINATION | Facility: CLINIC | Age: 58
End: 2023-12-13
Payer: MEDICAID

## 2023-12-13 DIAGNOSIS — C78.7 COLON CARCINOMA METASTATIC TO LIVER (H): Primary | ICD-10-CM

## 2023-12-13 DIAGNOSIS — C18.9 COLON CARCINOMA METASTATIC TO LIVER (H): Primary | ICD-10-CM

## 2023-12-13 RX ORDER — MEPERIDINE HYDROCHLORIDE 25 MG/ML
25 INJECTION INTRAMUSCULAR; INTRAVENOUS; SUBCUTANEOUS EVERY 30 MIN PRN
Status: CANCELLED | OUTPATIENT
Start: 2023-12-19

## 2023-12-13 RX ORDER — LORAZEPAM 2 MG/ML
0.5 INJECTION INTRAMUSCULAR EVERY 4 HOURS PRN
Status: CANCELLED | OUTPATIENT
Start: 2023-12-19

## 2023-12-13 RX ORDER — DIPHENHYDRAMINE HYDROCHLORIDE 50 MG/ML
50 INJECTION INTRAMUSCULAR; INTRAVENOUS
Status: CANCELLED
Start: 2023-12-19

## 2023-12-13 RX ORDER — ALBUTEROL SULFATE 0.83 MG/ML
2.5 SOLUTION RESPIRATORY (INHALATION)
Status: CANCELLED | OUTPATIENT
Start: 2023-12-19

## 2023-12-13 RX ORDER — ALBUTEROL SULFATE 90 UG/1
1-2 AEROSOL, METERED RESPIRATORY (INHALATION)
Status: CANCELLED
Start: 2023-12-19

## 2023-12-13 RX ORDER — HEPARIN SODIUM,PORCINE 10 UNIT/ML
5-20 VIAL (ML) INTRAVENOUS DAILY PRN
Status: CANCELLED | OUTPATIENT
Start: 2023-12-19

## 2023-12-13 RX ORDER — ONDANSETRON 2 MG/ML
8 INJECTION INTRAMUSCULAR; INTRAVENOUS ONCE
Status: CANCELLED | OUTPATIENT
Start: 2023-12-19

## 2023-12-13 RX ORDER — EPINEPHRINE 1 MG/ML
0.3 INJECTION, SOLUTION, CONCENTRATE INTRAVENOUS EVERY 5 MIN PRN
Status: CANCELLED | OUTPATIENT
Start: 2023-12-19

## 2023-12-13 RX ORDER — HEPARIN SODIUM (PORCINE) LOCK FLUSH IV SOLN 100 UNIT/ML 100 UNIT/ML
5 SOLUTION INTRAVENOUS
Status: CANCELLED | OUTPATIENT
Start: 2023-12-19

## 2023-12-13 NOTE — PROGRESS NOTES
"Social Work - Follow-Up  [facility]    Data/Intervention: Brady is a 58-year-old gentleman with a diagnosis of colon cancer with metastatic disease to liver.      Patient Name: Brady Lenz Goes By: Brady    /Age: 1965 (58 year old)    Reason for Follow-Up:  SW received referral from pharmacy team regarding health insurance continuing to not show active    Intervention/Education/Resources Provided:  ROSALEE collaborated with Sudhir Saba ( Financial Counselor) who reported that he was able to confirm that Medicaid application has been received by [facility] and application is presently \"pending processing.\" Sudhir acknowledges that [facility] continues to be quite backdated for Medicaid approvals.     Assessment/Plan:  Onc SW updated care team, and will await outcome of health insurance approval. Appreciative of  Financial Counselor's ongoing engagement with case.      Previously provided patient/family with writer's contact information and availability.      Deborah Yuan, MSW, LICSW, OSW-C  Clinical - Adult Oncology  She/Her/Hers  Phone: [phone]  [facility]: M, Thu  *every other Tue, 8am-4:30pm  [facility]: W, F, *every other Tue, 8am-4:30pm         "

## 2023-12-14 ENCOUNTER — PATIENT OUTREACH (OUTPATIENT)
Dept: ONCOLOGY | Facility: CLINIC | Age: 58
End: 2023-12-14
Payer: MEDICAID

## 2023-12-14 RX ORDER — LIDOCAINE 40 MG/G
CREAM TOPICAL
Status: CANCELLED | OUTPATIENT
Start: 2023-12-14

## 2023-12-14 RX ORDER — SODIUM CHLORIDE 9 MG/ML
INJECTION, SOLUTION INTRAVENOUS CONTINUOUS
Status: CANCELLED | OUTPATIENT
Start: 2023-12-14

## 2023-12-14 RX ORDER — CEFAZOLIN SODIUM 2 G/50ML
2 SOLUTION INTRAVENOUS
Status: CANCELLED | OUTPATIENT
Start: 2023-12-14

## 2023-12-14 NOTE — PROGRESS NOTES
Writer LVM with patient requesting a return call. Patient currently does not have active insurance. [facility]  and [facility]  are working on insurance and other supportive services.     Writer wants to discuss if patient would like to delay treatment until MA is approved or sign a waiver.      Patient per pharmacy finance is aware that treatment may be delayed if not approved by Monday .     If approved writer will provide education on Monday while he is here for labs.    Tanisha Ward RN

## 2023-12-14 NOTE — PROGRESS NOTES
Writer called patient and left a 2nd VM. Port placement has been cancelled.  Writer requested a return call.     Tanisha Ward RN

## 2023-12-18 ENCOUNTER — DOCUMENTATION ONLY (OUTPATIENT)
Dept: PHARMACY | Facility: CLINIC | Age: 58
End: 2023-12-18
Payer: MEDICAID

## 2023-12-18 ENCOUNTER — DOCUMENTATION ONLY (OUTPATIENT)
Dept: CARE COORDINATION | Facility: CLINIC | Age: 58
End: 2023-12-18
Payer: MEDICAID

## 2023-12-18 NOTE — PROGRESS NOTES
[facility]  Shelter Nurse Note - Follow up    Met with Brady in his room and after brief discussion in the mays, continued conversation in medical office. Brady reported to writer that he spent 45 minutes on the phone with [facility] trying to find out the status of his MA application. Writer encouraged Brady to speak with his  Herminio at the shelter to check on his MA application as well. Writemilli and Brady read over notes from pt outreach section of chart review and saw documentation of pt outreach from [facility]. Pt outreach mentioned a SW, Financial counselor and  with the Cancer Center and encouraged Brady to reach out to them as well. Brady mentioned that he did have a voice message from the SW. Writer offered to call on his behalf or together and Brady declined. Writemilli and Brady also reviewed the pre-op instructions for port placement (procedure currently on hold until insurance/payer information is verified for Cancer Clinic). Per the instructions Brady should shower the evening before and evening of the procedure with a antiseptic soap. Per the instructions, the soap can be picked up at a [facility] pharmacy (at no cost for surgical pt's) and Brady reported that he could get this before his procedure.       Angelica Juarez RN  [facility]  Ph: [phone], fax: [phone]

## 2023-12-18 NOTE — PROGRESS NOTES
"Oral Chemotherapy Monitoring Program    Lab Monitoring Plan  CBC w/ diff + CMP q3wk with infusions  Subjective/Objective:  Brady Lenz is a 58 year old male contacted by phone for an initial visit for oral chemotherapy education. Relevant side effects were reviewed, including hand-foot syndrome and NVD. Baseline labs from 11/15 reviewed and repeat labs with C1D1 will be drawn. Start date pending response on Medicare coverage.        12/13/2023     9:00 AM 12/18/2023     1:00 PM   ORAL CHEMOTHERAPY   Assessment Type Chart Review;Initial Work up New Teach   Diagnosis Code Colon Cancer Colon Cancer   Providers Dr. Braxton Limon   Clinic Name/Location \[facility]""   Drug Name Xeloda (capecitabine) Xeloda (capecitabine)   Dose 2,000 mg 2,000 mg   Current Schedule BID BID   Cycle Details 2 weeks on, 1 week off 2 weeks on, 1 week off   Planned next cycle start date 12/19/2023    Adverse Effects No AE identified during assessment    Any new drug interactions? No No   Is the dose as ordered appropriate for the patient? Yes Yes       Last PHQ-2 Score on record:       11/29/2023     1:18 PM   PHQ-2 ( 1999 Pfizer)   Q1: Little interest or pleasure in doing things 0   Q2: Feeling down, depressed or hopeless 0   PHQ-2 Score 0       Vitals:  BP:   BP Readings from Last 1 Encounters:   11/27/23 135/74     Wt Readings from Last 1 Encounters:   11/15/23 71.9 kg (158 lb 9.6 oz)     Estimated body surface area is 1.92 meters squared as calculated from the following:    Height as of 10/24/23: 1.854 m (6' 1\").    Weight as of 11/15/23: 71.9 kg (158 lb 9.6 oz).    Labs:  No results found for NA within last 30 days.     No results found for K within last 30 days.     No results found for CA within last 30 days.     No results found for Mag within last 30 days.     No results found for Phos within last 30 days.     No results found for ALBUMIN within last 30 days.     No results found for BUN within last 30 days.     No results " found for CR within last 30 days.     No results found for AST within last 30 days.     No results found for ALT within last 30 days.     No results found for BILITOTAL within last 30 days.     No results found for WBC within last 30 days.     No results found for HGB within last 30 days.     No results found for PLT within last 30 days.     No results found for ANC within last 30 days.     No results found for ANC within last 30 days.        Assessment:  Patient is appropriate to start therapy.    Plan:  Basic chemotherapy teaching was reviewed with the patient including indication, start date of therapy, dose, administration, adverse effects, missed doses, food and drug interactions, monitoring, side effect management, office contact information, and safe handling. Written materials were provided and all questions answered.    Follow-Up:  Phone call 1 week after starting Xeloda.     Rachel Moniz, PharmD  Hematology/Oncology Pharmacist  [facility]  [phone]

## 2023-12-19 ENCOUNTER — PATIENT OUTREACH (OUTPATIENT)
Dept: CARE COORDINATION | Facility: CLINIC | Age: 58
End: 2023-12-19
Payer: MEDICAID

## 2023-12-19 ENCOUNTER — DOCUMENTATION ONLY (OUTPATIENT)
Dept: ONCOLOGY | Facility: CLINIC | Age: 58
End: 2023-12-19
Payer: MEDICAID

## 2023-12-19 NOTE — PROGRESS NOTES
Social Work - Follow-Up  [facility]    Data/Intervention: Brady is a 58-year-old gentleman with a diagnosis of colon cancer with metastatic disease to liver who is followed by Dr. Limon.       Patient Name: Brady Lenz Goes By: Brady ZAMBRANO/Age: 1965 (58 year old)    Reason for Follow-Up:  Health Insurance Coverage    Collaborated With:    - Pharmacy team- Sue  - Michelle Maki  - Maryann Limon MD  - Sapna Ward, RN  - Park Tao, Clinic Nurse Manager  - Liyah Palacios, Director Clinical Operations  - Sudhir Jimenez, Financial Assistance Navigator    Intervention/Education/Resources Provided:  Sudhir DOW) has been in touch with the [facility] and confirmed that MA application has been received, and he has asked for expedited approval. We continue to await determination of Denzel's Medicaid, as application is pending at this time.     This patient's lack of insurance was escalated to clinic leadership as Brady was unable to have port placed or receive C1D1 of treatment at planned time.     Assessment/Plan:  SW will continue to follow Brady for ongoing psychosocial support.       Deborah Yuan, JERMAIN, LICSW, OSW-C  Clinical - Adult Oncology  She/Her/Hers  Phone: [phone]  [facility]: M, Thu  *every other Tue, 8am-4:30pm  [facility]: W, F, *every other Tue, 8am-4:30pm

## 2023-12-19 NOTE — NURSING NOTE
Writer emailed chemo education per the request of the patient to [email or web address].    Writer will reach out next week to set up a time to review either in person or over the phone.       Tanisha Ward RN

## 2023-12-20 ENCOUNTER — TELEPHONE (OUTPATIENT)
Dept: PHARMACY | Facility: CLINIC | Age: 58
End: 2023-12-20
Payer: MEDICAID

## 2023-12-20 ENCOUNTER — PATIENT OUTREACH (OUTPATIENT)
Dept: CARE COORDINATION | Facility: CLINIC | Age: 58
End: 2023-12-20
Payer: MEDICAID

## 2023-12-20 NOTE — TELEPHONE ENCOUNTER
Prior Authorization Not Needed --NO ACTIVE INSURANCE    Medication: CAPECITABINE 500 MG PO TABS  Insurance Company:    Expected CoPay: $ 50  Pharmacy Filling the Rx: [facility] - [place] [address]  Pharmacy Notified: NO  Patient Notified: YES,  patient states $50 is not an option for him, he is not able to afford this. I sent message to care team to inform them of this. Free drug is not offered anymore per [employer].

## 2023-12-20 NOTE — PROGRESS NOTES
Coordination with care team. Everardo Hsu and this clinician connected with Michelle Maki who confirmed that Brain's case has been financially secured and that no waiver is needed. Michelle recommended proceeding with care.     Case escalated to Valencia De La O in Cancer Leadership who also recommended proceeding with patient's care.    Team updated.     Deborah Yuan, MSW, LICSW, OSW-C  Clinical - Adult Oncology  She/Her/Hers  Phone: [phone]  [facility]: M, Thu  *every other Tue, 8am-4:30pm  [facility]: W, F, *every other Tue, 8am-4:30pm

## 2023-12-21 ENCOUNTER — PATIENT OUTREACH (OUTPATIENT)
Dept: CARE COORDINATION | Facility: CLINIC | Age: 58
End: 2023-12-21
Payer: MEDICAID

## 2023-12-21 ENCOUNTER — PATIENT OUTREACH (OUTPATIENT)
Dept: ONCOLOGY | Facility: CLINIC | Age: 58
End: 2023-12-21
Payer: MEDICAID

## 2023-12-21 ENCOUNTER — DOCUMENTATION ONLY (OUTPATIENT)
Dept: CARE COORDINATION | Facility: CLINIC | Age: 58
End: 2023-12-21
Payer: MEDICAID

## 2023-12-21 DIAGNOSIS — C78.7 COLON CARCINOMA METASTATIC TO LIVER (H): Primary | ICD-10-CM

## 2023-12-21 DIAGNOSIS — C18.9 COLON CARCINOMA METASTATIC TO LIVER (H): Primary | ICD-10-CM

## 2023-12-21 RX ORDER — LORAZEPAM 2 MG/ML
0.5 INJECTION INTRAMUSCULAR EVERY 4 HOURS PRN
Status: CANCELLED | OUTPATIENT
Start: 2024-01-15

## 2023-12-21 RX ORDER — EPINEPHRINE 1 MG/ML
0.3 INJECTION, SOLUTION, CONCENTRATE INTRAVENOUS EVERY 5 MIN PRN
Status: CANCELLED | OUTPATIENT
Start: 2024-01-15

## 2023-12-21 RX ORDER — ALBUTEROL SULFATE 0.83 MG/ML
2.5 SOLUTION RESPIRATORY (INHALATION)
Status: CANCELLED | OUTPATIENT
Start: 2024-01-15

## 2023-12-21 RX ORDER — HEPARIN SODIUM (PORCINE) LOCK FLUSH IV SOLN 100 UNIT/ML 100 UNIT/ML
5 SOLUTION INTRAVENOUS
Status: CANCELLED | OUTPATIENT
Start: 2024-01-17

## 2023-12-21 RX ORDER — HEPARIN SODIUM,PORCINE 10 UNIT/ML
5-20 VIAL (ML) INTRAVENOUS DAILY PRN
Status: CANCELLED | OUTPATIENT
Start: 2024-01-17

## 2023-12-21 RX ORDER — HEPARIN SODIUM,PORCINE 10 UNIT/ML
5-20 VIAL (ML) INTRAVENOUS DAILY PRN
Status: CANCELLED | OUTPATIENT
Start: 2024-01-15

## 2023-12-21 RX ORDER — HEPARIN SODIUM (PORCINE) LOCK FLUSH IV SOLN 100 UNIT/ML 100 UNIT/ML
5 SOLUTION INTRAVENOUS
Status: CANCELLED | OUTPATIENT
Start: 2024-01-15

## 2023-12-21 RX ORDER — ONDANSETRON 2 MG/ML
8 INJECTION INTRAMUSCULAR; INTRAVENOUS ONCE
Status: CANCELLED | OUTPATIENT
Start: 2024-01-15

## 2023-12-21 RX ORDER — DIPHENHYDRAMINE HYDROCHLORIDE 50 MG/ML
50 INJECTION INTRAMUSCULAR; INTRAVENOUS
Status: CANCELLED
Start: 2024-01-15

## 2023-12-21 RX ORDER — MEPERIDINE HYDROCHLORIDE 25 MG/ML
25 INJECTION INTRAMUSCULAR; INTRAVENOUS; SUBCUTANEOUS EVERY 30 MIN PRN
Status: CANCELLED | OUTPATIENT
Start: 2024-01-15

## 2023-12-21 RX ORDER — ALBUTEROL SULFATE 90 UG/1
1-2 AEROSOL, METERED RESPIRATORY (INHALATION)
Status: CANCELLED
Start: 2024-01-15

## 2023-12-21 NOTE — PROGRESS NOTES
12/21/23 1524   OTHER   Assessment completed with: Patient   Plan of Care Education    Yearly learning assessment completed? Yes (see Education tab)   Diagnosis: colorectal cancer   Does patient understand diagnosis? Yes   Tx plan/regimen: FOLFOX - 6   Does patient understand treatment plan/regimen? Yes   Preparing for treatment: Reviewed treatment preparation information with patient (vascular access, day of chemo, visitor policy, what to bring, etc.)   Vascular access education provided for: Port  (needs port for 5FU infusion)   Side effect education: Fatigue;Infection;Mylosuppression;Nausea/Vomiting;Neuropathy;Mouth sores;Lab value monitoring (anemia, neutropenia, thrombocytopenia);Skin changes;Diarrhea/Constipation   Safety/self care at home reviewed with patient: Yes   Coping - concerns/fears reviewed with patient: Yes   Plan of Care: BEBETO follow-up appointment;Lab appointment;MD follow-up appointment   When to call provider: Bleeding;Uncontrolled diarrhea/constipation;Increased shortness of breath;New/worsening pain;Uncontrolled nausea/vomiting;Shaking chills;Temperature >100.4F   Reasons for deferring treatment reviewed with patient: Yes   Procedure education provided for:  Port/PICC placement   Evaluation of Learning   Patient Education Provided Yes   Readiness: Acceptance   Method: Explanation;Literature   Response: Verbalizes understanding

## 2023-12-21 NOTE — PROGRESS NOTES
[facility]: Cancer Care Plan of Care Education Note                                    Discussion with Patient:                                                      Spoke with patient what to expect with treatment , when to call the provider and frequency and length of infusions.      Antiemetics: Compazine and Zofran         Assessment:                                                      Assessment completed with:: Patient    Plan of Care Education   Yearly learning assessment completed?: Yes (see Education tab)  Diagnosis:: colorectal cancer  Does patient understand diagnosis?: Yes  Tx plan/regimen:: FOLFOX - 6  Does patient understand treatment plan/regimen?: Yes  Preparing for treatment:: Reviewed treatment preparation information with patient (vascular access, day of chemo, visitor policy, what to bring, etc.)  Vascular access education provided for:: Port (needs port for 5FU infusion)  Side effect education:: Fatigue;Infection;Mylosuppression;Nausea/Vomiting;Neuropathy;Mouth sores;Lab value monitoring (anemia, neutropenia, thrombocytopenia);Skin changes;Diarrhea/Constipation  Safety/self care at home reviewed with patient:: Yes  Coping - concerns/fears reviewed with patient:: Yes  Plan of Care:: BEBETO follow-up appointment;Lab appointment;MD follow-up appointment  When to call provider:: Bleeding;Uncontrolled diarrhea/constipation;Increased shortness of breath;New/worsening pain;Uncontrolled nausea/vomiting;Shaking chills;Temperature >100.4F  Reasons for deferring treatment reviewed with patient:: Yes  Procedure education provided for: : Port/PICC placement    Evaluation of Learning  Patient Education Provided: Yes  Readiness:: Acceptance  Method:: Explanation;Literature  Response:: Verbalizes understanding      Intervention/Education provided during outreach:                                                         Signature:  Tanisha Ward RN

## 2023-12-21 NOTE — PROGRESS NOTES
Social Work - Follow-Up  [facility]    Data/Intervention: Brady is a 58-year-old gentleman with a diagnosis of colon cancer with metastatic disease to liver.     Patient Name: Brady Lenz Goes By: Brady    /Age: 1965 (58 year old)    Reason for Follow-Up:  This clinician received voicemail from RN Urvashi Loja requesting social work outreach to Brady to review plan    Intervention/Education/Resources Provided:  This clinician called and reviewed the following with Brain:     1) Our financial counseling team has ensured that MA application has been received by [facility] and has done everything within their power to ask for expedited approval. MA status continues to be pending. Sudhir Jimenez will give an update when approved.   2) Dr. Limon feels that it is medically necessary to proceed with treatment, even without active coverage.  3) Dr. Limon/the pharmacy team are working now to change the plan for treatment to ensure that it is affordable and the best care. This will likely NOT include the oral treatment at present time  4) Sapna Ward, MARTÍNCC will outreach to you today at 2:30pm to review side effects of treatment    Brady expressed understanding and reported that he would update Urvashi of call.     This clinician collaborated with Michelle Maki ( [employer]) regarding pt's need for port-a-cath placement without active health insurance. Michelle updated this clinician that proceeding with port-a-cath placement will not be an issue and that patient should go ahead and do this. RNCC to update Brady of this information this afternoon at time of call.     Assessment/Plan:  Onc SW will continue to be available as needed for ongoing psychosocial support.    Previously provided patient/family with writer's contact information and availability.      Deborah Yuan, MSW, LICSW, OSW-C  Clinical - Adult Oncology  She/Her/Hers  Phone: [phone]  [facility]:  Marii LAYTON  *every other Tue, 8am-4:30pm  [facility]: W, F, *every other Tue, 8am-4:30pm

## 2023-12-21 NOTE — PROGRESS NOTES
[facility] Medical Respite  Progress Note: Follow up    Writer met with Brady in nursing office. He asked writer to call Deborah, oncology social worker for follow up on approval status of oral chemotherapy and when port-a-cath placement will be rescheduled for. Writer reviewed chart notes from  and pharmacist from 12/19 and 12/20. Reviewed notes with Brady. Offered Brady to call Deborah on office phone or his own cell phone. He asked me to call Deborah from office phone and we placed this call on speaker. Voicemail left for Deborah to return call or email to Brady regarding approval status of oral chemotherapy and port-a-cath placement appointment. [facility] nursing office phone number provided. Brady weighed himself. His weight is 165.9lbs. He is happy about gaining weight. He told writer he had follow up appointment with colorectal surgeon yesterday. No new orders or medications provided. Brady was appreciative of support and call placed to .

## 2023-12-22 ENCOUNTER — DOCUMENTATION ONLY (OUTPATIENT)
Dept: CARE COORDINATION | Facility: CLINIC | Age: 58
End: 2023-12-22
Payer: MEDICAID

## 2023-12-22 ENCOUNTER — DOCUMENTATION ONLY (OUTPATIENT)
Dept: PHARMACY | Facility: CLINIC | Age: 58
End: 2023-12-22
Payer: MEDICAID

## 2023-12-22 NOTE — PROGRESS NOTES
[facility] Medical Respite Program  Shelter Nurse Note - Follow up      Met with Brady today in his [facility] room.  Discussed plans for IV chemotherapy on 1/4, and education provided 12/21.  Brady expressed understanding and readiness.  Reviewed upcoming appointments (IV infusion 1/4, PET scan 1/16).  Brady stated he has not yet heard back about schedule for port placement.      Brady shared he received some mail regarding his pending medical assistance.  Requested support with next steps.  Read document with Brady.  Document asking for additional information about his income over last year.  Document offered phone call to give information.  Encouraged Brady call [facility] to follow up on information to help MA move forward.    Brady plans to meet with OS case management to help navigate getting correct info to [facility] for MA approval.      Left messages with ROSALEE Junior in oncology and Tanisha Ward, RN to confirm plans for port placement, and anticipate scheduling, as IV infusion is scheduled for 1/4.      OSH clinical team to follow up with oncology team to coordinate port placement.      Sapna Tran, MSN, RN  [facility] medical respite program  Ph: [phone], fax: [phone]

## 2023-12-26 ENCOUNTER — TELEPHONE (OUTPATIENT)
Dept: INTERVENTIONAL RADIOLOGY/VASCULAR | Facility: CLINIC | Age: 58
End: 2023-12-26
Payer: MEDICAID

## 2023-12-26 ENCOUNTER — PATIENT OUTREACH (OUTPATIENT)
Dept: ONCOLOGY | Facility: CLINIC | Age: 58
End: 2023-12-26
Payer: MEDICAID

## 2023-12-26 NOTE — TELEPHONE ENCOUNTER
set up an appointment with Darin MAYORGA for preop on 12/28 at 1030a and port placement is scheduled for 11am check in at [facility] on 12/29 for a 1230 procedure.     Writer FULTON with patient requesting a return call to review appointments.     Tanisha Ward RN

## 2023-12-28 ENCOUNTER — ONCOLOGY VISIT (OUTPATIENT)
Dept: ONCOLOGY | Facility: CLINIC | Age: 58
End: 2023-12-28
Attending: NURSE PRACTITIONER
Payer: MEDICAID

## 2023-12-28 ENCOUNTER — DOCUMENTATION ONLY (OUTPATIENT)
Dept: CARE COORDINATION | Facility: CLINIC | Age: 58
End: 2023-12-28

## 2023-12-28 ENCOUNTER — PATIENT OUTREACH (OUTPATIENT)
Dept: CARE COORDINATION | Facility: CLINIC | Age: 58
End: 2023-12-28
Payer: MEDICAID

## 2023-12-28 VITALS
HEART RATE: 63 BPM | OXYGEN SATURATION: 100 % | BODY MASS INDEX: 22.01 KG/M2 | DIASTOLIC BLOOD PRESSURE: 82 MMHG | SYSTOLIC BLOOD PRESSURE: 132 MMHG | RESPIRATION RATE: 16 BRPM | WEIGHT: 166.8 LBS

## 2023-12-28 DIAGNOSIS — C78.7 COLON CARCINOMA METASTATIC TO LIVER (H): Primary | ICD-10-CM

## 2023-12-28 DIAGNOSIS — C18.9 COLON CARCINOMA METASTATIC TO LIVER (H): Primary | ICD-10-CM

## 2023-12-28 LAB
ALBUMIN SERPL BCG-MCNC: 4.2 G/DL (ref 3.5–5.2)
ALP SERPL-CCNC: 190 U/L (ref 40–150)
ALT SERPL W P-5'-P-CCNC: 31 U/L (ref 0–70)
ANION GAP SERPL CALCULATED.3IONS-SCNC: 8 MMOL/L (ref 7–15)
AST SERPL W P-5'-P-CCNC: 80 U/L (ref 0–45)
BASOPHILS # BLD AUTO: 0.1 10E3/UL (ref 0–0.2)
BASOPHILS NFR BLD AUTO: 1 %
BILIRUB SERPL-MCNC: 0.3 MG/DL
BUN SERPL-MCNC: 19.7 MG/DL (ref 6–20)
CALCIUM SERPL-MCNC: 9.3 MG/DL (ref 8.6–10)
CHLORIDE SERPL-SCNC: 105 MMOL/L (ref 98–107)
CREAT SERPL-MCNC: 0.7 MG/DL (ref 0.67–1.17)
DEPRECATED HCO3 PLAS-SCNC: 27 MMOL/L (ref 22–29)
EGFRCR SERPLBLD CKD-EPI 2021: >90 ML/MIN/1.73M2
EOSINOPHIL # BLD AUTO: 0.6 10E3/UL (ref 0–0.7)
EOSINOPHIL NFR BLD AUTO: 8 %
ERYTHROCYTE [DISTWIDTH] IN BLOOD BY AUTOMATED COUNT: 21.4 % (ref 10–15)
GLUCOSE SERPL-MCNC: 89 MG/DL (ref 70–99)
HCT VFR BLD AUTO: 40.6 % (ref 40–53)
HGB BLD-MCNC: 12.2 G/DL (ref 13.3–17.7)
IMM GRANULOCYTES # BLD: 0 10E3/UL
IMM GRANULOCYTES NFR BLD: 0 %
LYMPHOCYTES # BLD AUTO: 1.1 10E3/UL (ref 0.8–5.3)
LYMPHOCYTES NFR BLD AUTO: 15 %
MCH RBC QN AUTO: 25.2 PG (ref 26.5–33)
MCHC RBC AUTO-ENTMCNC: 30 G/DL (ref 31.5–36.5)
MCV RBC AUTO: 84 FL (ref 78–100)
MONOCYTES # BLD AUTO: 0.8 10E3/UL (ref 0–1.3)
MONOCYTES NFR BLD AUTO: 11 %
NEUTROPHILS # BLD AUTO: 4.8 10E3/UL (ref 1.6–8.3)
NEUTROPHILS NFR BLD AUTO: 65 %
NRBC # BLD AUTO: 0 10E3/UL
NRBC BLD AUTO-RTO: 0 /100
PLATELET # BLD AUTO: 325 10E3/UL (ref 150–450)
POTASSIUM SERPL-SCNC: 4.9 MMOL/L (ref 3.4–5.3)
PROT SERPL-MCNC: 7.2 G/DL (ref 6.4–8.3)
RBC # BLD AUTO: 4.85 10E6/UL (ref 4.4–5.9)
SODIUM SERPL-SCNC: 140 MMOL/L (ref 135–145)
WBC # BLD AUTO: 7.3 10E3/UL (ref 4–11)

## 2023-12-28 PROCEDURE — 36415 COLL VENOUS BLD VENIPUNCTURE: CPT | Performed by: NURSE PRACTITIONER

## 2023-12-28 PROCEDURE — G0463 HOSPITAL OUTPT CLINIC VISIT: HCPCS | Performed by: NURSE PRACTITIONER

## 2023-12-28 PROCEDURE — 80053 COMPREHEN METABOLIC PANEL: CPT | Performed by: NURSE PRACTITIONER

## 2023-12-28 PROCEDURE — 99214 OFFICE O/P EST MOD 30 MIN: CPT | Performed by: NURSE PRACTITIONER

## 2023-12-28 PROCEDURE — 85025 COMPLETE CBC W/AUTO DIFF WBC: CPT | Performed by: NURSE PRACTITIONER

## 2023-12-28 ASSESSMENT — PAIN SCALES - GENERAL: PAINLEVEL: NO PAIN (0)

## 2023-12-28 NOTE — PROGRESS NOTES
Social Work - Follow-Up  [facility]    Data/Intervention: Brady is a 58-year-old gentleman with a diagnosis of colon cancer with metastatic disease to liver.     Patient Name: Brady Lenz Goes By: Brady    /Age: 1965 (58 year old)    Reason for Follow-Up:  RNTROY Alejo informed this clinician that Brady is scheduled for port-placement 23 at 11am at [facility] and will need ride and 24 hours observation.     Intervention/Education/Resources Provided:  Brady endorsed that he does not have anyone who would be able to assist with transportation, and does not have the finances to pay for a trip. Supportive of this clinician exploring options for transportation support.     ROSALEE called and LVM with [facility] medical respite program Ph: [phone], verifying that they would be able to provide 24 hour oversight needed.     ROSALEE confirmed with Accommodations Specialist that [employer] funds could be utilized for ride payment.     This clinician called [employer] (Ghanshyam, [phone]) who confirmed that he would be able to pick Brady up at 10:40am and bring to [facility]. Ghanshyam aware that Brady will likely be ready for pickup between 3-3:30pm. Ghanshyam given Accommodation Specialist contact information for ride payment: [email or web address]    This clinician reviewed plan with Charge MARTÍN Abarca in Care Suites.     ROSALEE called and reviewed plan with Brady. Brady has [employer] contact information    Assessment/Plan:  This clinician will plan for psychosocial check-in after C1D1.     Previously provided patient/family with writer's contact information and availability.      Deborah Yuan, MSW, LICSW, OSW-C  Clinical - Adult Oncology  She/Her/Hers  Phone: [phone]  [facility]: M, Thu  *every other Tue, 8am-4:30pm  [facility]: W, F, *every other Tue, 8am-4:30pm     Addendum:  2:58pm  SW reviewed [place] port-a-cath placement teaching with RNTROY Alonzo who confirmed that patient would be safe to return to [facility] with the ability to contact non-medical staff with concerns if they arise, and does not require 24/7 RN observation after port placement.     ROSALEE returned voicemail to Maxtena's cell phone, leaving voicemail with update.

## 2023-12-28 NOTE — PROGRESS NOTES
"Oncology Rooming Note    December 28, 2023 10:43 AM   Brady Lenz is a 58 year old male who presents for:    Chief Complaint   Patient presents with    Oncology Clinic Visit     Initial Vitals: /82   Pulse 63   Resp 16   Wt 75.7 kg (166 lb 12.8 oz)   SpO2 100%   BMI 22.01 kg/m   Estimated body mass index is 22.01 kg/m  as calculated from the following:    Height as of 10/24/23: 1.854 m (6' 1\").    Weight as of this encounter: 75.7 kg (166 lb 12.8 oz). Body surface area is 1.97 meters squared.  No Pain (0) Comment: Data Unavailable   No LMP for male patient.  Allergies reviewed: Yes  Medications reviewed: Yes    Medications: MEDICATION REFILLS NEEDED TODAY. Provider was notified.  Pharmacy name entered into EPIC:      [id number] [place], [place] - [address] [place], [place] - [address]    Frailty Screening:   Is the patient here for a new oncology consult visit in cancer care? 2. No      Clinical concerns: sinus infection   Darin Padilla  was notified.      Shari J. Schoenberger, [facility]            "

## 2023-12-28 NOTE — PROGRESS NOTES
[facility] Medical Respite Program  Shelter Nurse Note - Follow Up      Received a call this am from Nathalie Dunlap at [facility]. She wanted to confirm that patient will have 24 hour observation at [facility] after his port placement tomorrow.  Did return her phone call but it went to .  Left our number and my cell number if she had any concerns about tomorrow.  I did explain that there is only a nurse here tomorrow for 4 hours in the morning and early afternoon. There is a staff person here 24/7, but not a nurse.  They may need to keep patient at the hospital for observation after his procedure, if he requires more close monitoring.        Marie Olivas, RN, BSN  [facility] medical respite program  Ph: [phone], fax: [phone]

## 2023-12-28 NOTE — PROGRESS NOTES
Oncology/Hematology Visit Note  Dec 28, 2023    Reason for Visit: follow up preop for Port-A-Cath placement    Patient with metastatic right colon cancer with liver metastasis status post colectomy  Plan for chemotherapy        Interval History:  Patient reports feeling well.  Denies fever chills sweats cough shortness of breath chest pain nausea vomiting diarrhea abdominal pain or bleeding  Reports good energy appetite    Review of Systems:  14 point ROS of systems including Constitutional, Eyes, Respiratory, Cardiovascular, Gastroenterology, Genitourinary, Integumentary, Muscularskeletal, Psychiatric were all negative except for pertinent positives noted in my HPI.      Current Outpatient Medications   Medication Sig Dispense Refill    acetaminophen (TYLENOL) 325 MG tablet Take 2 tablets (650 mg) by mouth every 4 hours as needed for mild pain or fever 100 tablet 0    fexofenadine (ALLEGRA) 60 MG tablet Take 1 tablet (60 mg) by mouth daily as needed for allergies 30 tablet 0       Physical Examination:  General: The patient is a pleasant male in no acute distress.  /82   Pulse 63   Resp 16   Wt 75.7 kg (166 lb 12.8 oz)   SpO2 100%   BMI 22.01 kg/m    HEENT: EOMI, PERRL. Sclerae are anicteric. Oral mucosa is pink and moist with no lesions or thrush.   Lymph: Neck is supple with no lymphadenopathy in the cervical or supraclavicular areas.   Heart: Regular rate and rhythm.   Lungs: Clear to auscultation bilaterally.   GI: Bowel sounds present, soft, nontender with no palpable hepatosplenomegaly or masses.   Extremities: No lower extremity edema noted bilaterally.   Skin: No rashes, petechiae, or bruising noted on exposed skin.    Laboratory Data:  Results for orders placed or performed in visit on 12/28/23 (from the past 24 hour(s))   Comprehensive metabolic panel   Result Value Ref Range    Sodium 140 135 - 145 mmol/L    Potassium 4.9 3.4 - 5.3 mmol/L    Carbon Dioxide (CO2) 27 22 - 29 mmol/L    Anion Gap  8 7 - 15 mmol/L    Urea Nitrogen 19.7 6.0 - 20.0 mg/dL    Creatinine 0.70 0.67 - 1.17 mg/dL    GFR Estimate >90 >60 mL/min/1.73m2    Calcium 9.3 8.6 - 10.0 mg/dL    Chloride 105 98 - 107 mmol/L    Glucose 89 70 - 99 mg/dL    Alkaline Phosphatase 190 (H) 40 - 150 U/L    AST 80 (H) 0 - 45 U/L    ALT 31 0 - 70 U/L    Protein Total 7.2 6.4 - 8.3 g/dL    Albumin 4.2 3.5 - 5.2 g/dL    Bilirubin Total 0.3 <=1.2 mg/dL   CBC with platelets and differential    Narrative    The following orders were created for panel order CBC with platelets and differential.  Procedure                               Abnormality         Status                     ---------                               -----------         ------                     CBC with platelets and d...[[id number]]  Abnormal            Final result                 Please view results for these tests on the individual orders.   CBC with platelets and differential   Result Value Ref Range    WBC Count 7.3 4.0 - 11.0 10e3/uL    RBC Count 4.85 4.40 - 5.90 10e6/uL    Hemoglobin 12.2 (L) 13.3 - 17.7 g/dL    Hematocrit 40.6 40.0 - 53.0 %    MCV 84 78 - 100 fL    MCH 25.2 (L) 26.5 - 33.0 pg    MCHC 30.0 (L) 31.5 - 36.5 g/dL    RDW 21.4 (H) 10.0 - 15.0 %    Platelet Count 325 150 - 450 10e3/uL    % Neutrophils 65 %    % Lymphocytes 15 %    % Monocytes 11 %    % Eosinophils 8 %    % Basophils 1 %    % Immature Granulocytes 0 %    NRBCs per 100 WBC 0 <1 /100    Absolute Neutrophils 4.8 1.6 - 8.3 10e3/uL    Absolute Lymphocytes 1.1 0.8 - 5.3 10e3/uL    Absolute Monocytes 0.8 0.0 - 1.3 10e3/uL    Absolute Eosinophils 0.6 0.0 - 0.7 10e3/uL    Absolute Basophils 0.1 0.0 - 0.2 10e3/uL    Absolute Immature Granulocytes 0.0 <=0.4 10e3/uL    Absolute NRBCs 0.0 10e3/uL       PAST MEDICAL HISTORY:   Past Medical History:   Diagnosis Date    Asthma        PAST SURGICAL HISTORY:   Past Surgical History:   Procedure Laterality Date    COLECTOMY WITHOUT COLOSTOMY N/A 10/24/2023    Procedure:  Converted to Open RightColectomy;  Surgeon: Jose Moe MD;  Location:  OR    LAPAROSCOPIC ASSISTED COLECTOMY N/A 10/24/2023    Procedure: Laparoscopic Peritoneal Biopsy;  Surgeon: Jose Moe MD;  Location:  OR       FAMILY HISTORY: History reviewed. No pertinent family history.    SOCIAL HISTORY:   Social History     Tobacco Use    Smoking status: Never    Smokeless tobacco: Never   Substance Use Topics    Alcohol use: Not Currently         Assessment and Plan:    metastatic right colon cancer with liver metastasis status post colectomy  Plan for chemotherapy  -Patient scheduled for Port-A-Cath placement-okay to proceed with Port-A-Cath placement tomorrow  Patient scheduled in January with Dr. Limon started chemotherapy      Abnormal liver enzymes  Secondary to liver metastasis    Iron deficiency anemia  Stable hemoglobin continue with iron        GABBY Junior [facility]     Chart documentation with Dragon Voice recognition Software. Although reviewed after completion, some words and grammatical errors may remain.

## 2023-12-28 NOTE — LETTER
"    12/28/2023         RE: Brady Lenz  [address] [address]  [facility] [address]        Dear Colleague,    Thank you for referring your patient, Brady Lenz, to the [facility]. Please see a copy of my visit note below.    Oncology Rooming Note    December 28, 2023 10:43 AM   Brady Lenz is a 58 year old male who presents for:    Chief Complaint   Patient presents with     Oncology Clinic Visit     Initial Vitals: /82   Pulse 63   Resp 16   Wt 75.7 kg (166 lb 12.8 oz)   SpO2 100%   BMI 22.01 kg/m   Estimated body mass index is 22.01 kg/m  as calculated from the following:    Height as of 10/24/23: 1.854 m (6' 1\").    Weight as of this encounter: 75.7 kg (166 lb 12.8 oz). Body surface area is 1.97 meters squared.  No Pain (0) Comment: Data Unavailable   No LMP for male patient.  Allergies reviewed: Yes  Medications reviewed: Yes    Medications: MEDICATION REFILLS NEEDED TODAY. Provider was notified.  Pharmacy name entered into EPIC:      [id number] - [place], [place] - [address] [place] [facility] [place], [place] - [address]    Frailty Screening:   Is the patient here for a new oncology consult visit in cancer care? 2. No      Clinical concerns: sinus infection   Darin Padilla  was notified.      Shari J. Schoenberger, CMA              Oncology/Hematology Visit Note  Dec 28, 2023    Reason for Visit: follow up preop for Port-A-Cath placement    Patient with metastatic right colon cancer with liver metastasis status post colectomy  Plan for chemotherapy        Interval History:  Patient reports feeling well.  Denies fever chills sweats cough shortness of breath chest pain nausea vomiting diarrhea abdominal pain or bleeding  Reports good energy appetite    Review of Systems:  14 point ROS of systems including Constitutional, Eyes, Respiratory, Cardiovascular, Gastroenterology, Genitourinary, Integumentary, " Muscularskeletal, Psychiatric were all negative except for pertinent positives noted in my HPI.      Current Outpatient Medications   Medication Sig Dispense Refill     acetaminophen (TYLENOL) 325 MG tablet Take 2 tablets (650 mg) by mouth every 4 hours as needed for mild pain or fever 100 tablet 0     fexofenadine (ALLEGRA) 60 MG tablet Take 1 tablet (60 mg) by mouth daily as needed for allergies 30 tablet 0       Physical Examination:  General: The patient is a pleasant male in no acute distress.  /82   Pulse 63   Resp 16   Wt 75.7 kg (166 lb 12.8 oz)   SpO2 100%   BMI 22.01 kg/m    HEENT: EOMI, PERRL. Sclerae are anicteric. Oral mucosa is pink and moist with no lesions or thrush.   Lymph: Neck is supple with no lymphadenopathy in the cervical or supraclavicular areas.   Heart: Regular rate and rhythm.   Lungs: Clear to auscultation bilaterally.   GI: Bowel sounds present, soft, nontender with no palpable hepatosplenomegaly or masses.   Extremities: No lower extremity edema noted bilaterally.   Skin: No rashes, petechiae, or bruising noted on exposed skin.    Laboratory Data:  Results for orders placed or performed in visit on 12/28/23 (from the past 24 hour(s))   Comprehensive metabolic panel   Result Value Ref Range    Sodium 140 135 - 145 mmol/L    Potassium 4.9 3.4 - 5.3 mmol/L    Carbon Dioxide (CO2) 27 22 - 29 mmol/L    Anion Gap 8 7 - 15 mmol/L    Urea Nitrogen 19.7 6.0 - 20.0 mg/dL    Creatinine 0.70 0.67 - 1.17 mg/dL    GFR Estimate >90 >60 mL/min/1.73m2    Calcium 9.3 8.6 - 10.0 mg/dL    Chloride 105 98 - 107 mmol/L    Glucose 89 70 - 99 mg/dL    Alkaline Phosphatase 190 (H) 40 - 150 U/L    AST 80 (H) 0 - 45 U/L    ALT 31 0 - 70 U/L    Protein Total 7.2 6.4 - 8.3 g/dL    Albumin 4.2 3.5 - 5.2 g/dL    Bilirubin Total 0.3 <=1.2 mg/dL   CBC with platelets and differential    Narrative    The following orders were created for panel order CBC with platelets and differential.  Procedure                                Abnormality         Status                     ---------                               -----------         ------                     CBC with platelets and d...[[id number]]  Abnormal            Final result                 Please view results for these tests on the individual orders.   CBC with platelets and differential   Result Value Ref Range    WBC Count 7.3 4.0 - 11.0 10e3/uL    RBC Count 4.85 4.40 - 5.90 10e6/uL    Hemoglobin 12.2 (L) 13.3 - 17.7 g/dL    Hematocrit 40.6 40.0 - 53.0 %    MCV 84 78 - 100 fL    MCH 25.2 (L) 26.5 - 33.0 pg    MCHC 30.0 (L) 31.5 - 36.5 g/dL    RDW 21.4 (H) 10.0 - 15.0 %    Platelet Count 325 150 - 450 10e3/uL    % Neutrophils 65 %    % Lymphocytes 15 %    % Monocytes 11 %    % Eosinophils 8 %    % Basophils 1 %    % Immature Granulocytes 0 %    NRBCs per 100 WBC 0 <1 /100    Absolute Neutrophils 4.8 1.6 - 8.3 10e3/uL    Absolute Lymphocytes 1.1 0.8 - 5.3 10e3/uL    Absolute Monocytes 0.8 0.0 - 1.3 10e3/uL    Absolute Eosinophils 0.6 0.0 - 0.7 10e3/uL    Absolute Basophils 0.1 0.0 - 0.2 10e3/uL    Absolute Immature Granulocytes 0.0 <=0.4 10e3/uL    Absolute NRBCs 0.0 10e3/uL       PAST MEDICAL HISTORY:   Past Medical History:   Diagnosis Date     Asthma        PAST SURGICAL HISTORY:   Past Surgical History:   Procedure Laterality Date     COLECTOMY WITHOUT COLOSTOMY N/A 10/24/2023    Procedure: Converted to Open RightColectomy;  Surgeon: Jose Meo MD;  Location:  OR     LAPAROSCOPIC ASSISTED COLECTOMY N/A 10/24/2023    Procedure: Laparoscopic Peritoneal Biopsy;  Surgeon: Jose Moe MD;  Location:  OR       FAMILY HISTORY: History reviewed. No pertinent family history.    SOCIAL HISTORY:   Social History     Tobacco Use     Smoking status: Never     Smokeless tobacco: Never   Substance Use Topics     Alcohol use: Not Currently         Assessment and Plan:    metastatic right colon cancer with liver metastasis status post colectomy  Plan for  chemotherapy  -Patient scheduled for Port-A-Cath placement-okay to proceed with Port-A-Cath placement tomorrow  Patient scheduled in January with Dr. Limon started chemotherapy    GABBY Junior CNP  [facility]     Chart documentation with Dragon Voice recognition Software. Although reviewed after completion, some words and grammatical errors may remain.          Again, thank you for allowing me to participate in the care of your patient.        Sincerely,        GABBY Junior CNP

## 2023-12-29 ENCOUNTER — HOSPITAL ENCOUNTER (OUTPATIENT)
Facility: CLINIC | Age: 58
Discharge: HOME | End: 2023-12-29
Admitting: RADIOLOGY
Payer: MEDICAID

## 2023-12-29 ENCOUNTER — HOME INFUSION (PRE-WILLOW HOME INFUSION) (OUTPATIENT)
Dept: PHARMACY | Facility: CLINIC | Age: 58
End: 2023-12-29
Payer: MEDICAID

## 2023-12-29 ENCOUNTER — APPOINTMENT (OUTPATIENT)
Dept: INTERVENTIONAL RADIOLOGY/VASCULAR | Facility: CLINIC | Age: 58
End: 2023-12-29
Attending: INTERNAL MEDICINE
Payer: MEDICAID

## 2023-12-29 VITALS
OXYGEN SATURATION: 98 % | SYSTOLIC BLOOD PRESSURE: 124 MMHG | RESPIRATION RATE: 16 BRPM | HEART RATE: 71 BPM | DIASTOLIC BLOOD PRESSURE: 66 MMHG

## 2023-12-29 DIAGNOSIS — C78.7 COLON CARCINOMA METASTATIC TO LIVER (H): ICD-10-CM

## 2023-12-29 DIAGNOSIS — C18.9 COLON CARCINOMA METASTATIC TO LIVER (H): ICD-10-CM

## 2023-12-29 PROCEDURE — 250N000011 HC RX IP 250 OP 636: Performed by: RADIOLOGY

## 2023-12-29 PROCEDURE — 250N000009 HC RX 250: Performed by: RADIOLOGY

## 2023-12-29 PROCEDURE — 999N000163 HC STATISTIC SIMPLE TUBE INSERTION/CHARGE, PORT, CATH, FISTULOGRAM

## 2023-12-29 PROCEDURE — 250N000011 HC RX IP 250 OP 636: Performed by: NURSE PRACTITIONER

## 2023-12-29 PROCEDURE — 272N000196 HC ACCESSORY CR5

## 2023-12-29 PROCEDURE — 99152 MOD SED SAME PHYS/QHP 5/>YRS: CPT

## 2023-12-29 PROCEDURE — C1788 PORT, INDWELLING, IMP: HCPCS

## 2023-12-29 PROCEDURE — 250N000009 HC RX 250: Performed by: NURSE PRACTITIONER

## 2023-12-29 RX ORDER — NALOXONE HYDROCHLORIDE 0.4 MG/ML
0.4 INJECTION, SOLUTION INTRAMUSCULAR; INTRAVENOUS; SUBCUTANEOUS
Status: DISCONTINUED | OUTPATIENT
Start: 2023-12-29 | End: 2023-12-29 | Stop reason: HOSPADM

## 2023-12-29 RX ORDER — CEFAZOLIN SODIUM 2 G/100ML
2 INJECTION, SOLUTION INTRAVENOUS
Status: COMPLETED | OUTPATIENT
Start: 2023-12-29 | End: 2023-12-29

## 2023-12-29 RX ORDER — FENTANYL CITRATE 50 UG/ML
25-50 INJECTION, SOLUTION INTRAMUSCULAR; INTRAVENOUS EVERY 5 MIN PRN
Status: DISCONTINUED | OUTPATIENT
Start: 2023-12-29 | End: 2023-12-29 | Stop reason: HOSPADM

## 2023-12-29 RX ORDER — ACETAMINOPHEN 325 MG/1
650 TABLET ORAL
Status: DISCONTINUED | OUTPATIENT
Start: 2023-12-29 | End: 2023-12-29 | Stop reason: HOSPADM

## 2023-12-29 RX ORDER — FLUMAZENIL 0.1 MG/ML
0.2 INJECTION, SOLUTION INTRAVENOUS
Status: DISCONTINUED | OUTPATIENT
Start: 2023-12-29 | End: 2023-12-29 | Stop reason: HOSPADM

## 2023-12-29 RX ORDER — NALOXONE HYDROCHLORIDE 0.4 MG/ML
0.2 INJECTION, SOLUTION INTRAMUSCULAR; INTRAVENOUS; SUBCUTANEOUS
Status: DISCONTINUED | OUTPATIENT
Start: 2023-12-29 | End: 2023-12-29 | Stop reason: HOSPADM

## 2023-12-29 RX ORDER — HEPARIN SODIUM (PORCINE) LOCK FLUSH IV SOLN 100 UNIT/ML 100 UNIT/ML
500 SOLUTION INTRAVENOUS ONCE
Status: COMPLETED | OUTPATIENT
Start: 2023-12-29 | End: 2023-12-29

## 2023-12-29 RX ADMIN — HEPARIN SODIUM (PORCINE) LOCK FLUSH IV SOLN 100 UNIT/ML 500 UNITS: 100 SOLUTION at 13:19

## 2023-12-29 RX ADMIN — LIDOCAINE HYDROCHLORIDE 9 ML: 10; .005 INJECTION, SOLUTION EPIDURAL; INFILTRATION; INTRACAUDAL; PERINEURAL at 13:15

## 2023-12-29 RX ADMIN — CEFAZOLIN SODIUM 2 G: 2 INJECTION, SOLUTION INTRAVENOUS at 12:51

## 2023-12-29 RX ADMIN — MIDAZOLAM 2 MG: 1 INJECTION INTRAMUSCULAR; INTRAVENOUS at 13:16

## 2023-12-29 RX ADMIN — MIDAZOLAM 2 MG: 1 INJECTION INTRAMUSCULAR; INTRAVENOUS at 13:12

## 2023-12-29 RX ADMIN — LIDOCAINE HYDROCHLORIDE 5 ML: 10 INJECTION, SOLUTION INFILTRATION; PERINEURAL at 13:15

## 2023-12-29 RX ADMIN — FENTANYL CITRATE 50 MCG: 50 INJECTION, SOLUTION INTRAMUSCULAR; INTRAVENOUS at 13:00

## 2023-12-29 RX ADMIN — FENTANYL CITRATE 50 MCG: 50 INJECTION, SOLUTION INTRAMUSCULAR; INTRAVENOUS at 13:12

## 2023-12-29 ASSESSMENT — ACTIVITIES OF DAILY LIVING (ADL)
ADLS_ACUITY_SCORE: 35
ADLS_ACUITY_SCORE: 35

## 2023-12-29 NOTE — PRE-PROCEDURE
GENERAL PRE-PROCEDURE:   Procedure:  Port a catheter placement with moderate sedation  Date/Time:  12/29/2023 12:22 PM    Written consent obtained?: Yes    Risks and benefits: Risks, benefits and alternatives were discussed    Consent given by:  Patient  Patient states understanding of procedure being performed: Yes    Patient's understanding of procedure matches consent: Yes    Procedure consent matches procedure scheduled: Yes    Expected level of sedation:  Moderate  Appropriately NPO:  Yes  ASA Class:  3  Mallampati  :  Grade 2- soft palate, base of uvula, tonsillar pillars, and portion of posterior pharyngeal wall visible  Lungs:  Lungs clear with good breath sounds bilaterally  Heart:  Normal heart sounds and rate  History & Physical reviewed:  History and physical reviewed and no updates needed  Statement of review:  I have reviewed the lab findings, diagnostic data, medications, and the plan for sedation    Total time: 20 minutes    [facility] Interventional Radiology CNP ([phone]) (phone [phone])

## 2023-12-29 NOTE — PROGRESS NOTES
Care Suites Admission Nursing Note    Patient Information  Name: Brady LEE Young  Age: 58 year old  Reason for admission: Port Placement  Care Suites arrival time: 1120    Visitor Information       Patient Admission/Assessment   Pre-procedure assessment complete: Yes  If abnormal assessment/labs, provider notified: N/A  NPO: Yes  Medications held per instructions/orders: N/A  Consent: deferred  If applicable, pregnancy test status: deferred  Patient oriented to room: Yes  Education/questions answered: Yes  Plan/other: Proceed per orders    Discharge Planning    Discharge location: to Gerardo Benedict RN

## 2023-12-29 NOTE — PROCEDURES
[facility]    Procedure: Right IJ port placement.    Date/Time: 12/29/2023 1:29 PM    Performed by: Christiane Guthrie DO  Authorized by: Christiane Guthrie DO      UNIVERSAL PROTOCOL   Site Marked: NA  Prior Images Obtained and Reviewed:  Yes  Required items: Required blood products, implants, devices and special equipment available    Patient identity confirmed:  Verbally with patient, arm band, provided demographic data and hospital-assigned identification number  Patient was reevaluated immediately before administering moderate or deep sedation or anesthesia  Confirmation Checklist:  Patient's identity using two indicators, relevant allergies, procedure was appropriate and matched the consent or emergent situation and correct equipment/implants were available  Time out: Immediately prior to the procedure a time out was called    Universal Protocol: the Joint Commission Universal Protocol was followed    Preparation: Patient was prepped and draped in usual sterile fashion       ANESTHESIA    Anesthesia: Local infiltration  Local Anesthetic:  Lidocaine 1% without epinephrine      SEDATION  Patient Sedated: Yes    Sedation Type:  Moderate (conscious) sedation  Vital signs: Vital signs monitored during sedation    See dictated procedure note for full details.  Findings: Right internal jugular port placement.    Specimens: none    Complications: None    Condition: Stable    Plan: Ok to use.      PROCEDURE    Patient Tolerance:  Patient tolerated the procedure well with no immediate complications  Length of time physician/provider present for 1:1 monitoring during sedation: 15

## 2023-12-29 NOTE — DISCHARGE INSTRUCTIONS
Port Insertion Discharge Instructions     After you go home:    Have an adult stay with you for the first 6 hours  You may resume your normal diet       For 24 hours - due to the sedation you received:  Relax and take it easy  Do NOT make any important or legal decisions  Do NOT drive or operate machines at home or at work  Do NOT drink alcohol    Care of Puncture Sites:    Keep the dressings on your sites clean & dry for 24-48 hours. Change it only if it gets wet or dirty.  You may take a shower 24-48 hours after your procedure. Do not scrub site.  No submerging site for 2 weeks or until the incision is completely healed.  Do not remove the small white strips of tape, if present. Allow them to fall off on their own.   You may cover the wound with a bandaid if needed for comfort.      Activity     Avoid heavy lifting (greater than 10 pounds) or the overuse of your shoulder for 48-72 hours.    Bleeding:    If you start bleeding from the incision sites in your chest or neck - or have swelling in your neck, sit down and press on the site for 5-10 minutes.   If bleeding has not stopped after 10 minutes, call your provider.        Call 911 right away if you have heavy bleeding or bleeding that does not stop.      Medicines:    You may resume all medications  Resume your Platelet Inhibitors and Aspirin tomorrow at your regular dose  For minor pain, you may take Acetaminophen (Tylenol) or Ibuprofen (Advil)    Call the provider who ordered this procedure if:    You have swelling in your neck or over your port site  The incision area is red, swollen, hot or tender  You have chills or a fever greater than 101 F (38 C)  Any questions or concerns    Call  911 or go to the Emergency Room if you have:    Severe chest pain or trouble breathing  Bleeding that you cannot control    Additional Information:    Your port may be accessed right away.   You will need to have your port flushed every 30 days or after each use.      If you  have questions call:          [facility] Radiology Dept @ [phone]

## 2023-12-29 NOTE — PROGRESS NOTES
Pt left his port placement information and card behind today. I spoke with Deborah Yuan in Adult Oncology. She asked us to inter office the packet to her and they will give it to him at his chemo appointment. Brady is aware and will receive it then.

## 2023-12-29 NOTE — PROGRESS NOTES
Care Suites Discharge Nursing Note    Patient Information  Name: Brady Lenz  Age: 58 year old    Discharge Education:  Discharge instructions reviewed: Yes  Additional education/resources provided: AVS Port info and identifiers.  Patient/patient representative verbalizes understanding: Yes  Patient discharging on new medications: No  Medication education completed: Yes    Discharge Plans:   Discharge location: home  Discharge ride contacted: Yes  Approximate discharge time: 1430    Discharge Criteria:  Discharge criteria met and vital signs stable: Yes    Patient Belongs:  Patient belongings returned to patient: Yes    Roly Jimenez RN

## 2023-12-29 NOTE — PROGRESS NOTES
Therapy: 5FU  Insurance: [place] Medicaid   Ded: $3.80    Patient has 100% Coverage for Home Disconnect through his [place] Medicaid Plan. Pt has a monthly Deductible of $3.80. Pt has coverage for Onpro, hydration and Linecare. ml    In reference to  [facility] to check 5FU coverage.  Please contact Intake with any questions, [phone] or In Basket pool,  Home Infusion ([phone]).

## 2023-12-29 NOTE — IR NOTE
Patient Name: Brady Lenz  Medical Record Number: [id number]  Today's Date: 12/29/2023    Procedure: Port Placement  Proceduralist: Dr. Guthrie  Pathology present: no    Procedure Start: 1311  Procedure end: 1323  Sedation medications administered: Last Dose: 1316, Fentanyl 100 mcg, Versed 4 mg, Lidocaine 5 ml's, lido with epi 9 ml's.    Report given to: JACY Ponce RN  : no    Other Notes: Pt will require 1 bedrest post procedure. Pt arrived to IR room 2 from CS 13. Consent reviewed. Pt denies any questions or concerns regarding procedure. Pt positioned supine and monitored per protocol. Pt tolerated procedure without any noted complications.

## 2023-12-29 NOTE — PROGRESS NOTES
Care Suites Post Procedure Note    Patient Information  Name: Brady Lenz  Age: 58 year old    Post Procedure  Time patient returned to Care Suites: 1337  Concerns/abnormal assessment: no  If abnormal assessment, provider notified: N/A  Plan/Other: monitor.    Roly Jimenez RN

## 2024-01-02 ENCOUNTER — DOCUMENTATION ONLY (OUTPATIENT)
Dept: CARE COORDINATION | Facility: CLINIC | Age: 59
End: 2024-01-02
Payer: MEDICAID

## 2024-01-02 DIAGNOSIS — S42.034A CLOSED NONDISPLACED FRACTURE OF ACROMIAL END OF RIGHT CLAVICLE, INITIAL ENCOUNTER: Primary | ICD-10-CM

## 2024-01-02 NOTE — PROGRESS NOTES
[facility] Medical Respite Program  Shelter Nurse Note - Follow up      Met with Brady briefly outside of his room in the hallway of Our [facility] medical [facility]. Relayed that a message had been received regarding Brady leaving his port-a-cath pack and discharge instructions after his port-a-cath placement. Brady reported that he was aware and that he would be getting them when he went for his infusion appt. Brady reported that he understands how to care for his port-a-cath and discharge instructions and had no other needs or concerns at this time.       Angelica Juarez RN  [facility] medical respite program  Ph: [phone], fax: [phone]

## 2024-01-03 ENCOUNTER — TELEPHONE (OUTPATIENT)
Dept: PHARMACY | Facility: CLINIC | Age: 59
End: 2024-01-03
Payer: MEDICAID

## 2024-01-03 NOTE — TELEPHONE ENCOUNTER
Prior Authorization Not Needed per Insurance    Medication: CAPECITABINE 500 MG PO TABS  Insurance Company: [place] Medicaid ([facility]) - Phone [phone] Fax [phone]  Expected CoPay: $ 0  Pharmacy Filling the Rx: [facility] - [place], [place] - [address]  Pharmacy Notified: yes  Patient Notified: yes

## 2024-01-04 ENCOUNTER — DOCUMENTATION ONLY (OUTPATIENT)
Dept: CARE COORDINATION | Facility: CLINIC | Age: 59
End: 2024-01-04

## 2024-01-04 ENCOUNTER — TELEPHONE (OUTPATIENT)
Dept: ONCOLOGY | Facility: CLINIC | Age: 59
End: 2024-01-04

## 2024-01-04 ENCOUNTER — OFFICE VISIT (OUTPATIENT)
Dept: ORTHOPEDICS | Facility: CLINIC | Age: 59
End: 2024-01-04
Payer: MEDICAID

## 2024-01-04 ENCOUNTER — ANCILLARY PROCEDURE (OUTPATIENT)
Dept: GENERAL RADIOLOGY | Facility: CLINIC | Age: 59
End: 2024-01-04
Attending: PREVENTIVE MEDICINE
Payer: MEDICAID

## 2024-01-04 VITALS — WEIGHT: 166 LBS | BODY MASS INDEX: 21.9 KG/M2

## 2024-01-04 DIAGNOSIS — S42.034A CLOSED NONDISPLACED FRACTURE OF ACROMIAL END OF RIGHT CLAVICLE, INITIAL ENCOUNTER: ICD-10-CM

## 2024-01-04 DIAGNOSIS — S42.034A CLOSED NONDISPLACED FRACTURE OF ACROMIAL END OF RIGHT CLAVICLE, INITIAL ENCOUNTER: Primary | ICD-10-CM

## 2024-01-04 PROCEDURE — 99213 OFFICE O/P EST LOW 20 MIN: CPT | Performed by: PREVENTIVE MEDICINE

## 2024-01-04 PROCEDURE — 73000 X-RAY EXAM OF COLLAR BONE: CPT | Mod: RT | Performed by: RADIOLOGY

## 2024-01-04 NOTE — TELEPHONE ENCOUNTER
----- Message from Maryann Limon MD sent at 1/3/2024  4:16 PM CST -----  Regarding: RE: New Xelox + Rosetta for 12/19  Pharmacy/Sapna: Please, ask patient if he wants FOLFOX or xeloda with oxaliplatin.    bk  ----- Message -----  From: Jesusita Almanza  Sent: 1/3/2024  11:22 AM CST  To: Adelaida Palacios; Sudhir Jimenez; #  Subject: RE: New Xelox + Rosetta for 12/19                    Hello team,     Great news, patients Medicaid is active, backdated to 10/1/2023.    Xeloda is covered, $0 copay and he can fill this at [facility] or [facility] if he wanted to pick it up before or after his infusion appointments.    We will need a new tx plan entered if we do go the xeloda route.    Thank you,   Jesusita Almanza, [facility] Oncology Pharmacy Liaison  [phone]     ----- Message -----  From: Adelaida Palacios  Sent: 1/3/2024   8:50 AM CST  To: Sudhir Jimenez; Tanisha Ward, RN; #  Subject: RE: New Xelox + Rosetta for 12/19                    Deborah - I have not heard any more on this case.  Can someone provide an update please?  ----- Message -----  From: Deborah Rider [facility]  Sent: 12/19/2023  11:12 AM CST  To: Adelaida Palacios; Sudhir Jimenez; #  Subject: RE: New Xelox + Rosetta for 12/19                    Hi Park/Liyah,   This patient was supposed to get a port placed and C1D1 treatment today, which have not happened due to not having active health insurance.      In conversation I had with Michelle Maki today, she said that service line leadership should make a determination about whether or not to proceed with cancer treatment. She did not anticipate there would be an issue if the patient had a Financial Assistance Navigator (our FAN on this case is Sudhir Jimenez) determine that Brady does meet eligibility for Medicaid. We are just in a waiting period- not knowing when he will be approved (the county is out months in their assessment).     Michelle reported that a patient should not need to sign a waiver if their Medicaid  is pending.     I am escalating this issue to you for final determination and messaging to patient.     Thank you,   Deborah Yuan, MSW, LICSW, OSW-C  Clinical - Adult Oncology  She/Her/Hers  Phone: [phone]  [facility]: M, Thu  #every other Tue, 8am-4:30pm  [facility]: W, F, #every other Tue, 8am-4:30pm           ----- Message -----  From: Jesusita Almanza  Sent: 12/13/2023  11:05 AM CST  To: Tanisha Ward, RN; Maryann Limon MD; #  Subject: RE: New Xelox + Rosetta for 12/19                    Hello team,     I checked the medicaid website, no active insurance.    If patient wants to start before insurance is active he can get capecitabine at [facility] pharmacy, should be around $50.    Deborah-do you have any updates on the insurance process/status?    Thank you,   [facility]ia[facility]  [phone]           ----- Message -----  From: Manny Milton [facility]  Sent: 12/13/2023   9:52 AM CST  To: Tanisha Ward RN; Maryann Limon MD; #  Subject: New Xelox + Rosetta for 12/19                        Hi Sapna Plaza, and Dr. Braxton Mc: Are you ok if we use the labs from November for baseline labs for Xeloda? We will review the labs prior to infusion on 12/19 as well, but would like to release the Xeloda ahead of time for patient to start regimen together on 12/19. Can you please sign the orders?    Jesusita,  Can you please look into coverage of Xeloda for us? We are awaiting signed orders.    We will reach out for education on Xeloda with the patient in the next 1-2 days.     Thanks,  Manny Milton PharmD  [facility] Infusion Pharmacy and Oral Chemotherapy  [phone]  December 13, 2023

## 2024-01-04 NOTE — LETTER
1/4/2024      RE: Brady Lenz  [address] [id number]  [place] [address]     Dear Colleague,    Thank you for referring your patient, Brady Lenz, to the [facility] [place]. Please see a copy of my visit note below.    HISTORY OF PRESENT ILLNESS  Mr. Lenz is a pleasant 58 year old year old male who presents to clinic today with the following:    What problem are you here for: Follow-up for right distal clavicle fracture. He was last seen on 12/7/2023 for this issue. Since that time, he has been able to drive and noticed over the last week his should range of motion has improved.     How long have you had this problem: 11/26/2023, 5.5 weeks    Have you had any recent imaging of this problem? Xrays/MRI/CT scans:  - Updated XR of right clavicle taken at appointment today.     Have you had treatments for this problem in the past?  -Medications: Tylenol prn.  -Physical therapy: None   -Injections: None  -Surgery: None     How severe is this problem today? 0-10 scale: 1/10    What do you think caused this problem: Fall     MEDICAL HISTORY  Patient Active Problem List   Diagnosis     SBO (small bowel obstruction) (H)     Liver mass     Cecum mass     Anemia, unspecified type     Colon carcinoma metastatic to liver (H)       Current Outpatient Medications   Medication Sig Dispense Refill     acetaminophen (TYLENOL) 325 MG tablet Take 2 tablets (650 mg) by mouth every 4 hours as needed for mild pain or fever 100 tablet 0     fexofenadine (ALLEGRA) 60 MG tablet Take 1 tablet (60 mg) by mouth daily as needed for allergies 30 tablet 0       No Known Allergies    No family history on file.  Social History     Socioeconomic History     Marital status: Single   Tobacco Use     Smoking status: Never     Smokeless tobacco: Never   Vaping Use     Vaping Use: Never used   Substance and Sexual Activity     Alcohol use: Not Currently     Drug use: Not Currently       Additional medical/Social/Surgical  histories reviewed in EPIC and updated as appropriate.     REVIEW OF SYSTEMS (1/4/2024)  10 point ROS of systems including Constitutional, Eyes, Respiratory, Cardiovascular, Gastroenterology, Genitourinary, Integumentary, Musculoskeletal, Psychiatric, Allergic/Immunologic were all negative except for pertinent positives noted in my HPI.     PHYSICAL EXAM  VSS  Vital Signs: Wt 75.3 kg (166 lb)   BMI 21.90 kg/m   Patient declined being weighed. Body mass index is 21.9 kg/m .    General  - normal appearance, in no obvious distress  HEENT  - conjunctivae not injected, moist mucous membranes, normocephalic/atraumatic head, ears normal appearance, no lesions, mouth normal appearance, no scars, normal dentition and teeth present  CV  - normal radial pulse  Pulm  - normal respiratory pattern, non-labored  Musculoskeletal - right shoulder  - inspection: normal bone and joint alignment, no obvious deformity, no scapular winging, no AC step-off  - palpation: no bony or soft tissue tenderness, normal clavicle, non-tender AC  - ROM:  180 deg flexion   45 deg extension   150 deg abduction   90 deg ER   70 deg IR  - strength: 5/5  strength, 5/5 in all shoulder planes  - special tests:  (-) Speed's  (-) Neer  (-) Hawkin's  (-) Barbie  (-) [place]'s  (-) apprehension  (-) subscap lift-off  Neuro  - no sensory or motor deficit, grossly normal coordination, normal muscle tone  Skin  - no ecchymosis, erythema, warmth, or induration, no obvious rash  Psych  - interactive, appropriate, normal mood and affect       ASSESSMENT & PLAN  59 yo male with right distal clavicle fracture, improved, healed    I independently reviewed the following imaging studies:  Right xray: shows healed nondisplaced clavicle fracture  Cont. HEP  Followup PRN  Patient has been doing home exercise physical therapy program for this problem      Appropriate PPE was utilized for prevention of spread of Covid-19.  Hever Dobbins MD, CAQSM      Again, thank you for  allowing me to participate in the care of your patient.      Sincerely,    Hever Dobbins MD

## 2024-01-04 NOTE — PROGRESS NOTES
HISTORY OF PRESENT ILLNESS  Mr. Lenz is a pleasant 58 year old year old male who presents to clinic today with the following:    What problem are you here for: Follow-up for right distal clavicle fracture. He was last seen on 12/7/2023 for this issue. Since that time, he has been able to drive and noticed over the last week his should range of motion has improved.     How long have you had this problem: 11/26/2023, 5.5 weeks    Have you had any recent imaging of this problem? Xrays/MRI/CT scans:  - Updated XR of right clavicle taken at appointment today.     Have you had treatments for this problem in the past?  -Medications: Tylenol prn.  -Physical therapy: None   -Injections: None  -Surgery: None     How severe is this problem today? 0-10 scale: 1/10    What do you think caused this problem: Fall     MEDICAL HISTORY  Patient Active Problem List   Diagnosis    SBO (small bowel obstruction) (H)    Liver mass    Cecum mass    Anemia, unspecified type    Colon carcinoma metastatic to liver (H)       Current Outpatient Medications   Medication Sig Dispense Refill    acetaminophen (TYLENOL) 325 MG tablet Take 2 tablets (650 mg) by mouth every 4 hours as needed for mild pain or fever 100 tablet 0    fexofenadine (ALLEGRA) 60 MG tablet Take 1 tablet (60 mg) by mouth daily as needed for allergies 30 tablet 0       No Known Allergies    No family history on file.  Social History     Socioeconomic History    Marital status: Single   Tobacco Use    Smoking status: Never    Smokeless tobacco: Never   Vaping Use    Vaping Use: Never used   Substance and Sexual Activity    Alcohol use: Not Currently    Drug use: Not Currently       Additional medical/Social/Surgical histories reviewed in [facility] and updated as appropriate.     REVIEW OF SYSTEMS (1/4/2024)  10 point ROS of systems including Constitutional, Eyes, Respiratory, Cardiovascular, Gastroenterology, Genitourinary, Integumentary, Musculoskeletal, Psychiatric,  Allergic/Immunologic were all negative except for pertinent positives noted in my HPI.     PHYSICAL EXAM  VSS  Vital Signs: Wt 75.3 kg (166 lb)   BMI 21.90 kg/m   Patient declined being weighed. Body mass index is 21.9 kg/m .    General  - normal appearance, in no obvious distress  HEENT  - conjunctivae not injected, moist mucous membranes, normocephalic/atraumatic head, ears normal appearance, no lesions, mouth normal appearance, no scars, normal dentition and teeth present  CV  - normal radial pulse  Pulm  - normal respiratory pattern, non-labored  Musculoskeletal - right shoulder  - inspection: normal bone and joint alignment, no obvious deformity, no scapular winging, no AC step-off  - palpation: no bony or soft tissue tenderness, normal clavicle, non-tender AC  - ROM:  180 deg flexion   45 deg extension   150 deg abduction   90 deg ER   70 deg IR  - strength: 5/5  strength, 5/5 in all shoulder planes  - special tests:  (-) Speed's  (-) Neer  (-) Hawkin's  (-) Barbie  (-) [place]'s  (-) apprehension  (-) subscap lift-off  Neuro  - no sensory or motor deficit, grossly normal coordination, normal muscle tone  Skin  - no ecchymosis, erythema, warmth, or induration, no obvious rash  Psych  - interactive, appropriate, normal mood and affect       ASSESSMENT & PLAN  59 yo male with right distal clavicle fracture, improved, healed    I independently reviewed the following imaging studies:  Right xray: shows healed nondisplaced clavicle fracture  Cont. HEP  Followup PRN  Patient has been doing home exercise physical therapy program for this problem      Appropriate PPE was utilized for prevention of spread of Covid-19.  Hever Dobbins MD, CAM

## 2024-01-04 NOTE — PROGRESS NOTES
Our [facility] Medical Respite  Progress Note: Follow up    Writer met with Brady in nurse office. He tells me he has an orthopedic follow up appointment today at 1:30. He states right clavicle pain is getting better with improved range of motion. Denies pain over port-a-cath. He is now back to driving. He brought a piece of paper with him to review. He needs to select a health plan. There is a website he can access to see if his providers are covered under which plan. Also provided him with [place] Senior Linkage Line [phone]. Denies any additional needs or concerns today.

## 2024-01-05 ENCOUNTER — DOCUMENTATION ONLY (OUTPATIENT)
Dept: CARE COORDINATION | Facility: CLINIC | Age: 59
End: 2024-01-05
Payer: MEDICAID

## 2024-01-05 NOTE — PROGRESS NOTES
[facility] Medical Respite Program  Shelter Nurse Note - Follow up      Met with Brady briefly in the mays at Our [facility]. Brady reported his clavicle was feeling better and his follow up appointment went well. He said he had been feeling well otherwise and had no concerns with upcoming infusion appointments. I relayed to Brady that there was note of a voice message left for him from his cancer clinic, requesting call back for update on medication preferences. Brady reported that he had the number and he would call. Brady's Medicaid is now active and I asked whether he had picked his health plan. He said he had not yet, but that he would look into it and do so. He declined any assistance with this or any other concerns. I showed him the RN hours for the day and requested he stop by if he did have questions or concerns.       Angelica Juarez RN  [facility] medical respite program  Ph: [phone], fax: [phone]

## 2024-01-08 ENCOUNTER — PATIENT OUTREACH (OUTPATIENT)
Dept: CARE COORDINATION | Facility: CLINIC | Age: 59
End: 2024-01-08
Payer: MEDICAID

## 2024-01-08 NOTE — PROGRESS NOTES
Social Work - Telephone/MyChart message  [facility]  Data:   Patient Name: Brady Lenz  Goes By: Brady    KENYA/Age: 1965 (58 year old)      Reason for Referral: Psychosocial check-in and support    Intervention: Left voice message for patient on 2024 .   Plan:  will await patient's return phone call/message and provide assistance at that time.   Deborah Yuan, MSW, LICSW, OSW-C  Clinical - Adult Oncology  She/Her/Hers  Phone: [phone]  [facility]: M, Thu  *every other Tue, 8am-4:30pm  [facility]: W, F, *every other Tue, 8am-4:30pm

## 2024-01-09 NOTE — TELEPHONE ENCOUNTER
Writer called again and was unable to reach patient.  A VM was left requesting a return call.     Tanisha Ward RN

## 2024-01-10 ENCOUNTER — DOCUMENTATION ONLY (OUTPATIENT)
Dept: CARE COORDINATION | Facility: CLINIC | Age: 59
End: 2024-01-10
Payer: MEDICAID

## 2024-01-10 NOTE — PROGRESS NOTES
Our [facility] Medical Respite  Progress Note: Follow up    Met with Brady in nursing office. He has a flat affect and tells me he is not doing well. Reports posterior neck and upper back pain in muscles. He is grabbing bilateral trapezius muscle region. Ongoing and worsening for 1 week. He is not sure if this pain might be related to cancer. Has been trying acetaminophen and heating pad with minimal relief. Asked if he is able to get seen by primary care. He tells me he needs to establish with a provider. Offered assistance to schedule PCP appointment. Reviewed dates of upcoming oncology appointments on 1/15 and 1/16 PET scan. Let Brady know nursing is here to partner alongside him in navigating his healthcare journey.

## 2024-01-11 NOTE — TELEPHONE ENCOUNTER
Writer called and LVM requesting a return call. This has been the 4th attempts. Will plan to follow up on 1/15 for C1 of treatment.     Tanisha Ward RN

## 2024-01-12 ENCOUNTER — DOCUMENTATION ONLY (OUTPATIENT)
Dept: CARE COORDINATION | Facility: CLINIC | Age: 59
End: 2024-01-12
Payer: MEDICAID

## 2024-01-12 NOTE — PROGRESS NOTES
[facility] Medical Respite Program  Shelter Nurse Note - Medical respite exit    Exit date: January 12, 2024  Reason for exit: Met Medical Respite goals.Independent in navigating his healthcare.    Transportation for medical appointments: Independent. Has his own car.    COMMUNITY CONTACTS:    PCP: does not have one    Pharmacy:   Specialty providers: Dr. Dobbins-[facility]. Dr. Maryann Limon- [facility] : [place]    Insurance care coordinator: unknown  DME /supply contacts: none    RESOURCES PROVIDED:    Guest provided with all community contacts as above.    GOALS:    Guest goals met:Yes   RN goals met:Yes  Goals not met:     Recommendations/next steps: Establish Primary Care Provider. Did not want assistance with this at Medical Respite Discharge.       ADDITIONAL INFORMATION:     Urvashi Loja RN  [facility] medical respite program  Ph: [phone], fax: [phone]

## 2024-01-15 ENCOUNTER — ONCOLOGY VISIT (OUTPATIENT)
Dept: ONCOLOGY | Facility: CLINIC | Age: 59
End: 2024-01-15
Attending: INTERNAL MEDICINE
Payer: MEDICAID

## 2024-01-15 ENCOUNTER — INFUSION THERAPY VISIT (OUTPATIENT)
Dept: INFUSION THERAPY | Facility: CLINIC | Age: 59
End: 2024-01-15
Attending: INTERNAL MEDICINE
Payer: MEDICAID

## 2024-01-15 ENCOUNTER — DOCUMENTATION ONLY (OUTPATIENT)
Dept: ONCOLOGY | Facility: CLINIC | Age: 59
End: 2024-01-15
Payer: MEDICAID

## 2024-01-15 VITALS — BODY MASS INDEX: 22 KG/M2 | WEIGHT: 166 LBS | HEIGHT: 73 IN

## 2024-01-15 VITALS
SYSTOLIC BLOOD PRESSURE: 128 MMHG | WEIGHT: 166 LBS | RESPIRATION RATE: 16 BRPM | BODY MASS INDEX: 21.9 KG/M2 | OXYGEN SATURATION: 98 % | DIASTOLIC BLOOD PRESSURE: 91 MMHG | HEART RATE: 81 BPM

## 2024-01-15 DIAGNOSIS — C78.7 COLON CARCINOMA METASTATIC TO LIVER (H): Primary | ICD-10-CM

## 2024-01-15 DIAGNOSIS — R80.9 PROTEINURIA: ICD-10-CM

## 2024-01-15 DIAGNOSIS — K76.9 LIVER LESION: ICD-10-CM

## 2024-01-15 DIAGNOSIS — C18.9 COLON CARCINOMA METASTATIC TO LIVER (H): Primary | ICD-10-CM

## 2024-01-15 LAB
ALBUMIN SERPL BCG-MCNC: 4.4 G/DL (ref 3.5–5.2)
ALBUMIN UR-MCNC: 50 MG/DL
ALP SERPL-CCNC: 173 U/L (ref 40–150)
ALT SERPL W P-5'-P-CCNC: 23 U/L (ref 0–70)
ANION GAP SERPL CALCULATED.3IONS-SCNC: 16 MMOL/L (ref 7–15)
AST SERPL W P-5'-P-CCNC: 76 U/L (ref 0–45)
BASOPHILS # BLD AUTO: 0.1 10E3/UL (ref 0–0.2)
BASOPHILS NFR BLD AUTO: 1 %
BILIRUB SERPL-MCNC: 0.5 MG/DL
BILIRUB SERPL-MCNC: 0.5 MG/DL
BUN SERPL-MCNC: 16.5 MG/DL (ref 6–20)
CALCIUM SERPL-MCNC: 9.8 MG/DL (ref 8.6–10)
CHLORIDE SERPL-SCNC: 99 MMOL/L (ref 98–107)
CREAT SERPL-MCNC: 0.73 MG/DL (ref 0.67–1.17)
CREAT SERPL-MCNC: 0.75 MG/DL (ref 0.67–1.17)
DEPRECATED HCO3 PLAS-SCNC: 22 MMOL/L (ref 22–29)
EGFRCR SERPLBLD CKD-EPI 2021: >90 ML/MIN/1.73M2
EGFRCR SERPLBLD CKD-EPI 2021: >90 ML/MIN/1.73M2
EOSINOPHIL # BLD AUTO: 0.4 10E3/UL (ref 0–0.7)
EOSINOPHIL NFR BLD AUTO: 5 %
ERYTHROCYTE [DISTWIDTH] IN BLOOD BY AUTOMATED COUNT: 18 % (ref 10–15)
GLUCOSE SERPL-MCNC: 82 MG/DL (ref 70–99)
HCT VFR BLD AUTO: 42.1 % (ref 40–53)
HGB BLD-MCNC: 13.5 G/DL (ref 13.3–17.7)
IMM GRANULOCYTES # BLD: 0 10E3/UL
IMM GRANULOCYTES NFR BLD: 0 %
LYMPHOCYTES # BLD AUTO: 1.1 10E3/UL (ref 0.8–5.3)
LYMPHOCYTES NFR BLD AUTO: 11 %
MCH RBC QN AUTO: 26 PG (ref 26.5–33)
MCHC RBC AUTO-ENTMCNC: 32.1 G/DL (ref 31.5–36.5)
MCV RBC AUTO: 81 FL (ref 78–100)
MONOCYTES # BLD AUTO: 0.8 10E3/UL (ref 0–1.3)
MONOCYTES NFR BLD AUTO: 8 %
NEUTROPHILS # BLD AUTO: 7.2 10E3/UL (ref 1.6–8.3)
NEUTROPHILS NFR BLD AUTO: 75 %
NRBC # BLD AUTO: 0 10E3/UL
NRBC BLD AUTO-RTO: 0 /100
PLATELET # BLD AUTO: 308 10E3/UL (ref 150–450)
POTASSIUM SERPL-SCNC: 4.3 MMOL/L (ref 3.4–5.3)
PROT SERPL-MCNC: 7.6 G/DL (ref 6.4–8.3)
RBC # BLD AUTO: 5.2 10E6/UL (ref 4.4–5.9)
SODIUM SERPL-SCNC: 137 MMOL/L (ref 135–145)
WBC # BLD AUTO: 9.6 10E3/UL (ref 4–11)

## 2024-01-15 PROCEDURE — 96413 CHEMO IV INFUSION 1 HR: CPT

## 2024-01-15 PROCEDURE — 96417 CHEMO IV INFUS EACH ADDL SEQ: CPT

## 2024-01-15 PROCEDURE — 250N000011 HC RX IP 250 OP 636: Performed by: INTERNAL MEDICINE

## 2024-01-15 PROCEDURE — 82247 BILIRUBIN TOTAL: CPT | Performed by: INTERNAL MEDICINE

## 2024-01-15 PROCEDURE — 96415 CHEMO IV INFUSION ADDL HR: CPT

## 2024-01-15 PROCEDURE — 96367 TX/PROPH/DG ADDL SEQ IV INF: CPT

## 2024-01-15 PROCEDURE — 99215 OFFICE O/P EST HI 40 MIN: CPT | Performed by: INTERNAL MEDICINE

## 2024-01-15 PROCEDURE — 82378 CARCINOEMBRYONIC ANTIGEN: CPT | Performed by: INTERNAL MEDICINE

## 2024-01-15 PROCEDURE — G0498 CHEMO EXTEND IV INFUS W/PUMP: HCPCS

## 2024-01-15 PROCEDURE — 36591 DRAW BLOOD OFF VENOUS DEVICE: CPT | Performed by: INTERNAL MEDICINE

## 2024-01-15 PROCEDURE — G0463 HOSPITAL OUTPT CLINIC VISIT: HCPCS | Mod: 25 | Performed by: INTERNAL MEDICINE

## 2024-01-15 PROCEDURE — 96375 TX/PRO/DX INJ NEW DRUG ADDON: CPT

## 2024-01-15 PROCEDURE — 85025 COMPLETE CBC W/AUTO DIFF WBC: CPT | Performed by: INTERNAL MEDICINE

## 2024-01-15 PROCEDURE — 81003 URINALYSIS AUTO W/O SCOPE: CPT | Performed by: INTERNAL MEDICINE

## 2024-01-15 PROCEDURE — 258N000003 HC RX IP 258 OP 636: Performed by: INTERNAL MEDICINE

## 2024-01-15 PROCEDURE — 82565 ASSAY OF CREATININE: CPT | Performed by: INTERNAL MEDICINE

## 2024-01-15 RX ORDER — ONDANSETRON 2 MG/ML
8 INJECTION INTRAMUSCULAR; INTRAVENOUS ONCE
Status: COMPLETED | OUTPATIENT
Start: 2024-01-15 | End: 2024-01-15

## 2024-01-15 RX ORDER — ONDANSETRON 8 MG/1
8 TABLET, FILM COATED ORAL EVERY 8 HOURS PRN
Qty: 30 TABLET | Refills: 2 | Status: SHIPPED | OUTPATIENT
Start: 2024-01-15 | End: 2024-05-06

## 2024-01-15 RX ORDER — PROCHLORPERAZINE MALEATE 10 MG
10 TABLET ORAL EVERY 6 HOURS PRN
Qty: 30 TABLET | Refills: 2 | Status: SHIPPED | OUTPATIENT
Start: 2024-01-15 | End: 2024-05-06

## 2024-01-15 RX ORDER — DEXAMETHASONE 4 MG/1
8 TABLET ORAL DAILY
Qty: 4 TABLET | Refills: 2 | Status: SHIPPED | OUTPATIENT
Start: 2024-01-15 | End: 2024-02-12

## 2024-01-15 RX ADMIN — FOSAPREPITANT: 150 INJECTION, POWDER, LYOPHILIZED, FOR SOLUTION INTRAVENOUS at 12:34

## 2024-01-15 RX ADMIN — OXALIPLATIN 170 MG: 100 INJECTION, SOLUTION, CONCENTRATE INTRAVENOUS at 13:36

## 2024-01-15 RX ADMIN — SODIUM CHLORIDE 250 ML: 9 INJECTION, SOLUTION INTRAVENOUS at 12:31

## 2024-01-15 RX ADMIN — ONDANSETRON 8 MG: 2 INJECTION INTRAMUSCULAR; INTRAVENOUS at 12:31

## 2024-01-15 RX ADMIN — DEXTROSE MONOHYDRATE 250 ML: 50 INJECTION, SOLUTION INTRAVENOUS at 13:31

## 2024-01-15 RX ADMIN — BEVACIZUMAB 400 MG: 400 INJECTION, SOLUTION INTRAVENOUS at 12:58

## 2024-01-15 ASSESSMENT — PAIN SCALES - GENERAL: PAINLEVEL: MODERATE PAIN (4)

## 2024-01-15 NOTE — PROGRESS NOTES
Take Home Medication Teaching    Patient was educated on the following oral medications: dexamethasone, ondansetron, prochlorperazine on January 15, 2024. Teaching provided to patient included indication, dose, administration, adverse effects and side effect management. Written materials were provided and patient was given the opportunity to ask questions. Patient verbalized understanding of the information presented.     Nick Knutson, JoseD, MS  Hematology/Oncology Clinical Pharmacist  [facility]  [phone]

## 2024-01-15 NOTE — PROGRESS NOTES
Nursing Note:  Brady Lenz presents today for Port Labs.    Patient seen by provider today: Yes: Dr. Limon   present during visit today: Not Applicable.    Note: Collected Urine sample in Epic but patient still will need to provide urine sample.    Intravenous Access:  Labs drawn without difficulty.  Implanted Port.    Discharge Plan:   Patient was sent to Clinic for 10 AM appointment.    Maury Guthrie RN

## 2024-01-15 NOTE — PROGRESS NOTES
"Infusion Nursing Note:  Brady Lenz presents today for C1D1 FOLFOX/Avastin.    Patient seen by provider today: Yes: Braxton   present during visit today: Not Applicable.    Note: First time getting chemotherapy today. Oriented to infusion center. Chemotherapy teaching, side effects, and schedule reviewed with patient. General and individual chemotherapy side effects reviewed with patient including fatigue, immunosuppression, alopecia, nausea/vomiting, constipation/diarrhea and neuropathy/cold sensitivity. Pt instructed to call care coordinator, triage (or MD on call if after hours/weekends) with chills/temp >=100.5, questions/concerns. Pt stated understanding of plan.       Intravenous Access:  Implanted Port.  Accessed in FT.    Treatment Conditions:  Lab Results   Component Value Date    HGB 13.5 01/15/2024    WBC 9.6 01/15/2024    ANEUTAUTO 7.2 01/15/2024     01/15/2024        Lab Results   Component Value Date     01/15/2024    POTASSIUM 4.3 01/15/2024    MAG 2.1 11/03/2023    CR 0.75 01/15/2024    CR 0.73 01/15/2024    NAT 9.8 01/15/2024    BILITOTAL 0.5 01/15/2024    BILITOTAL 0.5 01/15/2024    ALBUMIN 4.4 01/15/2024    ALT 23 01/15/2024    AST 76 (H) 01/15/2024       Results reviewed, labs MET treatment parameters, ok to proceed with treatment.  Urine protein=50;  24H urine collection ordered by Dr. Limon.  Patient sent home with supplies and instructions.      Post Infusion Assessment:  Patient tolerated infusion without incident.  Blood return noted pre and post infusion.  Site patent and intact, free from redness, edema or discomfort.  No evidence of extravasations.     Prior to discharge: Port is secured in place with tegaderm and flushed with 10cc NS with positive blood return noted.    Continuous home infusion Dosi-Fuser pump connected.    All connectors secured in place and clamps taped open.    Pump started, \"running\" noted on display (CADD): Not Applicable.   Capillary element " taped to pt's skin per protocol.  Pump Connection double checked with Mayuri RESENDIZ.  Patient instructed to call our clinic or [facility] with any questions or concerns at home.  Patient verbalized understanding.    Patient set up for pump disconnect at home with [facility] on 1/17/24 at 1:30pm.  In basket message sent.      Discharge Plan:   Prescription refills given for Decadron, Zofran, Compazine sent to patient's pharmacy.  Nick, pharm D, consulted on these medications.  Discharge instructions reviewed with: Patient.  Patient and/or family verbalized understanding of discharge instructions and all questions answered.  Copy of AVS reviewed with patient and/or family.  Patient will return 1/30/24 for next appointment.  Patient discharged in stable condition accompanied by: self.  Departure Mode: Ambulatory.      Nimesh Louis RN

## 2024-01-15 NOTE — LETTER
1/15/2024         RE: Brady Lenz  [address] [id number]  [facility] [address]        Dear Colleague,    Thank you for referring your patient, Brady Lenz, to the [facility]. Please see a copy of my visit note below.    ONCOLOGY HISTORY: Mr. Lenz is a gentleman with metastatic right colon cancer.    -MMR intact.    -HER2/rowan negative (score 1+).  -NGS panel: A pathogenic KRAS mutation was identified: KRAS G12D     1. Seen in emergency room on 10/23/2023 with abdominal pain and distention.  Multiple investigations done.  -Hemoglobin of 5.9 with MCV of 58.  -Iron of 6 with saturation index of 2%.  -CEA of 6.7.  -CT abdomen and pelvis revealed mass in the cecum causing small bowel obstruction.  Also revealed 10.6 cm mass in right lobe of the liver and 4.9 cm mass in the left lobe of the liver.     2.  On 10/24/2023, he had open right colectomy and laparoscopic peritoneal biopsy.  There was large mass in the cecum.  There were 2 large liver metastasis.  There was a small implant on the peritoneum in left upper quadrant over the spleen.  There was no evidence of carcinomatosis.  -Peritoneal biopsy is positive for metastatic adenocarcinoma.  -Colectomy specimen reveals moderately differentiated adenocarcinoma measuring 6.8 cm.  8 of 29 lymph nodes positive.  pT3 pN2b.  MMR intact.  HER2/rowan negative (score 1+).     3.  CT chest on 10/25/2023 does not reveal any evidence of malignancy.    4.  On 01/15/2024, CEA of 15.8.  -Modified FOLFOX 6 and Avastin started on 01/15/2024.     Subjective:  Mr. Lenz is a 58-year-old gentleman with metastatic colon cancer with liver metastasis.  He is here to start treatment with modified FOLFOX 6 and Avastin.  Initial plan was for Xeloda with oxaliplatin.  Because of insurance, switched to FOLFOX with Avastin.    Overall is doing good.  Mild fatigue.  No headache.  No dizziness.  No chest pain.  No shortness of breath.  No abdominal pain.  No nausea or vomiting.   Appetite is good.  No urinary or bowel complaints.  No bleeding.  All other review of system negative.    Exam:  He is alert oriented x3.  Not in distress.  ECOG PS of 0.  Vitals: Reviewed.  Rest of the system is not examined.     Labs: CBC, CMP and CEA reviewed.     Assessment:  1.  A 58-year-old gentleman with metastatic colon cancer. MMR intact. HER2/rowan negative(1+). KRAS mutated.  2.  Mildly elevated alkaline phosphatase and AST from liver metastasis.     Plan:  1. Start chemotherapy with modified FOLFOX 6 and Avastin.  2. Liver biopsy by IR.  3. See me or BEBETO with next treatment.  4. PET scan as scheduled.     Discussion:  1.  I had a long discussion with the patient regarding colon cancer.  I again reviewed the imaging studies.  Surgical pathology was reviewed.  Explained to the patient that he has metastatic colon cancer with liver metastasis.    Patient had questions regarding liver lesions.  I told him this is consistent with metastasis from colon cancer.  He wants to make sure that liver lesions are not hepatocellular carcinoma.    After discussion, plan is to get liver biopsy.    2.  Discussed regarding treatment.  This is a palliative situation.  It will help to control disease, palliate symptoms and prolong life.    He will be started on modified FOLFOX 6 with Avastin.  Regimen and side effects reviewed.  Hopefully he tolerates it well.    3.  Labs were reviewed with him.  Anemia has resolved.  LFT is minimally elevated from liver metastasis. Will continue to monitor it.    4.  Patient had a few questions which were all answered.  He will be seen with next treatment.  Advised him to call us with any questions or concerns.    Total visit time of 40 minutes.  Time spent in today's visit, review of chart/investigations today, discussing regarding chemotherapy and documentation today.         Again, thank you for allowing me to participate in the care of your patient.        Sincerely,        Maryann  MD Braxton

## 2024-01-15 NOTE — PATIENT INSTRUCTIONS
Remember to take your Decadron as prescribed for the next 2 days.     24 Hour urine collection; Bring to your next infusion appointment.     [facility] will come and disconnect your chemo pump on 1/17 around 1:30pm

## 2024-01-16 ENCOUNTER — HOSPITAL ENCOUNTER (OUTPATIENT)
Dept: PET IMAGING | Facility: CLINIC | Age: 59
Discharge: HOME | End: 2024-01-16
Attending: INTERNAL MEDICINE | Admitting: INTERNAL MEDICINE
Payer: MEDICAID

## 2024-01-16 ENCOUNTER — TELEPHONE (OUTPATIENT)
Dept: ONCOLOGY | Facility: CLINIC | Age: 59
End: 2024-01-16
Payer: MEDICAID

## 2024-01-16 DIAGNOSIS — C18.9 COLON CARCINOMA METASTATIC TO LIVER (H): ICD-10-CM

## 2024-01-16 DIAGNOSIS — C78.7 COLON CARCINOMA METASTATIC TO LIVER (H): ICD-10-CM

## 2024-01-16 PROCEDURE — 78815 PET IMAGE W/CT SKULL-THIGH: CPT | Mod: PI

## 2024-01-16 PROCEDURE — 343N000001 HC RX 343: Performed by: INTERNAL MEDICINE

## 2024-01-16 PROCEDURE — A9552 F18 FDG: HCPCS | Performed by: INTERNAL MEDICINE

## 2024-01-16 PROCEDURE — 78815 PET IMAGE W/CT SKULL-THIGH: CPT | Mod: 26 | Performed by: RADIOLOGY

## 2024-01-16 RX ADMIN — FLUDEOXYGLUCOSE F-18 10.1 MILLICURIE: 500 INJECTION, SOLUTION INTRAVENOUS at 12:36

## 2024-01-16 NOTE — TELEPHONE ENCOUNTER
Writer called patient to follow up on chemotherapy C1D1 FOLFOX/Avastin received  1/15.    Writer was not able to reach patient and LVM.     Tanisha Ward RN

## 2024-01-18 LAB — CEA SERPL-MCNC: 15.8 NG/ML

## 2024-01-19 NOTE — RESULT ENCOUNTER NOTE
Dear  Delfin,    PET scan reveals multiple areas of cancer including livre and lymph nodes. Will review it during appointment.    Please, call me with any questions.    Maryann Limon MD

## 2024-01-19 NOTE — TELEPHONE ENCOUNTER
Writer called and spoke with patient  re: side effects of treatment. Patient noted minimal and manageable side effects of 1 episode of diarrhea and cold sensitivity when outside , and it is improving each day.     Discussed with patient re: liver biopsy and answered some general questions and review PET SCAN.  Patient at this time will hold on liver biopsy and discuss more at his appointment on 1/30.    Patient encouraged to call with fever 100.4 or great, and or any other concerns.     Tanisha Ward RN

## 2024-01-21 NOTE — PROGRESS NOTES
ONCOLOGY HISTORY: Mr. Lenz is a gentleman with metastatic right colon cancer.    -MMR intact.    -HER2/rowan negative (score 1+).  -NGS panel: A pathogenic KRAS mutation was identified: KRAS G12D     1. Seen in emergency room on 10/23/2023 with abdominal pain and distention.  Multiple investigations done.  -Hemoglobin of 5.9 with MCV of 58.  -Iron of 6 with saturation index of 2%.  -CEA of 6.7.  -CT abdomen and pelvis revealed mass in the cecum causing small bowel obstruction.  Also revealed 10.6 cm mass in right lobe of the liver and 4.9 cm mass in the left lobe of the liver.     2.  On 10/24/2023, he had open right colectomy and laparoscopic peritoneal biopsy.  There was large mass in the cecum.  There were 2 large liver metastasis.  There was a small implant on the peritoneum in left upper quadrant over the spleen.  There was no evidence of carcinomatosis.  -Peritoneal biopsy is positive for metastatic adenocarcinoma.  -Colectomy specimen reveals moderately differentiated adenocarcinoma measuring 6.8 cm.  8 of 29 lymph nodes positive.  pT3 pN2b.  MMR intact.  HER2/rowan negative (score 1+).     3.  CT chest on 10/25/2023 does not reveal any evidence of malignancy.    4.  On 01/15/2024, CEA of 15.8.  -Modified FOLFOX 6 and Avastin started on 01/15/2024.     Subjective:  Mr. Lenz is a 58-year-old gentleman with metastatic colon cancer with liver metastasis.  He is here to start treatment with modified FOLFOX 6 and Avastin.  Initial plan was for Xeloda with oxaliplatin.  Because of insurance, switched to FOLFOX with Avastin.    Overall is doing good.  Mild fatigue.  No headache.  No dizziness.  No chest pain.  No shortness of breath.  No abdominal pain.  No nausea or vomiting.  Appetite is good.  No urinary or bowel complaints.  No bleeding.  All other review of system negative.    Exam:  He is alert oriented x3.  Not in distress.  ECOG PS of 0.  Vitals: Reviewed.  Rest of the system is not examined.     Labs: CBC,  CMP and CEA reviewed.     Assessment:  1.  A 58-year-old gentleman with metastatic colon cancer. MMR intact. HER2/rowan negative(1+). KRAS mutated.  2.  Mildly elevated alkaline phosphatase and AST from liver metastasis.     Plan:  1. Start chemotherapy with modified FOLFOX 6 and Avastin.  2. Liver biopsy by IR.  3. See me or BEBETO with next treatment.  4. PET scan as scheduled.     Discussion:  1.  I had a long discussion with the patient regarding colon cancer.  I again reviewed the imaging studies.  Surgical pathology was reviewed.  Explained to the patient that he has metastatic colon cancer with liver metastasis.    Patient had questions regarding liver lesions.  I told him this is consistent with metastasis from colon cancer.  He wants to make sure that liver lesions are not hepatocellular carcinoma.    After discussion, plan is to get liver biopsy.    2.  Discussed regarding treatment.  This is a palliative situation.  It will help to control disease, palliate symptoms and prolong life.    He will be started on modified FOLFOX 6 with Avastin.  Regimen and side effects reviewed.  Hopefully he tolerates it well.    3.  Labs were reviewed with him.  Anemia has resolved.  LFT is minimally elevated from liver metastasis. Will continue to monitor it.    4.  Patient had a few questions which were all answered.  He will be seen with next treatment.  Advised him to call us with any questions or concerns.    Total visit time of 40 minutes.  Time spent in today's visit, review of chart/investigations today, discussing regarding chemotherapy and documentation today.

## 2024-01-30 ENCOUNTER — ONCOLOGY VISIT (OUTPATIENT)
Dept: ONCOLOGY | Facility: CLINIC | Age: 59
End: 2024-01-30
Attending: INTERNAL MEDICINE
Payer: MEDICAID

## 2024-01-30 ENCOUNTER — INFUSION THERAPY VISIT (OUTPATIENT)
Dept: INFUSION THERAPY | Facility: CLINIC | Age: 59
End: 2024-01-30
Attending: INTERNAL MEDICINE
Payer: MEDICAID

## 2024-01-30 VITALS
RESPIRATION RATE: 16 BRPM | HEIGHT: 73 IN | WEIGHT: 160.4 LBS | DIASTOLIC BLOOD PRESSURE: 82 MMHG | OXYGEN SATURATION: 99 % | HEART RATE: 77 BPM | TEMPERATURE: 98 F | BODY MASS INDEX: 21.26 KG/M2 | SYSTOLIC BLOOD PRESSURE: 134 MMHG

## 2024-01-30 VITALS
OXYGEN SATURATION: 98 % | DIASTOLIC BLOOD PRESSURE: 84 MMHG | WEIGHT: 159 LBS | BODY MASS INDEX: 20.98 KG/M2 | TEMPERATURE: 96.9 F | RESPIRATION RATE: 16 BRPM | SYSTOLIC BLOOD PRESSURE: 130 MMHG | HEART RATE: 78 BPM

## 2024-01-30 DIAGNOSIS — C78.7 COLON CARCINOMA METASTATIC TO LIVER (H): Primary | ICD-10-CM

## 2024-01-30 DIAGNOSIS — C18.9 COLON CARCINOMA METASTATIC TO LIVER (H): Primary | ICD-10-CM

## 2024-01-30 LAB
ALBUMIN UR-MCNC: 10 MG/DL
BASOPHILS # BLD AUTO: 0 10E3/UL (ref 0–0.2)
BASOPHILS NFR BLD AUTO: 1 %
EOSINOPHIL # BLD AUTO: 0.3 10E3/UL (ref 0–0.7)
EOSINOPHIL NFR BLD AUTO: 6 %
ERYTHROCYTE [DISTWIDTH] IN BLOOD BY AUTOMATED COUNT: 16.3 % (ref 10–15)
HCT VFR BLD AUTO: 40.9 % (ref 40–53)
HGB BLD-MCNC: 13 G/DL (ref 13.3–17.7)
IMM GRANULOCYTES # BLD: 0 10E3/UL
IMM GRANULOCYTES NFR BLD: 0 %
LYMPHOCYTES # BLD AUTO: 1.2 10E3/UL (ref 0.8–5.3)
LYMPHOCYTES NFR BLD AUTO: 24 %
MCH RBC QN AUTO: 25.8 PG (ref 26.5–33)
MCHC RBC AUTO-ENTMCNC: 31.8 G/DL (ref 31.5–36.5)
MCV RBC AUTO: 81 FL (ref 78–100)
MONOCYTES # BLD AUTO: 0.6 10E3/UL (ref 0–1.3)
MONOCYTES NFR BLD AUTO: 11 %
NEUTROPHILS # BLD AUTO: 3 10E3/UL (ref 1.6–8.3)
NEUTROPHILS NFR BLD AUTO: 58 %
NRBC # BLD AUTO: 0 10E3/UL
NRBC BLD AUTO-RTO: 0 /100
PLATELET # BLD AUTO: 399 10E3/UL (ref 150–450)
RBC # BLD AUTO: 5.04 10E6/UL (ref 4.4–5.9)
WBC # BLD AUTO: 5.2 10E3/UL (ref 4–11)

## 2024-01-30 PROCEDURE — 258N000003 HC RX IP 258 OP 636: Performed by: INTERNAL MEDICINE

## 2024-01-30 PROCEDURE — 96413 CHEMO IV INFUSION 1 HR: CPT

## 2024-01-30 PROCEDURE — 96417 CHEMO IV INFUS EACH ADDL SEQ: CPT

## 2024-01-30 PROCEDURE — 96375 TX/PRO/DX INJ NEW DRUG ADDON: CPT

## 2024-01-30 PROCEDURE — 99214 OFFICE O/P EST MOD 30 MIN: CPT | Performed by: INTERNAL MEDICINE

## 2024-01-30 PROCEDURE — 250N000011 HC RX IP 250 OP 636: Performed by: INTERNAL MEDICINE

## 2024-01-30 PROCEDURE — 96415 CHEMO IV INFUSION ADDL HR: CPT

## 2024-01-30 PROCEDURE — G0463 HOSPITAL OUTPT CLINIC VISIT: HCPCS | Performed by: INTERNAL MEDICINE

## 2024-01-30 PROCEDURE — 85025 COMPLETE CBC W/AUTO DIFF WBC: CPT | Performed by: INTERNAL MEDICINE

## 2024-01-30 PROCEDURE — 96367 TX/PROPH/DG ADDL SEQ IV INF: CPT

## 2024-01-30 PROCEDURE — G0498 CHEMO EXTEND IV INFUS W/PUMP: HCPCS

## 2024-01-30 PROCEDURE — 36591 DRAW BLOOD OFF VENOUS DEVICE: CPT | Performed by: INTERNAL MEDICINE

## 2024-01-30 PROCEDURE — 81003 URINALYSIS AUTO W/O SCOPE: CPT | Performed by: INTERNAL MEDICINE

## 2024-01-30 RX ORDER — LORAZEPAM 2 MG/ML
0.5 INJECTION INTRAMUSCULAR EVERY 4 HOURS PRN
Status: CANCELLED | OUTPATIENT
Start: 2024-01-30

## 2024-01-30 RX ORDER — ONDANSETRON 2 MG/ML
8 INJECTION INTRAMUSCULAR; INTRAVENOUS ONCE
Status: COMPLETED | OUTPATIENT
Start: 2024-01-30 | End: 2024-01-30

## 2024-01-30 RX ORDER — HEPARIN SODIUM (PORCINE) LOCK FLUSH IV SOLN 100 UNIT/ML 100 UNIT/ML
5 SOLUTION INTRAVENOUS
Status: CANCELLED | OUTPATIENT
Start: 2024-01-30

## 2024-01-30 RX ORDER — MEPERIDINE HYDROCHLORIDE 25 MG/ML
25 INJECTION INTRAMUSCULAR; INTRAVENOUS; SUBCUTANEOUS EVERY 30 MIN PRN
Status: CANCELLED | OUTPATIENT
Start: 2024-01-30

## 2024-01-30 RX ORDER — METHYLPREDNISOLONE SODIUM SUCCINATE 125 MG/2ML
125 INJECTION, POWDER, LYOPHILIZED, FOR SOLUTION INTRAMUSCULAR; INTRAVENOUS
Status: CANCELLED
Start: 2024-01-30

## 2024-01-30 RX ORDER — ONDANSETRON 2 MG/ML
8 INJECTION INTRAMUSCULAR; INTRAVENOUS ONCE
Status: CANCELLED | OUTPATIENT
Start: 2024-01-30

## 2024-01-30 RX ORDER — HEPARIN SODIUM,PORCINE 10 UNIT/ML
5-20 VIAL (ML) INTRAVENOUS DAILY PRN
Status: CANCELLED | OUTPATIENT
Start: 2024-01-31

## 2024-01-30 RX ORDER — EPINEPHRINE 1 MG/ML
0.3 INJECTION, SOLUTION INTRAMUSCULAR; SUBCUTANEOUS EVERY 5 MIN PRN
Status: CANCELLED | OUTPATIENT
Start: 2024-01-30

## 2024-01-30 RX ORDER — HEPARIN SODIUM (PORCINE) LOCK FLUSH IV SOLN 100 UNIT/ML 100 UNIT/ML
5 SOLUTION INTRAVENOUS
Status: CANCELLED | OUTPATIENT
Start: 2024-01-31

## 2024-01-30 RX ORDER — DIPHENHYDRAMINE HYDROCHLORIDE 50 MG/ML
50 INJECTION INTRAMUSCULAR; INTRAVENOUS
Status: CANCELLED
Start: 2024-01-30

## 2024-01-30 RX ORDER — ALBUTEROL SULFATE 0.83 MG/ML
2.5 SOLUTION RESPIRATORY (INHALATION)
Status: CANCELLED | OUTPATIENT
Start: 2024-01-30

## 2024-01-30 RX ORDER — ALBUTEROL SULFATE 90 UG/1
1-2 AEROSOL, METERED RESPIRATORY (INHALATION)
Status: CANCELLED
Start: 2024-01-30

## 2024-01-30 RX ORDER — HEPARIN SODIUM,PORCINE 10 UNIT/ML
5-20 VIAL (ML) INTRAVENOUS DAILY PRN
Status: CANCELLED | OUTPATIENT
Start: 2024-01-30

## 2024-01-30 RX ADMIN — OXALIPLATIN 170 MG: 5 INJECTION, SOLUTION INTRAVENOUS at 10:48

## 2024-01-30 RX ADMIN — DEXTROSE MONOHYDRATE 250 ML: 50 INJECTION, SOLUTION INTRAVENOUS at 10:48

## 2024-01-30 RX ADMIN — BEVACIZUMAB 400 MG: 400 INJECTION, SOLUTION INTRAVENOUS at 10:09

## 2024-01-30 RX ADMIN — ONDANSETRON 8 MG: 2 INJECTION INTRAMUSCULAR; INTRAVENOUS at 09:35

## 2024-01-30 RX ADMIN — SODIUM CHLORIDE 250 ML: 9 INJECTION, SOLUTION INTRAVENOUS at 09:22

## 2024-01-30 RX ADMIN — FOSAPREPITANT: 150 INJECTION, POWDER, LYOPHILIZED, FOR SOLUTION INTRAVENOUS at 09:37

## 2024-01-30 ASSESSMENT — PAIN SCALES - GENERAL: PAINLEVEL: NO PAIN (0)

## 2024-01-30 NOTE — PROGRESS NOTES
ONCOLOGY HISTORY: Mr. Lenz is a gentleman with metastatic right colon cancer.    -MMR intact.    -HER2/rowan negative (score 1+).  -NGS panel: A pathogenic KRAS mutation was identified: KRAS G12D      1. Seen in emergency room on 10/23/2023 with abdominal pain and distention.  Multiple investigations done.  -Hemoglobin of 5.9 with MCV of 58.  -Iron of 6 with saturation index of 2%.  -CEA of 6.7.  -CT abdomen and pelvis revealed mass in the cecum causing small bowel obstruction.  Also revealed 10.6 cm mass in right lobe of the liver and 4.9 cm mass in the left lobe of the liver.     2.  On 10/24/2023, he had open right colectomy and laparoscopic peritoneal biopsy.  There was large mass in the cecum.  There were 2 large liver metastasis.  There was a small implant on the peritoneum in left upper quadrant over the spleen.  There was no evidence of carcinomatosis.  -Peritoneal biopsy is positive for metastatic adenocarcinoma.  -Colectomy specimen reveals moderately differentiated adenocarcinoma measuring 6.8 cm.  8 of 29 lymph nodes positive.  pT3 pN2b.  MMR intact.  HER2/rowan negative (score 1+).     3.  CT chest on 10/25/2023 does not reveal any evidence of malignancy.  -PET scan on 01/16/2024 reveals liver metastasis, splenic metastasis, peritoneal carcinomatosis and right lung 6 mm nodule suspicious for metastasis.     4.  On 01/15/2024, CEA of 15.8.  -Modified FOLFOX 6 and Avastin started on 01/15/2024.     Subjective:  Mr. Lenz is a 58-year-old gentleman with metastatic colon cancer. He is on modified FOLFOX 6 and Avastin.      Overall he tolerated first cycle well.  He had cold sensitivity for about 3 to 4 days.  He also had some sensation of pins-and-needles in the fingers.    PET scan was done on 01/16/2024:  1. Numerous hypermetabolic metastatic hepatic lesions, increased in size since recent CT. The largest necrotic lesion takes up >50% of the right hepatic lobe volume.  2. Single hypermetabolic metastatic  lesion in the inferior spleen.  3. Hypermetabolic right subdiaphragmatic, perisplenic, and right retroperitoneal peritoneal nodularity consistent with peritoneal carcinomatosis.  4. Right hemicolectomy and primary anastomosis uptake likely physiologic but adjacent nodules suspicious for implants.   5) Lung - 6 mm right middle lobe peribronchial lung nodule, new since 10/25/2023, concerning for metastasis.    No headache.  No dizziness.  No chest pain.  No shortness of breath.  No abdominal pain.  No nausea or vomiting.  No urinary or bowel complaints.  No bleeding.  He has mild generalized weakness.  All other review of system negative.     Exam:  He is alert oriented x3.  Not in distress.  ECOG PS of 0.  Vitals: Reviewed.  Rest of the system is not examined.     Labs: CBC and urine protein  reviewed.     Assessment:  1.  A 58-year-old gentleman with metastatic colon cancer. MMR intact. HER2/rowan negative(1+). KRAS mutated.  2.  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  3.  Mild normocytic anemia from chemotherapy.     Plan:  Continue chemotherapy.  See me or BEBETO with next treatment.     Discussion:  1.  Patient overall tolerated first cycle of treatment well.  He has grade 1 neuropathy.  He had some cold sensitivity.  He has mild generalized weakness.    Labs were reviewed with him.  They are good for treatment.    He will continue on modified FOLFOX 6 with Avastin.  Explained to the patient that some of the side effects including neuropathy, cold sensitivity and fatigue will get worse.    2.  PET scan was reviewed with him.  Explained to him that there are multiple sites of metastasis including lung, spleen and peritoneum.  Initially on the CT scan, we had thought that he had liver only metastasis.  But PET scan reveals more extensive metastasis.  He is not a candidate for the liver only directed therapy.  Treatment will be palliative chemotherapy.    3.  He will be seen with next  treatment.  Advised him to call us with any questions or concerns.    Total visit time of 30 minutes.  Time spent in today's visit, review of chart/investigations today, discussing regarding chemotherapy and documentation today.

## 2024-01-30 NOTE — PROGRESS NOTES
"Infusion Nursing Note:  Brady Lenz presents today for C2D1 Avastin+Oxaliplatin+ 5FU pump.    Patient seen by provider today: Yes: Dr. Limon   present during visit today: Not Applicable.    Note: Pt seen and assessed by Dr. Limon today: okay to proceed with treatment today.  Post clinic infusion,  the 5FU pump was connected to pt's port. The Capillary element was taped to pt's skin per protocol.  Due to some hair on pt's abdomen, pt was provided with 2 additional Tegaderm dressings ( just in case) and patient was educated about the importance to re-apply a new Tegaderm dressing to the Capillary element taped to pt's skin if the present dressing gets undone or loose.  All pt's questions were answered. Pt expressed understanding. Pt requested to be scheduled for a Pump Disconnect in the clinic this time. Appts was scheduled per pt request. Pt discharged in stable conditions      Intravenous Access:  Implanted Port.    Treatment Conditions:  Lab Results   Component Value Date    HGB 13.0 (L) 01/30/2024    WBC 5.2 01/30/2024    ANEUTAUTO 3.0 01/30/2024     01/30/2024        Results reviewed, labs MET treatment parameters, ok to proceed with treatment.  Protein Albumin Urine 10: meets parameters for treatment today.      Post Infusion Assessment:  Patient tolerated infusion without incident.  Blood return noted pre and post infusion.  Site patent and intact, free from redness, edema or discomfort.  No evidence of extravasations.  Access discontinued per protocol.     Prior to discharge: Port is secured in place with tegaderm and flushed with 10cc NS with positive blood return noted.    Continuous home infusion Dosi-Fuser pump connected.    All connectors secured in place and clamps taped open.    Pump started, \"running\" noted on display (CADD): Not Applicable.   Capillary element taped to pt's skin per protocol.  Pump Connection double checked with Diego Jaimes RN.  Patient instructed to call our " clinic or [facility] with any questions or concerns at home.  Patient verbalized understanding.    Patient set up for pump disconnect in the Clinic per pt request.      Discharge Plan:   Patient declined prescription refills.  Discharge instructions reviewed with: Patient.  Patient and/or family verbalized understanding of discharge instructions and all questions answered.  AVS to patient via [employer].  Patient will return 2/1/24 for next appointment.   Patient discharged in stable condition accompanied by: self.  Departure Mode: Ambulatory.      Renee Carr RN

## 2024-01-30 NOTE — PROGRESS NOTES
Nursing Note:  Brady Lenz presents today for port labs and urine.    Patient seen by provider today: Yes: Dr. Limon   present during visit today: Not Applicable.    Note: N/A.    Intravenous Access:  Implanted Port.    Discharge Plan:   Patient was sent to [facility] for 820 provider appointment followed by infusion.    Christiane Doll RN

## 2024-01-30 NOTE — LETTER
"    1/30/2024         RE: Brady Lenz  [address] [address]  [facility] [address]        Dear Colleague,    Thank you for referring your patient, Brady Lenz, to the [facility]. Please see a copy of my visit note below.    Oncology Rooming Note    January 30, 2024 8:23 AM   Brady Lenz is a 58 year old male who presents for:    Chief Complaint   Patient presents with     Oncology Clinic Visit     Initial Vitals: /84   Pulse 78   Temp 96.9  F (36.1  C) (Axillary)   Resp 16   Wt 72.1 kg (159 lb)   SpO2 98%   BMI 20.98 kg/m   Estimated body mass index is 20.98 kg/m  as calculated from the following:    Height as of 1/15/24: 1.854 m (6' 0.99\").    Weight as of this encounter: 72.1 kg (159 lb). Body surface area is 1.93 meters squared.  No Pain (0) Comment: Data Unavailable   No LMP for male patient.  Allergies reviewed: Yes  Medications reviewed: Yes    Medications: Medication refills not needed today.  Pharmacy name entered into [employer]:    [id number] - [place], [place] - [address] [facility] [place], [place] - [address]    Clinical concerns: no       Shari J. Schoenberger, [facility]                ONCOLOGY HISTORY: Mr. Lenz is a gentleman with metastatic right colon cancer.    -MMR intact.    -HER2/rowan negative (score 1+).  -NGS panel: A pathogenic KRAS mutation was identified: KRAS G12D      1. Seen in emergency room on 10/23/2023 with abdominal pain and distention.  Multiple investigations done.  -Hemoglobin of 5.9 with MCV of 58.  -Iron of 6 with saturation index of 2%.  -CEA of 6.7.  -CT abdomen and pelvis revealed mass in the cecum causing small bowel obstruction.  Also revealed 10.6 cm mass in right lobe of the liver and 4.9 cm mass in the left lobe of the liver.     2.  On 10/24/2023, he had open right colectomy and laparoscopic peritoneal biopsy.  There was large mass in the cecum.  There were 2 large liver " metastasis.  There was a small implant on the peritoneum in left upper quadrant over the spleen.  There was no evidence of carcinomatosis.  -Peritoneal biopsy is positive for metastatic adenocarcinoma.  -Colectomy specimen reveals moderately differentiated adenocarcinoma measuring 6.8 cm.  8 of 29 lymph nodes positive.  pT3 pN2b.  MMR intact.  HER2/rowan negative (score 1+).     3.  CT chest on 10/25/2023 does not reveal any evidence of malignancy.  -PET scan on 01/16/2024 reveals liver metastasis, splenic metastasis, peritoneal carcinomatosis and right lung 6 mm nodule suspicious for metastasis.     4.  On 01/15/2024, CEA of 15.8.  -Modified FOLFOX 6 and Avastin started on 01/15/2024.     Subjective:  Mr. Lenz is a 58-year-old gentleman with metastatic colon cancer. He is on modified FOLFOX 6 and Avastin.      Overall he tolerated first cycle well.  He had cold sensitivity for about 3 to 4 days.  He also had some sensation of pins-and-needles in the fingers.    PET scan was done on 01/16/2024:  1. Numerous hypermetabolic metastatic hepatic lesions, increased in size since recent CT. The largest necrotic lesion takes up >50% of the right hepatic lobe volume.  2. Single hypermetabolic metastatic lesion in the inferior spleen.  3. Hypermetabolic right subdiaphragmatic, perisplenic, and right retroperitoneal peritoneal nodularity consistent with peritoneal carcinomatosis.  4. Right hemicolectomy and primary anastomosis uptake likely physiologic but adjacent nodules suspicious for implants.   5) Lung - 6 mm right middle lobe peribronchial lung nodule, new since 10/25/2023, concerning for metastasis.    No headache.  No dizziness.  No chest pain.  No shortness of breath.  No abdominal pain.  No nausea or vomiting.  No urinary or bowel complaints.  No bleeding.  He has mild generalized weakness.  All other review of system negative.     Exam:  He is alert oriented x3.  Not in distress.  ECOG PS of 0.  Vitals:  Reviewed.  Rest of the system is not examined.     Labs: CBC and urine protein  reviewed.     Assessment:  1.  A 58-year-old gentleman with metastatic colon cancer. MMR intact. HER2/rowan negative(1+). KRAS mutated.  2.  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  3.  Mild normocytic anemia from chemotherapy.     Plan:  Continue chemotherapy.  See me or BEBETO with next treatment.     Discussion:  1.  Patient overall tolerated first cycle of treatment well.  He has grade 1 neuropathy.  He had some cold sensitivity.  He has mild generalized weakness.    Labs were reviewed with him.  They are good for treatment.    He will continue on modified FOLFOX 6 with Avastin.  Explained to the patient that some of the side effects including neuropathy, cold sensitivity and fatigue will get worse.    2.  PET scan was reviewed with him.  Explained to him that there are multiple sites of metastasis including lung, spleen and peritoneum.  Initially on the CT scan, we had thought that he had liver only metastasis.  But PET scan reveals more extensive metastasis.  He is not a candidate for the liver only directed therapy.  Treatment will be palliative chemotherapy.    3.  He will be seen with next treatment.  Advised him to call us with any questions or concerns.    Total visit time of 30 minutes.  Time spent in today's visit, review of chart/investigations today, discussing regarding chemotherapy and documentation today.            Again, thank you for allowing me to participate in the care of your patient.        Sincerely,        Maryann Limon MD

## 2024-01-30 NOTE — PROGRESS NOTES
"Oncology Rooming Note    January 30, 2024 8:23 AM   Brady Lenz is a 58 year old male who presents for:    Chief Complaint   Patient presents with    Oncology Clinic Visit     Initial Vitals: /84   Pulse 78   Temp 96.9  F (36.1  C) (Axillary)   Resp 16   Wt 72.1 kg (159 lb)   SpO2 98%   BMI 20.98 kg/m   Estimated body mass index is 20.98 kg/m  as calculated from the following:    Height as of 1/15/24: 1.854 m (6' 0.99\").    Weight as of this encounter: 72.1 kg (159 lb). Body surface area is 1.93 meters squared.  No Pain (0) Comment: Data Unavailable   No LMP for male patient.  Allergies reviewed: Yes  Medications reviewed: Yes    Medications: Medication refills not needed today.  Pharmacy name entered into [facility]:    [id number] - [place], [place] - [address] [facility] [place], [place] - [address]    Clinical concerns: no       Shari J. Schoenberger, CMA              "

## 2024-02-01 ENCOUNTER — INFUSION THERAPY VISIT (OUTPATIENT)
Dept: INFUSION THERAPY | Facility: CLINIC | Age: 59
End: 2024-02-01
Attending: INTERNAL MEDICINE
Payer: COMMERCIAL

## 2024-02-01 VITALS
TEMPERATURE: 97.5 F | HEART RATE: 86 BPM | DIASTOLIC BLOOD PRESSURE: 84 MMHG | RESPIRATION RATE: 18 BRPM | SYSTOLIC BLOOD PRESSURE: 139 MMHG

## 2024-02-01 DIAGNOSIS — C78.7 COLON CARCINOMA METASTATIC TO LIVER (H): Primary | ICD-10-CM

## 2024-02-01 DIAGNOSIS — C18.9 COLON CARCINOMA METASTATIC TO LIVER (H): Primary | ICD-10-CM

## 2024-02-01 PROCEDURE — 250N000011 HC RX IP 250 OP 636: Performed by: INTERNAL MEDICINE

## 2024-02-01 RX ORDER — HEPARIN SODIUM (PORCINE) LOCK FLUSH IV SOLN 100 UNIT/ML 100 UNIT/ML
5 SOLUTION INTRAVENOUS
Status: DISCONTINUED | OUTPATIENT
Start: 2024-02-01 | End: 2024-02-01 | Stop reason: HOSPADM

## 2024-02-01 RX ORDER — HEPARIN SODIUM,PORCINE 10 UNIT/ML
5-20 VIAL (ML) INTRAVENOUS DAILY PRN
Status: DISCONTINUED | OUTPATIENT
Start: 2024-02-01 | End: 2024-02-01 | Stop reason: HOSPADM

## 2024-02-01 RX ADMIN — Medication 5 ML: at 11:10

## 2024-02-01 NOTE — PROGRESS NOTES
Infusion Nursing Note:  Brady Lenz presents today for pump DC.    Patient seen by provider today: No   present during visit today: Not Applicable.    Note: N/A.      Intravenous Access:  Implanted Port.    Treatment Conditions:  Not Applicable.      Post Infusion Assessment:  Patient tolerated infusion without incident.  Blood return noted pre and post infusion.  Site patent and intact, free from redness, edema or discomfort.  No evidence of extravasations.  Access discontinued per protocol.       Discharge Plan:   Discharge instructions reviewed with: Patient.  Patient and/or family verbalized understanding of discharge instructions and all questions answered.  Patient discharged in stable condition accompanied by: self.  Departure Mode: Ambulatory.      Joyce Mcclendon RN

## 2024-02-12 ENCOUNTER — INFUSION THERAPY VISIT (OUTPATIENT)
Dept: INFUSION THERAPY | Facility: CLINIC | Age: 59
End: 2024-02-12
Attending: INTERNAL MEDICINE
Payer: COMMERCIAL

## 2024-02-12 ENCOUNTER — ONCOLOGY VISIT (OUTPATIENT)
Dept: ONCOLOGY | Facility: CLINIC | Age: 59
End: 2024-02-12
Attending: INTERNAL MEDICINE
Payer: COMMERCIAL

## 2024-02-12 VITALS
BODY MASS INDEX: 20.9 KG/M2 | RESPIRATION RATE: 16 BRPM | OXYGEN SATURATION: 97 % | HEART RATE: 78 BPM | WEIGHT: 158.4 LBS | DIASTOLIC BLOOD PRESSURE: 80 MMHG | SYSTOLIC BLOOD PRESSURE: 136 MMHG

## 2024-02-12 VITALS — TEMPERATURE: 97.5 F

## 2024-02-12 DIAGNOSIS — R19.7 DIARRHEA, UNSPECIFIED TYPE: Primary | ICD-10-CM

## 2024-02-12 DIAGNOSIS — C18.9 COLON CARCINOMA METASTATIC TO LIVER (H): Primary | ICD-10-CM

## 2024-02-12 DIAGNOSIS — C18.9 COLON CARCINOMA METASTATIC TO LIVER (H): ICD-10-CM

## 2024-02-12 DIAGNOSIS — C78.7 COLON CARCINOMA METASTATIC TO LIVER (H): Primary | ICD-10-CM

## 2024-02-12 DIAGNOSIS — C78.7 COLON CARCINOMA METASTATIC TO LIVER (H): ICD-10-CM

## 2024-02-12 LAB
ALBUMIN UR-MCNC: NEGATIVE MG/DL
BASOPHILS # BLD AUTO: 0.1 10E3/UL (ref 0–0.2)
BASOPHILS NFR BLD AUTO: 1 %
BILIRUB SERPL-MCNC: 0.6 MG/DL
CEA SERPL-MCNC: 8.7 NG/ML
CREAT SERPL-MCNC: 0.62 MG/DL (ref 0.67–1.17)
EGFRCR SERPLBLD CKD-EPI 2021: >90 ML/MIN/1.73M2
EOSINOPHIL # BLD AUTO: 0.2 10E3/UL (ref 0–0.7)
EOSINOPHIL NFR BLD AUTO: 4 %
ERYTHROCYTE [DISTWIDTH] IN BLOOD BY AUTOMATED COUNT: 16.8 % (ref 10–15)
HCT VFR BLD AUTO: 44.2 % (ref 40–53)
HGB BLD-MCNC: 14 G/DL (ref 13.3–17.7)
IMM GRANULOCYTES # BLD: 0.1 10E3/UL
IMM GRANULOCYTES NFR BLD: 1 %
LYMPHOCYTES # BLD AUTO: 1 10E3/UL (ref 0.8–5.3)
LYMPHOCYTES NFR BLD AUTO: 20 %
MCH RBC QN AUTO: 26.1 PG (ref 26.5–33)
MCHC RBC AUTO-ENTMCNC: 31.7 G/DL (ref 31.5–36.5)
MCV RBC AUTO: 82 FL (ref 78–100)
MONOCYTES # BLD AUTO: 0.7 10E3/UL (ref 0–1.3)
MONOCYTES NFR BLD AUTO: 14 %
NEUTROPHILS # BLD AUTO: 3.1 10E3/UL (ref 1.6–8.3)
NEUTROPHILS NFR BLD AUTO: 60 %
NRBC # BLD AUTO: 0 10E3/UL
NRBC BLD AUTO-RTO: 0 /100
PLATELET # BLD AUTO: 276 10E3/UL (ref 150–450)
RBC # BLD AUTO: 5.37 10E6/UL (ref 4.4–5.9)
WBC # BLD AUTO: 5.1 10E3/UL (ref 4–11)

## 2024-02-12 PROCEDURE — 96415 CHEMO IV INFUSION ADDL HR: CPT

## 2024-02-12 PROCEDURE — 82565 ASSAY OF CREATININE: CPT | Performed by: INTERNAL MEDICINE

## 2024-02-12 PROCEDURE — 81003 URINALYSIS AUTO W/O SCOPE: CPT | Performed by: INTERNAL MEDICINE

## 2024-02-12 PROCEDURE — 96413 CHEMO IV INFUSION 1 HR: CPT

## 2024-02-12 PROCEDURE — 96367 TX/PROPH/DG ADDL SEQ IV INF: CPT

## 2024-02-12 PROCEDURE — 82247 BILIRUBIN TOTAL: CPT | Performed by: INTERNAL MEDICINE

## 2024-02-12 PROCEDURE — 96375 TX/PRO/DX INJ NEW DRUG ADDON: CPT

## 2024-02-12 PROCEDURE — 258N000003 HC RX IP 258 OP 636: Performed by: NURSE PRACTITIONER

## 2024-02-12 PROCEDURE — 82378 CARCINOEMBRYONIC ANTIGEN: CPT | Performed by: INTERNAL MEDICINE

## 2024-02-12 PROCEDURE — G0463 HOSPITAL OUTPT CLINIC VISIT: HCPCS | Mod: 25 | Performed by: NURSE PRACTITIONER

## 2024-02-12 PROCEDURE — 250N000011 HC RX IP 250 OP 636: Performed by: NURSE PRACTITIONER

## 2024-02-12 PROCEDURE — 99214 OFFICE O/P EST MOD 30 MIN: CPT | Performed by: NURSE PRACTITIONER

## 2024-02-12 PROCEDURE — G0498 CHEMO EXTEND IV INFUS W/PUMP: HCPCS

## 2024-02-12 PROCEDURE — 36591 DRAW BLOOD OFF VENOUS DEVICE: CPT | Performed by: INTERNAL MEDICINE

## 2024-02-12 PROCEDURE — 96417 CHEMO IV INFUS EACH ADDL SEQ: CPT

## 2024-02-12 PROCEDURE — 85025 COMPLETE CBC W/AUTO DIFF WBC: CPT | Performed by: INTERNAL MEDICINE

## 2024-02-12 RX ORDER — LORAZEPAM 2 MG/ML
0.5 INJECTION INTRAMUSCULAR EVERY 4 HOURS PRN
Status: CANCELLED | OUTPATIENT
Start: 2024-02-12

## 2024-02-12 RX ORDER — HEPARIN SODIUM (PORCINE) LOCK FLUSH IV SOLN 100 UNIT/ML 100 UNIT/ML
5 SOLUTION INTRAVENOUS
Status: DISCONTINUED | OUTPATIENT
Start: 2024-02-12 | End: 2024-02-12 | Stop reason: HOSPADM

## 2024-02-12 RX ORDER — DIPHENHYDRAMINE HYDROCHLORIDE 50 MG/ML
50 INJECTION INTRAMUSCULAR; INTRAVENOUS
Status: CANCELLED
Start: 2024-02-12

## 2024-02-12 RX ORDER — EPINEPHRINE 1 MG/ML
0.3 INJECTION, SOLUTION INTRAMUSCULAR; SUBCUTANEOUS EVERY 5 MIN PRN
Status: CANCELLED | OUTPATIENT
Start: 2024-02-12

## 2024-02-12 RX ORDER — HEPARIN SODIUM,PORCINE 10 UNIT/ML
5-20 VIAL (ML) INTRAVENOUS DAILY PRN
Status: CANCELLED | OUTPATIENT
Start: 2024-02-14

## 2024-02-12 RX ORDER — ONDANSETRON 2 MG/ML
8 INJECTION INTRAMUSCULAR; INTRAVENOUS ONCE
Status: COMPLETED | OUTPATIENT
Start: 2024-02-12 | End: 2024-02-12

## 2024-02-12 RX ORDER — HEPARIN SODIUM,PORCINE 10 UNIT/ML
5-20 VIAL (ML) INTRAVENOUS DAILY PRN
Status: CANCELLED | OUTPATIENT
Start: 2024-02-12

## 2024-02-12 RX ORDER — HEPARIN SODIUM (PORCINE) LOCK FLUSH IV SOLN 100 UNIT/ML 100 UNIT/ML
5 SOLUTION INTRAVENOUS
Status: CANCELLED | OUTPATIENT
Start: 2024-02-14

## 2024-02-12 RX ORDER — DEXAMETHASONE 4 MG/1
8 TABLET ORAL DAILY
Qty: 4 TABLET | Refills: 2 | Status: SHIPPED | OUTPATIENT
Start: 2024-02-12 | End: 2024-04-08

## 2024-02-12 RX ORDER — ONDANSETRON 2 MG/ML
8 INJECTION INTRAMUSCULAR; INTRAVENOUS ONCE
Status: CANCELLED | OUTPATIENT
Start: 2024-02-12

## 2024-02-12 RX ORDER — LOPERAMIDE HYDROCHLORIDE 2 MG/1
2 TABLET ORAL 4 TIMES DAILY PRN
Qty: 45 TABLET | Refills: 0 | Status: SHIPPED | OUTPATIENT
Start: 2024-02-12 | End: 2024-03-13

## 2024-02-12 RX ORDER — MEPERIDINE HYDROCHLORIDE 25 MG/ML
25 INJECTION INTRAMUSCULAR; INTRAVENOUS; SUBCUTANEOUS EVERY 30 MIN PRN
Status: CANCELLED | OUTPATIENT
Start: 2024-02-12

## 2024-02-12 RX ORDER — ALBUTEROL SULFATE 0.83 MG/ML
2.5 SOLUTION RESPIRATORY (INHALATION)
Status: CANCELLED | OUTPATIENT
Start: 2024-02-12

## 2024-02-12 RX ORDER — ALBUTEROL SULFATE 90 UG/1
1-2 AEROSOL, METERED RESPIRATORY (INHALATION)
Status: CANCELLED
Start: 2024-02-12

## 2024-02-12 RX ORDER — HEPARIN SODIUM (PORCINE) LOCK FLUSH IV SOLN 100 UNIT/ML 100 UNIT/ML
5 SOLUTION INTRAVENOUS
Status: CANCELLED | OUTPATIENT
Start: 2024-02-12

## 2024-02-12 RX ORDER — METHYLPREDNISOLONE SODIUM SUCCINATE 125 MG/2ML
125 INJECTION, POWDER, LYOPHILIZED, FOR SOLUTION INTRAMUSCULAR; INTRAVENOUS
Status: CANCELLED
Start: 2024-02-12

## 2024-02-12 RX ADMIN — SODIUM CHLORIDE 250 ML: 9 INJECTION, SOLUTION INTRAVENOUS at 10:05

## 2024-02-12 RX ADMIN — OXALIPLATIN 170 MG: 100 INJECTION, SOLUTION, CONCENTRATE INTRAVENOUS at 11:11

## 2024-02-12 RX ADMIN — ONDANSETRON 8 MG: 2 INJECTION INTRAMUSCULAR; INTRAVENOUS at 10:04

## 2024-02-12 RX ADMIN — FOSAPREPITANT: 150 INJECTION, POWDER, LYOPHILIZED, FOR SOLUTION INTRAVENOUS at 10:07

## 2024-02-12 RX ADMIN — BEVACIZUMAB 400 MG: 400 INJECTION, SOLUTION INTRAVENOUS at 10:30

## 2024-02-12 RX ADMIN — DEXTROSE MONOHYDRATE 250 ML: 50 INJECTION, SOLUTION INTRAVENOUS at 11:11

## 2024-02-12 ASSESSMENT — PAIN SCALES - GENERAL: PAINLEVEL: MILD PAIN (2)

## 2024-02-12 NOTE — LETTER
2/12/2024         RE: Brady Lenz  [address] [address]  [facility] [address]        Dear Colleague,    Thank you for referring your patient, Brady Lenz, to the [facility]. Please see a copy of my visit note below.    Oncology/Hematology Visit Note  Feb 12, 2024    Reason for Visit: follow up of metastatic colon cancer  MMR intact  HER2 negative  Positive for KRAS G12D    CEA15.8  PET scan revealed multiple sites of metastasis lung spleen and liver and peritoneum  01/15/24-started modified FOLFOX 6 and Avastin      Interval History:  Overall patient reports feeling well now he reports with the last 5-FU infusion after the pump disconnect that night he had some mild chest tightness that resolved on its own.  Patient reports at that time he did not have any shortness of breath cough nausea vomiting denies fever chills sweats  Patient denies any chest pain right now or chest tightness denies palpitations denies cough shortness of breath denies fever chills sweats  He has intermittent diarrhea he has not taken anything for diarrhea denies blood in stool denies abdominal pain or abdominal distention  Reports good energy appetite    Review of Systems:  14 point ROS of systems including Constitutional, Eyes, Respiratory, Cardiovascular, Gastroenterology, Genitourinary, Integumentary, Muscularskeletal, Psychiatric were all negative except for pertinent positives noted in my HPI.    Physical Examination:  General: The patient is a pleasant male in no acute distress.  /80   Pulse 78   Resp 16   Wt 71.8 kg (158 lb 6.4 oz)   SpO2 97%   BMI 20.90 kg/m    HEENT: EOMI, PERRL. Sclerae are anicteric. Oral mucosa is pink and moist with no lesions or thrush.   Lymph: Neck is supple with no lymphadenopathy in the cervical or supraclavicular areas.   Heart: Regular rate and rhythm.   Lungs: Clear to auscultation bilaterally.   GI: Bowel sounds present, soft, nontender with no palpable  hepatosplenomegaly or masses.   Extremities: No lower extremity edema noted bilaterally.   Skin: No rashes, petechiae, or bruising noted on exposed skin.    Laboratory Data:  CBC creatinine and bilirubin reviewed      Assessment and Plan:    Metastatic colon cancer  Follows with Dr. Limon  Currently undergoing palliative treatment withmodified FOLFOX 6 and Avastin  -He had some mild child's chest tightnes the night after his 5-FU pump was discontinued  -Patient denies any chest pain right now shortness of breath cough lightheadedness reports feeling well right now  Reviewed with Dr. Limon  Per Dr. Limon ok to proceed with 5FU pump .  Side effects of 5-FU reviewed with patient  I advised patient if chest tightness returns or chest pain or shortness of breath lightheadedness heart palpitations cough fatigue fever chills sweats or any changes in health condition to go to ER immediately or call 911 patient verbalized understanding      Intermittent diarrhea  Denies any diarrhea now  Has not taken any Imodium  I prescribed Imodium as needed and call our clinic if diarrhea returns    Please call clinic with any changes in health condition or questions    GABBY Junior CNP  [facility]     Chart documentation with Dragon Voice recognition Software. Although reviewed after completion, some words and grammatical errors may remain.        Again, thank you for allowing me to participate in the care of your patient.        Sincerely,        GABBY Junior CNP

## 2024-02-12 NOTE — PROGRESS NOTES
Oncology/Hematology Visit Note  Feb 12, 2024    Reason for Visit: follow up of metastatic colon cancer  MMR intact  HER2 negative  Positive for KRAS G12D    CEA15.8  PET scan revealed multiple sites of metastasis lung spleen and liver and peritoneum  01/15/24-started modified FOLFOX 6 and Avastin      Interval History:  Overall patient reports feeling well now he reports with the last 5-FU infusion after the pump disconnect that night he had some mild chest tightness that resolved on its own.  Patient reports at that time he did not have any shortness of breath cough nausea vomiting denies fever chills sweats  Patient denies any chest pain right now or chest tightness denies palpitations denies cough shortness of breath denies fever chills sweats  He has intermittent diarrhea he has not taken anything for diarrhea denies blood in stool denies abdominal pain or abdominal distention  Reports good energy appetite    Review of Systems:  14 point ROS of systems including Constitutional, Eyes, Respiratory, Cardiovascular, Gastroenterology, Genitourinary, Integumentary, Muscularskeletal, Psychiatric were all negative except for pertinent positives noted in my HPI.    Physical Examination:  General: The patient is a pleasant male in no acute distress.  /80   Pulse 78   Resp 16   Wt 71.8 kg (158 lb 6.4 oz)   SpO2 97%   BMI 20.90 kg/m    HEENT: EOMI, PERRL. Sclerae are anicteric. Oral mucosa is pink and moist with no lesions or thrush.   Lymph: Neck is supple with no lymphadenopathy in the cervical or supraclavicular areas.   Heart: Regular rate and rhythm.   Lungs: Clear to auscultation bilaterally.   GI: Bowel sounds present, soft, nontender with no palpable hepatosplenomegaly or masses.   Extremities: No lower extremity edema noted bilaterally.   Skin: No rashes, petechiae, or bruising noted on exposed skin.    Laboratory Data:  CBC creatinine and bilirubin reviewed      Assessment and Plan:    Metastatic colon  cancer  Follows with Dr. Limon  Currently undergoing palliative treatment withmodified FOLFOX 6 and Avastin  -He had some mild child's chest tightnes the night after his 5-FU pump was discontinued  -Patient denies any chest pain right now shortness of breath cough lightheadedness reports feeling well right now  Reviewed with Dr. Limon  Per Dr. Limon ok to proceed with 5FU pump .  Side effects of 5-FU reviewed with patient  I advised patient if chest tightness returns or chest pain or shortness of breath lightheadedness heart palpitations cough fatigue fever chills sweats or any changes in health condition to go to ER immediately or call 911 patient verbalized understanding      Intermittent diarrhea  Denies any diarrhea now  Has not taken any Imodium  I prescribed Imodium as needed and call our clinic if diarrhea returns      Health maintenance  Patient would like me to refer him to family medicine  Referral made    Please call clinic with any changes in health condition or questions    GABBY Junior CNP  [facility]     Chart documentation with Dragon Voice recognition Software. Although reviewed after completion, some words and grammatical errors may remain.

## 2024-02-12 NOTE — PROGRESS NOTES
"Infusion Nursing Note:  Brady Lenz presents today for C3D1 Avastin, Oxaliplatin and 5FU pump connect.    Patient seen by provider today: Yes: Darin Padilla NP   present during visit today: Not Applicable.    Note: Pt reported some chest tightness the night after 5FU pump was disconnected last cycle. Darin Padilla NP, and Dr. Limon notified. Okay to continue with 5FU pump today. Pt aware that he should go to emergency department immediately if he experiences chest tightness again with 5FU pump.       Intravenous Access:  Implanted Port.    Treatment Conditions:  Lab Results   Component Value Date    HGB 14.0 02/12/2024    WBC 5.1 02/12/2024    ANEUTAUTO 3.1 02/12/2024     02/12/2024        Lab Results   Component Value Date     01/15/2024    POTASSIUM 4.3 01/15/2024    MAG 2.1 11/03/2023    CR 0.62 (L) 02/12/2024    NAT 9.8 01/15/2024    BILITOTAL 0.6 02/12/2024    ALBUMIN 4.4 01/15/2024    ALT 23 01/15/2024    AST 76 (H) 01/15/2024       Results reviewed, labs MET treatment parameters, ok to proceed with treatment.      Post Infusion Assessment:  Patient tolerated infusion without incident.  Blood return noted pre and post infusion.  Site patent and intact, free from redness, edema or discomfort.  No evidence of extravasations.    Prior to discharge: Port is secured in place with tegaderm and flushed with 10cc NS with positive blood return noted.    Continuous home infusion Dosi-Fuser pump connected.    All connectors secured in place and clamps taped open.    Pump started, \"running\" noted on display (CADD): Not Applicable.   Capillary element taped to pt's skin per protocol.  Pump Connection double checked with GINI Mcduffie RN.  Patient instructed to call our clinic or [facility] with any questions or concerns at home.  Patient verbalized understanding.    Patient set up for pump disconnect at our clinic on 2/14 at 11 am.           Discharge Plan:   Discharge instructions reviewed " with: Patient.  Patient and/or family verbalized understanding of discharge instructions and all questions answered.  Copy of AVS reviewed with patient and/or family.  Patient will return 2/14 for next appointment.  Patient discharged in stable condition accompanied by: self.  Departure Mode: Ambulatory.      Angelica Nolasco RN

## 2024-02-12 NOTE — PROGRESS NOTES
Nursing Note:  Brady Lenz presents today for Port Labs.    Patient seen by provider today: Yes: Darin Padilla NP at 0930   present during visit today: Not Applicable.    Note: N/A.    Intravenous Access:  Implanted Port.    Discharge Plan:   Patient was sent to [facility] for provider appointment.    Edwige Love RN

## 2024-02-14 ENCOUNTER — INFUSION THERAPY VISIT (OUTPATIENT)
Dept: INFUSION THERAPY | Facility: CLINIC | Age: 59
End: 2024-02-14
Attending: INTERNAL MEDICINE
Payer: COMMERCIAL

## 2024-02-14 VITALS
TEMPERATURE: 98 F | RESPIRATION RATE: 18 BRPM | OXYGEN SATURATION: 95 % | DIASTOLIC BLOOD PRESSURE: 71 MMHG | HEART RATE: 61 BPM | SYSTOLIC BLOOD PRESSURE: 117 MMHG

## 2024-02-14 DIAGNOSIS — C78.7 COLON CARCINOMA METASTATIC TO LIVER (H): Primary | ICD-10-CM

## 2024-02-14 DIAGNOSIS — C18.9 COLON CARCINOMA METASTATIC TO LIVER (H): Primary | ICD-10-CM

## 2024-02-14 PROCEDURE — 96523 IRRIG DRUG DELIVERY DEVICE: CPT

## 2024-02-14 PROCEDURE — 250N000011 HC RX IP 250 OP 636: Performed by: NURSE PRACTITIONER

## 2024-02-14 RX ORDER — HEPARIN SODIUM (PORCINE) LOCK FLUSH IV SOLN 100 UNIT/ML 100 UNIT/ML
5 SOLUTION INTRAVENOUS
Status: DISCONTINUED | OUTPATIENT
Start: 2024-02-14 | End: 2024-02-14 | Stop reason: HOSPADM

## 2024-02-14 RX ADMIN — Medication 5 ML: at 10:10

## 2024-02-14 ASSESSMENT — PAIN SCALES - GENERAL: PAINLEVEL: NO PAIN (0)

## 2024-02-14 NOTE — PROGRESS NOTES
Infusion Nursing Note:  Brady Lenz presents today for pump disconnect.    Patient seen by provider today: No   present during visit today: Not Applicable.    Note: N/A.      Intravenous Access:  Implanted Port.    Treatment Conditions:  Not Applicable.      Post Infusion Assessment:  Patient tolerated infusion without incident.  Site patent and intact, free from redness, edema or discomfort.  No evidence of extravasations.  Access discontinued per protocol.       Discharge Plan:   Discharge instructions reviewed with: Patient.  Patient and/or family verbalized understanding of discharge instructions and all questions answered.  AVS to patient via [employer].  Patient will return 2/26/24 for next appointment.   Patient discharged in stable condition accompanied by: self.  Departure Mode: Ambulatory.      Vanessa Muñiz RN

## 2024-02-15 ENCOUNTER — APPOINTMENT (OUTPATIENT)
Dept: GENERAL RADIOLOGY | Facility: CLINIC | Age: 59
End: 2024-02-15
Attending: EMERGENCY MEDICINE
Payer: COMMERCIAL

## 2024-02-15 ENCOUNTER — HOSPITAL ENCOUNTER (OUTPATIENT)
Facility: CLINIC | Age: 59
Setting detail: OBSERVATION
Discharge: NURSING, REHAB OR LONG-TERM CARE | End: 2024-02-17
Attending: EMERGENCY MEDICINE | Admitting: HOSPITALIST
Payer: COMMERCIAL

## 2024-02-15 ENCOUNTER — APPOINTMENT (OUTPATIENT)
Dept: CT IMAGING | Facility: CLINIC | Age: 59
End: 2024-02-15
Attending: EMERGENCY MEDICINE
Payer: COMMERCIAL

## 2024-02-15 DIAGNOSIS — S20.211A CHEST WALL CONTUSION, RIGHT, INITIAL ENCOUNTER: ICD-10-CM

## 2024-02-15 DIAGNOSIS — D64.9 ANEMIA, UNSPECIFIED TYPE: ICD-10-CM

## 2024-02-15 DIAGNOSIS — S42.034A CLOSED NONDISPLACED FRACTURE OF ACROMIAL END OF RIGHT CLAVICLE, INITIAL ENCOUNTER: ICD-10-CM

## 2024-02-15 DIAGNOSIS — C79.9 METASTATIC MALIGNANT NEOPLASM, UNSPECIFIED SITE (H): ICD-10-CM

## 2024-02-15 DIAGNOSIS — Z59.00 HOMELESSNESS: ICD-10-CM

## 2024-02-15 LAB
ALBUMIN SERPL BCG-MCNC: 4.1 G/DL (ref 3.5–5.2)
ALP SERPL-CCNC: 153 U/L (ref 40–150)
ALT SERPL W P-5'-P-CCNC: 61 U/L (ref 0–70)
ANION GAP SERPL CALCULATED.3IONS-SCNC: 7 MMOL/L (ref 7–15)
AST SERPL W P-5'-P-CCNC: 49 U/L (ref 0–45)
BASOPHILS # BLD AUTO: 0 10E3/UL (ref 0–0.2)
BASOPHILS NFR BLD AUTO: 1 %
BILIRUB SERPL-MCNC: 0.4 MG/DL
BUN SERPL-MCNC: 21.8 MG/DL (ref 6–20)
CALCIUM SERPL-MCNC: 8.7 MG/DL (ref 8.6–10)
CHLORIDE SERPL-SCNC: 104 MMOL/L (ref 98–107)
CREAT SERPL-MCNC: 0.56 MG/DL (ref 0.67–1.17)
DEPRECATED HCO3 PLAS-SCNC: 28 MMOL/L (ref 22–29)
EGFRCR SERPLBLD CKD-EPI 2021: >90 ML/MIN/1.73M2
EOSINOPHIL # BLD AUTO: 0 10E3/UL (ref 0–0.7)
EOSINOPHIL NFR BLD AUTO: 1 %
ERYTHROCYTE [DISTWIDTH] IN BLOOD BY AUTOMATED COUNT: 17.2 % (ref 10–15)
GLUCOSE SERPL-MCNC: 90 MG/DL (ref 70–99)
HCT VFR BLD AUTO: 39.3 % (ref 40–53)
HGB BLD-MCNC: 12.7 G/DL (ref 13.3–17.7)
IMM GRANULOCYTES # BLD: 0 10E3/UL
IMM GRANULOCYTES NFR BLD: 0 %
LYMPHOCYTES # BLD AUTO: 1 10E3/UL (ref 0.8–5.3)
LYMPHOCYTES NFR BLD AUTO: 19 %
MCH RBC QN AUTO: 26.5 PG (ref 26.5–33)
MCHC RBC AUTO-ENTMCNC: 32.3 G/DL (ref 31.5–36.5)
MCV RBC AUTO: 82 FL (ref 78–100)
MONOCYTES # BLD AUTO: 0.7 10E3/UL (ref 0–1.3)
MONOCYTES NFR BLD AUTO: 14 %
NEUTROPHILS # BLD AUTO: 3.4 10E3/UL (ref 1.6–8.3)
NEUTROPHILS NFR BLD AUTO: 65 %
NRBC # BLD AUTO: 0 10E3/UL
NRBC BLD AUTO-RTO: 0 /100
PLATELET # BLD AUTO: 179 10E3/UL (ref 150–450)
POTASSIUM SERPL-SCNC: 3.8 MMOL/L (ref 3.4–5.3)
PROT SERPL-MCNC: 6.8 G/DL (ref 6.4–8.3)
RBC # BLD AUTO: 4.8 10E6/UL (ref 4.4–5.9)
SODIUM SERPL-SCNC: 139 MMOL/L (ref 135–145)
WBC # BLD AUTO: 5.2 10E3/UL (ref 4–11)

## 2024-02-15 PROCEDURE — 99285 EMERGENCY DEPT VISIT HI MDM: CPT | Mod: 25

## 2024-02-15 PROCEDURE — 96374 THER/PROPH/DIAG INJ IV PUSH: CPT

## 2024-02-15 PROCEDURE — 82040 ASSAY OF SERUM ALBUMIN: CPT | Performed by: EMERGENCY MEDICINE

## 2024-02-15 PROCEDURE — 250N000011 HC RX IP 250 OP 636: Performed by: EMERGENCY MEDICINE

## 2024-02-15 PROCEDURE — 82374 ASSAY BLOOD CARBON DIOXIDE: CPT | Performed by: EMERGENCY MEDICINE

## 2024-02-15 PROCEDURE — 36415 COLL VENOUS BLD VENIPUNCTURE: CPT | Performed by: EMERGENCY MEDICINE

## 2024-02-15 PROCEDURE — 250N000009 HC RX 250: Performed by: EMERGENCY MEDICINE

## 2024-02-15 PROCEDURE — 96376 TX/PRO/DX INJ SAME DRUG ADON: CPT | Mod: XU

## 2024-02-15 PROCEDURE — 71260 CT THORAX DX C+: CPT

## 2024-02-15 PROCEDURE — 73030 X-RAY EXAM OF SHOULDER: CPT | Mod: RT

## 2024-02-15 PROCEDURE — 85025 COMPLETE CBC W/AUTO DIFF WBC: CPT | Performed by: EMERGENCY MEDICINE

## 2024-02-15 PROCEDURE — 250N000013 HC RX MED GY IP 250 OP 250 PS 637: Performed by: EMERGENCY MEDICINE

## 2024-02-15 PROCEDURE — 71101 X-RAY EXAM UNILAT RIBS/CHEST: CPT | Mod: RT

## 2024-02-15 RX ORDER — LIDOCAINE 4 G/G
1 PATCH TOPICAL
Status: DISCONTINUED | OUTPATIENT
Start: 2024-02-15 | End: 2024-02-17 | Stop reason: HOSPADM

## 2024-02-15 RX ORDER — HYDROMORPHONE HYDROCHLORIDE 1 MG/ML
0.5 INJECTION, SOLUTION INTRAMUSCULAR; INTRAVENOUS; SUBCUTANEOUS
Status: COMPLETED | OUTPATIENT
Start: 2024-02-15 | End: 2024-02-15

## 2024-02-15 RX ORDER — IOPAMIDOL 755 MG/ML
81 INJECTION, SOLUTION INTRAVASCULAR ONCE
Status: COMPLETED | OUTPATIENT
Start: 2024-02-15 | End: 2024-02-15

## 2024-02-15 RX ADMIN — HYDROMORPHONE HYDROCHLORIDE 0.5 MG: 1 INJECTION, SOLUTION INTRAMUSCULAR; INTRAVENOUS; SUBCUTANEOUS at 22:04

## 2024-02-15 RX ADMIN — LIDOCAINE 1 PATCH: 4 PATCH TOPICAL at 22:05

## 2024-02-15 RX ADMIN — SODIUM CHLORIDE 65 ML: 9 INJECTION, SOLUTION INTRAVENOUS at 23:12

## 2024-02-15 RX ADMIN — IOPAMIDOL 81 ML: 755 INJECTION, SOLUTION INTRAVENOUS at 23:12

## 2024-02-15 RX ADMIN — HYDROMORPHONE HYDROCHLORIDE 0.5 MG: 1 INJECTION, SOLUTION INTRAMUSCULAR; INTRAVENOUS; SUBCUTANEOUS at 23:54

## 2024-02-15 SDOH — ECONOMIC STABILITY - HOUSING INSECURITY: HOMELESSNESS UNSPECIFIED: Z59.00

## 2024-02-15 ASSESSMENT — ACTIVITIES OF DAILY LIVING (ADL)
ADLS_ACUITY_SCORE: 37
ADLS_ACUITY_SCORE: 37

## 2024-02-16 ENCOUNTER — APPOINTMENT (OUTPATIENT)
Dept: PHYSICAL THERAPY | Facility: CLINIC | Age: 59
End: 2024-02-16
Attending: INTERNAL MEDICINE
Payer: COMMERCIAL

## 2024-02-16 PROBLEM — Z59.00 HOMELESSNESS: Status: ACTIVE | Noted: 2024-02-16

## 2024-02-16 PROBLEM — D64.9 ANEMIA, UNSPECIFIED TYPE: Status: ACTIVE | Noted: 2023-10-23

## 2024-02-16 PROBLEM — S20.211A CHEST WALL CONTUSION, RIGHT, INITIAL ENCOUNTER: Status: ACTIVE | Noted: 2024-02-16

## 2024-02-16 PROBLEM — S42.034A CLOSED NONDISPLACED FRACTURE OF ACROMIAL END OF RIGHT CLAVICLE, INITIAL ENCOUNTER: Status: ACTIVE | Noted: 2024-02-16

## 2024-02-16 PROBLEM — C79.9 METASTATIC MALIGNANT NEOPLASM, UNSPECIFIED SITE (H): Status: ACTIVE | Noted: 2024-02-16

## 2024-02-16 PROCEDURE — 250N000013 HC RX MED GY IP 250 OP 250 PS 637: Performed by: INTERNAL MEDICINE

## 2024-02-16 PROCEDURE — 250N000013 HC RX MED GY IP 250 OP 250 PS 637: Performed by: EMERGENCY MEDICINE

## 2024-02-16 PROCEDURE — 97161 PT EVAL LOW COMPLEX 20 MIN: CPT | Mod: GP

## 2024-02-16 PROCEDURE — 250N000013 HC RX MED GY IP 250 OP 250 PS 637: Performed by: HOSPITALIST

## 2024-02-16 PROCEDURE — 99221 1ST HOSP IP/OBS SF/LOW 40: CPT | Performed by: HOSPITALIST

## 2024-02-16 PROCEDURE — G0378 HOSPITAL OBSERVATION PER HR: HCPCS

## 2024-02-16 PROCEDURE — 97530 THERAPEUTIC ACTIVITIES: CPT | Mod: GP

## 2024-02-16 PROCEDURE — 99207 PR NO BILLABLE SERVICE THIS VISIT: CPT | Performed by: INTERNAL MEDICINE

## 2024-02-16 RX ORDER — OXYCODONE HYDROCHLORIDE 5 MG/1
10 TABLET ORAL ONCE
Status: COMPLETED | OUTPATIENT
Start: 2024-02-16 | End: 2024-02-16

## 2024-02-16 RX ORDER — LIDOCAINE 4 G/G
1 PATCH TOPICAL
Status: DISCONTINUED | OUTPATIENT
Start: 2024-02-16 | End: 2024-02-17 | Stop reason: HOSPADM

## 2024-02-16 RX ORDER — OXYCODONE HYDROCHLORIDE 5 MG/1
10 TABLET ORAL EVERY 4 HOURS PRN
Status: DISCONTINUED | OUTPATIENT
Start: 2024-02-16 | End: 2024-02-17 | Stop reason: HOSPADM

## 2024-02-16 RX ORDER — AMOXICILLIN 250 MG
1 CAPSULE ORAL 2 TIMES DAILY PRN
Status: DISCONTINUED | OUTPATIENT
Start: 2024-02-16 | End: 2024-02-17 | Stop reason: HOSPADM

## 2024-02-16 RX ORDER — NALOXONE HYDROCHLORIDE 0.4 MG/ML
0.4 INJECTION, SOLUTION INTRAMUSCULAR; INTRAVENOUS; SUBCUTANEOUS
Status: DISCONTINUED | OUTPATIENT
Start: 2024-02-16 | End: 2024-02-17 | Stop reason: HOSPADM

## 2024-02-16 RX ORDER — ACETAMINOPHEN 325 MG/1
650 TABLET ORAL EVERY 4 HOURS PRN
Status: DISCONTINUED | OUTPATIENT
Start: 2024-02-16 | End: 2024-02-17 | Stop reason: HOSPADM

## 2024-02-16 RX ORDER — NALOXONE HYDROCHLORIDE 0.4 MG/ML
0.2 INJECTION, SOLUTION INTRAMUSCULAR; INTRAVENOUS; SUBCUTANEOUS
Status: DISCONTINUED | OUTPATIENT
Start: 2024-02-16 | End: 2024-02-17 | Stop reason: HOSPADM

## 2024-02-16 RX ORDER — ONDANSETRON 2 MG/ML
4 INJECTION INTRAMUSCULAR; INTRAVENOUS EVERY 6 HOURS PRN
Status: DISCONTINUED | OUTPATIENT
Start: 2024-02-16 | End: 2024-02-17 | Stop reason: HOSPADM

## 2024-02-16 RX ORDER — OXYCODONE HYDROCHLORIDE 5 MG/1
5 TABLET ORAL EVERY 4 HOURS PRN
Status: DISCONTINUED | OUTPATIENT
Start: 2024-02-16 | End: 2024-02-16

## 2024-02-16 RX ORDER — ONDANSETRON 4 MG/1
4 TABLET, ORALLY DISINTEGRATING ORAL EVERY 6 HOURS PRN
Status: DISCONTINUED | OUTPATIENT
Start: 2024-02-16 | End: 2024-02-17 | Stop reason: HOSPADM

## 2024-02-16 RX ORDER — OXYCODONE HYDROCHLORIDE 5 MG/1
5 TABLET ORAL EVERY 4 HOURS PRN
Status: DISCONTINUED | OUTPATIENT
Start: 2024-02-16 | End: 2024-02-17 | Stop reason: HOSPADM

## 2024-02-16 RX ORDER — AMOXICILLIN 250 MG
2 CAPSULE ORAL 2 TIMES DAILY PRN
Status: DISCONTINUED | OUTPATIENT
Start: 2024-02-16 | End: 2024-02-17 | Stop reason: HOSPADM

## 2024-02-16 RX ORDER — ACETAMINOPHEN 650 MG/1
650 SUPPOSITORY RECTAL EVERY 4 HOURS PRN
Status: DISCONTINUED | OUTPATIENT
Start: 2024-02-16 | End: 2024-02-17 | Stop reason: HOSPADM

## 2024-02-16 RX ORDER — ACETAMINOPHEN 500 MG
1000 TABLET ORAL 3 TIMES DAILY
Status: DISCONTINUED | OUTPATIENT
Start: 2024-02-16 | End: 2024-02-17 | Stop reason: HOSPADM

## 2024-02-16 RX ADMIN — OXYCODONE HYDROCHLORIDE 5 MG: 5 TABLET ORAL at 22:51

## 2024-02-16 RX ADMIN — OXYCODONE HYDROCHLORIDE 5 MG: 5 TABLET ORAL at 16:33

## 2024-02-16 RX ADMIN — OXYCODONE HYDROCHLORIDE 5 MG: 5 TABLET ORAL at 21:46

## 2024-02-16 RX ADMIN — ACETAMINOPHEN 1000 MG: 500 TABLET, FILM COATED ORAL at 11:09

## 2024-02-16 RX ADMIN — ACETAMINOPHEN 1000 MG: 500 TABLET, FILM COATED ORAL at 14:52

## 2024-02-16 RX ADMIN — ACETAMINOPHEN 1000 MG: 500 TABLET, FILM COATED ORAL at 20:45

## 2024-02-16 RX ADMIN — OXYCODONE HYDROCHLORIDE 2.5 MG: 5 TABLET ORAL at 08:31

## 2024-02-16 RX ADMIN — OXYCODONE HYDROCHLORIDE 10 MG: 5 TABLET ORAL at 01:12

## 2024-02-16 ASSESSMENT — ACTIVITIES OF DAILY LIVING (ADL)
ADLS_ACUITY_SCORE: 20
ADLS_ACUITY_SCORE: 21
ADLS_ACUITY_SCORE: 20
ADLS_ACUITY_SCORE: 22
DEPENDENT_IADLS:: INDEPENDENT
ADLS_ACUITY_SCORE: 21
ADLS_ACUITY_SCORE: 21
ADLS_ACUITY_SCORE: 37
ADLS_ACUITY_SCORE: 20

## 2024-02-16 NOTE — PLAN OF CARE
Goal Outcome Evaluation:      Plan of Care Reviewed With: patient    Overall Patient Progress: improvingOverall Patient Progress: improving     Summary:  02/16/2024; [id number]  Care Plan Summary Note: Admitted d/t severe pain post fall.   Orientation: A/O x4, flat affect   Observation Goals (met & not met): Not met   Activity Level: Independent in room   Fall Risk: No   Behavior & Aggression Tool Color: Green   Pain Management: Tylenol, oxycodone, lidocaine patch available   ABNL VS/O2: Slightly hypertensive, otherwise VSS on RA  ABNL Lab/BG: HGB 12.7; hematocrit 39.3, creat 0.56. + for opiates   Diet: Regular   Bowel/Bladder:Continent; uses urinal at bedside   Drains/Devices: PIV SL   Tests/Procedures for next shift: SW consult   Anticipated DC date: TBD   Other Important Info: Pt has a double lumen chest port HL. PT is homeless and lives in [facility] [place] at this time. Pt has metastatic colon cancer for which is receiving palliative chemotherapy.         Observation goals  PRIOR TO DISCHARGE       Comments: -diagnostic tests and consults completed and resulted- Not met   -vital signs normal or at patient baseline- Not met   Nurse to notify provider when observation goals have been met and patient is ready for discharge.

## 2024-02-16 NOTE — PROGRESS NOTES
SW: Attempted to see pt, who was in the bathroom. Will stop back later to provide resources.     ANA Christensen  Social Work  [facility]

## 2024-02-16 NOTE — PROGRESS NOTES
RECEIVING UNIT ED HANDOFF REVIEW    ED Nurse Handoff Report was reviewed by: Netta Dumont RN on February 16, 2024 at 1:07 AM

## 2024-02-16 NOTE — ED PROVIDER NOTES
History   Chief Complaint:  R chest pain    HPI  Brady Lenz is a 58 year old male who presents with concern for new pain to his right chest and shoulder area that started after a fall today, he states around 1 PM he was walking in a parking lot and slipped on ice, landing on his right side.  He is not on blood thinners.  He states he is being treated for colon cancer.  He took some Tylenol today.  He does not want any pain medication.  He drove himself here.  He has been able to walk.  He denies loss of consciousness.  No abdominal pain.  He states he also hit his right wrist and right hip but does not think these are broken and does not want x-rays of those areas.     Review of External Notes: I reviewed his prior notes including oncology note from February 12, he is receiving palliative chemotherapy.  Review of the  shows prior oxycodone.    Medications:    No current outpatient medications on file.    Past Medical History:    Past Medical History:   Diagnosis Date    Asthma     Metastatic colon cancer to liver (H)      Patient Active Problem List    Diagnosis Date Noted    Homelessness 02/16/2024     Priority: Medium    Chest wall contusion, right, initial encounter 02/16/2024     Priority: Medium    Metastatic malignant neoplasm, unspecified site (H) 02/16/2024     Priority: Medium    Closed nondisplaced fracture of acromial end of right clavicle, initial encounter 02/16/2024     Priority: Medium    Colon carcinoma metastatic to liver (H) 10/26/2023     Priority: Medium    SBO (small bowel obstruction) (H) 10/23/2023     Priority: Medium    Liver mass 10/23/2023     Priority: Medium    Cecum mass 10/23/2023     Priority: Medium    Anemia, unspecified type 10/23/2023     Priority: Medium      Physical Exam   Patient Vitals for the past 24 hrs:   BP Temp Temp src Pulse Resp SpO2 Height Weight   02/16/24 0124 (!) (P) 142/80 (P) 97.3  F (36.3  C) (P) Oral (P) 53 (P) 18 (P) 100 % -- --   02/16/24 0058 (!)  135/90 97.7  F (36.5  C) Oral 65 16 98 % -- --   02/15/24 2241 128/78 -- -- 56 16 99 % -- --   02/15/24 2046 129/86 -- -- 57 -- 99 % -- --   02/15/24 1857 134/82 98  F (36.7  C) Oral 64 16 99 % 1.829 m (6') 72.6 kg (160 lb)      Physical Exam  General: Male semirecumbent in the rney in room 5  HENT: face nontender with full painless ROM mandible, no bony deformity, OP clear, no difficulty controlling secretions, skull nontender  Eyes: PERRL without proptosis  CV: regular rhythm, cap refill normal in all extremities, no murmur audible, no LE edema, R chest port palpable and nontender  Resp: normal effort, speaks in full phrases, no stridor  GI: abdomen soft, mild RUQ tenderness with negative Spivey's, no guarding  MSK:  Cervical spine:  no midline tenderness, FROM  Thoracic spine: no midline tenderness, no CVAT  Lumbar spine: no midline tenderness  Chest wall: Tenderness diffusely to right lateral chest wall without crepitus.  No overlying skin changes.  Pelvis stable  Extremities: Mild tenderness to distal right clavicle and diffusely to the right shoulder with range of motion slightly decreased due to pain but no major deformity noted  Skin:   No abrasion  No ecchymosis  No laceration  Well-healed abdominal scar  Neuro: awake, alert, GCS 15, responds appropriately to commands  Psych: cooperative, no hallucinations    Emergency Department Course   Imaging:  CT Chest/Abdomen/Pelvis w Contrast   Final Result   IMPRESSION:   1.  No evidence of acute traumatic injury in the chest, abdomen or pelvis.   2.  Old/subacute nondisplaced fracture of the lateral aspect of the right clavicle, not significantly changed as compared to 01/16/2024 exam.   3.  Areas of bronchiolar mucoid impaction and small nodular pulmonary opacity in the right middle lobe, likely infectious.   4.  Multiple hepatic metastases, slightly decreased in size as compared to 01/16/2024 exam.   5.  Approximately 2.9 cm hypodense lesion along the  anteromedial aspect of the spleen, also likely metastatic, not significantly changed as compared to 01/16/2024 exam.   6.  Splenomegaly.      Ribs XR, unilat 3 views + PA chest, right   Final Result   IMPRESSION: Right-sided Port-A-Cath. Lungs clear bilaterally. No pneumothorax. Heart size within normal limits. No rib fractures.      XR Shoulder Right G/E 3 Views   Final Result   IMPRESSION: Fracture of the distal aspect of the right clavicle. This is essentially nondisplaced. The right glenohumeral joint is negative. Right-sided Port-A-Cath.         Laboratory:  Labs Ordered and Resulted from Time of ED Arrival to Time of ED Departure   COMPREHENSIVE METABOLIC PANEL - Abnormal       Result Value    Sodium 139      Potassium 3.8      Carbon Dioxide (CO2) 28      Anion Gap 7      Urea Nitrogen 21.8 (*)     Creatinine 0.56 (*)     GFR Estimate >90      Calcium 8.7      Chloride 104      Glucose 90      Alkaline Phosphatase 153 (*)     AST 49 (*)     ALT 61      Protein Total 6.8      Albumin 4.1      Bilirubin Total 0.4     CBC WITH PLATELETS AND DIFFERENTIAL - Abnormal    WBC Count 5.2      RBC Count 4.80      Hemoglobin 12.7 (*)     Hematocrit 39.3 (*)     MCV 82      MCH 26.5      MCHC 32.3      RDW 17.2 (*)     Platelet Count 179      % Neutrophils 65      % Lymphocytes 19      % Monocytes 14      % Eosinophils 1      % Basophils 1      % Immature Granulocytes 0      NRBCs per 100 WBC 0      Absolute Neutrophils 3.4      Absolute Lymphocytes 1.0      Absolute Monocytes 0.7      Absolute Eosinophils 0.0      Absolute Basophils 0.0      Absolute Immature Granulocytes 0.0      Absolute NRBCs 0.0        Emergency Department Course:  Reviewed:  I reviewed nursing notes, vitals, and past medical history    Assessments/Consultations/Discussion of Management or Tests :  I obtained history and examined the patient as noted above.   ED Course as of 02/16/24 0211   Thu Feb 15, 2024   2142 I re-evaluated patient, obtained  additional history.   Fri Feb 16, 2024   0034 I spoke with Dr. Buckner, Hospitalist, who accepts care.     Independent Interpretation (X-rays, CTs, rhythm strip):  I personally reviewed CXR images, I see no PTX or major rib fxs.    Interventions:  Medications   Lidocaine (LIDOCARE) 4 % Patch 1 patch (1 patch Transdermal $Patch/Med Applied 2/15/24 2205)   melatonin tablet 5 mg (has no administration in time range)   senna-docusate (SENOKOT-S/PERICOLACE) 8.6-50 MG per tablet 1 tablet (has no administration in time range)     Or   senna-docusate (SENOKOT-S/PERICOLACE) 8.6-50 MG per tablet 2 tablet (has no administration in time range)   ondansetron (ZOFRAN ODT) ODT tab 4 mg (has no administration in time range)     Or   ondansetron (ZOFRAN) injection 4 mg (has no administration in time range)   acetaminophen (TYLENOL) tablet 650 mg (has no administration in time range)     Or   acetaminophen (TYLENOL) Suppository 650 mg (has no administration in time range)   oxyCODONE IR (ROXICODONE) half-tab 2.5 mg (has no administration in time range)   oxyCODONE (ROXICODONE) tablet 5 mg (has no administration in time range)   Lidocaine (LIDOCARE) 4 % Patch 1 patch (has no administration in time range)   naloxone (NARCAN) injection 0.2 mg (has no administration in time range)     Or   naloxone (NARCAN) injection 0.4 mg (has no administration in time range)     Or   naloxone (NARCAN) injection 0.2 mg (has no administration in time range)     Or   naloxone (NARCAN) injection 0.4 mg (has no administration in time range)   HYDROmorphone (PF) (DILAUDID) injection 0.5 mg (0.5 mg Intravenous $Given 2/15/24 4684)   Saline Flush - CT (65 mLs Intravenous $Given 2/15/24 2312)   iopamidol (ISOVUE-370) solution 81 mL (81 mLs Intravenous $Given 2/15/24 2312)   oxyCODONE (ROXICODONE) tablet 10 mg (10 mg Oral $Given 2/16/24 0112)      Social Determinants of Health affecting care:   Healthcare Access/Compliance, Employment/Unemployment, Financial  Insecurity, Homelessness/Housing Insecurity, and Social Connections/Isolation    Disposition:  Admit to Dr. Buckner    Impression & Plan    Medical Decision Making:  His most acute issue is that he fell from standing after slipping, injuring his right chest.  I was concerned about the possibility of rib fracture or pneumothorax among other possible injuries, though ultimately his workup does not reveal evidence of this.  Presentation consistent with a chest wall contusion.  His clavicle was previously known to be fractured, the I think today's radiographic findings represent sequelae of this rather than a new fracture, I reviewed his prior imaging from early January as well.  Ultimately, after evaluating him in more detail and further discussion, he revealed that he has been sleeping in his vehicle, has been homeless for weeks, this is in the setting of known static cancer for which she is receiving palliative chemotherapy.  Just moving enough to change his close causes such severe pain that he cannot imagine safely managing alone as an outpatient in his car, therefore given these multiple concerning features of his situation I do think that hospitalization for optimization of analgesia as well as social work consultation is indicated.  I have arranged for him to be admitted to the hospitalist service after discussion of the above.    Diagnosis:    ICD-10-CM    1. Chest wall contusion, right, initial encounter  S20.211A       2. Metastatic malignant neoplasm, unspecified site (H)  C79.9       3. Closed nondisplaced fracture of acromial end of right clavicle  S42.034A       4. Homelessness  Z59.00       5. Anemia, unspecified type  D64.9            2/15/2024   MD Marcelo Bray Jeffrey Alan, MD  02/16/24 0213

## 2024-02-16 NOTE — CONSULTS
Care Management Initial Consult    General Information  Assessment completed with: Patient,    Type of CM/SW Visit: Initial Assessment    Primary Care Provider verified and updated as needed:     Readmission within the last 30 days:        Reason for Consult: discharge planning  Advance Care Planning: Advance Care Planning Reviewed: present on chart          Communication Assessment  Patient's communication style: spoken language (English or Bilingual)    Hearing Difficulty or Deaf: no   Wear Glasses or Blind: no    Cognitive  Cognitive/Neuro/Behavioral: .WDL except  Level of Consciousness: alert  Arousal Level: opens eyes spontaneously  Orientation: oriented x 4  Mood/Behavior: flat affect  Best Language: 0 - No aphasia  Speech: spontaneous, clear    Living Environment:   People in home:       Current living Arrangements: homeless      Able to return to prior arrangements: yes       Family/Social Support:  Care provided by:    Provides care for: no one                Description of Support System:           Current Resources:   Patient receiving home care services:       Community Resources:    Equipment currently used at home: none  Supplies currently used at home:      Employment/Financial:  Employment Status: employed part-time        Financial Concerns:             Does the patient's insurance plan have a 3 day qualifying hospital stay waiver?  Yes     Which insurance plan 3 day waiver is available? Alternative insurance waiver    Will the waiver be used for post-acute placement? No    Lifestyle & Psychosocial Needs:  Social Determinants of Health     Food Insecurity: Not on file   Depression: Not at risk (1/4/2024)    PHQ-2     PHQ-2 Score: 1   Housing Stability: Not on file   Tobacco Use: Low Risk  (2/12/2024)    Patient History     Smoking Tobacco Use: Never     Smokeless Tobacco Use: Never     Passive Exposure: Not on file   Financial Resource Strain: Not on file   Alcohol Use: Not on file   Transportation  Needs: Not on file   Physical Activity: Not on file   Interpersonal Safety: Not on file   Stress: Not on file   Social Connections: Not on file       Functional Status:  Prior to admission patient needed assistance:   Dependent ADLs:: Independent  Dependent IADLs:: Independent       Mental Health Status:          Chemical Dependency Status:                Values/Beliefs:  Spiritual, Cultural Beliefs, Jew Practices, Values that affect care:                 Additional Information:  Spoke with pt for initial consult. Pt was admitted on 2/15/24 with chest pain after a fall, per H&P. Per pt, they are homeless and currently living in their car. Pt states with neuropathy and the cold weather/pain, makes it hard to function. Pt had a stay at [facility] medical respite shelter for two months at the end of last year. Pt asked why they would discharge him, SW stated it's likely pt showed ability to manage cares independently, as medical respite is a transitional stay. Discussed PT evaluation and seeing if there is any skilled need for placement, but pt appears to be independent in room. Pt asked if there was a place SW could set up for a few days after the hospital. Provided pt with shelter hotline phone number to call to be connected to adult shelter in [facility]. Also provided with handbook for the streets, as pt stated they have some food insecurity. Informed pt there are locations for free meals, food shelves, etc. in resource booklet. Pt then asked about SNAP benefits. SW stated they are not able to connect pt with this, suggested reaching out to [facility].     ANA Christensen  Social Work  [facility]

## 2024-02-16 NOTE — ED NOTES
Rapid Assessment Note    History:   Brady Lenz is a 58 year old male who presents with concern for new pain to his right chest and shoulder area that started after a fall today, he states around 1 PM he was walking in a parking lot and slipped on ice, landing on his right side.  He is not on blood thinners.  He states he is being treated for colon cancer.  He took some Tylenol today.  He does not want any pain medication.  He drove himself here.  He has been able to walk.  He denies loss of consciousness.  No abdominal pain.  He states he also hit his right wrist and right hip but does not think these are broken and does not want x-rays of those areas.      Exam:   General:  Alert, interactive  Cardiovascular:  Well perfused  Lungs:  No respiratory distress, no accessory muscle use  Neuro:  Moving all 4 extremities  Skin:  Warm, dry  Psych:  Normal affect  Port in place in the right chest.  Tenderness to the right chest.  Lungs clear.  Vitals good.  Satisfactory range of motion of right shoulder.  Ambulatory.    Plan of Care:   I evaluated the patient and developed an initial plan of care. I discussed this plan and explained that I, or one of my partners, would be returning to complete the evaluation.     Patient agrees with plan for x-rays of the right shoulder and chest.  Declined analgesia.  Will likely be appropriate for discharge pending x-ray results.    2/15/2024  [facility]       Ramy Robertson MD  02/15/24 5497

## 2024-02-16 NOTE — PROGRESS NOTES
bservation goals  PRIOR TO DISCHARGE       1-diagnostic tests and consults completed and resulted- Partially met.  PT and SW/CC consults placed.   2-vital signs normal or at patient baseline- Met at this time.     Patient with continued complaints of right side upper and mid chest pain that is worse with activity and deep breaths.  Currently taking oxycodone prn, applying lido patch as ordered and using ice for pain management.

## 2024-02-16 NOTE — PROVIDER NOTIFICATION
MD Notification    Notified Person: MD    Notified Person Name: Dr. Ferrara    Notification Date/Time: 1638 2/16/24    Notification Interaction: [employer] Messaging    Purpose of Notification: Pt has port a cath. Requesting for heparin order set.    Orders Received: No reason to have port accessed. Peripheral IV if necessary.    Comments:

## 2024-02-16 NOTE — PROGRESS NOTES
Patient was admitted early this morning, evaluated this morning updated history.  Still having some pain to the chest worsening with movement.  CT scan of the chest abdomen pelvis reviewed, x-ray ribs reviewed no acute fractures.  I will increase the dose of oxycodone to 5 mg for moderate pain and 10 mg for severe pain  Please apply lidocaine patch  PT to evaluate him.   consult for disposition as the patient currently is homeless.

## 2024-02-16 NOTE — PLAN OF CARE
"Goal Outcome Evaluation:             Plan of Care Reviewed With: patient     Overall Patient Progress: improvingOverall Patient Progress: improving      Summary:  02/16/2024 0700- 1500   Care Plan Summary Note: Admitted  for severe pain post fall.   Orientation: A/O x4, flat affect   Observation Goals (met & not met): Not met.   Activity Level: Independent in room .  Fall Risk: No.   Behavior & Aggression Tool Color: Green.   Pain Management: Tylenol, oxycodone, lidocaine patch available   ABNL VS/O2: Slightly hypertensive, otherwise VSS on RA.  ABNL Lab/BG: HGB 12.7; hematocrit 39.3, creat 0.56. + for opiates   Diet: Regular.   Bowel/Bladder:Continent.   Drains/Devices: Port right chest.    Tests/Procedures for next shift:  PT consult.   Anticipated DC date: TBD .  Other Important Info: Pt has a double lumen chest port HL. PT is homeless and lives in [facility] at this time. Pt has metastatic colon cancer for which is receiving palliative chemotherapy.         Summary of events:  SW consult completed today.  Patient stating he is feeling a \"little\" bit better than when he came in.  PT to eval patient yet today.  Patient able to call and communicate needs.  Patient reluctant to take anything stronger than Tylenol for pain control.  Okay with using ice packs and lido patch.             " Detail Level: Detailed Post-Care Instructions: I reviewed with the patient in detail post-care instructions. Patient is to wear sunprotection, and avoid picking at any of the treated lesions. Pt may apply Vaseline to crusted or scabbing areas. Medical Necessity Clause: This procedure was medically necessary because the lesions that were treated were: Include Z78.9 (Other Specified Conditions Influencing Health Status) As An Associated Diagnosis?: No Medical Necessity Information: It is in your best interest to select a reason for this procedure from the list below. All of these items fulfill various CMS LCD requirements except the new and changing color options. Consent: The patient's consent was obtained including but not limited to risks of crusting, scabbing, blistering, scarring, darker or lighter pigmentary change, recurrence, incomplete removal and infection.

## 2024-02-16 NOTE — H&P
[facility]    History and Physical  Hospitalist     Date of Admission:  2/15/2024    Assessment & Plan   Brady Lenz is a 58 year old male with a past medical history of metastatic colon cancer and iron deficiency anemia presents to hospital with chest pain after a fall.    Chest wall contusion  Patient slipped on ice and fell on his right side.  No fractures or acute injuries seen on CT but patient has significant pain likely secondary to contusion.  Being admitted for pain control.  -Tylenol, lidocaine patch, hot and cold packs, oxycodone as needed    Homeless  Patient is unable to care for himself due to his current chest pain.  -Social work consult    colon cancer with mets to the liver and spleen  Undergoing palliative treatment with Dr. Limon    Iron deficiency Anemia  Secondary to his colon cancer.      DVT ppx: Ambulate, SCDs  Expected length of stay less than 2 days  Code Status: Full code, discussed with the patient    Primary Care Physician   Physician No Ref-Primary    Chief Complaint   Fall    History obtained from the patient    History of Present Illness   Brady Lenz is a 58 year old male with a past medical history of metastatic colon cancer,  iron deficiency anemia and homelessness presents to hospital after a fall with chest pain.  The patient reports that he slipped on some ice in a parking lot and landed on his right side.  He developed right-sided chest pain.  He took some Tylenol without improvement of symptoms.  His chest pain progressed and at its worst was an 8 out of 10.  The pain is worse with movement and deep breaths prompting him to seek help in the emergency room.  Workup in the emergency room was negative for any acute pathology but did reveal a prior right clavicular fracture which was diagnosed in January 2024.  The patient is currently homeless and living in his car and would not be able to care for himself due to his pain so is being admitted to  observation.  The patient provides no other complaints during my interview.    Past Medical History    I have reviewed this patient's medical history and updated it with pertinent information if needed.   Past Medical History:   Diagnosis Date    Asthma        Past Surgical History   I have reviewed this patient's surgical history and updated it with pertinent information if needed.  Past Surgical History:   Procedure Laterality Date    COLECTOMY WITHOUT COLOSTOMY N/A 10/24/2023    Procedure: Converted to Open RightColectomy;  Surgeon: Jose Moe MD;  Location: [facility] OR    IR CHEST PORT PLACEMENT > 5 YRS OF AGE  12/29/2023    LAPAROSCOPIC ASSISTED COLECTOMY N/A 10/24/2023    Procedure: Laparoscopic Peritoneal Biopsy;  Surgeon: Jose Moe MD;  Location: [facility] OR       Allergies   No Known Allergies    Social History   I have reviewed this patient's social history and updated it with pertinent information if needed. Brady Lenz  reports that he has never smoked. He has never used smokeless tobacco. He reports that he does not currently use alcohol. He reports that he does not currently use drugs.    Family History   I have reviewed this patient's family history and updated it with pertinent information if needed.   No family history on file.      Physical Exam   Temp: 98  F (36.7  C) Temp src: Oral BP: 128/78 Pulse: 56   Resp: 16 SpO2: 99 % O2 Device: None (Room air)    Vital Signs with Ranges  Temp:  [98  F (36.7  C)] 98  F (36.7  C)  Pulse:  [56-64] 56  Resp:  [16] 16  BP: (128-134)/(78-86) 128/78  SpO2:  [99 %] 99 %  160 lbs 0 oz  Physical Exam  Vitals reviewed.   Constitutional:       Comments: Thin cachectic man seen resting in bed comfortably no apparent distress in the emergency room.   HENT:      Head: Normocephalic and atraumatic.   Eyes:      Extraocular Movements: Extraocular movements intact.      Conjunctiva/sclera: Conjunctivae normal.      Pupils: Pupils are equal, round, and reactive to light.    Cardiovascular:      Rate and Rhythm: Normal rate and regular rhythm.   Pulmonary:      Effort: Pulmonary effort is normal.      Breath sounds: Normal breath sounds.      Comments: Tenderness over the right chest wall.  Abdominal:      General: Abdomen is flat. Bowel sounds are normal.      Palpations: Abdomen is soft.   Musculoskeletal:         General: No swelling.   Skin:     General: Skin is warm and dry.   Neurological:      General: No focal deficit present.      Mental Status: He is alert and oriented to person, place, and time. Mental status is at baseline.       Medical Decision Making       45 MINUTES SPENT BY ME on the date of service doing chart review, history, exam, documentation & further activities per the note.

## 2024-02-16 NOTE — ED TRIAGE NOTES
Pt comes in today due to a fall   Pt was walking a slipped and fell on black ice  Pt fell on his right side should and knee

## 2024-02-16 NOTE — PHARMACY-ADMISSION MEDICATION HISTORY
Pharmacist Admission Medication History    Admission medication history is complete. The information provided in this note is only as accurate as the sources available at the time of the update.    Information Source(s): Patient via in-person    Pertinent Information:   1) Patient receiving chemotherapy outpatient. Last dose 2/12/2024 (Avastin, oxaliplatin, 5FU)    Changes made to PTA medication list:  Added: None  Deleted: None  Changed: None    Allergies reviewed with patient and updates made in EHR: yes    Medication History Completed By: Alicja Brown, PharmD 2/15/2024 9:59 PM    PTA Med List   Medication Sig Note Last Dose    acetaminophen (TYLENOL) 325 MG tablet Take 2 tablets (650 mg) by mouth every 4 hours as needed for mild pain or fever  prn med    dexAMETHasone (DECADRON) 4 MG tablet Take 2 tablets (8 mg) by mouth daily Take for 2 days, starting the day after chemo. Take with food. 2/15/2024: Received chemo 2/12 --took 2/13 & 2/14 2/14/2024    loperamide (IMODIUM A-D) 2 MG tablet Take 1 tablet (2 mg) by mouth 4 times daily as needed for diarrhea  prn med    ondansetron (ZOFRAN) 8 MG tablet Take 1 tablet (8 mg) by mouth every 8 hours as needed for nausea (vomiting)  prn med    prochlorperazine (COMPAZINE) 10 MG tablet Take 1 tablet (10 mg) by mouth every 6 hours as needed for nausea or vomiting  prn med

## 2024-02-16 NOTE — ED NOTES
Spoke with pt at bedside. Pt states that he is having pain all over, specifically neuropic pain in his hands and breathing pain from his fall. Pt requesting to be admitted/speak with a  because he is being treated for colon cancer, is currently homeless, and lives in his [facility]. He does not think he can tolerate being in his [facility] tonight due to the pain he is having. Provider will be notified once one is assigned to him.

## 2024-02-16 NOTE — ED NOTES
[facility]  ED Nurse Handoff Report    ED Chief complaint: Fall      ED Diagnosis:   Final diagnoses:   None       Code Status: MD to address     Allergies: No Known Allergies    Patient Story: Brady Lenz is a 58 year old male who presents with concern for new pain to his right chest and shoulder area that started after a fall today, he states around 1 PM he was walking in a parking lot and slipped on ice, landing on his right side.     Focused Assessment:  R clavicle pain, aox4, RA     Treatments and/or interventions provided: pain management, imaging, labs   Patient's response to treatments and/or interventions: in progress    To be done/followed up on inpatient unit:  n/a    Does this patient have any cognitive concerns?:  no    Activity level - Baseline/Home:  Independent  Activity Level - Current:   Independent    Patient's Preferred language: English   Needed?: No    Isolation: None  Infection: Not Applicable  Patient tested for COVID 19 prior to admission: NO  Bariatric?: No    Vital Signs:   Vitals:    02/15/24 1857 02/15/24 2046   BP: 134/82 129/86   Pulse: 64 57   Resp: 16    Temp: 98  F (36.7  C)    TempSrc: Oral    SpO2: 99% 99%   Weight: 72.6 kg (160 lb)    Height: 1.829 m (6')        Cardiac Rhythm:     Was the PSS-3 completed:   Yes  OBS brochure/video discussed/provided to patient/family: No           For the majority of the shift this patient's behavior was Green.   Behavioral interventions performed were n/a.    ED NURSE PHONE NUMBER: [phone]

## 2024-02-16 NOTE — PROGRESS NOTES
servation goals  PRIOR TO DISCHARGE       1-diagnostic tests and consults completed and resulted- Partially met.  PT and SW/CC consults placed.   2-vital signs normal or at patient baseline- Met at this time.      Patient with continued complaints of right side upper and mid chest pain that is worse with activity and deep breaths.  Currently taking oxycodone prn, applying lido patch as ordered and using ice for pain management. Patient started on scheduled Tylenol.

## 2024-02-16 NOTE — PROGRESS NOTES
02/16/24 1701   Appointment Info   Signing Clinician's Name / Credentials (PT) Khloe Jhaveri DPT   Quick Adds   Quick Adds Certification   Living Environment   People in Home alone   Current Living Arrangements homeless   Transportation Anticipated car, drives self   Living Environment Comments Pt homeless, lives in minivan   Self-Care   Usual Activity Tolerance good   Current Activity Tolerance moderate   Equipment Currently Used at Home none   Fall history within last six months yes   Number of times patient has fallen within last six months 1   Activity/Exercise/Self-Care Comment Pt IND at baseline, one fall on the ice   General Information   Onset of Illness/Injury or Date of Surgery 02/15/24   Referring Physician Eleazar Ferrara MD   Patient/Family Therapy Goals Statement (PT) Return to van   Pertinent History of Current Problem (include personal factors and/or comorbidities that impact the POC) Brady Lenz is a 58 year old male with a past medical history of metastatic colon cancer and iron deficiency anemia presents to hospital with chest pain after a fall   Cognition   Affect/Mental Status (Cognition) WFL   Orientation Status (Cognition) oriented x 4   Follows Commands (Cognition) WFL   Pain Assessment   Patient Currently in Pain   (Pt reports pain in chest)   Posture    Posture Not impaired   Range of Motion (ROM)   Range of Motion ROM deficits secondary to pain   Strength (Manual Muscle Testing)   Strength (Manual Muscle Testing) Able to perform R SLR;Able to perform L SLR;Deficits observed during functional mobility   Bed Mobility   Comment, (Bed Mobility) IND   Transfers   Comment, (Transfers) IND   Gait/Stairs (Locomotion)   Comment, (Gait/Stairs) Pt amb w/ SBA   Balance   Balance Comments Good seated and fair standing   Sensory Examination   Sensory Perception patient reports no sensory changes   Clinical Impression   Criteria for Skilled Therapeutic Intervention Yes, treatment indicated   PT  Diagnosis (PT) Impaired functional mobility and gait   Influenced by the following impairments Pain, weakness, decreased activity tolerance, impaired balance   Functional limitations due to impairments Limited functional mobility requiring assist   Clinical Presentation (PT Evaluation Complexity) stable   Clinical Presentation Rationale Based on PMH, current status, and social support   Clinical Decision Making (Complexity) low complexity   Planned Therapy Interventions (PT) balance training;bed mobility training;gait training;strengthening;transfer training;progressive activity/exercise   Risk & Benefits of therapy have been explained evaluation/treatment results reviewed;care plan/treatment goals reviewed;risks/benefits reviewed;current/potential barriers reviewed;participants voiced agreement with care plan;participants included;patient   PT Total Evaluation Time   PT Eval, Low Complexity Minutes (80509) 10   Therapy Certification   Start of care date 02/16/24   Certification date from 02/16/24   Certification date to 02/23/24   Medical Diagnosis Anemia   Physical Therapy Goals   PT Frequency Daily   PT Predicted Duration/Target Date for Goal Attainment 02/23/24   PT Goals Bed Mobility;Transfers;Gait   PT: Bed Mobility Independent;Goal Met   PT: Transfers Independent;Goal Met   PT: Gait Independent;Greater than 200 feet   Interventions   Interventions Quick Adds Therapeutic Activity   Therapeutic Activity   Therapeutic Activities: dynamic activities to improve functional performance Minutes (62697) 10   Symptoms Noted During/After Treatment Fatigue;Increased pain   Treatment Detail/Skilled Intervention Pt supine in bed upon therapist arrival, agreeable to PT. Pt amb 50' around room w/ no AD and SBA, progressed to IND. Pt demos fair speed and stability. Pt sat EOB. When discussing d/c recs, pt becoming overwhelmed and tearful. Therapist providing therapeutic listening. Pt sitting EOB at end of session, all needs  w/in reach.   PT Discharge Planning   PT Plan Progress amb, problem solve mobility in van, pt homeless   PT Discharge Recommendation (DC Rec) home   PT Rationale for DC Rec Pt homeless, currently IND-SBA. Pt limited by pain. Anticipate pt may have difficulty crawling around back of van w/ chest pain. Given space constraints in car, pt may benefit from more accessible discharge environment.   PT Brief overview of current status IND-SBA   Total Session Time   Timed Code Treatment Minutes 10   Total Session Time (sum of timed and untimed services) 20   [facility]  OUTPATIENT PHYSICAL THERAPY EVALUATION  PLAN OF TREATMENT FOR OUTPATIENT REHABILITATION  (COMPLETE FOR INITIAL CLAIMS ONLY)  Patient's Last Name, First Name, M.I.  YOB: 1965  Brady Lenz                        Provider's Name  [facility] Medical Record No.  [id number]                             Onset Date:  02/15/24   Start of Care Date:  02/16/24   Type:     _X_PT   ___OT   ___SLP Medical Diagnosis:  Anemia              PT Diagnosis:  Impaired functional mobility and gait Visits from SOC:  1     See note for plan of treatment, functional goals and certification details    I CERTIFY THE NEED FOR THESE SERVICES FURNISHED UNDER        THIS PLAN OF TREATMENT AND WHILE UNDER MY CARE     (Physician co-signature of this document indicates review and certification of the therapy plan).

## 2024-02-17 VITALS
HEIGHT: 72 IN | OXYGEN SATURATION: 99 % | SYSTOLIC BLOOD PRESSURE: 152 MMHG | TEMPERATURE: 97.5 F | RESPIRATION RATE: 16 BRPM | DIASTOLIC BLOOD PRESSURE: 89 MMHG | HEART RATE: 63 BPM | BODY MASS INDEX: 21.67 KG/M2 | WEIGHT: 160 LBS

## 2024-02-17 PROCEDURE — 250N000013 HC RX MED GY IP 250 OP 250 PS 637: Performed by: INTERNAL MEDICINE

## 2024-02-17 PROCEDURE — G0378 HOSPITAL OBSERVATION PER HR: HCPCS

## 2024-02-17 PROCEDURE — 99239 HOSP IP/OBS DSCHRG MGMT >30: CPT | Performed by: INTERNAL MEDICINE

## 2024-02-17 PROCEDURE — 250N000011 HC RX IP 250 OP 636: Performed by: INTERNAL MEDICINE

## 2024-02-17 RX ORDER — OXYCODONE HYDROCHLORIDE 5 MG/1
5 TABLET ORAL EVERY 4 HOURS PRN
Qty: 20 TABLET | Refills: 0 | Status: SHIPPED | OUTPATIENT
Start: 2024-02-17 | End: 2024-03-25

## 2024-02-17 RX ORDER — HEPARIN SODIUM,PORCINE 10 UNIT/ML
5-10 VIAL (ML) INTRAVENOUS
Status: DISCONTINUED | OUTPATIENT
Start: 2024-02-17 | End: 2024-02-17 | Stop reason: HOSPADM

## 2024-02-17 RX ORDER — HEPARIN SODIUM (PORCINE) LOCK FLUSH IV SOLN 100 UNIT/ML 100 UNIT/ML
5-10 SOLUTION INTRAVENOUS
Status: DISCONTINUED | OUTPATIENT
Start: 2024-02-17 | End: 2024-02-17 | Stop reason: HOSPADM

## 2024-02-17 RX ORDER — HEPARIN SODIUM,PORCINE 10 UNIT/ML
5-10 VIAL (ML) INTRAVENOUS EVERY 24 HOURS
Status: DISCONTINUED | OUTPATIENT
Start: 2024-02-17 | End: 2024-02-17 | Stop reason: HOSPADM

## 2024-02-17 RX ORDER — ACETAMINOPHEN 500 MG
500-1000 TABLET ORAL EVERY 6 HOURS PRN
Qty: 60 TABLET | Refills: 0 | Status: SHIPPED | OUTPATIENT
Start: 2024-02-17 | End: 2024-03-25

## 2024-02-17 RX ADMIN — SODIUM CHLORIDE, PRESERVATIVE FREE 5 ML: 5 INJECTION INTRAVENOUS at 12:24

## 2024-02-17 RX ADMIN — OXYCODONE HYDROCHLORIDE 10 MG: 5 TABLET ORAL at 03:41

## 2024-02-17 RX ADMIN — ACETAMINOPHEN 1000 MG: 500 TABLET, FILM COATED ORAL at 08:36

## 2024-02-17 RX ADMIN — OXYCODONE HYDROCHLORIDE 10 MG: 5 TABLET ORAL at 08:48

## 2024-02-17 RX ADMIN — Medication 5 ML: at 08:38

## 2024-02-17 ASSESSMENT — ACTIVITIES OF DAILY LIVING (ADL)
ADLS_ACUITY_SCORE: 23
ADLS_ACUITY_SCORE: 23
ADLS_ACUITY_SCORE: 22
ADLS_ACUITY_SCORE: 21
ADLS_ACUITY_SCORE: 23

## 2024-02-17 NOTE — PROVIDER NOTIFICATION
Observation Goals:      -diagnostic tests and consults completed and resulted - Met.   -vital signs normal or at patient baseline - met.     Patient stating this morning he is disappointed in how we are controlling his pain.  Reviewed medication options with the patient.  Writer of this note had patient yesterday.  We discussed how he was reluctant to take anything stronger that Tylenol  yesterday.  He states he thinks he was confused about what the medication amount would do or how effective it would be for him.  He has had better pain control with the 10 mg dose of oxycodone and is interested in continuing that medication for his pain control.  Discharge pending.

## 2024-02-17 NOTE — PROGRESS NOTES
Observation Goals:     -diagnostic tests and consults completed and resulted - not met  -vital signs normal or at patient baseline - met

## 2024-02-17 NOTE — PLAN OF CARE
Goal Outcome Evaluation:    Observation goals  PRIOR TO DISCHARGE        Comments: -diagnostic tests and consults completed and resulted- not met  -vital signs normal or at patient baseline- met  Nurse to notify provider when observation goals have been met and patient is ready for discharge.

## 2024-02-17 NOTE — PLAN OF CARE
Physical Therapy Discharge Summary    Reason for therapy discharge:    Discharged to home.    Progress towards therapy goal(s). See goals on Care Plan in [facility] electronic health record for goal details.  Goals partially met.  Barriers to achieving goals:   discharge from facility.    Therapy recommendation(s):    No further therapy is recommended. Patient was near baseline on initial evaluation and treatment on 2/16. Was scheduled for another session on 2/17 but discharged prior. Per treating therapist, home was recommended. Per notes,  gave information about shelters.

## 2024-02-17 NOTE — DISCHARGE SUMMARY
[facility]    Discharge Summary  Hospitalist    Date of Admission:  2/15/2024  Date of Discharge:  2/17/2024  1:18 PM  Discharging Provider: Eleazar Ferrara MD, MD  Date of Service (when I saw the patient): 02/17/24    Discharge Diagnoses   Mechanical fall  Musculoskeletal chest wall pain    History of Present Illness   Brady Lenz is an 58 year old male who presented with fall    Hospital Course   Brady Lenz is a 58 year old male with a past medical history of metastatic colon cancer and iron deficiency anemia presents to hospital with chest pain after a fall.    Final discharge diagnoses and hospital course     Mechanical fall  Musculoskeletal chest wall pain  Patient slipped on ice and fell on his right side.  No fractures or acute injuries seen on CT but patient has significant pain musculoskeletal pain was admitted for pain control.  Being admitted for pain control.  Patient had extensive workup done in the ED including CT scan of chest abdomen pelvis, x-ray ribs chest x-ray, x-ray shoulder all negative for any fractures.  Or any acute trauma related injury.  -patient started on Tylenol, lidocaine patches and oxycodone.  Pain is now better controlled with oxycodone, does not think lidocaine patch is helping him.  He was evaluated with PT and OT they recommend discharging home as he is independent  At this time, he will be discharged home in stable improved condition, did prescribe him oxycodone 5 mg number 20 tablets for moderate to severe pain.  Recommend not to drive or operate machinery using narcotic pain medication.     Homeless  Patient is unable to care for himself due to his current chest pain.  -Social work consult, they did provide him information about shelters.     colon cancer with mets to the liver and spleen  Undergoing palliative treatment with Dr. Limon  Follow-up with Dr. Limon as scheduled     Iron deficiency Anemia  Secondary to his colon cancer.        Eleazar SARABIA  MD Alem, MD    Significant Results and Procedures       Pending Results   These results will be followed up by PCP  Unresulted Labs Ordered in the Past 30 Days of this Admission       No orders found from 1/16/2024 to 2/16/2024.            Code Status   Full Code       Primary Care Physician   Physician No Ref-Primary    Physical Exam   Temp: 97.5  F (36.4  C) Temp src: Oral BP: (!) 152/89 Pulse: 63   Resp: 16 SpO2: 99 % O2 Device: None (Room air)    Vitals:    02/15/24 1857   Weight: 72.6 kg (160 lb)     Vital Signs with Ranges  Temp:  [97.5  F (36.4  C)-97.9  F (36.6  C)] 97.5  F (36.4  C)  Pulse:  [51-93] 63  Resp:  [16-18] 16  BP: (121-152)/(69-89) 152/89  SpO2:  [95 %-99 %] 99 %  I/O last 3 completed shifts:  In: 360 [P.O.:360]  Out: 400 [Urine:400]    Constitutional: awake, alert, cooperative, no apparent distress, and appears stated age  Eyes: Lids and lashes normal, pupils equal, round and reactive to light, extra ocular muscles intact, sclera clear, conjunctiva normal  Respiratory: No increased work of breathing, good air exchange, clear to auscultation bilaterally, no crackles or wheezing  Cardiovascular: Normal apical impulse, regular rate and rhythm, normal S1 and S2, no S3 or S4, and no murmur noted  GI: No scars, normal bowel sounds, soft, non-distended, non-tender, no masses palpated, no hepatosplenomegally  Neurologic: No focal deficit    Discharge Disposition   Discharged to home  Condition at discharge: Stable    Consultations This Hospital Stay   CARE MANAGEMENT / SOCIAL WORK IP CONSULT  PHYSICAL THERAPY ADULT IP CONSULT    Time Spent on this Encounter   IEleazar MD, personally saw the patient today and spent greater than 30 minutes discharging this patient.    Discharge Orders      Reason for your hospital stay    Fall with chest pain (Musculoskeletal)     Follow-up and recommended labs and tests     Follow up with primary care provider, Physician No Ref-Primary, within 7 days for  hospital follow- up.  No follow up labs or test are needed.     Activity    Your activity upon discharge: activity as tolerated     Diet    Follow this diet upon discharge: Orders Placed This Encounter      Regular Diet Adult     Discharge Medications   Discharge Medication List as of 2/17/2024 12:42 PM        START taking these medications    Details   !! acetaminophen (TYLENOL) 500 MG tablet Take 1-2 tablets (500-1,000 mg) by mouth every 6 hours as needed for mild pain, headaches or fever (and adjunct with moderate or severe pain or per patient request), Disp-60 tablet, R-0, E-Prescribe      oxyCODONE (ROXICODONE) 5 MG tablet Take 1 tablet (5 mg) by mouth every 4 hours as needed for moderate pain (IF pain not managed with non-pharmacological and non-opioid interventions), Disp-20 tablet, R-0, Local Print       !! - Potential duplicate medications found. Please discuss with provider.        CONTINUE these medications which have NOT CHANGED    Details   !! acetaminophen (TYLENOL) 325 MG tablet Take 2 tablets (650 mg) by mouth every 4 hours as needed for mild pain or fever, Disp-100 tablet, R-0, E-Prescribe      dexAMETHasone (DECADRON) 4 MG tablet Take 2 tablets (8 mg) by mouth daily Take for 2 days, starting the day after chemo. Take with food., Disp-4 tablet, R-2, E-Prescribe      loperamide (IMODIUM A-D) 2 MG tablet Take 1 tablet (2 mg) by mouth 4 times daily as needed for diarrhea, Disp-45 tablet, R-0, E-Prescribe      ondansetron (ZOFRAN) 8 MG tablet Take 1 tablet (8 mg) by mouth every 8 hours as needed for nausea (vomiting), Disp-30 tablet, R-2, E-Prescribe      prochlorperazine (COMPAZINE) 10 MG tablet Take 1 tablet (10 mg) by mouth every 6 hours as needed for nausea or vomiting, Disp-30 tablet, R-2, E-Prescribe       !! - Potential duplicate medications found. Please discuss with provider.        Allergies   No Known Allergies  Data   Most Recent 3 CBC's:  Recent Labs   Lab Test 02/15/24  2204 02/12/24  0856  01/30/24  0819   WBC 5.2 5.1 5.2   HGB 12.7* 14.0 13.0*   MCV 82 82 81    276 399      Most Recent 3 BMP's:  Recent Labs   Lab Test 02/15/24  2204 02/12/24  0856 01/15/24  1023 12/28/23  1108     --  137 140   POTASSIUM 3.8  --  4.3 4.9   CHLORIDE 104  --  99 105   CO2 28  --  22 27   BUN 21.8*  --  16.5 19.7   CR 0.56* 0.62* 0.73  0.75 0.70   ANIONGAP 7  --  16* 8   NAT 8.7  --  9.8 9.3   GLC 90  --  82 89     Most Recent 2 LFT's:  Recent Labs   Lab Test 02/15/24  2204 02/12/24  0856 01/15/24  1023   AST 49*  --  76*   ALT 61  --  23   ALKPHOS 153*  --  173*   BILITOTAL 0.4 0.6 0.5  0.5     Most Recent INR's and Anticoagulation Dosing History:  Anticoagulation Dose History          Latest Ref Rng & Units 10/23/2023 10/24/2023 10/25/2023 10/30/2023   Recent Dosing and Labs   INR 0.85 - 1.15 1.18  1.17  1.38  1.24      Most Recent 3 Troponin's:No lab results found.  Most Recent Cholesterol Panel:  Recent Labs   Lab Test 10/30/23  0645   TRIG 58     Most Recent 6 Bacteria Isolates From Any Culture (See EPIC Reports for Culture Details):No lab results found.  Most Recent TSH, T4 and A1c Labs:  Recent Labs   Lab Test 11/03/23  0704 10/25/23  0525   TSH 4.30*  --    T4 1.12  --    A1C  --  5.0     Results for orders placed or performed during the hospital encounter of 02/15/24   Ribs XR, unilat 3 views + PA chest, right    Narrative    EXAM: XR RIBS and CHEST RT 3VW  LOCATION: [facility]  DATE: 2/15/2024    INDICATION: pain after blunt trauma gideon R lateral chest  COMPARISON: None.      Impression    IMPRESSION: Right-sided Port-A-Cath. Lungs clear bilaterally. No pneumothorax. Heart size within normal limits. No rib fractures.   XR Shoulder Right G/E 3 Views    Narrative    EXAM: XR SHOULDER RIGHT G/E 3 VIEWS  LOCATION: [facility]  DATE: 2/15/2024    INDICATION: pain after blunt trauma  COMPARISON: None.      Impression    IMPRESSION: Fracture of the  distal aspect of the right clavicle. This is essentially nondisplaced. The right glenohumeral joint is negative. Right-sided Port-A-Cath.   CT Chest/Abdomen/Pelvis w Contrast    Narrative    EXAM: CT CHEST/ABDOMEN/PELVIS W CONTRAST  LOCATION: [facility]  DATE/TIME: 2/15/2024 11:29 PM CST    INDICATION: Right chest, abdominal pain, blunt trauma today, known metastatic cancer.  COMPARISON: PET/CT on 01/16/2024.  TECHNIQUE: CT scan of the chest, abdomen, and pelvis was performed after the injection of 81 mL Isovue-370 intravenously. Multiplanar reformats were obtained. Dose reduction techniques were used.     FINDINGS:    MEDIASTINUM/AXILLAE: Right IJ Port-A-Cath distal tip is in the mid SVC. No cardiomegaly or significant pericardial effusion. No significant mediastinal or hilar lymphadenopathy.    LUNGS AND PLEURA: No pleural effusion or pneumothorax. Mild basilar pulmonary opacities, likely atelectasis. Areas of bronchiolar mucoid impaction and associated nodular pulmonary opacity in the right middle lobe, likely infectious.    CORONARY ARTERY CALCIFICATION: Moderate.    ABDOMEN/PELVIS:    HEPATOBILIARY: Multiple hypodense liver lesions, the largest measures 10.4 x 8.3 cm in the right hepatic lobe, slightly decreased in size as compared to 01/16/2024 exam when it measured 13.1 x 10.3 cm in dimensions, likely metastatic. The gallbladder is   unremarkable.    PANCREAS: No main pancreatic ductal dilatation or definite solid pancreatic mass.    SPLEEN: The spleen is enlarged measuring 15.2 cm in craniocaudal dimension. There is approximately 2.9 x 1.7 cm hypodense lesion along the anteromedial aspect of the spleen, likely metastatic.    ADRENAL GLANDS: No adrenal nodules.    KIDNEYS/BLADDER: No radiodense kidney/ureteral stones or hydronephrosis in either kidney. Bilateral hypodense foci in the kidneys, some can be characterized as renal cysts including 1.9 cm cyst at the upper pole of the left  kidney (series 3 image 192)   while a few others are subcentimeter and too small to characterize. Multiple layering stones in the dependent portion of the urinary bladder.    BOWEL: Postsurgical changes of the right colon. Moderate amount of stool in the colon, can be seen with constipation. No abnormally dilated small bowel loops.    PERITONEUM: No evidence of free fluid in the abdomen and pelvis. No free peritoneal or portal venous gas.    PELVIC ORGANS: The prostate gland is enlarged measuring 5.6 cm in transverse diameter.    VASCULATURE: Scattered atherosclerotic vascular calcification of the abdominal aorta and iliac vessels.    LYMPH NODES: Unremarkable.    MUSCULOSKELETAL: There is an old/subacute nondisplaced fracture of the lateral aspect of the right clavicle (series 3 image 11), not significantly changed as compared to 01/16/2024.      Impression    IMPRESSION:  1.  No evidence of acute traumatic injury in the chest, abdomen or pelvis.  2.  Old/subacute nondisplaced fracture of the lateral aspect of the right clavicle, not significantly changed as compared to 01/16/2024 exam.  3.  Areas of bronchiolar mucoid impaction and small nodular pulmonary opacity in the right middle lobe, likely infectious.  4.  Multiple hepatic metastases, slightly decreased in size as compared to 01/16/2024 exam.  5.  Approximately 2.9 cm hypodense lesion along the anteromedial aspect of the spleen, also likely metastatic, not significantly changed as compared to 01/16/2024 exam.  6.  Splenomegaly.     Most Recent 3 CBC's:  Recent Labs   Lab Test 02/15/24  2204 02/12/24  0856 01/30/24  0819   WBC 5.2 5.1 5.2   HGB 12.7* 14.0 13.0*   MCV 82 82 81    276 399     Most Recent 3 BMP's:  Recent Labs   Lab Test 02/15/24  2204 02/12/24  0856 01/15/24  1023 12/28/23  1108     --  137 140   POTASSIUM 3.8  --  4.3 4.9   CHLORIDE 104  --  99 105   CO2 28  --  22 27   BUN 21.8*  --  16.5 19.7   CR 0.56* 0.62* 0.73  0.75 0.70    ANIONGAP 7  --  16* 8   NAT 8.7  --  9.8 9.3   GLC 90  --  82 89     Most Recent 2 LFT's:  Recent Labs   Lab Test 02/15/24  2204 02/12/24  0856 01/15/24  1023   AST 49*  --  76*   ALT 61  --  23   ALKPHOS 153*  --  173*   BILITOTAL 0.4 0.6 0.5  0.5     Most Recent 3 INR's:  Recent Labs   Lab Test 10/30/23  0645 10/25/23  0621 10/24/23  1018   INR 1.24* 1.38* 1.17*

## 2024-02-17 NOTE — PLAN OF CARE
Goal Outcome Evaluation     PRIMARY Concern: Muscular right side flank, shoulder, rib cage and midsternal chest pain after fall on the ice.   SAFETY RISK Concerns (fall risk, behaviors, etc.): No.      Isolation/Type: N/A   Tests/Procedures for NEXT shift: None.  Consults? (Pending/following, signed-off?) PT following.  Where is patient from? (Home, TCU, etc.): Homeless.  Other Important info for NEXT shift:Patient set to discharge later today.   Anticipated DC date & active delays: 2/17/24.   _____________________________________________________________________________  SUMMARY NOTE:     Orientation/Cognitive: A&Ox4  Observation Goals (Met/ Not Met): Met.  Mobility Level/Assist Equipment: IND  Antibiotics & Plan (IV/po, length of tx left): N/A.  Pain Management: Scheduled Tylenol; PRN Oxycodone.   Tele/VS/O2: AVSS on RA; no Tele.   ABNL Lab/BG: N/A   Diet: Reg.  Bowel/Bladder: Voiding; no BM overnight.  Skin Concerns: n/a.  Drains/Devices: Port-a-cath right chest wall.  (Nursing to de-access port prior to the discharge.   Patient Stated Goal for Today: Pain control.      Filling script here. Patient has car parked in the parking ramp.

## 2024-02-17 NOTE — PROGRESS NOTES
PRIMARY Concern: CP after fall  SAFETY RISK Concerns (fall risk, behaviors, etc.): N/a      Isolation/Type: n/a  Tests/Procedures for NEXT shift: n/a  Consults? (Pending/following, signed-off?) PT following  Where is patient from? (Home, TCU, etc.): Homeless  Other Important info for NEXT shift: n/a  Anticipated DC date & active delays: TBD  _____________________________________________________________________________  SUMMARY NOTE:    Orientation/Cognitive: A&Ox4  Observation Goals (Met/ Not Met): Not Met  Mobility Level/Assist Equipment: Ind  Antibiotics & Plan (IV/po, length of tx left): n/a  Pain Management: PRN Oxycodone  Tele/VS/O2: VSS on RA  ABNL Lab/BG: Cr- 0.56, Alk PO- 153, AST- 49  Diet: Reg  Bowel/Bladder: cont. B&B  Skin Concerns: n/a  Drains/Devices: Port-a-cath to RUC  Patient Stated Goal for Today: n/a

## 2024-02-17 NOTE — PLAN OF CARE
Goal Outcome Evaluation:         PRIMARY Concern: CP after fall  SAFETY RISK Concerns (fall risk, behaviors, etc.): Fall Risk      Isolation/Type: n/a  Tests/Procedures for NEXT shift: None  Consults? (Pending/following, signed-off?) PT following  Where is patient from? (Home, TCU, etc.): Homeless  Other Important info for NEXT shift: N/A  Anticipated DC date & active delays: TBD  _____________________________________________________________________________  SUMMARY NOTE:     Orientation/Cognitive: A&Ox4  Observation Goals (Met/ Not Met): Not Met  Mobility Level/Assist Equipment: SBA  Antibiotics & Plan (IV/po, length of tx left): N/A  Pain Management: Scheduled Tylenol; PRN Oxycodone; prn oxycodone x 1 overnight  Tele/VS/O2: AVSS on RA; no Tele  ABNL Lab/BG: Cr- 0.56, Alk PO- 153, AST- 49  Diet: Reg  Bowel/Bladder: Voiding; no BM overnight  Skin Concerns: n/a  Drains/Devices: Port-a-cath   Patient Stated Goal for Today: Pain control

## 2024-02-17 NOTE — PROVIDER NOTIFICATION
MD Notification    Notified Person: MD    Notified Person Name: Dr. Quiles    Notification Date/Time: 2/16/24 0976    Notification Interaction: [employer] Messaging     Purpose of Notification: Pt requesting for additional dose of 5mg of Oxycodone.     Orders Received: MD okay with giving another 5mg of oxycodone.    Comments:

## 2024-02-28 ENCOUNTER — PATIENT OUTREACH (OUTPATIENT)
Dept: CARE COORDINATION | Facility: CLINIC | Age: 59
End: 2024-02-28
Payer: COMMERCIAL

## 2024-02-28 ENCOUNTER — PATIENT OUTREACH (OUTPATIENT)
Dept: ONCOLOGY | Facility: CLINIC | Age: 59
End: 2024-02-28
Payer: COMMERCIAL

## 2024-02-28 NOTE — PROGRESS NOTES
Social Work - Follow-Up  [facility]    Data/Intervention: Brady is a 58-year-old gentleman with a diagnosis of colon cancer with metastatic disease to liver.      Patient Name: Brady Lenz Goes By: Brady    /Age: 1965 (58 year old)    Reason for Follow-Up:  Brady recently discharged from [facility]. This clinician has not received outreach from Brady since 23. Per chart review, Brady discharged from [facility], and is presently residing in [facility]. This clinician outreached with goal of following up on housing/resource need.     Collaborated With:    - Niesha Horvath (RN at [facility])    Intervention/Education/Resources Provided:  This clinician left detailed voicemail for Brady with social work contact information and availability.     Assessment/Plan:  1) This clinician will await update from  at [facility] to see what recommendations had been made regarding housing at time of his stay there ([employer] vs. [employer] vs. [address])  2) Onc SW will await outreach from Brady for further resource connection.     Previously provided patient/family with writer's contact information and availability.    Deborah Yuan, MSW, LICSW, OSW-C  Clinical - Adult Oncology  She/Her/Hers  Phone: [phone]  [facility]: M, Thu  *every other Tue, 8am-4:30pm  [facility]: W, F, *every other Tue, 8am-4:30pm

## 2024-02-28 NOTE — PROGRESS NOTES
Writer called patient to follow up on missed treatment and address any needs , concerns, and or questions.     Will wait for a return call.     Tanisha Ward RN

## 2024-03-11 ENCOUNTER — INFUSION THERAPY VISIT (OUTPATIENT)
Dept: INFUSION THERAPY | Facility: CLINIC | Age: 59
End: 2024-03-11
Attending: NURSE PRACTITIONER
Payer: COMMERCIAL

## 2024-03-11 ENCOUNTER — ONCOLOGY VISIT (OUTPATIENT)
Dept: ONCOLOGY | Facility: CLINIC | Age: 59
End: 2024-03-11
Attending: NURSE PRACTITIONER
Payer: COMMERCIAL

## 2024-03-11 VITALS
OXYGEN SATURATION: 97 % | TEMPERATURE: 98.6 F | RESPIRATION RATE: 16 BRPM | DIASTOLIC BLOOD PRESSURE: 86 MMHG | HEART RATE: 75 BPM | SYSTOLIC BLOOD PRESSURE: 129 MMHG

## 2024-03-11 VITALS
HEART RATE: 75 BPM | RESPIRATION RATE: 16 BRPM | SYSTOLIC BLOOD PRESSURE: 134 MMHG | OXYGEN SATURATION: 97 % | DIASTOLIC BLOOD PRESSURE: 92 MMHG | TEMPERATURE: 98.6 F

## 2024-03-11 DIAGNOSIS — C78.7 COLON CARCINOMA METASTATIC TO LIVER (H): Primary | ICD-10-CM

## 2024-03-11 DIAGNOSIS — C18.9 COLON CARCINOMA METASTATIC TO LIVER (H): Primary | ICD-10-CM

## 2024-03-11 LAB
ALBUMIN UR-MCNC: NEGATIVE MG/DL
BASOPHILS # BLD AUTO: 0 10E3/UL (ref 0–0.2)
BASOPHILS NFR BLD AUTO: 1 %
BILIRUB SERPL-MCNC: 0.7 MG/DL
CEA SERPL-MCNC: 13.9 NG/ML
CREAT SERPL-MCNC: 0.62 MG/DL (ref 0.67–1.17)
EGFRCR SERPLBLD CKD-EPI 2021: >90 ML/MIN/1.73M2
EOSINOPHIL # BLD AUTO: 0.3 10E3/UL (ref 0–0.7)
EOSINOPHIL NFR BLD AUTO: 6 %
ERYTHROCYTE [DISTWIDTH] IN BLOOD BY AUTOMATED COUNT: 18.6 % (ref 10–15)
HCT VFR BLD AUTO: 44.5 % (ref 40–53)
HGB BLD-MCNC: 14.5 G/DL (ref 13.3–17.7)
IMM GRANULOCYTES # BLD: 0 10E3/UL
IMM GRANULOCYTES NFR BLD: 0 %
LYMPHOCYTES # BLD AUTO: 1.2 10E3/UL (ref 0.8–5.3)
LYMPHOCYTES NFR BLD AUTO: 27 %
MCH RBC QN AUTO: 27.1 PG (ref 26.5–33)
MCHC RBC AUTO-ENTMCNC: 32.6 G/DL (ref 31.5–36.5)
MCV RBC AUTO: 83 FL (ref 78–100)
MONOCYTES # BLD AUTO: 0.6 10E3/UL (ref 0–1.3)
MONOCYTES NFR BLD AUTO: 13 %
NEUTROPHILS # BLD AUTO: 2.3 10E3/UL (ref 1.6–8.3)
NEUTROPHILS NFR BLD AUTO: 53 %
NRBC # BLD AUTO: 0 10E3/UL
NRBC BLD AUTO-RTO: 0 /100
PLATELET # BLD AUTO: 232 10E3/UL (ref 150–450)
RBC # BLD AUTO: 5.35 10E6/UL (ref 4.4–5.9)
WBC # BLD AUTO: 4.3 10E3/UL (ref 4–11)

## 2024-03-11 PROCEDURE — 36591 DRAW BLOOD OFF VENOUS DEVICE: CPT | Performed by: NURSE PRACTITIONER

## 2024-03-11 PROCEDURE — 258N000003 HC RX IP 258 OP 636: Performed by: NURSE PRACTITIONER

## 2024-03-11 PROCEDURE — 82378 CARCINOEMBRYONIC ANTIGEN: CPT | Performed by: NURSE PRACTITIONER

## 2024-03-11 PROCEDURE — 99214 OFFICE O/P EST MOD 30 MIN: CPT | Performed by: NURSE PRACTITIONER

## 2024-03-11 PROCEDURE — 96375 TX/PRO/DX INJ NEW DRUG ADDON: CPT

## 2024-03-11 PROCEDURE — 250N000011 HC RX IP 250 OP 636: Performed by: NURSE PRACTITIONER

## 2024-03-11 PROCEDURE — 96417 CHEMO IV INFUS EACH ADDL SEQ: CPT

## 2024-03-11 PROCEDURE — 96415 CHEMO IV INFUSION ADDL HR: CPT

## 2024-03-11 PROCEDURE — G0463 HOSPITAL OUTPT CLINIC VISIT: HCPCS | Performed by: NURSE PRACTITIONER

## 2024-03-11 PROCEDURE — 96367 TX/PROPH/DG ADDL SEQ IV INF: CPT

## 2024-03-11 PROCEDURE — 82247 BILIRUBIN TOTAL: CPT | Performed by: NURSE PRACTITIONER

## 2024-03-11 PROCEDURE — G0498 CHEMO EXTEND IV INFUS W/PUMP: HCPCS

## 2024-03-11 PROCEDURE — G0463 HOSPITAL OUTPT CLINIC VISIT: HCPCS | Mod: 25 | Performed by: NURSE PRACTITIONER

## 2024-03-11 PROCEDURE — 81003 URINALYSIS AUTO W/O SCOPE: CPT | Performed by: NURSE PRACTITIONER

## 2024-03-11 PROCEDURE — 82565 ASSAY OF CREATININE: CPT | Performed by: NURSE PRACTITIONER

## 2024-03-11 PROCEDURE — 85025 COMPLETE CBC W/AUTO DIFF WBC: CPT | Performed by: NURSE PRACTITIONER

## 2024-03-11 PROCEDURE — 96413 CHEMO IV INFUSION 1 HR: CPT

## 2024-03-11 RX ORDER — ONDANSETRON 2 MG/ML
8 INJECTION INTRAMUSCULAR; INTRAVENOUS ONCE
Status: COMPLETED | OUTPATIENT
Start: 2024-03-11 | End: 2024-03-11

## 2024-03-11 RX ORDER — METHYLPREDNISOLONE SODIUM SUCCINATE 125 MG/2ML
125 INJECTION, POWDER, LYOPHILIZED, FOR SOLUTION INTRAMUSCULAR; INTRAVENOUS
Status: CANCELLED
Start: 2024-03-11

## 2024-03-11 RX ORDER — EPINEPHRINE 1 MG/ML
0.3 INJECTION, SOLUTION INTRAMUSCULAR; SUBCUTANEOUS EVERY 5 MIN PRN
Status: CANCELLED | OUTPATIENT
Start: 2024-03-11

## 2024-03-11 RX ORDER — ALBUTEROL SULFATE 90 UG/1
1-2 AEROSOL, METERED RESPIRATORY (INHALATION)
Status: CANCELLED
Start: 2024-03-11

## 2024-03-11 RX ORDER — ONDANSETRON 2 MG/ML
8 INJECTION INTRAMUSCULAR; INTRAVENOUS ONCE
Status: CANCELLED | OUTPATIENT
Start: 2024-03-11

## 2024-03-11 RX ORDER — HEPARIN SODIUM,PORCINE 10 UNIT/ML
5-20 VIAL (ML) INTRAVENOUS DAILY PRN
Status: CANCELLED | OUTPATIENT
Start: 2024-03-11

## 2024-03-11 RX ORDER — DIPHENHYDRAMINE HYDROCHLORIDE 50 MG/ML
50 INJECTION INTRAMUSCULAR; INTRAVENOUS
Status: CANCELLED
Start: 2024-03-11

## 2024-03-11 RX ORDER — HEPARIN SODIUM,PORCINE 10 UNIT/ML
5-20 VIAL (ML) INTRAVENOUS DAILY PRN
Status: CANCELLED | OUTPATIENT
Start: 2024-03-13

## 2024-03-11 RX ORDER — MEPERIDINE HYDROCHLORIDE 25 MG/ML
25 INJECTION INTRAMUSCULAR; INTRAVENOUS; SUBCUTANEOUS EVERY 30 MIN PRN
Status: CANCELLED | OUTPATIENT
Start: 2024-03-11

## 2024-03-11 RX ORDER — ALBUTEROL SULFATE 0.83 MG/ML
2.5 SOLUTION RESPIRATORY (INHALATION)
Status: CANCELLED | OUTPATIENT
Start: 2024-03-11

## 2024-03-11 RX ORDER — HEPARIN SODIUM (PORCINE) LOCK FLUSH IV SOLN 100 UNIT/ML 100 UNIT/ML
5 SOLUTION INTRAVENOUS
Status: CANCELLED | OUTPATIENT
Start: 2024-03-13

## 2024-03-11 RX ORDER — LORAZEPAM 2 MG/ML
0.5 INJECTION INTRAMUSCULAR EVERY 4 HOURS PRN
Status: CANCELLED | OUTPATIENT
Start: 2024-03-11

## 2024-03-11 RX ORDER — HEPARIN SODIUM (PORCINE) LOCK FLUSH IV SOLN 100 UNIT/ML 100 UNIT/ML
5 SOLUTION INTRAVENOUS
Status: CANCELLED | OUTPATIENT
Start: 2024-03-11

## 2024-03-11 RX ADMIN — OXALIPLATIN 170 MG: 100 INJECTION, SOLUTION, CONCENTRATE INTRAVENOUS at 11:52

## 2024-03-11 RX ADMIN — DEXTROSE MONOHYDRATE 250 ML: 50 INJECTION, SOLUTION INTRAVENOUS at 11:48

## 2024-03-11 RX ADMIN — BEVACIZUMAB 400 MG: 400 INJECTION, SOLUTION INTRAVENOUS at 11:11

## 2024-03-11 RX ADMIN — FOSAPREPITANT: 150 INJECTION, POWDER, LYOPHILIZED, FOR SOLUTION INTRAVENOUS at 10:45

## 2024-03-11 RX ADMIN — SODIUM CHLORIDE 250 ML: 9 INJECTION, SOLUTION INTRAVENOUS at 10:44

## 2024-03-11 RX ADMIN — ONDANSETRON 8 MG: 2 INJECTION INTRAMUSCULAR; INTRAVENOUS at 10:44

## 2024-03-11 ASSESSMENT — PAIN SCALES - GENERAL
PAINLEVEL: MILD PAIN (2)
PAINLEVEL: MILD PAIN (2)

## 2024-03-11 NOTE — PROGRESS NOTES
Oncology Rooming Note    March 11, 2024 9:51 AM   Brady Lenz is a 58 year old male who presents for:    Chief Complaint   Patient presents with    Oncology Clinic Visit     Initial Vitals: BP (!) 134/92   Pulse 75   Temp 98.6  F (37  C) (Oral)   Resp 16   SpO2 97%  Estimated body mass index is 21.7 kg/m  as calculated from the following:    Height as of 2/15/24: 1.829 m (6').    Weight as of 2/15/24: 72.6 kg (160 lb). There is no height or weight on file to calculate BSA.  Mild Pain (2) Comment: Data Unavailable   No LMP for male patient.  Allergies reviewed: Yes  Medications reviewed: Yes    Medications: MEDICATION REFILLS NEEDED TODAY. Provider was notified.  Pharmacy name entered into [employer]:    [facility] #[id number] [place], [place] - [address] PHARMACY [place], [place] - [address]    Frailty Screening:   Is the patient here for a new oncology consult visit in cancer care? 2. No      Clinical concerns:   np was notified.      Haily Elias, JB

## 2024-03-11 NOTE — PROGRESS NOTES
Infusion Nursing Note:  Brady Lenz presents today for C4D1: Oxaliplatin, Avastin, Fluorouracil pump connect.    Patient seen by provider today: Yes: Darin Padilla NP   present during visit today: Not Applicable.    Note: Patient reports to missing his last scheduled infusion due to a fall and related pains from the fall. Reports to feeling better today but does have lingering right shoulder pain of 2/10.  Per patient report and per note from Darin Padilla NP, IV Dexamethasone and oral Dexamethasone reduced today due to patient symptoms.     Prior to discharge: Port is secured in place with tegaderm and flushed with 10cc NS with positive blood return noted.  Continuous home infusion pump connected.    All connectors secured in place and clamps taped open.  Tape checked with BARNEY Love RN/KIMI Oropeza RN  Patient instructed to call our clinic or [facility] with any questions or concerns at home.  Patient verbalized understanding.    Patient set up for pump disconnect at our clinic on 3/13/24.           Intravenous Access:  Implanted Port.    Treatment Conditions:  Lab Results   Component Value Date    HGB 14.5 03/11/2024    WBC 4.3 03/11/2024    ANEUTAUTO 2.3 03/11/2024     03/11/2024        Lab Results   Component Value Date     02/15/2024    POTASSIUM 3.8 02/15/2024    MAG 2.1 11/03/2023    CR 0.62 (L) 03/11/2024    NAT 8.7 02/15/2024    BILITOTAL 0.7 03/11/2024    ALBUMIN 4.1 02/15/2024    ALT 61 02/15/2024    AST 49 (H) 02/15/2024       Results reviewed, labs MET treatment parameters, ok to proceed with treatment.      Post Infusion Assessment:  Patient tolerated infusion without incident.  Blood return noted pre and post infusion.  Site patent and intact, free from redness, edema or discomfort.  No evidence of extravasations.       Discharge Plan:   Patient declined prescription refills.  Discharge instructions reviewed with: Patient.  Patient and/or family verbalized understanding  of discharge instructions and all questions answered.  Copy of AVS reviewed with patient and/or family.  Patient will return 3/13/23 for pump disconnect for next appointment.  Patient discharged in stable condition accompanied by: self.  Departure Mode: Ambulatory.      Edwige Love RN

## 2024-03-11 NOTE — PROGRESS NOTES
Oncology/Hematology Visit Note  Mar 11, 2024    Reason for Visit: follow up of metastatic colon cancer  MMR intact  HER2 negative  Positive for KRAS G12D    CEA15.8  PET scan revealed multiple sites of metastasis lung spleen and liver and peritoneum  01/15/24-started modified FOLFOX 6 and Avastin  02/25/2024-02/17/2024-slipped on ice ,status post mechanical fall was hospitalized for musculoskeletal chest wall pain    Interval History:  Patient reports feeling well today he denies fever chills sweats cough shortness of breath chest pain lightheadedness nausea vomiting diarrhea denies edema denies lymphadenopathy reports good energy and appetite.  Denies headache dizziness.  Denies neuropathy  Patient reports he would like to reduce dexamethasone dose since he felt he had reactions with  It .    Review of Systems:  14 point ROS of systems including Constitutional, Eyes, Respiratory, Cardiovascular, Gastroenterology, Genitourinary, Integumentary, Muscularskeletal, Psychiatric were all negative except for pertinent positives noted in my HPI.    Physical Examination:  General: The patient is a pleasant male in no acute distress.  BP (!) 134/92   Pulse 75   Temp 98.6  F (37  C) (Oral)   Resp 16   SpO2 97%   HEENT: EOMI, PERRL. Sclerae are anicteric. Oral mucosa is pink and moist with no lesions or thrush.   Lymph: Neck is supple with no lymphadenopathy in the cervical or supraclavicular areas.   Heart: Regular rate and rhythm.   Lungs: Clear to auscultation bilaterally.   GI: Bowel sounds present, soft, nontender with no palpable hepatosplenomegaly or masses.   Extremities: No lower extremity edema noted bilaterally.   Skin: No rashes, petechiae, or bruising noted on exposed skin.    Laboratory Data:  CBC creatinine and bilirubin reviewed      Assessment and Plan:    Metastatic colon cancer  Follows with Dr. Limon  Currently undergoing palliative treatment withmodified FOLFOX 6 and Avastin  -Patient reports feeling  well  -Regimen and potential side effects reviewed with patient advised patient to call us with any changes in health condition  -Okay to proceed with treatment.  Per patient request will reduce dexamethasone IV to 8 mg and he will take dexamethasone 4 mg at home daily for 2 days instead of 8 mg  Patient scheduled in 2 weeks with Dr. Limon with next treatment      Status post fall  02/25/2024-02/17/2024-slipped on ice status post mechanical fall was hospitalized for musculoskeletal chest wall pain  Denies any pain now denies chest pain shortness of breath cough denies headache dizziness      Please call clinic with any changes in health condition or questions    GABBY [facility]     Chart documentation with Dragon Voice recognition Software. Although reviewed after completion, some words and grammatical errors may remain.

## 2024-03-11 NOTE — LETTER
3/11/2024         RE: Brady Lenz  [address] [address]  [facility] [address]        Dear Colleague,    Thank you for referring your patient, Brady Lenz, to the [facility]. Please see a copy of my visit note below.    Oncology Rooming Note    March 11, 2024 9:51 AM   Brady Lenz is a 58 year old male who presents for:    Chief Complaint   Patient presents with     Oncology Clinic Visit     Initial Vitals: BP (!) 134/92   Pulse 75   Temp 98.6  F (37  C) (Oral)   Resp 16   SpO2 97%  Estimated body mass index is 21.7 kg/m  as calculated from the following:    Height as of 2/15/24: 1.829 m (6').    Weight as of 2/15/24: 72.6 kg (160 lb). There is no height or weight on file to calculate BSA.  Mild Pain (2) Comment: Data Unavailable   No LMP for male patient.  Allergies reviewed: Yes  Medications reviewed: Yes    Medications: MEDICATION REFILLS NEEDED TODAY. Provider was notified.  Pharmacy name entered into [employer]:    [facility] #[id number] - [place], [place] - [address] AT [address] [place] [address] PHARMACY [place], [place] - [address]    Frailty Screening:   Is the patient here for a new oncology consult visit in cancer care? 2. No      Clinical concerns:   np was notified.      Haily Elias [facility]              Oncology/Hematology Visit Note  Mar 11, 2024    Reason for Visit: follow up of metastatic colon cancer  MMR intact  HER2 negative  Positive for KRAS G12D    CEA15.8  PET scan revealed multiple sites of metastasis lung spleen and liver and peritoneum  01/15/24-started modified FOLFOX 6 and Avastin  02/25/2024-02/17/2024-slipped on ice ,status post mechanical fall was hospitalized for musculoskeletal chest wall pain    Interval History:  Patient reports feeling well today he denies fever chills sweats cough shortness of breath chest pain lightheadedness nausea vomiting diarrhea denies edema denies lymphadenopathy reports good energy and appetite.   Denies headache dizziness  Patient reports he would like to reduce dexamethasone dose since he felt he had reactions with  It .    Review of Systems:  14 point ROS of systems including Constitutional, Eyes, Respiratory, Cardiovascular, Gastroenterology, Genitourinary, Integumentary, Muscularskeletal, Psychiatric were all negative except for pertinent positives noted in my HPI.    Physical Examination:  General: The patient is a pleasant male in no acute distress.  BP (!) 134/92   Pulse 75   Temp 98.6  F (37  C) (Oral)   Resp 16   SpO2 97%   HEENT: EOMI, PERRL. Sclerae are anicteric. Oral mucosa is pink and moist with no lesions or thrush.   Lymph: Neck is supple with no lymphadenopathy in the cervical or supraclavicular areas.   Heart: Regular rate and rhythm.   Lungs: Clear to auscultation bilaterally.   GI: Bowel sounds present, soft, nontender with no palpable hepatosplenomegaly or masses.   Extremities: No lower extremity edema noted bilaterally.   Skin: No rashes, petechiae, or bruising noted on exposed skin.    Laboratory Data:  CBC creatinine and bilirubin reviewed      Assessment and Plan:    Metastatic colon cancer  Follows with Dr. Limon  Currently undergoing palliative treatment withmodified FOLFOX 6 and Avastin  -Patient reports feeling well  -Regimen and potential side effects reviewed with patient advised patient to call us with any changes in health condition  -Okay to proceed with treatment.  Per patient request will reduce dexamethasone IV to 8 mg and he will take dexamethasone 4 mg at home daily for 2 days instead of 8 mg  Patient scheduled in 2 weeks with Dr. Limon with next treatment      Status post fall  02/25/2024-02/17/2024-slipped on ice status post mechanical fall was hospitalized for musculoskeletal chest wall pain  Denies any pain now denies chest pain shortness of breath cough denies headache dizziness      Please call clinic with any changes in health condition or questions    Darin  GABBY Padilla CNP  [facility]     Chart documentation with Dragon Voice recognition Software. Although reviewed after completion, some words and grammatical errors may remain.          Again, thank you for allowing me to participate in the care of your patient.        Sincerely,        GABBY Junior CNP

## 2024-03-13 ENCOUNTER — INFUSION THERAPY VISIT (OUTPATIENT)
Dept: INFUSION THERAPY | Facility: CLINIC | Age: 59
End: 2024-03-13
Attending: NURSE PRACTITIONER
Payer: COMMERCIAL

## 2024-03-13 VITALS
HEART RATE: 61 BPM | DIASTOLIC BLOOD PRESSURE: 77 MMHG | SYSTOLIC BLOOD PRESSURE: 122 MMHG | RESPIRATION RATE: 16 BRPM | TEMPERATURE: 98.1 F | OXYGEN SATURATION: 95 %

## 2024-03-13 DIAGNOSIS — C78.7 COLON CARCINOMA METASTATIC TO LIVER (H): Primary | ICD-10-CM

## 2024-03-13 DIAGNOSIS — C18.9 COLON CARCINOMA METASTATIC TO LIVER (H): Primary | ICD-10-CM

## 2024-03-13 PROCEDURE — 250N000011 HC RX IP 250 OP 636: Performed by: NURSE PRACTITIONER

## 2024-03-13 RX ORDER — HEPARIN SODIUM (PORCINE) LOCK FLUSH IV SOLN 100 UNIT/ML 100 UNIT/ML
5 SOLUTION INTRAVENOUS
Status: DISCONTINUED | OUTPATIENT
Start: 2024-03-13 | End: 2024-03-13 | Stop reason: HOSPADM

## 2024-03-13 RX ADMIN — Medication 5 ML: at 09:55

## 2024-03-13 NOTE — PROGRESS NOTES
Infusion Nursing Note:  Brady Lenz presents today for pump disconnect.    Patient seen by provider today: No   present during visit today: Not Applicable.    Note: N/A.      Intravenous Access:  Implanted Port.    Treatment Conditions:  Not Applicable.      Post Infusion Assessment:  Patient tolerated infusion without incident.  Blood return noted post infusion.  Site patent and intact, free from redness, edema or discomfort.  No evidence of extravasations.  Access discontinued per protocol.       Discharge Plan:   Discharge instructions reviewed with: Patient.  Patient and/or family verbalized understanding of discharge instructions and all questions answered.  AVS to patient via [employer].  Patient will return 3/25/24 for next appointment.   Patient discharged in stable condition accompanied by: self.  Departure Mode: Ambulatory.      Suri Cummins RN

## 2024-03-25 ENCOUNTER — INFUSION THERAPY VISIT (OUTPATIENT)
Dept: INFUSION THERAPY | Facility: CLINIC | Age: 59
End: 2024-03-25
Attending: NURSE PRACTITIONER
Payer: COMMERCIAL

## 2024-03-25 ENCOUNTER — ONCOLOGY VISIT (OUTPATIENT)
Dept: ONCOLOGY | Facility: CLINIC | Age: 59
End: 2024-03-25
Attending: NURSE PRACTITIONER
Payer: COMMERCIAL

## 2024-03-25 VITALS
WEIGHT: 168.2 LBS | HEART RATE: 79 BPM | RESPIRATION RATE: 16 BRPM | SYSTOLIC BLOOD PRESSURE: 132 MMHG | DIASTOLIC BLOOD PRESSURE: 87 MMHG | BODY MASS INDEX: 22.29 KG/M2 | OXYGEN SATURATION: 98 % | HEIGHT: 73 IN

## 2024-03-25 DIAGNOSIS — C78.7 COLON CARCINOMA METASTATIC TO LIVER (H): Primary | ICD-10-CM

## 2024-03-25 DIAGNOSIS — C18.9 COLON CARCINOMA METASTATIC TO LIVER (H): Primary | ICD-10-CM

## 2024-03-25 DIAGNOSIS — S20.211A CHEST WALL CONTUSION, RIGHT, INITIAL ENCOUNTER: ICD-10-CM

## 2024-03-25 LAB
ALBUMIN SERPL BCG-MCNC: 4.2 G/DL (ref 3.5–5.2)
ALBUMIN UR-MCNC: NEGATIVE MG/DL
ALP SERPL-CCNC: 172 U/L (ref 40–150)
ALT SERPL W P-5'-P-CCNC: 39 U/L (ref 0–70)
ANION GAP SERPL CALCULATED.3IONS-SCNC: 17 MMOL/L (ref 7–15)
AST SERPL W P-5'-P-CCNC: 46 U/L (ref 0–45)
BASOPHILS # BLD AUTO: 0.1 10E3/UL (ref 0–0.2)
BASOPHILS NFR BLD AUTO: 1 %
BILIRUB SERPL-MCNC: 0.9 MG/DL
BILIRUB SERPL-MCNC: 0.9 MG/DL
BUN SERPL-MCNC: 8.3 MG/DL (ref 6–20)
CALCIUM SERPL-MCNC: 9.2 MG/DL (ref 8.6–10)
CEA SERPL-MCNC: 16.4 NG/ML
CHLORIDE SERPL-SCNC: 103 MMOL/L (ref 98–107)
CREAT SERPL-MCNC: 0.58 MG/DL (ref 0.67–1.17)
CREAT SERPL-MCNC: 0.61 MG/DL (ref 0.67–1.17)
DEPRECATED HCO3 PLAS-SCNC: 21 MMOL/L (ref 22–29)
EGFRCR SERPLBLD CKD-EPI 2021: >90 ML/MIN/1.73M2
EGFRCR SERPLBLD CKD-EPI 2021: >90 ML/MIN/1.73M2
EOSINOPHIL # BLD AUTO: 0.3 10E3/UL (ref 0–0.7)
EOSINOPHIL NFR BLD AUTO: 6 %
ERYTHROCYTE [DISTWIDTH] IN BLOOD BY AUTOMATED COUNT: 19.3 % (ref 10–15)
GLUCOSE SERPL-MCNC: 83 MG/DL (ref 70–99)
HCT VFR BLD AUTO: 45.8 % (ref 40–53)
HGB BLD-MCNC: 15.1 G/DL (ref 13.3–17.7)
IMM GRANULOCYTES # BLD: 0 10E3/UL
IMM GRANULOCYTES NFR BLD: 0 %
LYMPHOCYTES # BLD AUTO: 1.2 10E3/UL (ref 0.8–5.3)
LYMPHOCYTES NFR BLD AUTO: 22 %
MCH RBC QN AUTO: 28.3 PG (ref 26.5–33)
MCHC RBC AUTO-ENTMCNC: 33 G/DL (ref 31.5–36.5)
MCV RBC AUTO: 86 FL (ref 78–100)
MONOCYTES # BLD AUTO: 0.7 10E3/UL (ref 0–1.3)
MONOCYTES NFR BLD AUTO: 13 %
NEUTROPHILS # BLD AUTO: 3.2 10E3/UL (ref 1.6–8.3)
NEUTROPHILS NFR BLD AUTO: 58 %
NRBC # BLD AUTO: 0 10E3/UL
NRBC BLD AUTO-RTO: 0 /100
PLATELET # BLD AUTO: 214 10E3/UL (ref 150–450)
POTASSIUM SERPL-SCNC: 4.3 MMOL/L (ref 3.4–5.3)
PROT SERPL-MCNC: 7.1 G/DL (ref 6.4–8.3)
RBC # BLD AUTO: 5.33 10E6/UL (ref 4.4–5.9)
SODIUM SERPL-SCNC: 141 MMOL/L (ref 135–145)
WBC # BLD AUTO: 5.5 10E3/UL (ref 4–11)

## 2024-03-25 PROCEDURE — G0463 HOSPITAL OUTPT CLINIC VISIT: HCPCS | Performed by: INTERNAL MEDICINE

## 2024-03-25 PROCEDURE — 36591 DRAW BLOOD OFF VENOUS DEVICE: CPT | Performed by: NURSE PRACTITIONER

## 2024-03-25 PROCEDURE — 96367 TX/PROPH/DG ADDL SEQ IV INF: CPT

## 2024-03-25 PROCEDURE — 82565 ASSAY OF CREATININE: CPT | Performed by: NURSE PRACTITIONER

## 2024-03-25 PROCEDURE — 250N000011 HC RX IP 250 OP 636: Performed by: INTERNAL MEDICINE

## 2024-03-25 PROCEDURE — G0463 HOSPITAL OUTPT CLINIC VISIT: HCPCS | Mod: 25 | Performed by: INTERNAL MEDICINE

## 2024-03-25 PROCEDURE — 99215 OFFICE O/P EST HI 40 MIN: CPT | Performed by: INTERNAL MEDICINE

## 2024-03-25 PROCEDURE — G0498 CHEMO EXTEND IV INFUS W/PUMP: HCPCS

## 2024-03-25 PROCEDURE — 80053 COMPREHEN METABOLIC PANEL: CPT | Performed by: NURSE PRACTITIONER

## 2024-03-25 PROCEDURE — 258N000003 HC RX IP 258 OP 636: Performed by: INTERNAL MEDICINE

## 2024-03-25 PROCEDURE — 82247 BILIRUBIN TOTAL: CPT | Performed by: NURSE PRACTITIONER

## 2024-03-25 PROCEDURE — 82378 CARCINOEMBRYONIC ANTIGEN: CPT | Performed by: NURSE PRACTITIONER

## 2024-03-25 PROCEDURE — 96375 TX/PRO/DX INJ NEW DRUG ADDON: CPT

## 2024-03-25 PROCEDURE — 96417 CHEMO IV INFUS EACH ADDL SEQ: CPT

## 2024-03-25 PROCEDURE — 96415 CHEMO IV INFUSION ADDL HR: CPT

## 2024-03-25 PROCEDURE — 85025 COMPLETE CBC W/AUTO DIFF WBC: CPT | Performed by: NURSE PRACTITIONER

## 2024-03-25 PROCEDURE — 81003 URINALYSIS AUTO W/O SCOPE: CPT | Performed by: NURSE PRACTITIONER

## 2024-03-25 PROCEDURE — 96413 CHEMO IV INFUSION 1 HR: CPT

## 2024-03-25 RX ORDER — LORAZEPAM 2 MG/ML
0.5 INJECTION INTRAMUSCULAR EVERY 4 HOURS PRN
Status: CANCELLED | OUTPATIENT
Start: 2024-03-25

## 2024-03-25 RX ORDER — ACETAMINOPHEN 500 MG
500-1000 TABLET ORAL EVERY 6 HOURS PRN
Qty: 100 TABLET | Refills: 0 | Status: SHIPPED | OUTPATIENT
Start: 2024-03-25

## 2024-03-25 RX ORDER — HEPARIN SODIUM (PORCINE) LOCK FLUSH IV SOLN 100 UNIT/ML 100 UNIT/ML
5 SOLUTION INTRAVENOUS
Status: CANCELLED | OUTPATIENT
Start: 2024-03-25

## 2024-03-25 RX ORDER — METHYLPREDNISOLONE SODIUM SUCCINATE 125 MG/2ML
125 INJECTION, POWDER, LYOPHILIZED, FOR SOLUTION INTRAMUSCULAR; INTRAVENOUS
Status: CANCELLED
Start: 2024-03-25

## 2024-03-25 RX ORDER — HEPARIN SODIUM (PORCINE) LOCK FLUSH IV SOLN 100 UNIT/ML 100 UNIT/ML
5 SOLUTION INTRAVENOUS
Status: CANCELLED | OUTPATIENT
Start: 2024-03-27

## 2024-03-25 RX ORDER — HEPARIN SODIUM,PORCINE 10 UNIT/ML
5-20 VIAL (ML) INTRAVENOUS DAILY PRN
Status: CANCELLED | OUTPATIENT
Start: 2024-03-25

## 2024-03-25 RX ORDER — EPINEPHRINE 1 MG/ML
0.3 INJECTION, SOLUTION INTRAMUSCULAR; SUBCUTANEOUS EVERY 5 MIN PRN
Status: CANCELLED | OUTPATIENT
Start: 2024-03-25

## 2024-03-25 RX ORDER — MEPERIDINE HYDROCHLORIDE 25 MG/ML
25 INJECTION INTRAMUSCULAR; INTRAVENOUS; SUBCUTANEOUS EVERY 30 MIN PRN
Status: CANCELLED | OUTPATIENT
Start: 2024-03-25

## 2024-03-25 RX ORDER — ONDANSETRON 2 MG/ML
8 INJECTION INTRAMUSCULAR; INTRAVENOUS ONCE
Status: COMPLETED | OUTPATIENT
Start: 2024-03-25 | End: 2024-03-25

## 2024-03-25 RX ORDER — ONDANSETRON 2 MG/ML
8 INJECTION INTRAMUSCULAR; INTRAVENOUS ONCE
Status: CANCELLED | OUTPATIENT
Start: 2024-03-25

## 2024-03-25 RX ORDER — HEPARIN SODIUM,PORCINE 10 UNIT/ML
5-20 VIAL (ML) INTRAVENOUS DAILY PRN
Status: CANCELLED | OUTPATIENT
Start: 2024-03-27

## 2024-03-25 RX ORDER — ALBUTEROL SULFATE 90 UG/1
1-2 AEROSOL, METERED RESPIRATORY (INHALATION)
Status: CANCELLED
Start: 2024-03-25

## 2024-03-25 RX ORDER — DIPHENHYDRAMINE HYDROCHLORIDE 50 MG/ML
50 INJECTION INTRAMUSCULAR; INTRAVENOUS
Status: CANCELLED
Start: 2024-03-25

## 2024-03-25 RX ORDER — ALBUTEROL SULFATE 0.83 MG/ML
2.5 SOLUTION RESPIRATORY (INHALATION)
Status: CANCELLED | OUTPATIENT
Start: 2024-03-25

## 2024-03-25 RX ADMIN — ONDANSETRON 8 MG: 2 INJECTION INTRAMUSCULAR; INTRAVENOUS at 10:30

## 2024-03-25 RX ADMIN — FOSAPREPITANT: 150 INJECTION, POWDER, LYOPHILIZED, FOR SOLUTION INTRAVENOUS at 10:06

## 2024-03-25 RX ADMIN — DEXTROSE MONOHYDRATE 250 ML: 50 INJECTION, SOLUTION INTRAVENOUS at 11:04

## 2024-03-25 RX ADMIN — OXALIPLATIN 170 MG: 100 INJECTION, SOLUTION, CONCENTRATE INTRAVENOUS at 11:04

## 2024-03-25 RX ADMIN — SODIUM CHLORIDE 250 ML: 9 INJECTION, SOLUTION INTRAVENOUS at 09:52

## 2024-03-25 RX ADMIN — BEVACIZUMAB 400 MG: 400 INJECTION, SOLUTION INTRAVENOUS at 10:33

## 2024-03-25 ASSESSMENT — PAIN SCALES - GENERAL: PAINLEVEL: MILD PAIN (2)

## 2024-03-25 NOTE — LETTER
"    3/25/2024         RE: Brady Lenz  [address] [address]  [facility] [address]        Dear Colleague,    Thank you for referring your patient, Brady Lenz, to the [facility]. Please see a copy of my visit note below.    Oncology Rooming Note    March 25, 2024 9:11 AM   Brady Lenz is a 58 year old male who presents for:    Chief Complaint   Patient presents with     Oncology Clinic Visit     Initial Vitals: Resp 16   Ht 1.854 m (6' 1\")   Wt 76.3 kg (168 lb 3.2 oz)   BMI 22.19 kg/m   Estimated body mass index is 22.19 kg/m  as calculated from the following:    Height as of this encounter: 1.854 m (6' 1\").    Weight as of this encounter: 76.3 kg (168 lb 3.2 oz). Body surface area is 1.98 meters squared.  Mild Pain (2) Comment: Data Unavailable   No LMP for male patient.  Allergies reviewed: Yes  Medications reviewed: Yes    Medications: Medication refills not needed today.  Pharmacy name entered into [employer]:    [facility] #[id number] [place], [place] - [address] AT [address] [facility]AC[facility] [place], [place] - [address]    Frailty Screening:   Is the patient here for a new oncology consult visit in cancer care? 2. No        Judi Reyna MA              ONCOLOGY HISTORY: Mr. Lenz is a gentleman with metastatic right colon cancer.    -MMR intact.    -HER2/rowan negative (score 1+).  -NGS panel: A pathogenic KRAS mutation was identified: KRAS G12D      1. Seen in emergency room on 10/23/2023 with abdominal pain and distention.  Multiple investigations done.  -Hemoglobin of 5.9 with MCV of 58.  -Iron of 6 with saturation index of 2%.  -CEA of 6.7.  -CT abdomen and pelvis revealed mass in the cecum causing small bowel obstruction.  Also revealed 10.6 cm mass in right lobe of the liver and 4.9 cm mass in the left lobe of the liver.     2.  On 10/24/2023, he had open right colectomy and laparoscopic peritoneal biopsy.  There was large mass in the " cecum.  There were 2 large liver metastasis.  There was a small implant on the peritoneum in left upper quadrant over the spleen.  There was no evidence of carcinomatosis.  -Peritoneal biopsy is positive for metastatic adenocarcinoma.  -Colectomy specimen reveals moderately differentiated adenocarcinoma measuring 6.8 cm.  8 of 29 lymph nodes positive.  pT3 pN2b.  MMR intact.  HER2/rowan negative (score 1+).     3.  CT chest on 10/25/2023 does not reveal any evidence of malignancy.  -PET scan on 01/16/2024 reveals liver metastasis, splenic metastasis, peritoneal carcinomatosis and right lung 6 mm nodule suspicious for metastasis.     4.  On 01/15/2024, CEA of 15.8.  -Modified FOLFOX 6 and Avastin started on 01/15/2024.     Subjective:  Mr. Lenz is a 58-year-old gentleman with metastatic colon cancer. He is on modified FOLFOX 6 and Avastin.  He is responding to it.    CT chest, abdomen and pelvis was done on 02/15/2024 after a fall.  1.  No evidence of acute traumatic injury in the chest, abdomen or pelvis.  2.  Old/subacute nondisplaced fracture of the lateral aspect of the right clavicle, not significantly changed as compared to 01/16/2024 exam.  3.  Areas of bronchiolar mucoid impaction and small nodular pulmonary opacity in the right middle lobe, likely infectious.  4.  Multiple hepatic metastases, slightly decreased in size as compared to 01/16/2024 exam.  5.  Approximately 2.9 cm hypodense lesion along the anteromedial aspect of the spleen, also likely metastatic, not significantly changed as compared to 01/16/2024 exam.  6.  Splenomegaly.    Patient has few side effects from chemotherapy.  He has generalized weakness.  He also gets diarrhea for 2 to 3 days after each chemotherapy.  He gets 2-3 loose stool.  After about 3 days, diarrhea resolves.  He also gets cold sensitivity for few days.  He also gets some numbness and tingling and pain for 3 to 4 days.  No neuropathy in the feet.    No headache.  No  dizziness.  No chest pain.  No shortness of breath.  No abdominal pain.  No nausea or vomiting.  No urinary complaints.  No bleeding.  He has mild generalized weakness.  All other review of system negative.      Exam:  He is alert oriented x3.  Not in distress.  ECOG PS of 0.  Vitals: Reviewed.  Rest of the system is not examined.     Labs: CBC and urine protein  reviewed.     Assessment:  1.  A 58-year-old gentleman with metastatic colon cancer on palliative treatment with modified FOLFOX 6 and Avastin.  Disease is responding to treatment.  2.  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  3.  Mild diarrhea from chemotherapy.     Plan:  Continue chemotherapy.  See me or [facility] with next treatment.     Discussion:  1.  CT scan done on February 15 was reviewed with him.  Explained to him that it reveals mild improvement in disease.  He was happy to know that.    2.  Discussed regarding metastatic colon cancer.  He will be continued on modified FOLFOX 6 with Avastin.  So far he is tolerating it well.    Explained to the patient that with further treatment, some of the side effects including neuropathy will get worse.  Will discuss regarding dose modification and supportive treatment at that time.    3.  Discussed regarding neuropathy.  It is grade 1.  Advised him to let us know if it is starts interfering with his activities.  Will plan on starting him on gabapentin or Lyrica at that time.  Will also consider dose reduction of oxaliplatin.  Explained to him that typically neuropathy starts bothering after cycle 8.    4.  Patient gets mild diarrhea.  He will take Imodium as needed.    5.  He had few questions which were all answered.  He will be seen with next treatment.     Total visit time of 40 minutes.  Time spent in today's visit, review of chart/investigations today, monitoring for toxicity of high risk drug and documentation today.      Again, thank you for allowing me to participate in the  care of your patient.        Sincerely,        Maryann Limon MD

## 2024-03-25 NOTE — PROGRESS NOTES
ONCOLOGY HISTORY: Mr. Lenz is a gentleman with metastatic right colon cancer.    -MMR intact.    -HER2/rowan negative (score 1+).  -NGS panel: A pathogenic KRAS mutation was identified: KRAS G12D      1. Seen in emergency room on 10/23/2023 with abdominal pain and distention.  Multiple investigations done.  -Hemoglobin of 5.9 with MCV of 58.  -Iron of 6 with saturation index of 2%.  -CEA of 6.7.  -CT abdomen and pelvis revealed mass in the cecum causing small bowel obstruction.  Also revealed 10.6 cm mass in right lobe of the liver and 4.9 cm mass in the left lobe of the liver.     2.  On 10/24/2023, he had open right colectomy and laparoscopic peritoneal biopsy.  There was large mass in the cecum.  There were 2 large liver metastasis.  There was a small implant on the peritoneum in left upper quadrant over the spleen.  There was no evidence of carcinomatosis.  -Peritoneal biopsy is positive for metastatic adenocarcinoma.  -Colectomy specimen reveals moderately differentiated adenocarcinoma measuring 6.8 cm.  8 of 29 lymph nodes positive.  pT3 pN2b.  MMR intact.  HER2/rowan negative (score 1+).     3.  CT chest on 10/25/2023 does not reveal any evidence of malignancy.  -PET scan on 01/16/2024 reveals liver metastasis, splenic metastasis, peritoneal carcinomatosis and right lung 6 mm nodule suspicious for metastasis.     4.  On 01/15/2024, CEA of 15.8.  -Modified FOLFOX 6 and Avastin started on 01/15/2024.     Subjective:  Mr. Lenz is a 58-year-old gentleman with metastatic colon cancer. He is on modified FOLFOX 6 and Avastin.  He is responding to it.    CT chest, abdomen and pelvis was done on 02/15/2024 after a fall.  1.  No evidence of acute traumatic injury in the chest, abdomen or pelvis.  2.  Old/subacute nondisplaced fracture of the lateral aspect of the right clavicle, not significantly changed as compared to 01/16/2024 exam.  3.  Areas of bronchiolar mucoid impaction and small nodular pulmonary opacity in the  right middle lobe, likely infectious.  4.  Multiple hepatic metastases, slightly decreased in size as compared to 01/16/2024 exam.  5.  Approximately 2.9 cm hypodense lesion along the anteromedial aspect of the spleen, also likely metastatic, not significantly changed as compared to 01/16/2024 exam.  6.  Splenomegaly.    Patient has few side effects from chemotherapy.  He has generalized weakness.  He also gets diarrhea for 2 to 3 days after each chemotherapy.  He gets 2-3 loose stool.  After about 3 days, diarrhea resolves.  He also gets cold sensitivity for few days.  He also gets some numbness and tingling and pain for 3 to 4 days.  No neuropathy in the feet.    No headache.  No dizziness.  No chest pain.  No shortness of breath.  No abdominal pain.  No nausea or vomiting.  No urinary complaints.  No bleeding.  He has mild generalized weakness.  All other review of system negative.      Exam:  He is alert oriented x3.  Not in distress.  ECOG PS of 0.  Vitals: Reviewed.  Rest of the system is not examined.     Labs: CBC and urine protein  reviewed.     Assessment:  1.  A 58-year-old gentleman with metastatic colon cancer on palliative treatment with modified FOLFOX 6 and Avastin.  Disease is responding to treatment.  2.  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  3.  Mild diarrhea from chemotherapy.     Plan:  Continue chemotherapy.  See me or BEBETO with next treatment.     Discussion:  1.  CT scan done on February 15 was reviewed with him.  Explained to him that it reveals mild improvement in disease.  He was happy to know that.    2.  Discussed regarding metastatic colon cancer.  He will be continued on modified FOLFOX 6 with Avastin.  So far he is tolerating it well.    Explained to the patient that with further treatment, some of the side effects including neuropathy will get worse.  Will discuss regarding dose modification and supportive treatment at that time.    3.  Discussed  regarding neuropathy.  It is grade 1.  Advised him to let us know if it is starts interfering with his activities.  Will plan on starting him on gabapentin or Lyrica at that time.  Will also consider dose reduction of oxaliplatin.  Explained to him that typically neuropathy starts bothering after cycle 8.    4.  Patient gets mild diarrhea.  He will take Imodium as needed.    5.  He had few questions which were all answered.  He will be seen with next treatment.     Total visit time of 40 minutes.  Time spent in today's visit, review of chart/investigations today, monitoring for toxicity of high risk drug and documentation today.

## 2024-03-25 NOTE — PROGRESS NOTES
"Infusion Nursing Note:  Brady Lenz presents today for folfox plus avastin.    Patient seen by provider today: Yes: Braxton   present during visit today: Not Applicable.    Note: N/A.      Intravenous Access:  Implanted Port.    Treatment Conditions:  Lab Results   Component Value Date    HGB 15.1 03/25/2024    WBC 5.5 03/25/2024    ANEUTAUTO 3.2 03/25/2024     03/25/2024        Lab Results   Component Value Date     02/15/2024    POTASSIUM 3.8 02/15/2024    MAG 2.1 11/03/2023    CR 0.58 (L) 03/25/2024    NAT 8.7 02/15/2024    BILITOTAL 0.9 03/25/2024    ALBUMIN 4.1 02/15/2024    ALT 61 02/15/2024    AST 49 (H) 02/15/2024       Results reviewed, labs MET treatment parameters, ok to proceed with treatment.  Urine negative.      Post Infusion Assessment:  Patient tolerated infusion without incident.  Blood return noted pre and post infusion.  Site patent and intact, free from redness, edema or discomfort.  No evidence of extravasations.       Discharge Plan:   Patient and/or family verbalized understanding of discharge instructions and all questions answered.  AVS to patient via [employer].  Patient will return in 2 days for next appointment.   Patient discharged in stable condition accompanied by: self.  Departure Mode: Ambulatory.    Prior to discharge: Port is secured in place with tegaderm and flushed with 10cc NS with positive blood return noted.    Continuous home infusion Dosi-Fuser pump connected.    All connectors secured in place and clamps taped open.    Pump started, \"running\" noted on display (CADD): Not Applicable.   Capillary element taped to pt's skin per protocol.  Pump Connection double checked with MARTÍN Duran and MARTÍN Cortez.  Patient instructed to call our clinic or [facility] with any questions or concerns at home.  Patient verbalized understanding.    Patient set up for pump disconnect at our clinic on 3/27.       Rosario Moon RN    "

## 2024-03-25 NOTE — PROGRESS NOTES
Nursing Note:  Brady Lenz presents today for port labs and urine.    Patient seen by provider today: Yes: Dr. Limon   present during visit today: Not Applicable.    Note: N/A.    Intravenous Access:  Implanted Port.    Discharge Plan:   Patient was sent to lob for provider and infusion appointments.    Christiane Doll RN

## 2024-03-25 NOTE — PROGRESS NOTES
"Oncology Rooming Note    March 25, 2024 9:11 AM   Brady Lenz is a 58 year old male who presents for:    Chief Complaint   Patient presents with    Oncology Clinic Visit     Initial Vitals: Resp 16   Ht 1.854 m (6' 1\")   Wt 76.3 kg (168 lb 3.2 oz)   BMI 22.19 kg/m   Estimated body mass index is 22.19 kg/m  as calculated from the following:    Height as of this encounter: 1.854 m (6' 1\").    Weight as of this encounter: 76.3 kg (168 lb 3.2 oz). Body surface area is 1.98 meters squared.  Mild Pain (2) Comment: Data Unavailable   No LMP for male patient.  Allergies reviewed: Yes  Medications reviewed: Yes    Medications: Medication refills not needed today.  Pharmacy name entered into "[employer]:    [facility] #[id number] [place], [place] - [address] AT [address] [place] [address] PHARMACY [place], [place] - [address]    Frailty Screening:   Is the patient here for a new oncology consult visit in cancer care? 2. No        Judi Reyna MA            "

## 2024-03-27 ENCOUNTER — INFUSION THERAPY VISIT (OUTPATIENT)
Dept: INFUSION THERAPY | Facility: CLINIC | Age: 59
End: 2024-03-27
Attending: INTERNAL MEDICINE
Payer: COMMERCIAL

## 2024-03-27 DIAGNOSIS — C18.9 COLON CARCINOMA METASTATIC TO LIVER (H): Primary | ICD-10-CM

## 2024-03-27 DIAGNOSIS — C78.7 COLON CARCINOMA METASTATIC TO LIVER (H): Primary | ICD-10-CM

## 2024-03-27 PROCEDURE — 250N000011 HC RX IP 250 OP 636: Performed by: INTERNAL MEDICINE

## 2024-03-27 RX ORDER — HEPARIN SODIUM (PORCINE) LOCK FLUSH IV SOLN 100 UNIT/ML 100 UNIT/ML
5 SOLUTION INTRAVENOUS
Status: DISCONTINUED | OUTPATIENT
Start: 2024-03-27 | End: 2024-03-27 | Stop reason: HOSPADM

## 2024-03-27 RX ADMIN — Medication 5 ML: at 10:30

## 2024-03-27 NOTE — PROGRESS NOTES
Infusion Nursing Note:  Brady Lenz presents today for pump disconnect.    Patient seen by provider today: No   present during visit today: Not Applicable.    Note: N/A.      Intravenous Access:  Implanted Port.    Treatment Conditions:  Not Applicable.      Post Infusion Assessment:  Patient tolerated infusion without incident.  Blood return noted post infusion.  Site patent and intact, free from redness, edema or discomfort.  No evidence of extravasations.  Access discontinued per protocol.       Discharge Plan:   Discharge instructions reviewed with: Patient.  Patient and/or family verbalized understanding of discharge instructions and all questions answered.  AVS to patient via [employer].  Patient will return 4/8/24 for next appointment.   Patient discharged in stable condition accompanied by: self.  Departure Mode: Ambulatory.      Suri Cummins RN

## 2024-04-04 RX ORDER — DIPHENHYDRAMINE HYDROCHLORIDE 50 MG/ML
50 INJECTION INTRAMUSCULAR; INTRAVENOUS
Status: CANCELLED
Start: 2024-04-08

## 2024-04-04 RX ORDER — METHYLPREDNISOLONE SODIUM SUCCINATE 125 MG/2ML
125 INJECTION, POWDER, LYOPHILIZED, FOR SOLUTION INTRAMUSCULAR; INTRAVENOUS
Status: CANCELLED
Start: 2024-04-08

## 2024-04-04 RX ORDER — HEPARIN SODIUM (PORCINE) LOCK FLUSH IV SOLN 100 UNIT/ML 100 UNIT/ML
5 SOLUTION INTRAVENOUS
Status: CANCELLED | OUTPATIENT
Start: 2024-04-10

## 2024-04-04 RX ORDER — MEPERIDINE HYDROCHLORIDE 25 MG/ML
25 INJECTION INTRAMUSCULAR; INTRAVENOUS; SUBCUTANEOUS EVERY 30 MIN PRN
Status: CANCELLED | OUTPATIENT
Start: 2024-04-08

## 2024-04-04 RX ORDER — ONDANSETRON 2 MG/ML
8 INJECTION INTRAMUSCULAR; INTRAVENOUS ONCE
Status: CANCELLED | OUTPATIENT
Start: 2024-04-08

## 2024-04-04 RX ORDER — HEPARIN SODIUM,PORCINE 10 UNIT/ML
5-20 VIAL (ML) INTRAVENOUS DAILY PRN
Status: CANCELLED | OUTPATIENT
Start: 2024-04-10

## 2024-04-04 RX ORDER — HEPARIN SODIUM,PORCINE 10 UNIT/ML
5-20 VIAL (ML) INTRAVENOUS DAILY PRN
Status: CANCELLED | OUTPATIENT
Start: 2024-04-08

## 2024-04-04 RX ORDER — EPINEPHRINE 1 MG/ML
0.3 INJECTION, SOLUTION INTRAMUSCULAR; SUBCUTANEOUS EVERY 5 MIN PRN
Status: CANCELLED | OUTPATIENT
Start: 2024-04-08

## 2024-04-04 RX ORDER — ALBUTEROL SULFATE 0.83 MG/ML
2.5 SOLUTION RESPIRATORY (INHALATION)
Status: CANCELLED | OUTPATIENT
Start: 2024-04-08

## 2024-04-04 RX ORDER — ALBUTEROL SULFATE 90 UG/1
1-2 AEROSOL, METERED RESPIRATORY (INHALATION)
Status: CANCELLED
Start: 2024-04-08

## 2024-04-04 RX ORDER — LORAZEPAM 2 MG/ML
0.5 INJECTION INTRAMUSCULAR EVERY 4 HOURS PRN
Status: CANCELLED | OUTPATIENT
Start: 2024-04-08

## 2024-04-04 RX ORDER — HEPARIN SODIUM (PORCINE) LOCK FLUSH IV SOLN 100 UNIT/ML 100 UNIT/ML
5 SOLUTION INTRAVENOUS
Status: CANCELLED | OUTPATIENT
Start: 2024-04-08

## 2024-04-08 ENCOUNTER — INFUSION THERAPY VISIT (OUTPATIENT)
Dept: INFUSION THERAPY | Facility: CLINIC | Age: 59
End: 2024-04-08
Attending: INTERNAL MEDICINE
Payer: COMMERCIAL

## 2024-04-08 ENCOUNTER — ONCOLOGY VISIT (OUTPATIENT)
Dept: ONCOLOGY | Facility: CLINIC | Age: 59
End: 2024-04-08
Attending: INTERNAL MEDICINE
Payer: COMMERCIAL

## 2024-04-08 VITALS
HEIGHT: 73 IN | WEIGHT: 169 LBS | DIASTOLIC BLOOD PRESSURE: 93 MMHG | SYSTOLIC BLOOD PRESSURE: 147 MMHG | RESPIRATION RATE: 18 BRPM | BODY MASS INDEX: 22.4 KG/M2 | HEART RATE: 68 BPM

## 2024-04-08 VITALS
HEIGHT: 73 IN | HEART RATE: 64 BPM | WEIGHT: 169 LBS | BODY MASS INDEX: 22.4 KG/M2 | RESPIRATION RATE: 18 BRPM | SYSTOLIC BLOOD PRESSURE: 147 MMHG | DIASTOLIC BLOOD PRESSURE: 93 MMHG

## 2024-04-08 DIAGNOSIS — C78.7 COLON CARCINOMA METASTATIC TO LIVER (H): ICD-10-CM

## 2024-04-08 DIAGNOSIS — C18.9 COLON CARCINOMA METASTATIC TO LIVER (H): Primary | ICD-10-CM

## 2024-04-08 DIAGNOSIS — C18.9 COLON CARCINOMA METASTATIC TO LIVER (H): ICD-10-CM

## 2024-04-08 DIAGNOSIS — C78.7 COLON CARCINOMA METASTATIC TO LIVER (H): Primary | ICD-10-CM

## 2024-04-08 LAB
ALBUMIN UR-MCNC: NEGATIVE MG/DL
BASOPHILS # BLD AUTO: 0 10E3/UL (ref 0–0.2)
BASOPHILS NFR BLD AUTO: 1 %
BILIRUB SERPL-MCNC: 0.6 MG/DL
CREAT SERPL-MCNC: 0.63 MG/DL (ref 0.67–1.17)
EGFRCR SERPLBLD CKD-EPI 2021: >90 ML/MIN/1.73M2
EOSINOPHIL # BLD AUTO: 0.3 10E3/UL (ref 0–0.7)
EOSINOPHIL NFR BLD AUTO: 7 %
ERYTHROCYTE [DISTWIDTH] IN BLOOD BY AUTOMATED COUNT: 19.2 % (ref 10–15)
HCT VFR BLD AUTO: 44.3 % (ref 40–53)
HGB BLD-MCNC: 14.5 G/DL (ref 13.3–17.7)
IMM GRANULOCYTES # BLD: 0 10E3/UL
IMM GRANULOCYTES NFR BLD: 0 %
LYMPHOCYTES # BLD AUTO: 1.3 10E3/UL (ref 0.8–5.3)
LYMPHOCYTES NFR BLD AUTO: 28 %
MCH RBC QN AUTO: 28.6 PG (ref 26.5–33)
MCHC RBC AUTO-ENTMCNC: 32.7 G/DL (ref 31.5–36.5)
MCV RBC AUTO: 87 FL (ref 78–100)
MONOCYTES # BLD AUTO: 0.7 10E3/UL (ref 0–1.3)
MONOCYTES NFR BLD AUTO: 16 %
NEUTROPHILS # BLD AUTO: 2.1 10E3/UL (ref 1.6–8.3)
NEUTROPHILS NFR BLD AUTO: 48 %
NRBC # BLD AUTO: 0 10E3/UL
NRBC BLD AUTO-RTO: 0 /100
PLATELET # BLD AUTO: 240 10E3/UL (ref 150–450)
RBC # BLD AUTO: 5.07 10E6/UL (ref 4.4–5.9)
WBC # BLD AUTO: 4.5 10E3/UL (ref 4–11)

## 2024-04-08 PROCEDURE — 96413 CHEMO IV INFUSION 1 HR: CPT

## 2024-04-08 PROCEDURE — 96375 TX/PRO/DX INJ NEW DRUG ADDON: CPT

## 2024-04-08 PROCEDURE — G0498 CHEMO EXTEND IV INFUS W/PUMP: HCPCS

## 2024-04-08 PROCEDURE — G0463 HOSPITAL OUTPT CLINIC VISIT: HCPCS | Mod: 25 | Performed by: NURSE PRACTITIONER

## 2024-04-08 PROCEDURE — 250N000011 HC RX IP 250 OP 636: Mod: JZ | Performed by: NURSE PRACTITIONER

## 2024-04-08 PROCEDURE — 96417 CHEMO IV INFUS EACH ADDL SEQ: CPT

## 2024-04-08 PROCEDURE — 99213 OFFICE O/P EST LOW 20 MIN: CPT | Performed by: NURSE PRACTITIONER

## 2024-04-08 PROCEDURE — 36591 DRAW BLOOD OFF VENOUS DEVICE: CPT | Performed by: NURSE PRACTITIONER

## 2024-04-08 PROCEDURE — 36593 DECLOT VASCULAR DEVICE: CPT

## 2024-04-08 PROCEDURE — 81003 URINALYSIS AUTO W/O SCOPE: CPT | Performed by: NURSE PRACTITIONER

## 2024-04-08 PROCEDURE — 82247 BILIRUBIN TOTAL: CPT | Performed by: NURSE PRACTITIONER

## 2024-04-08 PROCEDURE — 96415 CHEMO IV INFUSION ADDL HR: CPT

## 2024-04-08 PROCEDURE — 82565 ASSAY OF CREATININE: CPT | Performed by: NURSE PRACTITIONER

## 2024-04-08 PROCEDURE — 258N000003 HC RX IP 258 OP 636: Performed by: NURSE PRACTITIONER

## 2024-04-08 PROCEDURE — 85025 COMPLETE CBC W/AUTO DIFF WBC: CPT | Performed by: NURSE PRACTITIONER

## 2024-04-08 RX ORDER — HEPARIN SODIUM (PORCINE) LOCK FLUSH IV SOLN 100 UNIT/ML 100 UNIT/ML
5 SOLUTION INTRAVENOUS
Status: DISCONTINUED | OUTPATIENT
Start: 2024-04-08 | End: 2024-04-08 | Stop reason: HOSPADM

## 2024-04-08 RX ORDER — ONDANSETRON 2 MG/ML
8 INJECTION INTRAMUSCULAR; INTRAVENOUS ONCE
Status: COMPLETED | OUTPATIENT
Start: 2024-04-08 | End: 2024-04-08

## 2024-04-08 RX ORDER — DEXAMETHASONE 4 MG/1
8 TABLET ORAL DAILY
Qty: 4 TABLET | Refills: 2 | Status: SHIPPED | OUTPATIENT
Start: 2024-04-08 | End: 2024-04-08

## 2024-04-08 RX ORDER — DEXAMETHASONE 4 MG/1
4 TABLET ORAL DAILY
Qty: 4 TABLET | Refills: 2 | Status: SHIPPED | OUTPATIENT
Start: 2024-04-08 | End: 2024-05-06

## 2024-04-08 RX ADMIN — OXALIPLATIN 170 MG: 100 INJECTION, SOLUTION, CONCENTRATE INTRAVENOUS at 10:25

## 2024-04-08 RX ADMIN — DEXTROSE MONOHYDRATE 250 ML: 50 INJECTION, SOLUTION INTRAVENOUS at 10:25

## 2024-04-08 RX ADMIN — FOSAPREPITANT: 150 INJECTION, POWDER, LYOPHILIZED, FOR SOLUTION INTRAVENOUS at 09:27

## 2024-04-08 RX ADMIN — SODIUM CHLORIDE 250 ML: 9 INJECTION, SOLUTION INTRAVENOUS at 09:27

## 2024-04-08 RX ADMIN — BEVACIZUMAB 400 MG: 400 INJECTION, SOLUTION INTRAVENOUS at 09:49

## 2024-04-08 RX ADMIN — ALTEPLASE 2 MG: 2.2 INJECTION, POWDER, LYOPHILIZED, FOR SOLUTION INTRAVENOUS at 08:30

## 2024-04-08 RX ADMIN — ONDANSETRON 8 MG: 2 INJECTION INTRAMUSCULAR; INTRAVENOUS at 09:27

## 2024-04-08 NOTE — PROGRESS NOTES
"Oncology Rooming Note    April 8, 2024 8:42 AM   Brady Lenz is a 58 year old male who presents for:    Chief Complaint   Patient presents with    Oncology Clinic Visit     Initial Vitals: BP (!) 147/93   Pulse (!) 18   Resp 18   Ht 1.854 m (6' 0.99\")   Wt 76.7 kg (169 lb)   BMI 22.30 kg/m   Estimated body mass index is 22.3 kg/m  as calculated from the following:    Height as of this encounter: 1.854 m (6' 0.99\").    Weight as of this encounter: 76.7 kg (169 lb). Body surface area is 1.99 meters squared.  Data Unavailable Comment: Data Unavailable   No LMP for male patient.  Allergies reviewed: Yes  Medications reviewed: Yes    Medications: Medication refills not needed today.  Pharmacy name entered into [employer]:    [facility] #[id number] [place], [place] - [address] [place] [address] [place], [place] - [address]    Frailty Screening:   Is the patient here for a new oncology consult visit in cancer care? 2. No      Clinical concerns:   NP was notified.      Haily Elias CMA            "

## 2024-04-08 NOTE — PROGRESS NOTES
Infusion Nursing Note:  Brady Lenz presents today for labs/folfox.    Patient seen by provider today: Yes: Darin   present during visit today: Not Applicable.    Note: N/A.      Intravenous Access:  Lab draw site right AC, Needle type butterfly, Gauge 23.  Labs drawn without difficulty.  Implanted Port. Poor blood return, instilled TPA. Good blood return noted post TPA.    Treatment Conditions:  Lab Results   Component Value Date    HGB 14.5 04/08/2024    WBC 4.5 04/08/2024    ANEUTAUTO 2.1 04/08/2024     04/08/2024        Results reviewed, labs MET treatment parameters, ok to proceed with treatment.  Urine negative.      Post Infusion Assessment:  Patient tolerated infusion without incident.  Blood return noted pre and post infusion.  Site patent and intact, free from redness, edema or discomfort.  No evidence of extravasations.       Discharge Plan:   Discharge instructions reviewed with: Patient.  Patient and/or family verbalized understanding of discharge instructions and all questions answered.  Patient discharged in stable condition accompanied by: self.  Departure Mode: Ambulatory.      Joyce Mcclendon RN

## 2024-04-08 NOTE — LETTER
"    4/8/2024         RE: Brady Lenz  [address] [id number]  [facility] [address]        Dear Colleague,    Thank you for referring your patient, Brady Lenz, to the [facility]. Please see a copy of my visit note below.    Oncology Rooming Note    April 8, 2024 8:42 AM   Brady Lenz is a 58 year old male who presents for:    Chief Complaint   Patient presents with     Oncology Clinic Visit     Initial Vitals: BP (!) 147/93   Pulse (!) 18   Resp 18   Ht 1.854 m (6' 0.99\")   Wt 76.7 kg (169 lb)   BMI 22.30 kg/m   Estimated body mass index is 22.3 kg/m  as calculated from the following:    Height as of this encounter: 1.854 m (6' 0.99\").    Weight as of this encounter: 76.7 kg (169 lb). Body surface area is 1.99 meters squared.  Data Unavailable Comment: Data Unavailable   No LMP for male patient.  Allergies reviewed: Yes  Medications reviewed: Yes    Medications: Medication refills not needed today.  Pharmacy name entered into [employer]:    [facility] #[id number] - [place], [place] - [address] AT [address] [place] [facility] PHARMACY [place], [place] - [address]    Frailty Screening:   Is the patient here for a new oncology consult visit in cancer care? 2. No      Clinical concerns:   NP was notified.      Haily Elias CMA              Oncology/Hematology Visit Note  Apr 8, 2024    Reason for Visit: follow up of metastatic colon cancer  MMR intact  HER2 negative  Positive for KRAS G12D    CEA15.8  PET scan revealed multiple sites of metastasis lung spleen and liver and peritoneum  01/15/24-started modified FOLFOX 6 and Avastin  02/25/2024-02/17/2024-slipped on ice ,status post mechanical fall was hospitalized for musculoskeletal chest wall pain    Interval History:  Patient overall reports feeling well.  Denies fever chills sweats cough shortness of breath chest pain nausea vomiting diarrhea abdominal pain or bleeding  Some mild neuropathy that resolved on its " "own    Review of Systems:  14 point ROS of systems including Constitutional, Eyes, Respiratory, Cardiovascular, Gastroenterology, Genitourinary, Integumentary, Muscularskeletal, Psychiatric were all negative except for pertinent positives noted in my HPI.    Physical Examination:  General: The patient is a pleasant male in no acute distress.  BP (!) 147/93   Pulse 68   Resp 18   Ht 1.854 m (6' 0.99\")   Wt 76.7 kg (169 lb)   BMI 22.30 kg/m    HEENT: EOMI, PERRL. Sclerae are anicteric. Oral mucosa is pink and moist with no lesions or thrush.   Lymph: Neck is supple with no lymphadenopathy in the cervical or supraclavicular areas.   Heart: Regular rate and rhythm.   Lungs: Clear to auscultation bilaterally.   GI: Bowel sounds present, soft, nontender with no palpable hepatosplenomegaly or masses.   Extremities: No lower extremity edema noted bilaterally.   Skin: No rashes, petechiae, or bruising noted on exposed skin.    Laboratory Data:  CBC creatinine and bilirubin reviewed      Assessment and Plan:    Metastatic colon cancer  Follows with Dr. Limon  Currently undergoing palliative treatment withmodified FOLFOX 6 and Avastin  -Patient reports feeling well  -Regimen and potential side effects reviewed with patient advised patient to call us with any changes in health condition  -Ok to proceed with treatment.    -Refilled dexamethasone (per patient request 1 tablet for 2 days this is all he can tolerate)  -Patient is scheduled to see Dr. Limon with next cycle of treatment      Peripheral neuropathy  Mild for now.  Resolves on its own  I advised patient to let us know with any changes or worsening of symptoms      Please call clinic with any changes in health condition or questions    GABBY Junior [facility] [place]     Chart documentation with Dragon Voice recognition Software. Although reviewed after completion, some words and grammatical errors may remain.          Again, thank you " for allowing me to participate in the care of your patient.        Sincerely,        GABBY Junior CNP

## 2024-04-08 NOTE — PROGRESS NOTES
"Oncology/Hematology Visit Note  Apr 8, 2024    Reason for Visit: follow up of metastatic colon cancer  MMR intact  HER2 negative  Positive for KRAS G12D    CEA15.8  PET scan revealed multiple sites of metastasis lung spleen and liver and peritoneum  01/15/24-started modified FOLFOX 6 and Avastin  02/25/2024-02/17/2024-slipped on ice ,status post mechanical fall was hospitalized for musculoskeletal chest wall pain    Interval History:  Patient overall reports feeling well.  Denies fever chills sweats cough shortness of breath chest pain nausea vomiting diarrhea abdominal pain or bleeding  Some mild neuropathy that resolved on its own    Review of Systems:  14 point ROS of systems including Constitutional, Eyes, Respiratory, Cardiovascular, Gastroenterology, Genitourinary, Integumentary, Muscularskeletal, Psychiatric were all negative except for pertinent positives noted in my HPI.    Physical Examination:  General: The patient is a pleasant male in no acute distress.  BP (!) 147/93   Pulse 68   Resp 18   Ht 1.854 m (6' 0.99\")   Wt 76.7 kg (169 lb)   BMI 22.30 kg/m    HEENT: EOMI, PERRL. Sclerae are anicteric. Oral mucosa is pink and moist with no lesions or thrush.   Lymph: Neck is supple with no lymphadenopathy in the cervical or supraclavicular areas.   Heart: Regular rate and rhythm.   Lungs: Clear to auscultation bilaterally.   GI: Bowel sounds present, soft, nontender with no palpable hepatosplenomegaly or masses.   Extremities: No lower extremity edema noted bilaterally.   Skin: No rashes, petechiae, or bruising noted on exposed skin.    Laboratory Data:  CBC creatinine and bilirubin reviewed      Assessment and Plan:    Metastatic colon cancer  Follows with Dr. Limon  Currently undergoing palliative treatment withmodified FOLFOX 6 and Avastin  -Patient reports feeling well  -Regimen and potential side effects reviewed with patient advised patient to call us with any changes in health condition  -Ok to " proceed with treatment.    -Refilled dexamethasone (per patient request 1 tablet for 2 days this is all he can tolerate)  -Patient is scheduled to see Dr. Limon with next cycle of treatment      Peripheral neuropathy  Mild for now.  Resolves on its own  I advised patient to let us know with any changes or worsening of symptoms      Please call clinic with any changes in health condition or questions    GABBY Junior [facility] [place]     Chart documentation with Dragon Voice recognition Software. Although reviewed after completion, some words and grammatical errors may remain.

## 2024-04-10 ENCOUNTER — INFUSION THERAPY VISIT (OUTPATIENT)
Dept: INFUSION THERAPY | Facility: CLINIC | Age: 59
End: 2024-04-10
Attending: INTERNAL MEDICINE
Payer: COMMERCIAL

## 2024-04-10 VITALS
TEMPERATURE: 98.8 F | HEART RATE: 61 BPM | OXYGEN SATURATION: 96 % | DIASTOLIC BLOOD PRESSURE: 73 MMHG | SYSTOLIC BLOOD PRESSURE: 118 MMHG | RESPIRATION RATE: 20 BRPM

## 2024-04-10 DIAGNOSIS — C18.9 COLON CARCINOMA METASTATIC TO LIVER (H): Primary | ICD-10-CM

## 2024-04-10 DIAGNOSIS — C78.7 COLON CARCINOMA METASTATIC TO LIVER (H): Primary | ICD-10-CM

## 2024-04-10 PROCEDURE — 250N000011 HC RX IP 250 OP 636: Performed by: NURSE PRACTITIONER

## 2024-04-10 RX ORDER — HEPARIN SODIUM (PORCINE) LOCK FLUSH IV SOLN 100 UNIT/ML 100 UNIT/ML
5 SOLUTION INTRAVENOUS
Status: DISCONTINUED | OUTPATIENT
Start: 2024-04-10 | End: 2024-04-10 | Stop reason: HOSPADM

## 2024-04-10 RX ADMIN — Medication 5 ML: at 09:55

## 2024-04-10 NOTE — PROGRESS NOTES
Infusion Nursing Note:  Brady JESUS Lenz presents today for Pump Disconnect.    Patient seen by provider today: No   present during visit today: Not Applicable.    Note: Pump noted to be empty upon arrival. Positive blood return obtained from port prior to de-access. Patient denies having any concerns or issues today and reports feeling well.       Intravenous Access:  Implanted Port.    Treatment Conditions:  Not Applicable.      Post Infusion Assessment:  Blood return noted pre and post infusion.  Site patent and intact, free from redness, edema or discomfort.  No evidence of extravasations.  Access discontinued per protocol.       Discharge Plan:   Patient declined prescription refills.  Discharge instructions reviewed with: Patient.  Patient and/or family verbalized understanding of discharge instructions and all questions answered.  AVS to patient via [email or web address].  Patient will return 4/22/24 for next appointment.   Patient discharged in stable condition accompanied by: self.  Departure Mode: Ambulatory.      Christiane Doll RN

## 2024-04-22 ENCOUNTER — LAB (OUTPATIENT)
Dept: INFUSION THERAPY | Facility: CLINIC | Age: 59
End: 2024-04-22
Attending: INTERNAL MEDICINE
Payer: COMMERCIAL

## 2024-04-22 ENCOUNTER — ONCOLOGY VISIT (OUTPATIENT)
Dept: ONCOLOGY | Facility: CLINIC | Age: 59
End: 2024-04-22
Attending: INTERNAL MEDICINE
Payer: COMMERCIAL

## 2024-04-22 ENCOUNTER — HOSPITAL ENCOUNTER (OUTPATIENT)
Facility: CLINIC | Age: 59
Discharge: HOME | End: 2024-04-22
Admitting: INTERNAL MEDICINE
Payer: COMMERCIAL

## 2024-04-22 VITALS
HEART RATE: 102 BPM | DIASTOLIC BLOOD PRESSURE: 85 MMHG | SYSTOLIC BLOOD PRESSURE: 131 MMHG | BODY MASS INDEX: 23.35 KG/M2 | RESPIRATION RATE: 16 BRPM | WEIGHT: 172.4 LBS | HEIGHT: 72 IN | OXYGEN SATURATION: 97 % | TEMPERATURE: 97.8 F

## 2024-04-22 DIAGNOSIS — C78.7 COLON CARCINOMA METASTATIC TO LIVER (H): Primary | ICD-10-CM

## 2024-04-22 DIAGNOSIS — C18.9 COLON CARCINOMA METASTATIC TO LIVER (H): Primary | ICD-10-CM

## 2024-04-22 LAB
ALBUMIN UR-MCNC: NEGATIVE MG/DL
BASOPHILS # BLD AUTO: 0.1 10E3/UL (ref 0–0.2)
BASOPHILS NFR BLD AUTO: 1 %
BILIRUB SERPL-MCNC: 0.9 MG/DL
CEA SERPL-MCNC: 28.1 NG/ML
CREAT SERPL-MCNC: 0.7 MG/DL (ref 0.67–1.17)
EGFRCR SERPLBLD CKD-EPI 2021: >90 ML/MIN/1.73M2
EOSINOPHIL # BLD AUTO: 0.3 10E3/UL (ref 0–0.7)
EOSINOPHIL NFR BLD AUTO: 6 %
ERYTHROCYTE [DISTWIDTH] IN BLOOD BY AUTOMATED COUNT: 18.1 % (ref 10–15)
HCT VFR BLD AUTO: 46.7 % (ref 40–53)
HGB BLD-MCNC: 15.5 G/DL (ref 13.3–17.7)
IMM GRANULOCYTES # BLD: 0 10E3/UL
IMM GRANULOCYTES NFR BLD: 0 %
LYMPHOCYTES # BLD AUTO: 1.2 10E3/UL (ref 0.8–5.3)
LYMPHOCYTES NFR BLD AUTO: 19 %
MCH RBC QN AUTO: 29.6 PG (ref 26.5–33)
MCHC RBC AUTO-ENTMCNC: 33.2 G/DL (ref 31.5–36.5)
MCV RBC AUTO: 89 FL (ref 78–100)
MONOCYTES # BLD AUTO: 0.8 10E3/UL (ref 0–1.3)
MONOCYTES NFR BLD AUTO: 13 %
NEUTROPHILS # BLD AUTO: 3.7 10E3/UL (ref 1.6–8.3)
NEUTROPHILS NFR BLD AUTO: 61 %
NRBC # BLD AUTO: 0 10E3/UL
NRBC BLD AUTO-RTO: 0 /100
PLATELET # BLD AUTO: 191 10E3/UL (ref 150–450)
RBC # BLD AUTO: 5.24 10E6/UL (ref 4.4–5.9)
WBC # BLD AUTO: 6 10E3/UL (ref 4–11)

## 2024-04-22 PROCEDURE — 82247 BILIRUBIN TOTAL: CPT | Performed by: INTERNAL MEDICINE

## 2024-04-22 PROCEDURE — 96417 CHEMO IV INFUS EACH ADDL SEQ: CPT

## 2024-04-22 PROCEDURE — 82565 ASSAY OF CREATININE: CPT | Performed by: INTERNAL MEDICINE

## 2024-04-22 PROCEDURE — G0498 CHEMO EXTEND IV INFUS W/PUMP: HCPCS

## 2024-04-22 PROCEDURE — 81003 URINALYSIS AUTO W/O SCOPE: CPT | Performed by: INTERNAL MEDICINE

## 2024-04-22 PROCEDURE — 36591 DRAW BLOOD OFF VENOUS DEVICE: CPT | Performed by: INTERNAL MEDICINE

## 2024-04-22 PROCEDURE — 96413 CHEMO IV INFUSION 1 HR: CPT

## 2024-04-22 PROCEDURE — 96367 TX/PROPH/DG ADDL SEQ IV INF: CPT

## 2024-04-22 PROCEDURE — 85025 COMPLETE CBC W/AUTO DIFF WBC: CPT | Performed by: INTERNAL MEDICINE

## 2024-04-22 PROCEDURE — 96415 CHEMO IV INFUSION ADDL HR: CPT

## 2024-04-22 PROCEDURE — 96375 TX/PRO/DX INJ NEW DRUG ADDON: CPT

## 2024-04-22 PROCEDURE — 82378 CARCINOEMBRYONIC ANTIGEN: CPT | Performed by: INTERNAL MEDICINE

## 2024-04-22 PROCEDURE — 258N000003 HC RX IP 258 OP 636: Performed by: INTERNAL MEDICINE

## 2024-04-22 PROCEDURE — 250N000011 HC RX IP 250 OP 636: Performed by: INTERNAL MEDICINE

## 2024-04-22 PROCEDURE — 99215 OFFICE O/P EST HI 40 MIN: CPT | Performed by: INTERNAL MEDICINE

## 2024-04-22 PROCEDURE — G0463 HOSPITAL OUTPT CLINIC VISIT: HCPCS | Performed by: INTERNAL MEDICINE

## 2024-04-22 RX ORDER — ALBUTEROL SULFATE 0.83 MG/ML
2.5 SOLUTION RESPIRATORY (INHALATION)
Status: CANCELLED | OUTPATIENT
Start: 2024-04-22

## 2024-04-22 RX ORDER — ONDANSETRON 2 MG/ML
8 INJECTION INTRAMUSCULAR; INTRAVENOUS ONCE
Status: CANCELLED | OUTPATIENT
Start: 2024-04-22

## 2024-04-22 RX ORDER — HEPARIN SODIUM (PORCINE) LOCK FLUSH IV SOLN 100 UNIT/ML 100 UNIT/ML
5 SOLUTION INTRAVENOUS
Status: CANCELLED | OUTPATIENT
Start: 2024-04-24

## 2024-04-22 RX ORDER — ONDANSETRON 2 MG/ML
8 INJECTION INTRAMUSCULAR; INTRAVENOUS ONCE
Status: COMPLETED | OUTPATIENT
Start: 2024-04-22 | End: 2024-04-22

## 2024-04-22 RX ORDER — ALBUTEROL SULFATE 90 UG/1
1-2 AEROSOL, METERED RESPIRATORY (INHALATION)
Status: CANCELLED
Start: 2024-04-22

## 2024-04-22 RX ORDER — LORAZEPAM 2 MG/ML
0.5 INJECTION INTRAMUSCULAR EVERY 4 HOURS PRN
Status: CANCELLED | OUTPATIENT
Start: 2024-04-22

## 2024-04-22 RX ORDER — METHYLPREDNISOLONE SODIUM SUCCINATE 125 MG/2ML
125 INJECTION, POWDER, LYOPHILIZED, FOR SOLUTION INTRAMUSCULAR; INTRAVENOUS
Status: CANCELLED
Start: 2024-04-22

## 2024-04-22 RX ORDER — DIPHENHYDRAMINE HYDROCHLORIDE 50 MG/ML
50 INJECTION INTRAMUSCULAR; INTRAVENOUS
Status: CANCELLED
Start: 2024-04-22

## 2024-04-22 RX ORDER — HEPARIN SODIUM,PORCINE 10 UNIT/ML
5-20 VIAL (ML) INTRAVENOUS DAILY PRN
Status: CANCELLED | OUTPATIENT
Start: 2024-04-24

## 2024-04-22 RX ORDER — HEPARIN SODIUM,PORCINE 10 UNIT/ML
5-20 VIAL (ML) INTRAVENOUS DAILY PRN
Status: CANCELLED | OUTPATIENT
Start: 2024-04-22

## 2024-04-22 RX ORDER — EPINEPHRINE 1 MG/ML
0.3 INJECTION, SOLUTION INTRAMUSCULAR; SUBCUTANEOUS EVERY 5 MIN PRN
Status: CANCELLED | OUTPATIENT
Start: 2024-04-22

## 2024-04-22 RX ORDER — HEPARIN SODIUM (PORCINE) LOCK FLUSH IV SOLN 100 UNIT/ML 100 UNIT/ML
5 SOLUTION INTRAVENOUS
Status: CANCELLED | OUTPATIENT
Start: 2024-04-22

## 2024-04-22 RX ORDER — MEPERIDINE HYDROCHLORIDE 25 MG/ML
25 INJECTION INTRAMUSCULAR; INTRAVENOUS; SUBCUTANEOUS EVERY 30 MIN PRN
Status: CANCELLED | OUTPATIENT
Start: 2024-04-22

## 2024-04-22 RX ADMIN — BEVACIZUMAB 400 MG: 400 INJECTION, SOLUTION INTRAVENOUS at 10:21

## 2024-04-22 RX ADMIN — SODIUM CHLORIDE 250 ML: 9 INJECTION, SOLUTION INTRAVENOUS at 09:45

## 2024-04-22 RX ADMIN — DEXTROSE MONOHYDRATE 250 ML: 50 INJECTION, SOLUTION INTRAVENOUS at 10:57

## 2024-04-22 RX ADMIN — OXALIPLATIN 170 MG: 100 INJECTION, SOLUTION, CONCENTRATE INTRAVENOUS at 10:59

## 2024-04-22 RX ADMIN — ONDANSETRON 8 MG: 2 INJECTION INTRAMUSCULAR; INTRAVENOUS at 09:57

## 2024-04-22 RX ADMIN — FOSAPREPITANT: 150 INJECTION, POWDER, LYOPHILIZED, FOR SOLUTION INTRAVENOUS at 09:57

## 2024-04-22 ASSESSMENT — PAIN SCALES - GENERAL: PAINLEVEL: NO PAIN (0)

## 2024-04-22 NOTE — PATIENT INSTRUCTIONS
Your chemo Pump will infuse for 46 hours. If at any time the pump should malfunction or you have pump concerns, please contact [facility] after hours at [phone].You will need an appointment for the pump disconnect in our clinic or with [facility].

## 2024-04-22 NOTE — PROGRESS NOTES
"Infusion Nursing Note:  Brady N Delfin presents today for C7D1 modified FOLFOX/Avastin.    Patient seen by provider today: Yes: Dr. Limon   present during visit today: Not Applicable.    Note: N/A.    Prior to discharge: Port is secured in place with tegaderm and flushed with 10cc NS with positive blood return noted.    Continuous home infusion Dosi-Fuser pump connected.    All connectors secured in place and clamps taped open.    Pump started, \"running\" noted on display (CADD): NO.   Capillary element taped to pt's skin per protocol.  Pump Connection double checked with Maricruz Doll RN.  Patient instructed to call our clinic or [facility] with any questions or concerns at home.  Patient verbalized understanding.    Patient set up for pump disconnect at our clinic on 4/24/24 at 11am.       Intravenous Access:  Implanted Port.    Treatment Conditions:  Lab Results   Component Value Date    HGB 15.5 04/22/2024    WBC 6.0 04/22/2024    ANEUTAUTO 3.7 04/22/2024     04/22/2024        Lab Results   Component Value Date     03/25/2024    POTASSIUM 4.3 03/25/2024    MAG 2.1 11/03/2023    CR 0.70 04/22/2024    NAT 9.2 03/25/2024    BILITOTAL 0.9 04/22/2024    ALBUMIN 4.2 03/25/2024    ALT 39 03/25/2024    AST 46 (H) 03/25/2024       Results reviewed, labs MET treatment parameters, ok to proceed with treatment.  Urine protein-negative.      Post Infusion Assessment:  Patient tolerated infusion without incident.  Blood return noted pre and post infusion.  Site patent and intact, free from redness, edema or discomfort.  No evidence of extravasations.  Access left in place for chemo pump      Discharge Plan:   Discharge instructions reviewed with: Patient.  Patient and/or family verbalized understanding of discharge instructions and all questions answered.  AVS to patient via [employer].  Patient will return 4/24/24 for next appointment.   Patient discharged in stable condition accompanied by: " self.  Departure Mode: Ambulatory.      Ginna Looney RN

## 2024-04-22 NOTE — PROGRESS NOTES
Oncology Rooming Note    April 22, 2024 9:03 AM   Brady Lenz is a 58 year old male who presents for:    Chief Complaint   Patient presents with    Oncology Clinic Visit     Initial Vitals: Resp 16   Ht 1.829 m (6')   Wt 78.2 kg (172 lb 6.4 oz)   BMI 23.38 kg/m   Estimated body mass index is 23.38 kg/m  as calculated from the following:    Height as of this encounter: 1.829 m (6').    Weight as of this encounter: 78.2 kg (172 lb 6.4 oz). Body surface area is 1.99 meters squared.  No Pain (0) Comment: Data Unavailable   No LMP for male patient.  Allergies reviewed: Yes  Medications reviewed: Yes    Medications: Medication refills not needed today.  Pharmacy name entered into [employer]:    [facility] #[id number] [place], [place] - [address] PHARMACY [place], [place] - [address]    Frailty Screening:   Is the patient here for a new oncology consult visit in cancer care? 2. No        Judi Reyna MA

## 2024-04-22 NOTE — PROGRESS NOTES
Nursing Note:  Brady Lenz presents today for Port Labs.    Patient seen by provider today: Yes: Dr. Limon   present during visit today: Not Applicable.    Note: N/A.    Intravenous Access:  Labs drawn without difficulty.  Implanted Port.    Discharge Plan:   Patient was sent to [facility] for 0900 appointment.    Renee Carr RN

## 2024-04-22 NOTE — LETTER
4/22/2024         RE: Brady Lenz  [address] [address]  [facility] [address]        Dear Colleague,    Thank you for referring your patient, Brady Lenz, to the [facility]. Please see a copy of my visit note below.    Oncology Rooming Note    April 22, 2024 9:03 AM   Brady Lenz is a 58 year old male who presents for:    Chief Complaint   Patient presents with     Oncology Clinic Visit     Initial Vitals: Resp 16   Ht 1.829 m (6')   Wt 78.2 kg (172 lb 6.4 oz)   BMI 23.38 kg/m   Estimated body mass index is 23.38 kg/m  as calculated from the following:    Height as of this encounter: 1.829 m (6').    Weight as of this encounter: 78.2 kg (172 lb 6.4 oz). Body surface area is 1.99 meters squared.  No Pain (0) Comment: Data Unavailable   No LMP for male patient.  Allergies reviewed: Yes  Medications reviewed: Yes    Medications: Medication refills not needed today.  Pharmacy name entered into [employer]:    [id number] [place], [place] - [address] AT [address] [place] [address] PHARMACY [place], [place] - [address]    Frailty Screening:   Is the patient here for a new oncology consult visit in cancer care? 2. No        Judi Reyna MA              ONCOLOGY HISTORY: Mr. Lenz is a gentleman with metastatic right colon cancer.    -MMR intact.    -HER2/rowan negative (score 1+).  -NGS panel: A pathogenic KRAS mutation was identified: KRAS G12D      1. Seen in emergency room on 10/23/2023 with abdominal pain and distention.  Multiple investigations done.  -Hemoglobin of 5.9 with MCV of 58.  -Iron of 6 with saturation index of 2%.  -CEA of 6.7.  -CT abdomen and pelvis revealed mass in the cecum causing small bowel obstruction.  Also revealed 10.6 cm mass in right lobe of the liver and 4.9 cm mass in the left lobe of the liver.     2.  On 10/24/2023, he had open right colectomy and laparoscopic peritoneal biopsy.  There was large mass in the cecum.  There  were 2 large liver metastasis.  There was a small implant on the peritoneum in left upper quadrant over the spleen.  There was no evidence of carcinomatosis.  -Peritoneal biopsy is positive for metastatic adenocarcinoma.  -Colectomy specimen reveals moderately differentiated adenocarcinoma measuring 6.8 cm.  8 of 29 lymph nodes positive.  pT3 pN2b.  MMR intact.  HER2/rowan negative (score 1+).     3.  CT chest on 10/25/2023 does not reveal any evidence of malignancy.  -PET scan on 01/16/2024 reveals liver metastasis, splenic metastasis, peritoneal carcinomatosis and right lung 6 mm nodule suspicious for metastasis.     4.  On 01/15/2024, CEA of 15.8.  -Modified FOLFOX 6 and Avastin started on 01/15/2024.     Subjective:  Mr. Lenz is a 58-year-old gentleman with metastatic colon cancer on modified FOLFOX 6 and Avastin.  He is overall tolerating it well.    He has mild generalized weakness.  He gets diarrhea for 2 to 3 days after each chemotherapy.  He gets 2-3 loose stool. Imodium helps.  He also gets cold sensitivity for few days in the hands and mouth.  He also gets some numbness and tingling and pin sensation in the hands for 3 to 4 days.  No neuropathy in the feet.     No headache.  No dizziness.  No chest pain.  No shortness of breath.  No abdominal pain.  No nausea or vomiting.  No urinary complaints.  No bleeding. All other review of system negative.      Exam:  He is alert and oriented x3.  Not in distress.  ECOG PS of 0.  Vitals: Reviewed.  Rest of the system is not examined.     Labs: Reviewed.     Assessment:  1.  A 58-year-old gentleman with metastatic colon cancer on palliative treatment with modified FOLFOX 6 and Avastin.    2.  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  3.  Mild diarrhea from chemotherapy.     Plan:  Continue chemotherapy.  CT scan in 1-2 weeks.  See me after CT scan.  Imodium as needed.     Discussion:  1.  Patient's overall condition is stable.  For  metastatic colon cancer, he will continue on current treatment with modified FOLFOX 6 and Avastin.  Overall he is tolerating it well.  Side effects reviewed.  Explained to the patient that neuropathy and cold sensitivity will get worse.  We may have to consider dose reduction/discontinuation of oxaliplatin in future.    2.  Discussed regarding scans.  Will get CT chest, abdomen and pelvis in next 1 to 2 weeks.  CEA came back later elevated at 28.1.    3.  He gets mild diarrhea from chemotherapy.  Imodium helps but he gets constipated from it.  He will take it as needed.    4.  He had a few questions which were all answered.  I will see him after CT scan.     Total visit time of 40 minutes.  Time spent in today's visit, review of chart/investigations today, monitoring for toxicity of high risk drug and documentation today.      Again, thank you for allowing me to participate in the care of your patient.        Sincerely,        Maryann Limon MD

## 2024-04-22 NOTE — PROGRESS NOTES
ONCOLOGY HISTORY: Mr. Lenz is a gentleman with metastatic right colon cancer.    -MMR intact.    -HER2/rowan negative (score 1+).  -NGS panel: A pathogenic KRAS mutation was identified: KRAS G12D      1. Seen in emergency room on 10/23/2023 with abdominal pain and distention.  Multiple investigations done.  -Hemoglobin of 5.9 with MCV of 58.  -Iron of 6 with saturation index of 2%.  -CEA of 6.7.  -CT abdomen and pelvis revealed mass in the cecum causing small bowel obstruction.  Also revealed 10.6 cm mass in right lobe of the liver and 4.9 cm mass in the left lobe of the liver.     2.  On 10/24/2023, he had open right colectomy and laparoscopic peritoneal biopsy.  There was large mass in the cecum.  There were 2 large liver metastasis.  There was a small implant on the peritoneum in left upper quadrant over the spleen.  There was no evidence of carcinomatosis.  -Peritoneal biopsy is positive for metastatic adenocarcinoma.  -Colectomy specimen reveals moderately differentiated adenocarcinoma measuring 6.8 cm.  8 of 29 lymph nodes positive.  pT3 pN2b.  MMR intact.  HER2/rowan negative (score 1+).     3.  CT chest on 10/25/2023 does not reveal any evidence of malignancy.  -PET scan on 01/16/2024 reveals liver metastasis, splenic metastasis, peritoneal carcinomatosis and right lung 6 mm nodule suspicious for metastasis.     4.  On 01/15/2024, CEA of 15.8.  -Modified FOLFOX 6 and Avastin started on 01/15/2024.     Subjective:  Mr. Lenz is a 58-year-old gentleman with metastatic colon cancer on modified FOLFOX 6 and Avastin.  He is overall tolerating it well.    He has mild generalized weakness.  He gets diarrhea for 2 to 3 days after each chemotherapy.  He gets 2-3 loose stool. Imodium helps.  He also gets cold sensitivity for few days in the hands and mouth.  He also gets some numbness and tingling and pin sensation in the hands for 3 to 4 days.  No neuropathy in the feet.     No headache.  No dizziness.  No chest pain.   No shortness of breath.  No abdominal pain.  No nausea or vomiting.  No urinary complaints.  No bleeding. All other review of system negative.      Exam:  He is alert and oriented x3.  Not in distress.  ECOG PS of 0.  Vitals: Reviewed.  Rest of the system is not examined.     Labs: Reviewed.     Assessment:  1.  A 58-year-old gentleman with metastatic colon cancer on palliative treatment with modified FOLFOX 6 and Avastin.    2.  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  3.  Mild diarrhea from chemotherapy.     Plan:  Continue chemotherapy.  CT scan in 1-2 weeks.  See me after CT scan.  Imodium as needed.     Discussion:  1.  Patient's overall condition is stable.  For metastatic colon cancer, he will continue on current treatment with modified FOLFOX 6 and Avastin.  Overall he is tolerating it well.  Side effects reviewed.  Explained to the patient that neuropathy and cold sensitivity will get worse.  We may have to consider dose reduction/discontinuation of oxaliplatin in future.    2.  Discussed regarding scans.  Will get CT chest, abdomen and pelvis in next 1 to 2 weeks.  CEA came back later elevated at 28.1.    3.  He gets mild diarrhea from chemotherapy.  Imodium helps but he gets constipated from it.  He will take it as needed.    4.  He had a few questions which were all answered.  I will see him after CT scan.     Total visit time of 40 minutes.  Time spent in today's visit, review of chart/investigations today, monitoring for toxicity of high risk drug and documentation today.

## 2024-04-24 ENCOUNTER — INFUSION THERAPY VISIT (OUTPATIENT)
Dept: INFUSION THERAPY | Facility: CLINIC | Age: 59
End: 2024-04-24
Attending: NURSE PRACTITIONER
Payer: COMMERCIAL

## 2024-04-24 VITALS
OXYGEN SATURATION: 96 % | DIASTOLIC BLOOD PRESSURE: 81 MMHG | RESPIRATION RATE: 16 BRPM | HEART RATE: 61 BPM | TEMPERATURE: 97.9 F | SYSTOLIC BLOOD PRESSURE: 130 MMHG

## 2024-04-24 DIAGNOSIS — C18.9 COLON CARCINOMA METASTATIC TO LIVER (H): Primary | ICD-10-CM

## 2024-04-24 DIAGNOSIS — C78.7 COLON CARCINOMA METASTATIC TO LIVER (H): Primary | ICD-10-CM

## 2024-04-24 PROCEDURE — 250N000011 HC RX IP 250 OP 636: Performed by: INTERNAL MEDICINE

## 2024-04-24 RX ORDER — HEPARIN SODIUM (PORCINE) LOCK FLUSH IV SOLN 100 UNIT/ML 100 UNIT/ML
5 SOLUTION INTRAVENOUS
Status: DISCONTINUED | OUTPATIENT
Start: 2024-04-24 | End: 2024-04-24 | Stop reason: HOSPADM

## 2024-04-24 RX ADMIN — Medication 5 ML: at 09:42

## 2024-04-24 NOTE — PROGRESS NOTES
Infusion Nursing Note:  Brady Lenz presents today for unhook chemo pump.    Patient seen by provider today: No   present during visit today: Not Applicable.    Note: No new concerns the past couple of days.      Intravenous Access:  Implanted Port.    Treatment Conditions:  Not Applicable.      Post Infusion Assessment:  Patient tolerated infusion without incident.  Blood return noted pre and post infusion.  Site patent and intact, free from redness, edema or discomfort.  No evidence of extravasations.  Access discontinued per protocol.       Discharge Plan:   AVS to patient via MYCHART.  Patient will return as prev rosalind for next appointment.   Patient discharged in stable condition accompanied by: self.  Departure Mode: Ambulatory.      Diego Jaimes RN

## 2024-05-03 ENCOUNTER — ANCILLARY PROCEDURE (OUTPATIENT)
Dept: CT IMAGING | Facility: CLINIC | Age: 59
End: 2024-05-03
Attending: INTERNAL MEDICINE
Payer: COMMERCIAL

## 2024-05-03 DIAGNOSIS — C18.9 COLON CARCINOMA METASTATIC TO LIVER (H): ICD-10-CM

## 2024-05-03 DIAGNOSIS — C78.7 COLON CARCINOMA METASTATIC TO LIVER (H): ICD-10-CM

## 2024-05-03 PROCEDURE — 250N000011 HC RX IP 250 OP 636: Performed by: INTERNAL MEDICINE

## 2024-05-03 PROCEDURE — 250N000009 HC RX 250: Performed by: INTERNAL MEDICINE

## 2024-05-03 PROCEDURE — 71260 CT THORAX DX C+: CPT

## 2024-05-03 RX ORDER — IOPAMIDOL 755 MG/ML
90 INJECTION, SOLUTION INTRAVASCULAR ONCE
Status: COMPLETED | OUTPATIENT
Start: 2024-05-03 | End: 2024-05-03

## 2024-05-03 RX ORDER — HEPARIN SODIUM (PORCINE) LOCK FLUSH IV SOLN 100 UNIT/ML 100 UNIT/ML
5 SOLUTION INTRAVENOUS ONCE
Status: COMPLETED | OUTPATIENT
Start: 2024-05-03 | End: 2024-05-03

## 2024-05-03 RX ADMIN — IOPAMIDOL 90 ML: 755 INJECTION, SOLUTION INTRAVENOUS at 09:40

## 2024-05-03 RX ADMIN — HEPARIN SODIUM (PORCINE) LOCK FLUSH IV SOLN 100 UNIT/ML 5 ML: 100 SOLUTION at 09:40

## 2024-05-03 RX ADMIN — SODIUM CHLORIDE 40 ML: 9 INJECTION, SOLUTION INTRAVENOUS at 09:40

## 2024-05-06 ENCOUNTER — LAB (OUTPATIENT)
Dept: INFUSION THERAPY | Facility: CLINIC | Age: 59
End: 2024-05-06
Attending: INTERNAL MEDICINE
Payer: COMMERCIAL

## 2024-05-06 ENCOUNTER — ONCOLOGY VISIT (OUTPATIENT)
Dept: ONCOLOGY | Facility: CLINIC | Age: 59
End: 2024-05-06
Attending: INTERNAL MEDICINE
Payer: COMMERCIAL

## 2024-05-06 VITALS — WEIGHT: 177 LBS | BODY MASS INDEX: 23.98 KG/M2 | HEIGHT: 72 IN

## 2024-05-06 VITALS
DIASTOLIC BLOOD PRESSURE: 90 MMHG | SYSTOLIC BLOOD PRESSURE: 144 MMHG | OXYGEN SATURATION: 98 % | WEIGHT: 177 LBS | HEART RATE: 71 BPM | RESPIRATION RATE: 16 BRPM | BODY MASS INDEX: 24.01 KG/M2

## 2024-05-06 DIAGNOSIS — C18.9 COLON CARCINOMA METASTATIC TO LIVER (H): Primary | ICD-10-CM

## 2024-05-06 DIAGNOSIS — C78.7 COLON CARCINOMA METASTATIC TO LIVER (H): Primary | ICD-10-CM

## 2024-05-06 DIAGNOSIS — R19.7 DIARRHEA, UNSPECIFIED TYPE: ICD-10-CM

## 2024-05-06 LAB
ALBUMIN SERPL BCG-MCNC: 4 G/DL (ref 3.5–5.2)
ALBUMIN UR-MCNC: 30 MG/DL
ALP SERPL-CCNC: 181 U/L (ref 40–150)
ALT SERPL W P-5'-P-CCNC: 43 U/L (ref 0–70)
ANION GAP SERPL CALCULATED.3IONS-SCNC: 16 MMOL/L (ref 7–15)
AST SERPL W P-5'-P-CCNC: 46 U/L (ref 0–45)
BASOPHILS # BLD AUTO: 0.1 10E3/UL (ref 0–0.2)
BASOPHILS NFR BLD AUTO: 1 %
BILIRUB SERPL-MCNC: 0.4 MG/DL
BILIRUB SERPL-MCNC: 0.4 MG/DL
BUN SERPL-MCNC: 18.6 MG/DL (ref 6–20)
CALCIUM SERPL-MCNC: 9 MG/DL (ref 8.6–10)
CHLORIDE SERPL-SCNC: 104 MMOL/L (ref 98–107)
CREAT SERPL-MCNC: 0.62 MG/DL (ref 0.67–1.17)
CREAT SERPL-MCNC: 0.62 MG/DL (ref 0.67–1.17)
DEPRECATED HCO3 PLAS-SCNC: 20 MMOL/L (ref 22–29)
EGFRCR SERPLBLD CKD-EPI 2021: >90 ML/MIN/1.73M2
EGFRCR SERPLBLD CKD-EPI 2021: >90 ML/MIN/1.73M2
EOSINOPHIL # BLD AUTO: 0.5 10E3/UL (ref 0–0.7)
EOSINOPHIL NFR BLD AUTO: 8 %
ERYTHROCYTE [DISTWIDTH] IN BLOOD BY AUTOMATED COUNT: 16.5 % (ref 10–15)
GLUCOSE SERPL-MCNC: 83 MG/DL (ref 70–99)
HCT VFR BLD AUTO: 44.7 % (ref 40–53)
HGB BLD-MCNC: 14.6 G/DL (ref 13.3–17.7)
IMM GRANULOCYTES # BLD: 0 10E3/UL
IMM GRANULOCYTES NFR BLD: 1 %
LYMPHOCYTES # BLD AUTO: 1.6 10E3/UL (ref 0.8–5.3)
LYMPHOCYTES NFR BLD AUTO: 27 %
MCH RBC QN AUTO: 30 PG (ref 26.5–33)
MCHC RBC AUTO-ENTMCNC: 32.7 G/DL (ref 31.5–36.5)
MCV RBC AUTO: 92 FL (ref 78–100)
MONOCYTES # BLD AUTO: 0.9 10E3/UL (ref 0–1.3)
MONOCYTES NFR BLD AUTO: 15 %
NEUTROPHILS # BLD AUTO: 2.7 10E3/UL (ref 1.6–8.3)
NEUTROPHILS NFR BLD AUTO: 48 %
NRBC # BLD AUTO: 0 10E3/UL
NRBC BLD AUTO-RTO: 0 /100
PLATELET # BLD AUTO: 160 10E3/UL (ref 150–450)
POTASSIUM SERPL-SCNC: 4 MMOL/L (ref 3.4–5.3)
PROT SERPL-MCNC: 6.9 G/DL (ref 6.4–8.3)
RBC # BLD AUTO: 4.87 10E6/UL (ref 4.4–5.9)
SODIUM SERPL-SCNC: 140 MMOL/L (ref 135–145)
WBC # BLD AUTO: 5.7 10E3/UL (ref 4–11)

## 2024-05-06 PROCEDURE — 96367 TX/PROPH/DG ADDL SEQ IV INF: CPT

## 2024-05-06 PROCEDURE — 82040 ASSAY OF SERUM ALBUMIN: CPT | Performed by: INTERNAL MEDICINE

## 2024-05-06 PROCEDURE — 96368 THER/DIAG CONCURRENT INF: CPT

## 2024-05-06 PROCEDURE — 96411 CHEMO IV PUSH ADDL DRUG: CPT

## 2024-05-06 PROCEDURE — G0498 CHEMO EXTEND IV INFUS W/PUMP: HCPCS

## 2024-05-06 PROCEDURE — 85025 COMPLETE CBC W/AUTO DIFF WBC: CPT | Performed by: INTERNAL MEDICINE

## 2024-05-06 PROCEDURE — 96375 TX/PRO/DX INJ NEW DRUG ADDON: CPT

## 2024-05-06 PROCEDURE — 82565 ASSAY OF CREATININE: CPT | Performed by: INTERNAL MEDICINE

## 2024-05-06 PROCEDURE — 81003 URINALYSIS AUTO W/O SCOPE: CPT | Performed by: INTERNAL MEDICINE

## 2024-05-06 PROCEDURE — 82247 BILIRUBIN TOTAL: CPT | Performed by: INTERNAL MEDICINE

## 2024-05-06 PROCEDURE — 96413 CHEMO IV INFUSION 1 HR: CPT

## 2024-05-06 PROCEDURE — 36591 DRAW BLOOD OFF VENOUS DEVICE: CPT | Performed by: INTERNAL MEDICINE

## 2024-05-06 PROCEDURE — G0463 HOSPITAL OUTPT CLINIC VISIT: HCPCS | Performed by: INTERNAL MEDICINE

## 2024-05-06 PROCEDURE — 96415 CHEMO IV INFUSION ADDL HR: CPT

## 2024-05-06 PROCEDURE — 250N000011 HC RX IP 250 OP 636: Mod: JZ | Performed by: INTERNAL MEDICINE

## 2024-05-06 PROCEDURE — 258N000003 HC RX IP 258 OP 636: Performed by: INTERNAL MEDICINE

## 2024-05-06 PROCEDURE — 96417 CHEMO IV INFUS EACH ADDL SEQ: CPT

## 2024-05-06 PROCEDURE — G0463 HOSPITAL OUTPT CLINIC VISIT: HCPCS | Mod: 25 | Performed by: INTERNAL MEDICINE

## 2024-05-06 PROCEDURE — 99215 OFFICE O/P EST HI 40 MIN: CPT | Performed by: INTERNAL MEDICINE

## 2024-05-06 PROCEDURE — 82374 ASSAY BLOOD CARBON DIOXIDE: CPT | Performed by: INTERNAL MEDICINE

## 2024-05-06 RX ORDER — HEPARIN SODIUM,PORCINE 10 UNIT/ML
5-20 VIAL (ML) INTRAVENOUS DAILY PRN
Status: CANCELLED | OUTPATIENT
Start: 2024-05-06

## 2024-05-06 RX ORDER — ALBUTEROL SULFATE 90 UG/1
1-2 AEROSOL, METERED RESPIRATORY (INHALATION)
Status: CANCELLED
Start: 2024-05-06

## 2024-05-06 RX ORDER — HEPARIN SODIUM,PORCINE 10 UNIT/ML
5-20 VIAL (ML) INTRAVENOUS DAILY PRN
Status: CANCELLED | OUTPATIENT
Start: 2024-05-08

## 2024-05-06 RX ORDER — HEPARIN SODIUM (PORCINE) LOCK FLUSH IV SOLN 100 UNIT/ML 100 UNIT/ML
5 SOLUTION INTRAVENOUS
Status: CANCELLED | OUTPATIENT
Start: 2024-05-06

## 2024-05-06 RX ORDER — DIPHENOXYLATE HCL/ATROPINE 2.5-.025MG
1 TABLET ORAL 4 TIMES DAILY PRN
Qty: 20 TABLET | Refills: 1 | Status: SHIPPED | OUTPATIENT
Start: 2024-05-06

## 2024-05-06 RX ORDER — EPINEPHRINE 1 MG/ML
0.3 INJECTION, SOLUTION INTRAMUSCULAR; SUBCUTANEOUS EVERY 5 MIN PRN
Status: CANCELLED | OUTPATIENT
Start: 2024-05-06

## 2024-05-06 RX ORDER — METHYLPREDNISOLONE SODIUM SUCCINATE 125 MG/2ML
125 INJECTION, POWDER, LYOPHILIZED, FOR SOLUTION INTRAMUSCULAR; INTRAVENOUS
Status: CANCELLED
Start: 2024-05-06

## 2024-05-06 RX ORDER — FLUOROURACIL 50 MG/ML
400 INJECTION, SOLUTION INTRAVENOUS ONCE
Status: CANCELLED | OUTPATIENT
Start: 2024-05-06

## 2024-05-06 RX ORDER — ATROPINE SULFATE 0.4 MG/ML
0.4 AMPUL (ML) INJECTION
Status: DISCONTINUED | OUTPATIENT
Start: 2024-05-06 | End: 2024-05-06 | Stop reason: HOSPADM

## 2024-05-06 RX ORDER — ATROPINE SULFATE 0.4 MG/ML
0.4 AMPUL (ML) INJECTION
Status: CANCELLED | OUTPATIENT
Start: 2024-05-06

## 2024-05-06 RX ORDER — PROCHLORPERAZINE MALEATE 10 MG
10 TABLET ORAL EVERY 6 HOURS PRN
Qty: 30 TABLET | Refills: 2 | Status: SHIPPED | OUTPATIENT
Start: 2024-05-06

## 2024-05-06 RX ORDER — LORAZEPAM 2 MG/ML
0.5 INJECTION INTRAMUSCULAR EVERY 4 HOURS PRN
Status: CANCELLED | OUTPATIENT
Start: 2024-05-06

## 2024-05-06 RX ORDER — LOPERAMIDE HCL 2 MG
CAPSULE ORAL
Qty: 30 CAPSULE | Refills: 0 | Status: SHIPPED | OUTPATIENT
Start: 2024-05-06 | End: 2024-06-05

## 2024-05-06 RX ORDER — FLUOROURACIL 50 MG/ML
400 INJECTION, SOLUTION INTRAVENOUS ONCE
Status: COMPLETED | OUTPATIENT
Start: 2024-05-06 | End: 2024-05-06

## 2024-05-06 RX ORDER — DIPHENHYDRAMINE HYDROCHLORIDE 50 MG/ML
50 INJECTION INTRAMUSCULAR; INTRAVENOUS
Status: CANCELLED
Start: 2024-05-06

## 2024-05-06 RX ORDER — MEPERIDINE HYDROCHLORIDE 25 MG/ML
25 INJECTION INTRAMUSCULAR; INTRAVENOUS; SUBCUTANEOUS EVERY 30 MIN PRN
Status: CANCELLED | OUTPATIENT
Start: 2024-05-06

## 2024-05-06 RX ORDER — HEPARIN SODIUM (PORCINE) LOCK FLUSH IV SOLN 100 UNIT/ML 100 UNIT/ML
5 SOLUTION INTRAVENOUS
Status: CANCELLED | OUTPATIENT
Start: 2024-05-08

## 2024-05-06 RX ORDER — ALBUTEROL SULFATE 0.83 MG/ML
2.5 SOLUTION RESPIRATORY (INHALATION)
Status: CANCELLED | OUTPATIENT
Start: 2024-05-06

## 2024-05-06 RX ADMIN — IRINOTECAN HYDROCHLORIDE 360 MG: 20 INJECTION, SOLUTION INTRAVENOUS at 10:43

## 2024-05-06 RX ADMIN — BEVACIZUMAB 400 MG: 400 INJECTION, SOLUTION INTRAVENOUS at 10:03

## 2024-05-06 RX ADMIN — ATROPINE SULFATE 0.4 MG: 0.4 INJECTION, SOLUTION INTRAVENOUS at 10:41

## 2024-05-06 RX ADMIN — FLUOROURACIL 810 MG: 50 INJECTION, SOLUTION INTRAVENOUS at 12:16

## 2024-05-06 RX ADMIN — LEUCOVORIN CALCIUM 800 MG: 500 INJECTION, POWDER, LYOPHILIZED, FOR SOLUTION INTRAMUSCULAR; INTRAVENOUS at 10:43

## 2024-05-06 RX ADMIN — SODIUM CHLORIDE 250 ML: 9 INJECTION, SOLUTION INTRAVENOUS at 09:46

## 2024-05-06 RX ADMIN — DEXAMETHASONE SODIUM PHOSPHATE: 10 INJECTION, SOLUTION INTRAMUSCULAR; INTRAVENOUS at 09:46

## 2024-05-06 ASSESSMENT — PAIN SCALES - GENERAL: PAINLEVEL: NO PAIN (0)

## 2024-05-06 NOTE — PROGRESS NOTES
ONCOLOGY HISTORY: Mr. Lenz is a gentleman with metastatic right colon cancer.    -MMR intact.    -HER2/rowan negative (score 1+).  -NGS panel: A pathogenic KRAS mutation was identified: KRAS G12D      1. Seen in emergency room on 10/23/2023 with abdominal pain and distention.  Multiple investigations done.  -Hemoglobin of 5.9 with MCV of 58.  -Iron of 6 with saturation index of 2%.  -CEA of 6.7.  -CT abdomen and pelvis revealed mass in the cecum causing small bowel obstruction.  Also revealed 10.6 cm mass in right lobe of the liver and 4.9 cm mass in the left lobe of the liver.     2.  On 10/24/2023, he had open right colectomy and laparoscopic peritoneal biopsy.  There was large mass in the cecum.  There were 2 large liver metastasis.  There was a small implant on the peritoneum in left upper quadrant over the spleen.  There was no evidence of carcinomatosis.  -Peritoneal biopsy is positive for metastatic adenocarcinoma.  -Colectomy specimen reveals moderately differentiated adenocarcinoma measuring 6.8 cm.  8 of 29 lymph nodes positive.  pT3 pN2b.  MMR intact.  HER2/rowan negative (score 1+).     3.  CT chest on 10/25/2023 does not reveal any evidence of malignancy.  -PET scan on 01/16/2024 reveals liver metastasis, splenic metastasis, peritoneal carcinomatosis and right lung 6 mm nodule suspicious for metastasis.     4.  On 01/15/2024, CEA of 15.8.  -Modified FOLFOX 6 and Avastin x 7 between 01/15/2024 and 04/22/2024. Stopped due to progression.  -FOLFIRI and avastin started on 05/06/2024.     Subjective:  Mr. Lenz is a 58-year-old gentleman with metastatic colon cancer on modified FOLFOX 6 and Avastin.  He has completed 7 cycles.    CT chest, abdomen and pelvis on 05/03/2024:  1.  Enlarging pulmonary nodules measuring up to 4 mm concerning for worsening metastatic disease.  2.  Multiple new sclerotic lesions are seen along a few right ribs with pathological fractures suspected along the right seventh and ninth  ribs.  3.  Mixed response in the liver with several metastatic lesions in the liver appearing increased in size while others are decreased in size.  4.  Slightly enlarging 2.9 cm lesion within the enlarged spleen suggestive of worsening metastatic disease.  5.  Several nodules along the right posterior peritoneum appears slightly increased in size concerning for worsening peritoneal carcinomatosis.    His overall condition is stable. He has mild generalized weakness.  He can do all his activities. He gets diarrhea for 2 to 3 days after each chemotherapy.  He gets 2-3 loose stool. Imodium helps.  He also gets cold sensitivity for few days in the hands and mouth.  He also gets some numbness and tingling and pin sensation in the hands for 4-5 days.  No neuropathy in the feet.     No headache.  No dizziness.  He gets some pain in the right side of the chest on movement.  No pain at rest. No shortness of breath.  No abdominal pain.  No nausea or vomiting.  No urinary complaints.  No bleeding. All other review of system negative.      Exam:  He is alert and oriented x3.  Not in distress.  ECOG PS of 0.  Vitals: Reviewed.  Rest of the system is not examined.     Labs: CBC and CMP reviewed.     Assessment:  1.  A 58-year-old gentleman with metastatic colon cancer on palliative treatment with modified FOLFOX 6 and Avastin.  Disease is progressing.  2.  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  3.  Mild diarrhea from chemotherapy.     Plan:  Change chemotherapy to FOLFIRI and avastin.  See BEBETO with next 2 treatments.  See me in 6-8 weeks.     Discussion:  1.  CT scan was reviewed the patient.  Explained to him it reveals progression of disease.    Discussed regarding further treatment.  After discussion, plan is to change treatment to FOLFIRI with Avastin.  Regimen discussed.  Side effects reviewed.  He is agreeable for FOLFIRI with Avastin.    Explained to the patient that diarrhea can get worse  with it.  He will take Imodium and Lomotil as needed.  Also advised him to drink plenty of fluid.    2.  Discussed regarding neuropathy.  Explained to him it should improve as he will no longer get oxaliplatin.    He gets some pain in the right side of the chest.  Pain is only on movement.  Explained to the patient that there are sclerotic lesions in the right rib.  This is likely focus of metastasis.  Pain is secondary to that.  Pain is mild.  He will take over-the-counter pain medication as needed.    3.  Labs were reviewed with him.  They are good for treatment.    4.  He had a few questions which were all answered.  I will see him in 6 to 8 weeks time.  In between he will see BEBETO.  Will plan on next CT scan in 2 to 3 months time.  Advised him to call us with any questions or concerns.     Total visit time of 40 minutes.  Time spent in today's visit, review of chart/investigations today, monitoring for toxicity of high risk drugs and documentation today.

## 2024-05-06 NOTE — LETTER
5/6/2024         RE: Brady Lenz  [address] [address]  [facility] [address]        Dear Colleague,    Thank you for referring your patient, Brady Lenz, to the [facility]. Please see a copy of my visit note below.    ONCOLOGY HISTORY: Mr. Lenz is a gentleman with metastatic right colon cancer.    -MMR intact.    -HER2/rowan negative (score 1+).  -NGS panel: A pathogenic KRAS mutation was identified: KRAS G12D      1. Seen in emergency room on 10/23/2023 with abdominal pain and distention.  Multiple investigations done.  -Hemoglobin of 5.9 with MCV of 58.  -Iron of 6 with saturation index of 2%.  -CEA of 6.7.  -CT abdomen and pelvis revealed mass in the cecum causing small bowel obstruction.  Also revealed 10.6 cm mass in right lobe of the liver and 4.9 cm mass in the left lobe of the liver.     2.  On 10/24/2023, he had open right colectomy and laparoscopic peritoneal biopsy.  There was large mass in the cecum.  There were 2 large liver metastasis.  There was a small implant on the peritoneum in left upper quadrant over the spleen.  There was no evidence of carcinomatosis.  -Peritoneal biopsy is positive for metastatic adenocarcinoma.  -Colectomy specimen reveals moderately differentiated adenocarcinoma measuring 6.8 cm.  8 of 29 lymph nodes positive.  pT3 pN2b.  MMR intact.  HER2/rowan negative (score 1+).     3.  CT chest on 10/25/2023 does not reveal any evidence of malignancy.  -PET scan on 01/16/2024 reveals liver metastasis, splenic metastasis, peritoneal carcinomatosis and right lung 6 mm nodule suspicious for metastasis.     4.  On 01/15/2024, CEA of 15.8.  -Modified FOLFOX 6 and Avastin x 7 between 01/15/2024 and 04/22/2024. Stopped due to progression.  -FOLFIRI and avastin started on 05/06/2024.     Subjective:  Mr. Lenz is a 58-year-old gentleman with metastatic colon cancer on modified FOLFOX 6 and Avastin.  He has completed 7 cycles.    CT chest, abdomen and pelvis on  05/03/2024:  1.  Enlarging pulmonary nodules measuring up to 4 mm concerning for worsening metastatic disease.  2.  Multiple new sclerotic lesions are seen along a few right ribs with pathological fractures suspected along the right seventh and ninth ribs.  3.  Mixed response in the liver with several metastatic lesions in the liver appearing increased in size while others are decreased in size.  4.  Slightly enlarging 2.9 cm lesion within the enlarged spleen suggestive of worsening metastatic disease.  5.  Several nodules along the right posterior peritoneum appears slightly increased in size concerning for worsening peritoneal carcinomatosis.    His overall condition is stable. He has mild generalized weakness.  He can do all his activities. He gets diarrhea for 2 to 3 days after each chemotherapy.  He gets 2-3 loose stool. Imodium helps.  He also gets cold sensitivity for few days in the hands and mouth.  He also gets some numbness and tingling and pin sensation in the hands for 4-5 days.  No neuropathy in the feet.     No headache.  No dizziness.  He gets some pain in the right side of the chest on movement.  No pain at rest. No shortness of breath.  No abdominal pain.  No nausea or vomiting.  No urinary complaints.  No bleeding. All other review of system negative.      Exam:  He is alert and oriented x3.  Not in distress.  ECOG PS of 0.  Vitals: Reviewed.  Rest of the system is not examined.     Labs: CBC and CMP reviewed.     Assessment:  1.  A 58-year-old gentleman with metastatic colon cancer on palliative treatment with modified FOLFOX 6 and Avastin.  Disease is progressing.  2.  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  3.  Mild diarrhea from chemotherapy.     Plan:  Change chemotherapy to FOLFIRI and avastin.  See BEBETO with next 2 treatments.  See me in 6-8 weeks.     Discussion:  1.  CT scan was reviewed the patient.  Explained to him it reveals progression of  disease.    Discussed regarding further treatment.  After discussion, plan is to change treatment to FOLFIRI with Avastin.  Regimen discussed.  Side effects reviewed.  He is agreeable for FOLFIRI with Avastin.    Explained to the patient that diarrhea can get worse with it.  He will take Imodium and Lomotil as needed.  Also advised him to drink plenty of fluid.    2.  Discussed regarding neuropathy.  Explained to him it should improve as he will no longer get oxaliplatin.    He gets some pain in the right side of the chest.  Pain is only on movement.  Explained to the patient that there are sclerotic lesions in the right rib.  This is likely focus of metastasis.  Pain is secondary to that.  Pain is mild.  He will take over-the-counter pain medication as needed.    3.  Labs were reviewed with him.  They are good for treatment.    4.  He had a few questions which were all answered.  I will see him in 6 to 8 weeks time.  In between he will see BEBETO.  Will plan on next CT scan in 2 to 3 months time.  Advised him to call us with any questions or concerns.     Total visit time of 40 minutes.  Time spent in today's visit, review of chart/investigations today, monitoring for toxicity of high risk drugs and documentation today.      Again, thank you for allowing me to participate in the care of your patient.        Sincerely,        Maryann Limon MD

## 2024-05-06 NOTE — PROGRESS NOTES
"Infusion Nursing Note:  Brady Lenz presents today for C1D1 Folfiri/Avastin (changing treatments today per Dr. Limon).    Patient seen by provider today: Yes: Braxton   present during visit today: Not Applicable.    Note: N/A.      Intravenous Access:  Implanted Port.  Accessed in FT.    Treatment Conditions:  Lab Results   Component Value Date    HGB 14.6 05/06/2024    WBC 5.7 05/06/2024    ANEUTAUTO 2.7 05/06/2024     05/06/2024        Lab Results   Component Value Date     05/06/2024    POTASSIUM 4.0 05/06/2024    MAG 2.1 11/03/2023    CR 0.62 (L) 05/06/2024    CR 0.62 (L) 05/06/2024    NAT 9.0 05/06/2024    BILITOTAL 0.4 05/06/2024    BILITOTAL 0.4 05/06/2024    ALBUMIN 4.0 05/06/2024    ALT 43 05/06/2024    AST 46 (H) 05/06/2024       Results reviewed, labs MET treatment parameters, ok to proceed with treatment.  Urine protein=30; NO 24 H urine needed per Dr. Limon.      Post Infusion Assessment:  Patient tolerated infusion without incident.  Blood return noted pre and post infusion.  Site patent and intact, free from redness, edema or discomfort.  No evidence of extravasations.     Prior to discharge: Port is secured in place with tegaderm and flushed with 10cc NS with positive blood return noted.    Continuous home infusion Dosi-Fuser pump connected.    All connectors secured in place and clamps taped open.    Pump started, \"running\" noted on display (CADD): Not Applicable.   Capillary element taped to pt's skin per protocol.  Pump Connection double checked with BB/VN.  Patient instructed to call our clinic or [facility] with any questions or concerns at home.  Patient verbalized understanding.    Patient set up for pump disconnect at our clinic on 5/8/24.        Discharge Plan:   Prescription refills given for Compazine and Imodium.  Discharge instructions reviewed with: Patient.  Patient and/or family verbalized understanding of discharge instructions and all questions " answered.  AVS to patient via [employer].  Patient will return 5/8/24 for next appointment.   Patient discharged in stable condition accompanied by: self.  Departure Mode: Ambulatory.      Nimesh Louis RN

## 2024-05-06 NOTE — PATIENT INSTRUCTIONS
Change chemotherapy to FOLFIRI and avastin.  See BEBETO with next 2 treatments.  See me in 6-8 weeks.

## 2024-05-08 ENCOUNTER — INFUSION THERAPY VISIT (OUTPATIENT)
Dept: INFUSION THERAPY | Facility: CLINIC | Age: 59
End: 2024-05-08
Payer: COMMERCIAL

## 2024-05-08 VITALS
DIASTOLIC BLOOD PRESSURE: 84 MMHG | OXYGEN SATURATION: 97 % | HEART RATE: 88 BPM | TEMPERATURE: 97.4 F | RESPIRATION RATE: 16 BRPM | SYSTOLIC BLOOD PRESSURE: 134 MMHG

## 2024-05-08 DIAGNOSIS — C18.9 COLON CARCINOMA METASTATIC TO LIVER (H): Primary | ICD-10-CM

## 2024-05-08 DIAGNOSIS — C78.7 COLON CARCINOMA METASTATIC TO LIVER (H): Primary | ICD-10-CM

## 2024-05-08 PROCEDURE — 250N000011 HC RX IP 250 OP 636: Performed by: INTERNAL MEDICINE

## 2024-05-08 RX ORDER — HEPARIN SODIUM (PORCINE) LOCK FLUSH IV SOLN 100 UNIT/ML 100 UNIT/ML
5 SOLUTION INTRAVENOUS
Status: DISCONTINUED | OUTPATIENT
Start: 2024-05-08 | End: 2024-05-08 | Stop reason: HOSPADM

## 2024-05-08 RX ADMIN — Medication 5 ML: at 09:19

## 2024-05-08 NOTE — PROGRESS NOTES
Infusion Nursing Note:  Brady Lenz presents today for Pump Disconnect.    Patient seen by provider today: No   present during visit today: Not Applicable.    Note: N/A.      Intravenous Access:  Implanted Port.    Treatment Conditions:  Not Applicable.      Post Infusion Assessment:  Patient tolerated infusion without incident.  Blood return noted pre and post infusion.  Site patent and intact, free from redness, edema or discomfort.  No evidence of extravasations.  Access discontinued per protocol.       Discharge Plan:   Patient declined prescription refills.  Discharge instructions reviewed with: Patient.  Patient and/or family verbalized understanding of discharge instructions and all questions answered.  AVS to patient via [employer].  Patient will return 5/20 for next appointment.   Patient discharged in stable condition accompanied by: self.  Departure Mode: Ambulatory.      Maury Guthrie RN

## 2024-05-17 RX ORDER — HEPARIN SODIUM,PORCINE 10 UNIT/ML
5-20 VIAL (ML) INTRAVENOUS DAILY PRN
Status: CANCELLED | OUTPATIENT
Start: 2024-05-22

## 2024-05-17 RX ORDER — EPINEPHRINE 1 MG/ML
0.3 INJECTION, SOLUTION INTRAMUSCULAR; SUBCUTANEOUS EVERY 5 MIN PRN
Status: CANCELLED | OUTPATIENT
Start: 2024-05-20

## 2024-05-17 RX ORDER — ATROPINE SULFATE 0.4 MG/ML
0.4 AMPUL (ML) INJECTION
Status: CANCELLED | OUTPATIENT
Start: 2024-05-20

## 2024-05-17 RX ORDER — LORAZEPAM 2 MG/ML
0.5 INJECTION INTRAMUSCULAR EVERY 4 HOURS PRN
Status: CANCELLED | OUTPATIENT
Start: 2024-05-20

## 2024-05-17 RX ORDER — FLUOROURACIL 50 MG/ML
400 INJECTION, SOLUTION INTRAVENOUS ONCE
Status: CANCELLED | OUTPATIENT
Start: 2024-05-20

## 2024-05-17 RX ORDER — DIPHENHYDRAMINE HYDROCHLORIDE 50 MG/ML
50 INJECTION INTRAMUSCULAR; INTRAVENOUS
Status: CANCELLED
Start: 2024-05-20

## 2024-05-17 RX ORDER — ALBUTEROL SULFATE 90 UG/1
1-2 AEROSOL, METERED RESPIRATORY (INHALATION)
Status: CANCELLED
Start: 2024-05-20

## 2024-05-17 RX ORDER — HEPARIN SODIUM,PORCINE 10 UNIT/ML
5-20 VIAL (ML) INTRAVENOUS DAILY PRN
Status: CANCELLED | OUTPATIENT
Start: 2024-05-20

## 2024-05-17 RX ORDER — ALBUTEROL SULFATE 0.83 MG/ML
2.5 SOLUTION RESPIRATORY (INHALATION)
Status: CANCELLED | OUTPATIENT
Start: 2024-05-20

## 2024-05-17 RX ORDER — HEPARIN SODIUM (PORCINE) LOCK FLUSH IV SOLN 100 UNIT/ML 100 UNIT/ML
5 SOLUTION INTRAVENOUS
Status: CANCELLED | OUTPATIENT
Start: 2024-05-22

## 2024-05-17 RX ORDER — HEPARIN SODIUM (PORCINE) LOCK FLUSH IV SOLN 100 UNIT/ML 100 UNIT/ML
5 SOLUTION INTRAVENOUS
Status: CANCELLED | OUTPATIENT
Start: 2024-05-20

## 2024-05-17 RX ORDER — METHYLPREDNISOLONE SODIUM SUCCINATE 125 MG/2ML
125 INJECTION, POWDER, LYOPHILIZED, FOR SOLUTION INTRAMUSCULAR; INTRAVENOUS
Status: CANCELLED
Start: 2024-05-20

## 2024-05-17 RX ORDER — MEPERIDINE HYDROCHLORIDE 25 MG/ML
25 INJECTION INTRAMUSCULAR; INTRAVENOUS; SUBCUTANEOUS EVERY 30 MIN PRN
Status: CANCELLED | OUTPATIENT
Start: 2024-05-20

## 2024-05-20 ENCOUNTER — LAB (OUTPATIENT)
Dept: INFUSION THERAPY | Facility: CLINIC | Age: 59
End: 2024-05-20
Payer: COMMERCIAL

## 2024-05-20 ENCOUNTER — INFUSION THERAPY VISIT (OUTPATIENT)
Dept: INFUSION THERAPY | Facility: CLINIC | Age: 59
End: 2024-05-20
Payer: COMMERCIAL

## 2024-05-20 VITALS
HEART RATE: 81 BPM | TEMPERATURE: 97.5 F | RESPIRATION RATE: 16 BRPM | OXYGEN SATURATION: 97 % | DIASTOLIC BLOOD PRESSURE: 85 MMHG | SYSTOLIC BLOOD PRESSURE: 133 MMHG

## 2024-05-20 DIAGNOSIS — C18.9 COLON CARCINOMA METASTATIC TO LIVER (H): Primary | ICD-10-CM

## 2024-05-20 DIAGNOSIS — C78.7 COLON CARCINOMA METASTATIC TO LIVER (H): Primary | ICD-10-CM

## 2024-05-20 LAB
ALBUMIN UR-MCNC: NEGATIVE MG/DL
BASOPHILS # BLD AUTO: 0 10E3/UL (ref 0–0.2)
BASOPHILS NFR BLD AUTO: 1 %
BILIRUB SERPL-MCNC: 0.5 MG/DL
EOSINOPHIL # BLD AUTO: 0.3 10E3/UL (ref 0–0.7)
EOSINOPHIL NFR BLD AUTO: 8 %
ERYTHROCYTE [DISTWIDTH] IN BLOOD BY AUTOMATED COUNT: 15.4 % (ref 10–15)
HCT VFR BLD AUTO: 48 % (ref 40–53)
HGB BLD-MCNC: 15.7 G/DL (ref 13.3–17.7)
IMM GRANULOCYTES # BLD: 0 10E3/UL
IMM GRANULOCYTES NFR BLD: 1 %
LYMPHOCYTES # BLD AUTO: 1.1 10E3/UL (ref 0.8–5.3)
LYMPHOCYTES NFR BLD AUTO: 32 %
MCH RBC QN AUTO: 30 PG (ref 26.5–33)
MCHC RBC AUTO-ENTMCNC: 32.7 G/DL (ref 31.5–36.5)
MCV RBC AUTO: 92 FL (ref 78–100)
MONOCYTES # BLD AUTO: 0.6 10E3/UL (ref 0–1.3)
MONOCYTES NFR BLD AUTO: 17 %
NEUTROPHILS # BLD AUTO: 1.4 10E3/UL (ref 1.6–8.3)
NEUTROPHILS NFR BLD AUTO: 41 %
NRBC # BLD AUTO: 0 10E3/UL
NRBC BLD AUTO-RTO: 0 /100
PLATELET # BLD AUTO: 200 10E3/UL (ref 150–450)
RBC # BLD AUTO: 5.23 10E6/UL (ref 4.4–5.9)
WBC # BLD AUTO: 3.4 10E3/UL (ref 4–11)

## 2024-05-20 PROCEDURE — 85025 COMPLETE CBC W/AUTO DIFF WBC: CPT | Performed by: NURSE PRACTITIONER

## 2024-05-20 PROCEDURE — 96415 CHEMO IV INFUSION ADDL HR: CPT

## 2024-05-20 PROCEDURE — 250N000011 HC RX IP 250 OP 636: Performed by: NURSE PRACTITIONER

## 2024-05-20 PROCEDURE — 250N000011 HC RX IP 250 OP 636: Mod: JZ | Performed by: INTERNAL MEDICINE

## 2024-05-20 PROCEDURE — 82247 BILIRUBIN TOTAL: CPT | Performed by: NURSE PRACTITIONER

## 2024-05-20 PROCEDURE — 96367 TX/PROPH/DG ADDL SEQ IV INF: CPT

## 2024-05-20 PROCEDURE — 36591 DRAW BLOOD OFF VENOUS DEVICE: CPT | Performed by: NURSE PRACTITIONER

## 2024-05-20 PROCEDURE — 96417 CHEMO IV INFUS EACH ADDL SEQ: CPT

## 2024-05-20 PROCEDURE — 96368 THER/DIAG CONCURRENT INF: CPT

## 2024-05-20 PROCEDURE — G0498 CHEMO EXTEND IV INFUS W/PUMP: HCPCS

## 2024-05-20 PROCEDURE — 81003 URINALYSIS AUTO W/O SCOPE: CPT | Performed by: NURSE PRACTITIONER

## 2024-05-20 PROCEDURE — 258N000003 HC RX IP 258 OP 636: Performed by: NURSE PRACTITIONER

## 2024-05-20 PROCEDURE — 96413 CHEMO IV INFUSION 1 HR: CPT

## 2024-05-20 PROCEDURE — 36593 DECLOT VASCULAR DEVICE: CPT

## 2024-05-20 PROCEDURE — 96411 CHEMO IV PUSH ADDL DRUG: CPT

## 2024-05-20 RX ORDER — FLUOROURACIL 50 MG/ML
400 INJECTION, SOLUTION INTRAVENOUS ONCE
Status: COMPLETED | OUTPATIENT
Start: 2024-05-20 | End: 2024-05-20

## 2024-05-20 RX ORDER — HEPARIN SODIUM (PORCINE) LOCK FLUSH IV SOLN 100 UNIT/ML 100 UNIT/ML
5 SOLUTION INTRAVENOUS
OUTPATIENT
Start: 2024-05-20

## 2024-05-20 RX ORDER — HEPARIN SODIUM,PORCINE 10 UNIT/ML
5-20 VIAL (ML) INTRAVENOUS DAILY PRN
OUTPATIENT
Start: 2024-05-20

## 2024-05-20 RX ORDER — HEPARIN SODIUM (PORCINE) LOCK FLUSH IV SOLN 100 UNIT/ML 100 UNIT/ML
5 SOLUTION INTRAVENOUS
Status: DISCONTINUED | OUTPATIENT
Start: 2024-05-20 | End: 2024-05-20 | Stop reason: HOSPADM

## 2024-05-20 RX ADMIN — ALTEPLASE 2 MG: 2.2 INJECTION, POWDER, LYOPHILIZED, FOR SOLUTION INTRAVENOUS at 09:56

## 2024-05-20 RX ADMIN — BEVACIZUMAB 400 MG: 400 INJECTION, SOLUTION INTRAVENOUS at 11:38

## 2024-05-20 RX ADMIN — IRINOTECAN HYDROCHLORIDE 360 MG: 20 INJECTION, SOLUTION INTRAVENOUS at 12:20

## 2024-05-20 RX ADMIN — LEUCOVORIN CALCIUM 800 MG: 100 INJECTION, POWDER, LYOPHILIZED, FOR SUSPENSION INTRAMUSCULAR; INTRAVENOUS at 12:20

## 2024-05-20 RX ADMIN — DEXAMETHASONE SODIUM PHOSPHATE: 10 INJECTION, SOLUTION INTRAMUSCULAR; INTRAVENOUS at 11:21

## 2024-05-20 RX ADMIN — SODIUM CHLORIDE 250 ML: 9 INJECTION, SOLUTION INTRAVENOUS at 11:21

## 2024-05-20 RX ADMIN — FLUOROURACIL 810 MG: 50 INJECTION, SOLUTION INTRAVENOUS at 13:56

## 2024-05-20 NOTE — PROGRESS NOTES
Nursing Note:  Brady Lenz presents today for port draw.    Patient seen by provider today: No   present during visit today: Not Applicable.    Note: Port without blood return. Cathflo instilled into port.    Intravenous Access:  Lab draw site RAC, Needle type butterfly, Gauge 23.  Labs drawn without difficulty.  Implanted Port.    Discharge Plan:   Patient was sent to [facility] for infusion appointment.    Ginna Looney RN

## 2024-05-20 NOTE — PROGRESS NOTES
"Infusion Nursing Note:  Brady Lenz presents today for C2D1 FOLFIRI.    Patient seen by provider today: No   present during visit today: Not Applicable.    Note: No provider appointment was scheduled for treatment today. Pt feeling well. Per nora Plaza to proceed with treatment today with out being seen.      Intravenous Access:  Implanted Port.    Treatment Conditions:  Lab Results   Component Value Date    HGB 15.7 05/20/2024    WBC 3.4 (L) 05/20/2024    ANEUTAUTO 1.4 (L) 05/20/2024     05/20/2024        Lab Results   Component Value Date     05/06/2024    POTASSIUM 4.0 05/06/2024    MAG 2.1 11/03/2023    CR 0.62 (L) 05/06/2024    CR 0.62 (L) 05/06/2024    NAT 9.0 05/06/2024    BILITOTAL 0.5 05/20/2024    ALBUMIN 4.0 05/06/2024    ALT 43 05/06/2024    AST 46 (H) 05/06/2024       Urine protein negative.  /85    Post Infusion Assessment:  Patient tolerated infusion without incident.  Blood return noted pre and post infusion.  Blood return noted during administration every 2-3 cc.  Site patent and intact, free from redness, edema or discomfort.  No evidence of extravasations.     Prior to discharge: Port is secured in place with tegaderm and flushed with 10cc NS with positive blood return noted.    Continuous home infusion Dosi-Fuser pump connected.    All connectors secured in place and clamps taped open.    Pump started, \"running\" noted on display (CADD): Not Applicable.   Capillary element taped to pt's skin per protocol.  Pump Connection double checked with MARTÍN Wynne.  Patient instructed to call our clinic or [facility] with any questions or concerns at home.  Patient verbalized understanding.    Patient set up for pump disconnect at our clinic on 5/22 at noon.           Discharge Plan:   AVS to patient via MYCHART.  Patient will return 5/22 for next appointment.   Patient discharged in stable condition accompanied by: self.  Departure Mode: " Ambulatory.      Suri Cummins, RN

## 2024-05-22 ENCOUNTER — INFUSION THERAPY VISIT (OUTPATIENT)
Dept: INFUSION THERAPY | Facility: CLINIC | Age: 59
End: 2024-05-22
Attending: INTERNAL MEDICINE
Payer: COMMERCIAL

## 2024-05-22 VITALS
TEMPERATURE: 98.2 F | SYSTOLIC BLOOD PRESSURE: 121 MMHG | DIASTOLIC BLOOD PRESSURE: 81 MMHG | OXYGEN SATURATION: 96 % | HEART RATE: 65 BPM | RESPIRATION RATE: 18 BRPM

## 2024-05-22 DIAGNOSIS — C18.9 COLON CARCINOMA METASTATIC TO LIVER (H): Primary | ICD-10-CM

## 2024-05-22 DIAGNOSIS — C78.7 COLON CARCINOMA METASTATIC TO LIVER (H): Primary | ICD-10-CM

## 2024-05-22 PROCEDURE — 250N000011 HC RX IP 250 OP 636: Performed by: NURSE PRACTITIONER

## 2024-05-22 RX ORDER — HEPARIN SODIUM (PORCINE) LOCK FLUSH IV SOLN 100 UNIT/ML 100 UNIT/ML
5 SOLUTION INTRAVENOUS
Status: DISCONTINUED | OUTPATIENT
Start: 2024-05-22 | End: 2024-05-22 | Stop reason: HOSPADM

## 2024-05-22 RX ADMIN — Medication 5 ML: at 12:11

## 2024-05-22 ASSESSMENT — PAIN SCALES - GENERAL: PAINLEVEL: MILD PAIN (2)

## 2024-05-22 NOTE — PROGRESS NOTES
Infusion Nursing Note:  Brady Lenz presents today for pump disconnect.    Patient seen by provider today: No   present during visit today: Not Applicable.    Note: N/A.      Intravenous Access:  Implanted Port.    Treatment Conditions:  Not Applicable.      Post Infusion Assessment:  Patient tolerated infusion without incident.  Blood return noted pre and post infusion.  Site patent and intact, free from redness, edema or discomfort.  No evidence of extravasations.  Access discontinued per protocol.       Discharge Plan:   Discharge instructions reviewed with: Patient.  Patient and/or family verbalized understanding of discharge instructions and all questions answered.  AVS to patient via [employer].  Patient will return 6/3/24 for next appointment.   Patient discharged in stable condition accompanied by: self.  Departure Mode: Ambulatory.      Vanessa Muñiz RN

## 2024-06-03 ENCOUNTER — INFUSION THERAPY VISIT (OUTPATIENT)
Dept: INFUSION THERAPY | Facility: CLINIC | Age: 59
End: 2024-06-03
Attending: INTERNAL MEDICINE
Payer: COMMERCIAL

## 2024-06-03 ENCOUNTER — ONCOLOGY VISIT (OUTPATIENT)
Dept: ONCOLOGY | Facility: CLINIC | Age: 59
End: 2024-06-03
Attending: INTERNAL MEDICINE
Payer: COMMERCIAL

## 2024-06-03 VITALS
BODY MASS INDEX: 23.35 KG/M2 | DIASTOLIC BLOOD PRESSURE: 79 MMHG | TEMPERATURE: 98 F | RESPIRATION RATE: 20 BRPM | SYSTOLIC BLOOD PRESSURE: 119 MMHG | WEIGHT: 172.2 LBS | OXYGEN SATURATION: 98 % | HEART RATE: 68 BPM

## 2024-06-03 VITALS
SYSTOLIC BLOOD PRESSURE: 119 MMHG | BODY MASS INDEX: 23.36 KG/M2 | OXYGEN SATURATION: 98 % | RESPIRATION RATE: 20 BRPM | DIASTOLIC BLOOD PRESSURE: 79 MMHG | TEMPERATURE: 98 F | HEART RATE: 68 BPM | WEIGHT: 172.3 LBS

## 2024-06-03 DIAGNOSIS — C78.7 COLON CARCINOMA METASTATIC TO LIVER (H): Primary | ICD-10-CM

## 2024-06-03 DIAGNOSIS — C18.9 COLON CARCINOMA METASTATIC TO LIVER (H): Primary | ICD-10-CM

## 2024-06-03 LAB
ALBUMIN UR-MCNC: 30 MG/DL
BASOPHILS # BLD AUTO: 0 10E3/UL (ref 0–0.2)
BASOPHILS NFR BLD AUTO: 1 %
BILIRUB SERPL-MCNC: 0.3 MG/DL
CEA SERPL-MCNC: 31.6 NG/ML
EOSINOPHIL # BLD AUTO: 0.3 10E3/UL (ref 0–0.7)
EOSINOPHIL NFR BLD AUTO: 11 %
ERYTHROCYTE [DISTWIDTH] IN BLOOD BY AUTOMATED COUNT: 15.1 % (ref 10–15)
HCT VFR BLD AUTO: 43.7 % (ref 40–53)
HGB BLD-MCNC: 14.4 G/DL (ref 13.3–17.7)
IMM GRANULOCYTES # BLD: 0 10E3/UL
IMM GRANULOCYTES NFR BLD: 0 %
LYMPHOCYTES # BLD AUTO: 1.1 10E3/UL (ref 0.8–5.3)
LYMPHOCYTES NFR BLD AUTO: 34 %
MCH RBC QN AUTO: 30.3 PG (ref 26.5–33)
MCHC RBC AUTO-ENTMCNC: 33 G/DL (ref 31.5–36.5)
MCV RBC AUTO: 92 FL (ref 78–100)
MONOCYTES # BLD AUTO: 0.4 10E3/UL (ref 0–1.3)
MONOCYTES NFR BLD AUTO: 14 %
NEUTROPHILS # BLD AUTO: 1.3 10E3/UL (ref 1.6–8.3)
NEUTROPHILS NFR BLD AUTO: 40 %
NRBC # BLD AUTO: 0 10E3/UL
NRBC BLD AUTO-RTO: 0 /100
PLATELET # BLD AUTO: 146 10E3/UL (ref 150–450)
RBC # BLD AUTO: 4.75 10E6/UL (ref 4.4–5.9)
WBC # BLD AUTO: 3.1 10E3/UL (ref 4–11)

## 2024-06-03 PROCEDURE — 96368 THER/DIAG CONCURRENT INF: CPT

## 2024-06-03 PROCEDURE — 96417 CHEMO IV INFUS EACH ADDL SEQ: CPT

## 2024-06-03 PROCEDURE — 96375 TX/PRO/DX INJ NEW DRUG ADDON: CPT

## 2024-06-03 PROCEDURE — 82378 CARCINOEMBRYONIC ANTIGEN: CPT | Performed by: INTERNAL MEDICINE

## 2024-06-03 PROCEDURE — 250N000011 HC RX IP 250 OP 636: Mod: JZ | Performed by: INTERNAL MEDICINE

## 2024-06-03 PROCEDURE — 96415 CHEMO IV INFUSION ADDL HR: CPT

## 2024-06-03 PROCEDURE — 36591 DRAW BLOOD OFF VENOUS DEVICE: CPT | Performed by: INTERNAL MEDICINE

## 2024-06-03 PROCEDURE — G0498 CHEMO EXTEND IV INFUS W/PUMP: HCPCS

## 2024-06-03 PROCEDURE — 258N000003 HC RX IP 258 OP 636: Performed by: INTERNAL MEDICINE

## 2024-06-03 PROCEDURE — 96413 CHEMO IV INFUSION 1 HR: CPT

## 2024-06-03 PROCEDURE — 82247 BILIRUBIN TOTAL: CPT | Performed by: INTERNAL MEDICINE

## 2024-06-03 PROCEDURE — 85025 COMPLETE CBC W/AUTO DIFF WBC: CPT | Performed by: INTERNAL MEDICINE

## 2024-06-03 PROCEDURE — 99214 OFFICE O/P EST MOD 30 MIN: CPT | Performed by: INTERNAL MEDICINE

## 2024-06-03 PROCEDURE — G0463 HOSPITAL OUTPT CLINIC VISIT: HCPCS | Performed by: INTERNAL MEDICINE

## 2024-06-03 PROCEDURE — 81003 URINALYSIS AUTO W/O SCOPE: CPT | Performed by: INTERNAL MEDICINE

## 2024-06-03 RX ORDER — HEPARIN SODIUM (PORCINE) LOCK FLUSH IV SOLN 100 UNIT/ML 100 UNIT/ML
5 SOLUTION INTRAVENOUS
Status: CANCELLED | OUTPATIENT
Start: 2024-06-03

## 2024-06-03 RX ORDER — ALBUTEROL SULFATE 0.83 MG/ML
2.5 SOLUTION RESPIRATORY (INHALATION)
Status: CANCELLED | OUTPATIENT
Start: 2024-06-03

## 2024-06-03 RX ORDER — LORAZEPAM 2 MG/ML
0.5 INJECTION INTRAMUSCULAR EVERY 4 HOURS PRN
Status: CANCELLED | OUTPATIENT
Start: 2024-06-03

## 2024-06-03 RX ORDER — EPINEPHRINE 1 MG/ML
0.3 INJECTION, SOLUTION INTRAMUSCULAR; SUBCUTANEOUS EVERY 5 MIN PRN
Status: CANCELLED | OUTPATIENT
Start: 2024-06-03

## 2024-06-03 RX ORDER — HEPARIN SODIUM (PORCINE) LOCK FLUSH IV SOLN 100 UNIT/ML 100 UNIT/ML
5 SOLUTION INTRAVENOUS
OUTPATIENT
Start: 2024-07-20

## 2024-06-03 RX ORDER — HEPARIN SODIUM,PORCINE 10 UNIT/ML
5-20 VIAL (ML) INTRAVENOUS DAILY PRN
Status: CANCELLED | OUTPATIENT
Start: 2024-07-03

## 2024-06-03 RX ORDER — ATROPINE SULFATE 0.4 MG/ML
0.4 AMPUL (ML) INJECTION
Status: CANCELLED | OUTPATIENT
Start: 2024-06-03

## 2024-06-03 RX ORDER — MEPERIDINE HYDROCHLORIDE 25 MG/ML
25 INJECTION INTRAMUSCULAR; INTRAVENOUS; SUBCUTANEOUS EVERY 30 MIN PRN
Status: CANCELLED | OUTPATIENT
Start: 2024-06-03

## 2024-06-03 RX ORDER — HEPARIN SODIUM,PORCINE 10 UNIT/ML
5-20 VIAL (ML) INTRAVENOUS DAILY PRN
Status: CANCELLED | OUTPATIENT
Start: 2024-06-05

## 2024-06-03 RX ORDER — HEPARIN SODIUM (PORCINE) LOCK FLUSH IV SOLN 100 UNIT/ML 100 UNIT/ML
5 SOLUTION INTRAVENOUS
Status: CANCELLED | OUTPATIENT
Start: 2024-07-03

## 2024-06-03 RX ORDER — ATROPINE SULFATE 0.4 MG/ML
0.4 AMPUL (ML) INJECTION
Status: CANCELLED | OUTPATIENT
Start: 2024-07-03

## 2024-06-03 RX ORDER — METHYLPREDNISOLONE SODIUM SUCCINATE 125 MG/2ML
125 INJECTION, POWDER, LYOPHILIZED, FOR SOLUTION INTRAMUSCULAR; INTRAVENOUS
Status: CANCELLED
Start: 2024-07-03

## 2024-06-03 RX ORDER — ALBUTEROL SULFATE 90 UG/1
1-2 AEROSOL, METERED RESPIRATORY (INHALATION)
Status: CANCELLED
Start: 2024-07-03

## 2024-06-03 RX ORDER — HEPARIN SODIUM (PORCINE) LOCK FLUSH IV SOLN 100 UNIT/ML 100 UNIT/ML
5 SOLUTION INTRAVENOUS
Status: CANCELLED | OUTPATIENT
Start: 2024-06-05

## 2024-06-03 RX ORDER — MEPERIDINE HYDROCHLORIDE 25 MG/ML
25 INJECTION INTRAMUSCULAR; INTRAVENOUS; SUBCUTANEOUS EVERY 30 MIN PRN
Status: CANCELLED | OUTPATIENT
Start: 2024-07-03

## 2024-06-03 RX ORDER — HEPARIN SODIUM,PORCINE 10 UNIT/ML
5-20 VIAL (ML) INTRAVENOUS DAILY PRN
Status: CANCELLED | OUTPATIENT
Start: 2024-06-03

## 2024-06-03 RX ORDER — DIPHENHYDRAMINE HYDROCHLORIDE 50 MG/ML
50 INJECTION INTRAMUSCULAR; INTRAVENOUS
Status: CANCELLED
Start: 2024-07-03

## 2024-06-03 RX ORDER — LORAZEPAM 2 MG/ML
0.5 INJECTION INTRAMUSCULAR EVERY 4 HOURS PRN
Status: CANCELLED | OUTPATIENT
Start: 2024-07-03

## 2024-06-03 RX ORDER — FLUOROURACIL 50 MG/ML
400 INJECTION, SOLUTION INTRAVENOUS ONCE
Status: CANCELLED | OUTPATIENT
Start: 2024-06-03

## 2024-06-03 RX ORDER — ALBUTEROL SULFATE 90 UG/1
1-2 AEROSOL, METERED RESPIRATORY (INHALATION)
Status: CANCELLED
Start: 2024-06-03

## 2024-06-03 RX ORDER — METHYLPREDNISOLONE SODIUM SUCCINATE 125 MG/2ML
125 INJECTION, POWDER, LYOPHILIZED, FOR SOLUTION INTRAMUSCULAR; INTRAVENOUS
Status: CANCELLED
Start: 2024-06-03

## 2024-06-03 RX ORDER — DIPHENHYDRAMINE HYDROCHLORIDE 50 MG/ML
50 INJECTION INTRAMUSCULAR; INTRAVENOUS
Status: CANCELLED
Start: 2024-06-03

## 2024-06-03 RX ORDER — ALBUTEROL SULFATE 0.83 MG/ML
2.5 SOLUTION RESPIRATORY (INHALATION)
Status: CANCELLED | OUTPATIENT
Start: 2024-07-03

## 2024-06-03 RX ORDER — EPINEPHRINE 1 MG/ML
0.3 INJECTION, SOLUTION INTRAMUSCULAR; SUBCUTANEOUS EVERY 5 MIN PRN
Status: CANCELLED | OUTPATIENT
Start: 2024-07-03

## 2024-06-03 RX ORDER — HEPARIN SODIUM,PORCINE 10 UNIT/ML
5-20 VIAL (ML) INTRAVENOUS DAILY PRN
OUTPATIENT
Start: 2024-07-20

## 2024-06-03 RX ADMIN — LEUCOVORIN CALCIUM 800 MG: 500 INJECTION, POWDER, LYOPHILIZED, FOR SOLUTION INTRAMUSCULAR; INTRAVENOUS at 10:40

## 2024-06-03 RX ADMIN — BEVACIZUMAB 400 MG: 400 INJECTION, SOLUTION INTRAVENOUS at 10:00

## 2024-06-03 RX ADMIN — DEXAMETHASONE SODIUM PHOSPHATE: 10 INJECTION, SOLUTION INTRAMUSCULAR; INTRAVENOUS at 09:44

## 2024-06-03 RX ADMIN — DEXTROSE MONOHYDRATE 360 MG: 50 INJECTION, SOLUTION INTRAVENOUS at 10:40

## 2024-06-03 RX ADMIN — SODIUM CHLORIDE 250 ML: 9 INJECTION, SOLUTION INTRAVENOUS at 09:44

## 2024-06-03 ASSESSMENT — PAIN SCALES - GENERAL
PAINLEVEL: MILD PAIN (2)
PAINLEVEL: MILD PAIN (2)

## 2024-06-03 NOTE — PROGRESS NOTES
ONCOLOGY HISTORY: Mr. Lenz is a gentleman with metastatic right colon cancer.    -MMR intact.    -HER2/rowan negative (score 1+).  -NGS panel: A pathogenic KRAS mutation was identified: KRAS G12D      1. Seen in emergency room on 10/23/2023 with abdominal pain and distention.  Multiple investigations done.  -Hemoglobin of 5.9 with MCV of 58.  -Iron of 6 with saturation index of 2%.  -CEA of 6.7.  -CT abdomen and pelvis revealed mass in the cecum causing small bowel obstruction.  Also revealed 10.6 cm mass in right lobe of the liver and 4.9 cm mass in the left lobe of the liver.     2.  On 10/24/2023, he had open right colectomy and laparoscopic peritoneal biopsy.  There was large mass in the cecum.  There were 2 large liver metastasis.  There was a small implant on the peritoneum in left upper quadrant over the spleen.  There was no evidence of carcinomatosis.  -Peritoneal biopsy is positive for metastatic adenocarcinoma.  -Colectomy specimen reveals moderately differentiated adenocarcinoma measuring 6.8 cm.  8 of 29 lymph nodes positive.  pT3 pN2b.  MMR intact.  HER2/rowan negative (score 1+).     3.  CT chest on 10/25/2023 does not reveal any evidence of malignancy.  -PET scan on 01/16/2024 reveals liver metastasis, splenic metastasis, peritoneal carcinomatosis and right lung 6 mm nodule suspicious for metastasis.     4.  On 01/15/2024, CEA of 15.8.  -Modified FOLFOX 6 and Avastin x 7 between 01/15/2024 and 04/22/2024. Stopped due to progression.  -FOLFIRI and avastin started on 05/06/2024.     Subjective:  Mr. Lenz is a 58-year-old gentleman with metastatic colon cancer on FOLFIRI and Avastin.  Overall he tolerated it well.  He gets 3 soft to loose bowel movements a day.  Imodium helps.  His neuropathy is improving.  He has mild intermittent numbness in his fingers.  No cold sensitivity.     No headache.  No dizziness.  He gets some pain in neck and shoulder. No shortness of breath.  No abdominal pain.  No nausea  or vomiting. Appetite is good.  No urinary complaints.  No bleeding. All other review of system negative.      Exam:  He is alert and oriented x 3.  Not in distress.  ECOG PS of 0.  Vitals: Reviewed.  Rest of the system is not examined.     Labs: CBC and bilirubin reviewed.     Assessment:  1.  A 58-year-old gentleman with metastatic colon cancer on palliative treatment with FOLFIRI and Avastin.    2.  Grade 1 peripheral neuropathy, improving.  3.  Mild diarrhea from chemotherapy.  4.  Leukopenia and neutropenia.     Plan:  Continue chemotherapy. No bolus 5FU.  Continue imodium and lomotil as needed.  See BEBETO with next 2 treatment.  CT scan in about a month.  See me after CT scan.    Discussion:  1.  Patient started chemotherapy with FOLFIRI and Avastin on 05/06/2024.  Overall he is tolerating it well.  He has mild diarrhea.     Labs were reviewed with him.  He has mild leukopenia, neutropenia and thrombocytopenia.    2.  Discussed regarding chemotherapy.  Explained to him that his leukopenia/neutropenia will get worse with chemotherapy.  Because of that discussed regarding discontinuing bolus 5-FU.  He is agreeable for it.    Patient will continue on FOLFIRI with Avastin without bolus 5-FU.    3.  For diarrhea he will take Imodium and Lomotil as needed.  Also advised him to drink plenty of fluid.    4.  His neuropathy is improving.  Hopefully it resolves.    5.  Will get CT scan in a month.  I will see him after that.  In between he will see our BEBETO.  Advised him to call us with any questions or concerns.    Total visit time of 30 minutes.  Time spent in today's visit, review of chart/investigations today, monitoring for toxicity of high risk drugs and documentation today.

## 2024-06-03 NOTE — PATIENT INSTRUCTIONS
Continue chemotherapy. No bolus 5FU.  Continue imodium and lomotil as needed.  See BEBETO with next 2 treatment.  CT scan in about a month.  See me after CT scan.

## 2024-06-03 NOTE — LETTER
6/3/2024         RE: Brady Lenz  [address] [address]  [facility] [address]        Dear Colleague,    Thank you for referring your patient, Brady Lenz, to the [facility]. Please see a copy of my visit note below.    ONCOLOGY HISTORY: Mr. Lenz is a gentleman with metastatic right colon cancer.    -MMR intact.    -HER2/rowan negative (score 1+).  -NGS panel: A pathogenic KRAS mutation was identified: KRAS G12D      1. Seen in emergency room on 10/23/2023 with abdominal pain and distention.  Multiple investigations done.  -Hemoglobin of 5.9 with MCV of 58.  -Iron of 6 with saturation index of 2%.  -CEA of 6.7.  -CT abdomen and pelvis revealed mass in the cecum causing small bowel obstruction.  Also revealed 10.6 cm mass in right lobe of the liver and 4.9 cm mass in the left lobe of the liver.     2.  On 10/24/2023, he had open right colectomy and laparoscopic peritoneal biopsy.  There was large mass in the cecum.  There were 2 large liver metastasis.  There was a small implant on the peritoneum in left upper quadrant over the spleen.  There was no evidence of carcinomatosis.  -Peritoneal biopsy is positive for metastatic adenocarcinoma.  -Colectomy specimen reveals moderately differentiated adenocarcinoma measuring 6.8 cm.  8 of 29 lymph nodes positive.  pT3 pN2b.  MMR intact.  HER2/rowan negative (score 1+).     3.  CT chest on 10/25/2023 does not reveal any evidence of malignancy.  -PET scan on 01/16/2024 reveals liver metastasis, splenic metastasis, peritoneal carcinomatosis and right lung 6 mm nodule suspicious for metastasis.     4.  On 01/15/2024, CEA of 15.8.  -Modified FOLFOX 6 and Avastin x 7 between 01/15/2024 and 04/22/2024. Stopped due to progression.  -FOLFIRI and avastin started on 05/06/2024.     Subjective:  Mr. Lenz is a 58-year-old gentleman with metastatic colon cancer on FOLFIRI and Avastin.  Overall he tolerated it well.  He gets 3 soft to loose bowel movements a day.   Imodium helps.  His neuropathy is improving.  He has mild intermittent numbness in his fingers.  No cold sensitivity.     No headache.  No dizziness.  He gets some pain in neck and shoulder. No shortness of breath.  No abdominal pain.  No nausea or vomiting. Appetite is good.  No urinary complaints.  No bleeding. All other review of system negative.      Exam:  He is alert and oriented x 3.  Not in distress.  ECOG PS of 0.  Vitals: Reviewed.  Rest of the system is not examined.     Labs: CBC and bilirubin reviewed.     Assessment:  1.  A 58-year-old gentleman with metastatic colon cancer on palliative treatment with FOLFIRI and Avastin.    2.  Grade 1 peripheral neuropathy, improving.  3.  Mild diarrhea from chemotherapy.  4.  Leukopenia and neutropenia.     Plan:  Continue chemotherapy. No bolus 5FU.  Continue imodium and lomotil as needed.  See BEBETO with next 2 treatment.  CT scan in about a month.  See me after CT scan.    Discussion:  1.  Patient started chemotherapy with FOLFIRI and Avastin on 05/06/2024.  Overall he is tolerating it well.  He has mild diarrhea.     Labs were reviewed with him.  He has mild leukopenia, neutropenia and thrombocytopenia.    2.  Discussed regarding chemotherapy.  Explained to him that his leukopenia/neutropenia will get worse with chemotherapy.  Because of that discussed regarding discontinuing bolus 5-FU.  He is agreeable for it.    Patient will continue on FOLFIRI with Avastin without bolus 5-FU.    3.  For diarrhea he will take Imodium and Lomotil as needed.  Also advised him to drink plenty of fluid.    4.  His neuropathy is improving.  Hopefully it resolves.    5.  Will get CT scan in a month.  I will see him after that.  In between he will see our BEBETO.  Advised him to call us with any questions or concerns.    Total visit time of 30 minutes.  Time spent in today's visit, review of chart/investigations today, monitoring for toxicity of high risk drugs and documentation  today.      Again, thank you for allowing me to participate in the care of your patient.        Sincerely,        Maryann Limon MD

## 2024-06-03 NOTE — PROGRESS NOTES
"Infusion Nursing Note:  Brady Lenz presents today for Cycle 3 Day 1 Avastin, Irinotecan, Leucovorin, Fluorouracil pump connect.     Patient seen by provider today: No   present during visit today: Not Applicable.    Note: N/A.      Intravenous Access:  Implanted Port.    Treatment Conditions:  Lab Results   Component Value Date    HGB 14.4 06/03/2024    WBC 3.1 (L) 06/03/2024    ANEUTAUTO 1.3 (L) 06/03/2024     (L) 06/03/2024        Lab Results   Component Value Date     05/06/2024    POTASSIUM 4.0 05/06/2024    MAG 2.1 11/03/2023    CR 0.62 (L) 05/06/2024    CR 0.62 (L) 05/06/2024    NAT 9.0 05/06/2024    BILITOTAL 0.3 06/03/2024    ALBUMIN 4.0 05/06/2024    ALT 43 05/06/2024    AST 46 (H) 05/06/2024       Results reviewed, labs MET treatment parameters, ok to proceed with treatment.    Urine Protein = 30. Per Dr. Limno, no 24hr urine needed.       Post Infusion Assessment:  Patient tolerated infusion without incident.  Blood return noted pre and post infusion.  Blood return noted during administration every 2 cc.  Site patent and intact, free from redness, edema or discomfort.  No evidence of extravasations.     Prior to discharge: Port is secured in place with tegaderm and flushed with 10cc NS with positive blood return noted.    Continuous home infusion CADD pump connected.    All connectors secured in place and clamps taped open.    Pump started, \"running\" noted on display (CADD): Not Applicable.   Capillary element taped to pt's skin per protocol.  Pump Connection double checked with Michelle Dumont RN.  Patient instructed to call our clinic or [facility] with any questions or concerns at home.  Patient verbalized understanding.    Patient set up for pump disconnect at our clinic on 6/5/24 at 12:00pm.           Discharge Plan:   Patient declined prescription refills.  Discharge instructions reviewed with: Patient.  Patient verbalized understanding of discharge instructions " and all questions answered.  AVS to patient via "[employer]  Patient will return 6/5/24 for next appointment.   Patient discharged in stable condition accompanied by: self.  Departure Mode: Ambulatory.      Christiane Doll RN

## 2024-06-05 ENCOUNTER — INFUSION THERAPY VISIT (OUTPATIENT)
Dept: INFUSION THERAPY | Facility: CLINIC | Age: 59
End: 2024-06-05
Attending: INTERNAL MEDICINE
Payer: COMMERCIAL

## 2024-06-05 ENCOUNTER — PATIENT OUTREACH (OUTPATIENT)
Dept: ONCOLOGY | Facility: CLINIC | Age: 59
End: 2024-06-05

## 2024-06-05 VITALS
OXYGEN SATURATION: 98 % | HEART RATE: 62 BPM | DIASTOLIC BLOOD PRESSURE: 81 MMHG | TEMPERATURE: 97.6 F | SYSTOLIC BLOOD PRESSURE: 130 MMHG | RESPIRATION RATE: 16 BRPM

## 2024-06-05 DIAGNOSIS — C18.9 COLON CARCINOMA METASTATIC TO LIVER (H): Primary | ICD-10-CM

## 2024-06-05 DIAGNOSIS — C18.9 COLON CARCINOMA METASTATIC TO LIVER (H): ICD-10-CM

## 2024-06-05 DIAGNOSIS — C78.7 COLON CARCINOMA METASTATIC TO LIVER (H): Primary | ICD-10-CM

## 2024-06-05 DIAGNOSIS — C78.7 COLON CARCINOMA METASTATIC TO LIVER (H): ICD-10-CM

## 2024-06-05 PROCEDURE — 250N000011 HC RX IP 250 OP 636: Performed by: INTERNAL MEDICINE

## 2024-06-05 RX ORDER — LOPERAMIDE HCL 2 MG
CAPSULE ORAL
Qty: 30 CAPSULE | Refills: 2 | Status: SHIPPED | OUTPATIENT
Start: 2024-06-05

## 2024-06-05 RX ORDER — HEPARIN SODIUM (PORCINE) LOCK FLUSH IV SOLN 100 UNIT/ML 100 UNIT/ML
5 SOLUTION INTRAVENOUS
Status: DISCONTINUED | OUTPATIENT
Start: 2024-06-05 | End: 2024-06-05 | Stop reason: HOSPADM

## 2024-06-05 RX ADMIN — Medication 5 ML: at 11:19

## 2024-06-05 ASSESSMENT — PAIN SCALES - GENERAL: PAINLEVEL: NO PAIN (0)

## 2024-06-05 NOTE — PROGRESS NOTES
Patient here today requesting a new script of Imodium to be sent to his preferred pharmacy.    New script with 1 refill has been reordered and routed to Dr. Limon to review and sign.    Tanisha Ward RN

## 2024-06-05 NOTE — PROGRESS NOTES
Infusion Nursing Note:  Brady Lenz presents today for C3D3 5FU pump disconnect.    Patient seen by provider today: No   present during visit today: Not Applicable.    Note: pt reports tolerating the infusion pump well. Requesting new order for Immodium to be sent to [facility] pharmacy. Provider notified.      Intravenous Access:  Implanted Port.    Treatment Conditions:  Not Applicable.      Post Infusion Assessment:  Patient tolerated infusion without incident.  Blood return noted pre and post infusion.  Site patent and intact, free from redness, edema or discomfort.  No evidence of extravasations.  Access discontinued per protocol.       Discharge Plan:   Discharge instructions reviewed with: Patient.  Patient and/or family verbalized understanding of discharge instructions and all questions answered.  Patient discharged in stable condition accompanied by: self.  Departure Mode: Ambulatory.      Michell Oropeza RN

## 2024-06-19 ENCOUNTER — PATIENT OUTREACH (OUTPATIENT)
Dept: ONCOLOGY | Facility: CLINIC | Age: 59
End: 2024-06-19
Payer: COMMERCIAL

## 2024-06-19 NOTE — PROGRESS NOTES
Writer was informed that patient no showed infusion and appointment with Dr. Limon on 6/17.     Darion called and spoke with patient and he states that he twisted his ankle and could not get around easily. Today he mentioned it to be much improved.     Patient is willing to move up infusion if availability next week otherwise will keep appointment as scheduled.     Tanisha Ward RN

## 2024-07-01 DIAGNOSIS — C78.7 COLON CARCINOMA METASTATIC TO LIVER (H): Primary | ICD-10-CM

## 2024-07-01 DIAGNOSIS — C18.9 COLON CARCINOMA METASTATIC TO LIVER (H): Primary | ICD-10-CM

## 2024-07-01 RX ORDER — LORAZEPAM 2 MG/ML
0.5 INJECTION INTRAMUSCULAR EVERY 4 HOURS PRN
Status: CANCELLED | OUTPATIENT
Start: 2024-07-15

## 2024-07-01 RX ORDER — HEPARIN SODIUM (PORCINE) LOCK FLUSH IV SOLN 100 UNIT/ML 100 UNIT/ML
5 SOLUTION INTRAVENOUS
Status: CANCELLED | OUTPATIENT
Start: 2024-07-15

## 2024-07-01 RX ORDER — METHYLPREDNISOLONE SODIUM SUCCINATE 125 MG/2ML
125 INJECTION, POWDER, LYOPHILIZED, FOR SOLUTION INTRAMUSCULAR; INTRAVENOUS
Status: CANCELLED
Start: 2024-07-15

## 2024-07-01 RX ORDER — EPINEPHRINE 1 MG/ML
0.3 INJECTION, SOLUTION INTRAMUSCULAR; SUBCUTANEOUS EVERY 5 MIN PRN
Status: CANCELLED | OUTPATIENT
Start: 2024-07-15

## 2024-07-01 RX ORDER — ATROPINE SULFATE 0.4 MG/ML
0.4 AMPUL (ML) INJECTION
Status: CANCELLED | OUTPATIENT
Start: 2024-07-15

## 2024-07-01 RX ORDER — HEPARIN SODIUM,PORCINE 10 UNIT/ML
5-20 VIAL (ML) INTRAVENOUS DAILY PRN
Status: CANCELLED | OUTPATIENT
Start: 2024-07-15

## 2024-07-01 RX ORDER — MEPERIDINE HYDROCHLORIDE 25 MG/ML
25 INJECTION INTRAMUSCULAR; INTRAVENOUS; SUBCUTANEOUS EVERY 30 MIN PRN
Status: CANCELLED | OUTPATIENT
Start: 2024-07-15

## 2024-07-01 RX ORDER — DIPHENHYDRAMINE HYDROCHLORIDE 50 MG/ML
50 INJECTION INTRAMUSCULAR; INTRAVENOUS
Status: CANCELLED
Start: 2024-07-15

## 2024-07-01 RX ORDER — ALBUTEROL SULFATE 0.83 MG/ML
2.5 SOLUTION RESPIRATORY (INHALATION)
Status: CANCELLED | OUTPATIENT
Start: 2024-07-15

## 2024-07-01 RX ORDER — ALBUTEROL SULFATE 90 UG/1
1-2 AEROSOL, METERED RESPIRATORY (INHALATION)
Status: CANCELLED
Start: 2024-07-15

## 2024-07-17 ENCOUNTER — TELEPHONE (OUTPATIENT)
Dept: ONCOLOGY | Facility: CLINIC | Age: 59
End: 2024-07-17
Payer: COMMERCIAL

## 2024-07-17 ENCOUNTER — VIRTUAL VISIT (OUTPATIENT)
Dept: ONCOLOGY | Facility: CLINIC | Age: 59
End: 2024-07-17
Attending: INTERNAL MEDICINE
Payer: COMMERCIAL

## 2024-07-17 VITALS — BODY MASS INDEX: 22.73 KG/M2 | HEIGHT: 73 IN

## 2024-07-17 DIAGNOSIS — C78.7 COLON CARCINOMA METASTATIC TO LIVER (H): ICD-10-CM

## 2024-07-17 DIAGNOSIS — C18.9 COLON CARCINOMA METASTATIC TO LIVER (H): ICD-10-CM

## 2024-07-17 RX ORDER — LOPERAMIDE HCL 2 MG
CAPSULE ORAL
Qty: 30 CAPSULE | Refills: 2 | OUTPATIENT
Start: 2024-07-17

## 2024-07-17 ASSESSMENT — PAIN SCALES - GENERAL: PAINLEVEL: MILD PAIN (3)

## 2024-07-17 NOTE — NURSING NOTE
Current patient location: [address] [address]  [facility] [address]    Is the patient currently in the state of [place]? YES    Visit mode:VIDEO    If the visit is dropped, the patient can be reconnected by: VIDEO VISIT: Text to cell phone:   Telephone Information:   Mobile [phone]       Will anyone else be joining the visit? NO  (If patient encounters technical issues they should call [phone] :[phone])    How would you like to obtain your AVS? MyChart    Are changes needed to the allergy or medication list?  Yes  Pt takes Ibuprofen prn.     Are refills needed on medications prescribed by this physician? YES  Imodium    Unable to complete distress screening due to time constraint.     Reason for visit: RECHECK (Return CCSL)    Stacy DENIS

## 2024-07-17 NOTE — PROGRESS NOTES
"Virtual Visit Details    Type of service:  Video Visit   Video Start Time: {video visit start/end time for provider to select:891256}  Video End Time:{video visit start/end time for provider to select:472970}    Originating Location (pt. Location): {video visit patient location:067007::\"Home\"}  {PROVIDER LOCATION On-site should be selected for visits conducted from your clinic location or adjoining [facility] hospital, academic office, or other nearby [facility] building. Off-site should be selected for all other provider locations, including home:579814}  Distant Location (provider location):  {virtual location provider:317353}  Platform used for Video Visit: {Virtual Visit Platforms:113272::\"[employer]\"}  "

## 2024-07-18 ENCOUNTER — PATIENT OUTREACH (OUTPATIENT)
Dept: ONCOLOGY | Facility: CLINIC | Age: 59
End: 2024-07-18
Payer: COMMERCIAL

## 2024-07-18 NOTE — PROGRESS NOTES
Writer called patient and earlier today to follow up on NO SHOWS to see if there is a barrier to making it to appointments that I or a  can assist with .     Tanisha Ward RN

## 2024-07-22 NOTE — PROGRESS NOTES
Writer called again this morning and VM was full unable to leave a message.     A letter has been sent to patient.     Tanisha Ward RN

## 2024-09-05 ENCOUNTER — ENROLLMENT (OUTPATIENT)
Dept: HOME HEALTH SERVICES | Facility: HOME HEALTH | Age: 59
End: 2024-09-05
Payer: COMMERCIAL

## 2024-10-18 ENCOUNTER — HOME INFUSION (OUTPATIENT)
Dept: HOME HEALTH SERVICES | Facility: HOME HEALTH | Age: 59
End: 2024-10-18
Payer: COMMERCIAL

## 2024-10-18 DIAGNOSIS — C18.9 MALIGNANT NEOPLASM OF COLON, UNSPECIFIED PART OF COLON (H): Primary | ICD-10-CM

## 2025-01-01 ENCOUNTER — PATIENT OUTREACH (OUTPATIENT)
Dept: ONCOLOGY | Facility: CLINIC | Age: 60
End: 2025-01-01
Payer: COMMERCIAL

## 2025-01-01 ENCOUNTER — HOSPITAL ENCOUNTER (OUTPATIENT)
Facility: CLINIC | Age: 60
End: 2025-01-01
Admitting: RADIOLOGY
Payer: COMMERCIAL

## 2025-01-01 ENCOUNTER — LAB REQUISITION (OUTPATIENT)
Dept: LAB | Facility: CLINIC | Age: 60
End: 2025-01-01
Payer: COMMERCIAL

## 2025-01-01 DIAGNOSIS — J94.8 HYDROTHORAX: ICD-10-CM

## 2025-01-01 DIAGNOSIS — R06.02 SOB (SHORTNESS OF BREATH): ICD-10-CM

## 2025-01-01 DIAGNOSIS — C18.9 METASTATIC COLON CANCER TO LIVER (H): Primary | ICD-10-CM

## 2025-01-01 DIAGNOSIS — Z11.1 ENCOUNTER FOR SCREENING FOR RESPIRATORY TUBERCULOSIS: ICD-10-CM

## 2025-01-01 DIAGNOSIS — C18.9 METASTATIC COLON CANCER TO LIVER (H): ICD-10-CM

## 2025-01-01 DIAGNOSIS — C78.7 METASTATIC COLON CANCER TO LIVER (H): Primary | ICD-10-CM

## 2025-01-01 DIAGNOSIS — C78.7 METASTATIC COLON CANCER TO LIVER (H): ICD-10-CM

## 2025-01-01 LAB
GAMMA INTERFERON BACKGROUND BLD IA-ACNC: 0 IU/ML
M TB IFN-G BLD-IMP: ABNORMAL
M TB IFN-G CD4+ BCKGRND COR BLD-ACNC: 0.26 IU/ML
MITOGEN IGNF BCKGRD COR BLD-ACNC: 0 IU/ML
MITOGEN IGNF BCKGRD COR BLD-ACNC: 0 IU/ML
QUANTIFERON MITOGEN: 0.26 IU/ML
QUANTIFERON NIL TUBE: 0 IU/ML
QUANTIFERON TB1 TUBE: 0 IU/ML
QUANTIFERON TB2 TUBE: 0

## 2025-01-11 ENCOUNTER — HEALTH MAINTENANCE LETTER (OUTPATIENT)
Age: 60
End: 2025-01-11

## 2025-01-26 ENCOUNTER — HOSPITAL ENCOUNTER (OUTPATIENT)
Facility: CLINIC | Age: 60
Setting detail: OBSERVATION
Discharge: HOSPICE | End: 2025-01-28
Attending: EMERGENCY MEDICINE | Admitting: RADIOLOGY
Payer: COMMERCIAL

## 2025-01-26 ENCOUNTER — APPOINTMENT (OUTPATIENT)
Dept: ULTRASOUND IMAGING | Facility: CLINIC | Age: 60
End: 2025-01-26
Attending: RADIOLOGY
Payer: COMMERCIAL

## 2025-01-26 ENCOUNTER — APPOINTMENT (OUTPATIENT)
Dept: CT IMAGING | Facility: CLINIC | Age: 60
End: 2025-01-26
Attending: EMERGENCY MEDICINE
Payer: COMMERCIAL

## 2025-01-26 DIAGNOSIS — C18.9 METASTATIC COLON CANCER TO LIVER (H): ICD-10-CM

## 2025-01-26 DIAGNOSIS — R06.02 SOB (SHORTNESS OF BREATH): ICD-10-CM

## 2025-01-26 DIAGNOSIS — J94.8 HYDROTHORAX: ICD-10-CM

## 2025-01-26 DIAGNOSIS — Z51.5 HOSPICE CARE PATIENT: Primary | ICD-10-CM

## 2025-01-26 DIAGNOSIS — C78.7 METASTATIC COLON CANCER TO LIVER (H): ICD-10-CM

## 2025-01-26 PROBLEM — Z59.00 HOMELESSNESS: Status: ACTIVE | Noted: 2024-02-16

## 2025-01-26 LAB
ALBUMIN SERPL BCG-MCNC: 3 G/DL (ref 3.5–5.2)
ALP SERPL-CCNC: 1849 U/L (ref 40–150)
ALT SERPL W P-5'-P-CCNC: 57 U/L (ref 0–70)
ANION GAP SERPL CALCULATED.3IONS-SCNC: 10 MMOL/L (ref 7–15)
AST SERPL W P-5'-P-CCNC: 105 U/L (ref 0–45)
ATRIAL RATE - MUSE: 94 BPM
BASE EXCESS BLDV CALC-SCNC: 3.9 MMOL/L (ref -3–3)
BASOPHILS # BLD AUTO: 0 10E3/UL (ref 0–0.2)
BASOPHILS NFR BLD AUTO: 0 %
BILIRUB DIRECT SERPL-MCNC: 0.83 MG/DL (ref 0–0.3)
BILIRUB SERPL-MCNC: 1.1 MG/DL
BILIRUB SERPL-MCNC: 1.2 MG/DL
BUN SERPL-MCNC: 47.1 MG/DL (ref 6–20)
CALCIUM SERPL-MCNC: 9.4 MG/DL (ref 8.8–10.4)
CHLORIDE SERPL-SCNC: 99 MMOL/L (ref 98–107)
CREAT SERPL-MCNC: 0.74 MG/DL (ref 0.67–1.17)
DIASTOLIC BLOOD PRESSURE - MUSE: NORMAL MMHG
EGFRCR SERPLBLD CKD-EPI 2021: >90 ML/MIN/1.73M2
EOSINOPHIL # BLD AUTO: 0 10E3/UL (ref 0–0.7)
EOSINOPHIL NFR BLD AUTO: 0 %
ERYTHROCYTE [DISTWIDTH] IN BLOOD BY AUTOMATED COUNT: 18.3 % (ref 10–15)
GLUCOSE SERPL-MCNC: 81 MG/DL (ref 70–99)
HCO3 BLDV-SCNC: 31 MMOL/L (ref 21–28)
HCO3 SERPL-SCNC: 28 MMOL/L (ref 22–29)
HCT VFR BLD AUTO: 35.1 % (ref 40–53)
HGB BLD-MCNC: 10.8 G/DL (ref 13.3–17.7)
IMM GRANULOCYTES # BLD: 0.1 10E3/UL
IMM GRANULOCYTES NFR BLD: 1 %
INR PPP: 1.22 (ref 0.85–1.15)
INTERPRETATION ECG - MUSE: NORMAL
LYMPHOCYTES # BLD AUTO: 0.4 10E3/UL (ref 0.8–5.3)
LYMPHOCYTES NFR BLD AUTO: 4 %
MCH RBC QN AUTO: 24.1 PG (ref 26.5–33)
MCHC RBC AUTO-ENTMCNC: 30.8 G/DL (ref 31.5–36.5)
MCV RBC AUTO: 78 FL (ref 78–100)
MONOCYTES # BLD AUTO: 0.9 10E3/UL (ref 0–1.3)
MONOCYTES NFR BLD AUTO: 9 %
NEUTROPHILS # BLD AUTO: 8.6 10E3/UL (ref 1.6–8.3)
NEUTROPHILS NFR BLD AUTO: 86 %
NRBC # BLD AUTO: 0 10E3/UL
NRBC BLD AUTO-RTO: 0 /100
O2/TOTAL GAS SETTING VFR VENT: 37 %
OXYHGB MFR BLDV: 49 % (ref 70–75)
P AXIS - MUSE: 90 DEGREES
PCO2 BLDV: 57 MM HG (ref 40–50)
PH BLDV: 7.34 [PH] (ref 7.32–7.43)
PLATELET # BLD AUTO: 333 10E3/UL (ref 150–450)
PO2 BLDV: 32 MM HG (ref 25–47)
POTASSIUM SERPL-SCNC: 5 MMOL/L (ref 3.4–5.3)
PR INTERVAL - MUSE: 170 MS
PROT SERPL-MCNC: 5.8 G/DL (ref 6.4–8.3)
QRS DURATION - MUSE: 130 MS
QT - MUSE: 374 MS
QTC - MUSE: 467 MS
R AXIS - MUSE: 68 DEGREES
RBC # BLD AUTO: 4.48 10E6/UL (ref 4.4–5.9)
SAO2 % BLDV: 49.9 % (ref 70–75)
SODIUM SERPL-SCNC: 137 MMOL/L (ref 135–145)
SYSTOLIC BLOOD PRESSURE - MUSE: NORMAL MMHG
T AXIS - MUSE: 91 DEGREES
VENTRICULAR RATE- MUSE: 94 BPM
WBC # BLD AUTO: 9.9 10E3/UL (ref 4–11)

## 2025-01-26 PROCEDURE — 250N000011 HC RX IP 250 OP 636: Performed by: EMERGENCY MEDICINE

## 2025-01-26 PROCEDURE — 250N000013 HC RX MED GY IP 250 OP 250 PS 637: Performed by: INTERNAL MEDICINE

## 2025-01-26 PROCEDURE — 32555 ASPIRATE PLEURA W/ IMAGING: CPT

## 2025-01-26 PROCEDURE — 85610 PROTHROMBIN TIME: CPT | Performed by: EMERGENCY MEDICINE

## 2025-01-26 PROCEDURE — 99223 1ST HOSP IP/OBS HIGH 75: CPT | Mod: AI | Performed by: INTERNAL MEDICINE

## 2025-01-26 PROCEDURE — 71275 CT ANGIOGRAPHY CHEST: CPT

## 2025-01-26 PROCEDURE — 250N000013 HC RX MED GY IP 250 OP 250 PS 637: Performed by: EMERGENCY MEDICINE

## 2025-01-26 PROCEDURE — 85025 COMPLETE CBC W/AUTO DIFF WBC: CPT | Performed by: EMERGENCY MEDICINE

## 2025-01-26 PROCEDURE — 82805 BLOOD GASES W/O2 SATURATION: CPT | Performed by: EMERGENCY MEDICINE

## 2025-01-26 PROCEDURE — 82248 BILIRUBIN DIRECT: CPT | Performed by: EMERGENCY MEDICINE

## 2025-01-26 PROCEDURE — 96376 TX/PRO/DX INJ SAME DRUG ADON: CPT

## 2025-01-26 PROCEDURE — 120N000001 HC R&B MED SURG/OB

## 2025-01-26 PROCEDURE — 82247 BILIRUBIN TOTAL: CPT | Performed by: EMERGENCY MEDICINE

## 2025-01-26 PROCEDURE — 99285 EMERGENCY DEPT VISIT HI MDM: CPT | Mod: 25

## 2025-01-26 PROCEDURE — 93005 ELECTROCARDIOGRAM TRACING: CPT

## 2025-01-26 PROCEDURE — 250N000009 HC RX 250: Performed by: RADIOLOGY

## 2025-01-26 PROCEDURE — 74177 CT ABD & PELVIS W/CONTRAST: CPT

## 2025-01-26 PROCEDURE — 96372 THER/PROPH/DIAG INJ SC/IM: CPT | Performed by: RADIOLOGY

## 2025-01-26 PROCEDURE — 272N000706 US THORACENTESIS

## 2025-01-26 PROCEDURE — 250N000009 HC RX 250: Performed by: EMERGENCY MEDICINE

## 2025-01-26 PROCEDURE — 36415 COLL VENOUS BLD VENIPUNCTURE: CPT | Performed by: EMERGENCY MEDICINE

## 2025-01-26 PROCEDURE — 96374 THER/PROPH/DIAG INJ IV PUSH: CPT | Mod: 59

## 2025-01-26 RX ORDER — FENTANYL 50 UG/1
1 PATCH TRANSDERMAL
Status: ON HOLD | COMMUNITY
End: 2025-01-28

## 2025-01-26 RX ORDER — FLUTICASONE PROPIONATE 50 MCG
1 SPRAY, SUSPENSION (ML) NASAL DAILY
Status: DISCONTINUED | OUTPATIENT
Start: 2025-01-27 | End: 2025-01-28 | Stop reason: HOSPADM

## 2025-01-26 RX ORDER — CETIRIZINE HYDROCHLORIDE 10 MG/1
10 TABLET ORAL DAILY
Status: ON HOLD | COMMUNITY
End: 2025-01-28

## 2025-01-26 RX ORDER — SALIVA STIMULANT COMB. NO.3
2 SPRAY, NON-AEROSOL (ML) MUCOUS MEMBRANE PRN
Status: ON HOLD | COMMUNITY
End: 2025-01-28

## 2025-01-26 RX ORDER — HYOSCYAMINE SULFATE 0.125 MG
125 TABLET ORAL EVERY 4 HOURS PRN
Status: DISCONTINUED | OUTPATIENT
Start: 2025-01-26 | End: 2025-01-28 | Stop reason: HOSPADM

## 2025-01-26 RX ORDER — HYOSCYAMINE SULFATE 0.125 MG
0.12 TABLET ORAL EVERY 4 HOURS PRN
Status: ON HOLD | COMMUNITY
End: 2025-01-28

## 2025-01-26 RX ORDER — OXYCODONE HYDROCHLORIDE 20 MG/1
20 TABLET ORAL
Status: ON HOLD | COMMUNITY
End: 2025-01-27

## 2025-01-26 RX ORDER — SPIRONOLACTONE 25 MG/5ML
20 SUSPENSION ORAL DAILY
Status: ON HOLD | COMMUNITY
End: 2025-01-28

## 2025-01-26 RX ORDER — LORAZEPAM 0.5 MG/1
0.5 TABLET ORAL EVERY 4 HOURS PRN
Status: DISCONTINUED | OUTPATIENT
Start: 2025-01-26 | End: 2025-01-28 | Stop reason: HOSPADM

## 2025-01-26 RX ORDER — LORAZEPAM 0.5 MG/1
0.5 TABLET ORAL EVERY 4 HOURS PRN
Status: ON HOLD | COMMUNITY
End: 2025-01-27

## 2025-01-26 RX ORDER — SENNOSIDES 8.6 MG
2 TABLET ORAL 2 TIMES DAILY
Status: ON HOLD | COMMUNITY
End: 2025-01-28

## 2025-01-26 RX ORDER — ACETAMINOPHEN 325 MG/10.15ML
960 LIQUID ORAL 3 TIMES DAILY
Status: DISCONTINUED | OUTPATIENT
Start: 2025-01-26 | End: 2025-01-28 | Stop reason: HOSPADM

## 2025-01-26 RX ORDER — NALOXONE HYDROCHLORIDE 0.4 MG/ML
0.2 INJECTION, SOLUTION INTRAMUSCULAR; INTRAVENOUS; SUBCUTANEOUS
Status: DISCONTINUED | OUTPATIENT
Start: 2025-01-26 | End: 2025-01-28 | Stop reason: HOSPADM

## 2025-01-26 RX ORDER — FENTANYL 50 UG/1
50 PATCH TRANSDERMAL
Status: DISCONTINUED | OUTPATIENT
Start: 2025-01-27 | End: 2025-01-27

## 2025-01-26 RX ORDER — SENNOSIDES 8.6 MG
2 TABLET ORAL 2 TIMES DAILY
Status: DISCONTINUED | OUTPATIENT
Start: 2025-01-26 | End: 2025-01-28 | Stop reason: HOSPADM

## 2025-01-26 RX ORDER — NALOXONE HYDROCHLORIDE 0.4 MG/ML
0.4 INJECTION, SOLUTION INTRAMUSCULAR; INTRAVENOUS; SUBCUTANEOUS
Status: DISCONTINUED | OUTPATIENT
Start: 2025-01-26 | End: 2025-01-28 | Stop reason: HOSPADM

## 2025-01-26 RX ORDER — OXYCODONE HYDROCHLORIDE 5 MG/1
20 TABLET ORAL EVERY 4 HOURS
Status: DISCONTINUED | OUTPATIENT
Start: 2025-01-26 | End: 2025-01-28 | Stop reason: HOSPADM

## 2025-01-26 RX ORDER — OXYCODONE HYDROCHLORIDE 20 MG/1
20 TABLET ORAL EVERY 4 HOURS
Status: ON HOLD | COMMUNITY
End: 2025-01-27

## 2025-01-26 RX ORDER — OXYCODONE HYDROCHLORIDE 5 MG/1
5 TABLET ORAL EVERY 4 HOURS
Status: DISCONTINUED | OUTPATIENT
Start: 2025-01-26 | End: 2025-01-26

## 2025-01-26 RX ORDER — SALIVA STIMULANT COMB. NO.3
2 SPRAY, NON-AEROSOL (ML) MUCOUS MEMBRANE 4 TIMES DAILY
Status: DISCONTINUED | OUTPATIENT
Start: 2025-01-26 | End: 2025-01-28 | Stop reason: HOSPADM

## 2025-01-26 RX ORDER — CETIRIZINE HYDROCHLORIDE 10 MG/1
10 TABLET ORAL DAILY
Status: DISCONTINUED | OUTPATIENT
Start: 2025-01-26 | End: 2025-01-28 | Stop reason: HOSPADM

## 2025-01-26 RX ORDER — IOPAMIDOL 755 MG/ML
85 INJECTION, SOLUTION INTRAVASCULAR ONCE
Status: COMPLETED | OUTPATIENT
Start: 2025-01-26 | End: 2025-01-26

## 2025-01-26 RX ORDER — POLYETHYLENE GLYCOL 3350 17 G/17G
17 POWDER, FOR SOLUTION ORAL 3 TIMES DAILY
Status: ON HOLD | COMMUNITY
End: 2025-01-27

## 2025-01-26 RX ORDER — MIRTAZAPINE 15 MG/1
15 TABLET, FILM COATED ORAL AT BEDTIME
Status: DISCONTINUED | OUTPATIENT
Start: 2025-01-26 | End: 2025-01-28 | Stop reason: HOSPADM

## 2025-01-26 RX ORDER — DICLOFENAC SODIUM 25 MG/1
50 TABLET, DELAYED RELEASE ORAL 2 TIMES DAILY
Status: DISCONTINUED | OUTPATIENT
Start: 2025-01-26 | End: 2025-01-28 | Stop reason: HOSPADM

## 2025-01-26 RX ORDER — POLYETHYLENE GLYCOL 3350 17 G/17G
17 POWDER, FOR SOLUTION ORAL 3 TIMES DAILY
Status: DISCONTINUED | OUTPATIENT
Start: 2025-01-26 | End: 2025-01-27

## 2025-01-26 RX ORDER — ONDANSETRON 4 MG/1
4 TABLET, FILM COATED ORAL 3 TIMES DAILY PRN
Status: ON HOLD | COMMUNITY
End: 2025-01-28

## 2025-01-26 RX ORDER — ONDANSETRON 4 MG/1
4 TABLET, FILM COATED ORAL 3 TIMES DAILY PRN
Status: DISCONTINUED | OUTPATIENT
Start: 2025-01-26 | End: 2025-01-28 | Stop reason: HOSPADM

## 2025-01-26 RX ORDER — SPIRONOLACTONE 100 MG/1
100 TABLET, FILM COATED ORAL DAILY
Status: DISCONTINUED | OUTPATIENT
Start: 2025-01-27 | End: 2025-01-27

## 2025-01-26 RX ORDER — OXYCODONE HYDROCHLORIDE 5 MG/1
20 TABLET ORAL ONCE
Status: COMPLETED | OUTPATIENT
Start: 2025-01-26 | End: 2025-01-26

## 2025-01-26 RX ORDER — FERROUS SULFATE 325(65) MG
325 TABLET ORAL
Status: ON HOLD | COMMUNITY
End: 2025-01-28

## 2025-01-26 RX ORDER — LIDOCAINE HYDROCHLORIDE 10 MG/ML
10 INJECTION, SOLUTION EPIDURAL; INFILTRATION; INTRACAUDAL; PERINEURAL ONCE
Status: COMPLETED | OUTPATIENT
Start: 2025-01-26 | End: 2025-01-26

## 2025-01-26 RX ORDER — FLUTICASONE PROPIONATE 50 MCG
1 SPRAY, SUSPENSION (ML) NASAL DAILY
Status: ON HOLD | COMMUNITY
End: 2025-01-28

## 2025-01-26 RX ORDER — MIRTAZAPINE 15 MG/1
15 TABLET, FILM COATED ORAL AT BEDTIME
Status: ON HOLD | COMMUNITY
End: 2025-01-28

## 2025-01-26 RX ADMIN — LIDOCAINE HYDROCHLORIDE ANHYDROUS 10 ML: 10 INJECTION, SOLUTION INFILTRATION at 20:02

## 2025-01-26 RX ADMIN — SODIUM CHLORIDE 90 ML: 9 INJECTION, SOLUTION INTRAVENOUS at 17:39

## 2025-01-26 RX ADMIN — OXYCODONE HYDROCHLORIDE 20 MG: 5 TABLET ORAL at 21:04

## 2025-01-26 RX ADMIN — DICLOFENAC SODIUM 50 MG: 25 TABLET, DELAYED RELEASE ORAL at 21:06

## 2025-01-26 RX ADMIN — ACETAMINOPHEN 960 MG: 325 SUSPENSION ORAL at 21:06

## 2025-01-26 RX ADMIN — SENNOSIDES 2 TABLET: 8.6 TABLET, FILM COATED ORAL at 21:05

## 2025-01-26 RX ADMIN — HYDROMORPHONE HYDROCHLORIDE 1 MG: 1 INJECTION, SOLUTION INTRAMUSCULAR; INTRAVENOUS; SUBCUTANEOUS at 16:02

## 2025-01-26 RX ADMIN — IOPAMIDOL 85 ML: 755 INJECTION, SOLUTION INTRAVENOUS at 17:39

## 2025-01-26 RX ADMIN — HYDROMORPHONE HYDROCHLORIDE 1 MG: 1 INJECTION, SOLUTION INTRAMUSCULAR; INTRAVENOUS; SUBCUTANEOUS at 18:18

## 2025-01-26 RX ADMIN — CETIRIZINE HYDROCHLORIDE 10 MG: 10 TABLET, FILM COATED ORAL at 21:05

## 2025-01-26 RX ADMIN — OXYCODONE HYDROCHLORIDE 20 MG: 5 TABLET ORAL at 16:02

## 2025-01-26 ASSESSMENT — ACTIVITIES OF DAILY LIVING (ADL)
ADLS_ACUITY_SCORE: 56
ADLS_ACUITY_SCORE: 56
ADLS_ACUITY_SCORE: 50
ADLS_ACUITY_SCORE: 56
DEPENDENT_IADLS:: CLEANING;COOKING;LAUNDRY;SHOPPING;MEAL PREPARATION;MEDICATION MANAGEMENT;MONEY MANAGEMENT;TRANSPORTATION;INCONTINENCE
ADLS_ACUITY_SCORE: 56
ADLS_ACUITY_SCORE: 56
ADLS_ACUITY_SCORE: 50
ADLS_ACUITY_SCORE: 56

## 2025-01-26 ASSESSMENT — COLUMBIA-SUICIDE SEVERITY RATING SCALE - C-SSRS
6. HAVE YOU EVER DONE ANYTHING, STARTED TO DO ANYTHING, OR PREPARED TO DO ANYTHING TO END YOUR LIFE?: NO
1. IN THE PAST MONTH, HAVE YOU WISHED YOU WERE DEAD OR WISHED YOU COULD GO TO SLEEP AND NOT WAKE UP?: NO
2. HAVE YOU ACTUALLY HAD ANY THOUGHTS OF KILLING YOURSELF IN THE PAST MONTH?: NO

## 2025-01-26 NOTE — ED NOTES
Bed: ED22  Expected date:   Expected time:   Means of arrival:   Comments:  [place] [facility]26 59m cancer pt, SOB ETA 15:00

## 2025-01-26 NOTE — ED TRIAGE NOTES
BIBA from NH for SOB, pt has colon cancer with mets, is on 4L NC baseline. Sats stable on 4L, VSS. Pt reports his SOB started today. Complains of pain 10/10 in his tailbone.

## 2025-01-26 NOTE — ED PROVIDER NOTES
Emergency Department Note      History of Present Illness     Chief Complaint   Shortness of Breath      HPI   Brady Lenz is a 59 year old male with widely metastatic colon cancer not on chemotherapy presenting from his TCU where he is on hospice for evaluation of increasing pain in his tailbone as well as slowly worsening shortness of breath over the last few weeks.  He denies overt chest pain, chest pressure or heaviness, reports no chest tightness fevers or chills.  He does have some chronic leg swelling that has not changed.  He is not on any chemo or radiation.    I did review with him trying to respect his wishes given his hospice status, I did inform him we could simply focus on pain control increasing his pain for his tailbone pain however he is interested in pursuing evaluation for his worsening shortness of breath as well as his tailbone pain.    Independent Historian   None    Review of External Notes   [facility] [place], known at that time to have widely metastatic colon cancer with mets to the liver, bone, lung and spleen.  He was admitted there for ongoing pain, he was seen by pain/palliative discharged to TCU on hospice.  He is not currently on treatment.  He takes 20 mg p.o. oxycodone every 4 hours with 20 mg p.o. every 2 hours as needed as well as fentanyl patch.    Past Medical History     Medical History and Problem List   Past Medical History:   Diagnosis Date    Asthma     Metastatic colon cancer to liver (H)        Medications   acetaminophen (TYLENOL) 500 MG tablet  diphenoxylate-atropine (LOMOTIL) 2.5-0.025 MG tablet  loperamide (IMODIUM) 2 MG capsule  prochlorperazine (COMPAZINE) 10 MG tablet        Surgical History   Past Surgical History:   Procedure Laterality Date    COLECTOMY WITHOUT COLOSTOMY N/A 10/24/2023    Procedure: Converted to Open RightColectomy;  Surgeon: Jose Moe MD;  Location: [facility] OR    IR CHEST PORT PLACEMENT > 5 YRS OF AGE  12/29/2023     "LAPAROSCOPIC ASSISTED COLECTOMY N/A 10/24/2023    Procedure: Laparoscopic Peritoneal Biopsy;  Surgeon: Jose Moe MD;  Location:  OR       Physical Exam     Patient Vitals for the past 24 hrs:   BP Temp Temp src Pulse Resp SpO2 Height Weight   01/26/25 1730 (!) 122/109 -- -- 96 (!) 36 100 % -- --   01/26/25 1715 108/80 -- -- 91 12 100 % -- --   01/26/25 1700 108/83 -- -- 93 15 99 % -- --   01/26/25 1645 (!) 128/94 -- -- 97 28 99 % -- --   01/26/25 1630 116/86 -- -- 96 23 99 % -- --   01/26/25 1615 105/80 -- -- 93 17 100 % -- --   01/26/25 1600 (!) 119/91 -- -- 94 23 97 % -- --   01/26/25 1513 (!) 115/93 97.5  F (36.4  C) Oral 96 17 100 % 1.854 m (6' 1\") 77.1 kg (170 lb)     Physical Exam  Constitutional: Chronically ill, frail appearing jaundiced, cachectic  HENT:    Nose: Nose normal.    Mouth/Throat: Oropharynx is clear, mucous membranes are dry  Eyes: EOM are normal, anicteric, conjugate gaze  CV: regular rate and rhythm   Chest: On nasal cannula, right upper chest port, effort normal and breath sounds clear without wheezing or rales, symmetric bilaterally   GI:  non tender. No distension. No guarding or rebound.    MSK: Marked symmetric bilateral lower extremity edema nonblanching anterior redness, , no tenderness to palpation of BLE.  Neurological: Alert, attentive, moving all extremities equally.   Skin: Skin is warm and dry.      Diagnostics     Lab Results   Labs Ordered and Resulted from Time of ED Arrival to Time of ED Departure   BLOOD GAS VENOUS - Abnormal       Result Value    pH Venous 7.34      pCO2 Venous 57 (*)     pO2 Venous 32      Bicarbonate Venous 31 (*)     Base Excess/Deficit Venous 3.9 (*)     FIO2 37      Oxyhemoglobin Venous 49 (*)     O2 Sat, Venous 49.9 (*)    COMPREHENSIVE METABOLIC PANEL - Abnormal    Sodium 137      Potassium 5.0      Carbon Dioxide (CO2) 28      Anion Gap 10      Urea Nitrogen 47.1 (*)     Creatinine 0.74      GFR Estimate >90      Calcium 9.4      Chloride " 99      Glucose 81      Alkaline Phosphatase 1,849 (*)      (*)     ALT 57      Protein Total 5.8 (*)     Albumin 3.0 (*)     Bilirubin Total 1.2     CBC WITH PLATELETS AND DIFFERENTIAL - Abnormal    WBC Count 9.9      RBC Count 4.48      Hemoglobin 10.8 (*)     Hematocrit 35.1 (*)     MCV 78      MCH 24.1 (*)     MCHC 30.8 (*)     RDW 18.3 (*)     Platelet Count 333      % Neutrophils 86      % Lymphocytes 4      % Monocytes 9      % Eosinophils 0      % Basophils 0      % Immature Granulocytes 1      NRBCs per 100 WBC 0      Absolute Neutrophils 8.6 (*)     Absolute Lymphocytes 0.4 (*)     Absolute Monocytes 0.9      Absolute Eosinophils 0.0      Absolute Basophils 0.0      Absolute Immature Granulocytes 0.1      Absolute NRBCs 0.0     BILIRUBIN DIRECT AND TOTAL - Abnormal    Bilirubin Direct 0.83 (*)     Bilirubin Total 1.1     INR - Abnormal    INR 1.22 (*)        Imaging   CT Chest (PE) Abdomen Pelvis w Contrast    (Results Pending)   US Thoracentesis    (Results Pending)       EKG   ECG results from 01/26/25   EKG 12 lead     Value    Systolic Blood Pressure     Diastolic Blood Pressure     Ventricular Rate 94    Atrial Rate 94    NY Interval 170    QRS Duration 130        QTc 467    P Axis 90    R AXIS 68    T Axis 91    Interpretation ECG      Sinus rhythm  Right bundle branch block  Anteroseptal infarct , age undetermined  Marked T wave abnormality, consider lateral ischemia  Abnormal ECG  No previous ECGs available       Formal read for CT PE is pending, he has massive right-sided hydrothorax with radiographic tension physiology, multiple liver mets are noted.      ED Course      Medications Administered   Medications   HYDROmorphone (DILAUDID) injection 1 mg (1 mg Intravenous $Given 1/26/25 1818)   oxyCODONE (ROXICODONE) tablet 20 mg (20 mg Oral $Given 1/26/25 1602)   iopamidol (ISOVUE-370) solution 85 mL (85 mLs Intravenous $Given 1/26/25 3738)   sodium chloride 0.9 % bag 500mL for CT scan  flush use (90 mLs Intravenous $Given 1/26/25 7936)       Procedures   Procedures     Discussion of Management   Dr. Babin, IR  Dr. Busch, Hospitalist    Additional Documentation  Social Determinants of Health: Healthcare Access/Compliance  and Homelessness/Housing Insecurity     Medical Decision Making / Diagnosis     MIPS   CT for PE was ordered because the patient is high risk for pulmonary embolism.    HENNA eLnz is a 59 year old male past medical history significant for widely metastatic stage IV colon cancer with known liver, lung and bony mets presenting from TCU where he is on hospice for evaluation of gradually worsening shortness of breath and increasing tailbone pain.  Based on his exam, I suspect likely a pressure sore that does not appear overtly infected as etiology for his tailbone pain.  I did spend some time discussing with him given his hospice status if he would want to proceed with full evaluation or focus on comfort measures, his preference was for labs and imaging.  As such CT PE abdomen pelvis was ordered, this is pending.  Given his gradual onset, lower suspicion for PE though certainly is high risk with active cancer.  Screening labs are notable for elevated LFTs consistent with known liver mets, however he does not have leukocytosis, hemoglobin is stable.  By my read, he has massive right-sided hydrothorax, does appear this was not present on last CT.  I suspect this is etiology of his increasing shortness of breath.  Given his blood pressure and O2 sats are stable, do not see decoration for emergent thoracentesis.  In discussion with IR, they are able to perform thoracentesis later today, but agree that hospitalization for consideration of Pleurx placement is reasonable for palliation.  Will plan for medicine admission pending thoracentesis and consideration of Pleurx patient.  Patient is understanding that he will have to revoke hospice for this and is okay with it as he  wants this for palliation of his breathing.  EKG does show possible lateral ischemia however based on the CT scan, his heart is displaced left, he is not endorsing any overt chest pain chest pressure heaviness, I did defer troponin, patient would not be a candidate for revascularization.      Disposition   The patient was admitted to the hospital.     Diagnosis     ICD-10-CM    1. Metastatic colon cancer to liver (H)  C18.9     C78.7       2. SOB (shortness of breath)  R06.02       3. Hydrothorax  J94.8              Jose A Venegas MD  [employer]  5:57 PM 01/26/25          Jose A Venegas MD  01/26/25 [id number]

## 2025-01-27 VITALS
RESPIRATION RATE: 16 BRPM | TEMPERATURE: 97.3 F | DIASTOLIC BLOOD PRESSURE: 71 MMHG | HEART RATE: 76 BPM | BODY MASS INDEX: 22.53 KG/M2 | SYSTOLIC BLOOD PRESSURE: 102 MMHG | HEIGHT: 73 IN | WEIGHT: 170 LBS | OXYGEN SATURATION: 97 %

## 2025-01-27 PROBLEM — J94.8 HYDROTHORAX: Status: ACTIVE | Noted: 2025-01-27

## 2025-01-27 PROCEDURE — 250N000013 HC RX MED GY IP 250 OP 250 PS 637: Performed by: EMERGENCY MEDICINE

## 2025-01-27 PROCEDURE — 99245 OFF/OP CONSLTJ NEW/EST HI 55: CPT | Performed by: NURSE PRACTITIONER

## 2025-01-27 PROCEDURE — 250N000013 HC RX MED GY IP 250 OP 250 PS 637: Performed by: NURSE PRACTITIONER

## 2025-01-27 PROCEDURE — 250N000011 HC RX IP 250 OP 636: Performed by: INTERNAL MEDICINE

## 2025-01-27 PROCEDURE — G0378 HOSPITAL OBSERVATION PER HR: HCPCS

## 2025-01-27 PROCEDURE — 250N000013 HC RX MED GY IP 250 OP 250 PS 637: Performed by: INTERNAL MEDICINE

## 2025-01-27 PROCEDURE — 99232 SBSQ HOSP IP/OBS MODERATE 35: CPT | Performed by: INTERNAL MEDICINE

## 2025-01-27 PROCEDURE — 99417 PROLNG OP E/M EACH 15 MIN: CPT | Performed by: NURSE PRACTITIONER

## 2025-01-27 RX ORDER — LORAZEPAM 0.5 MG/1
0.5 TABLET ORAL EVERY 4 HOURS PRN
Qty: 30 TABLET | Refills: 0 | Status: SHIPPED | OUTPATIENT
Start: 2025-01-27 | End: 2025-01-28

## 2025-01-27 RX ORDER — OXYCODONE HYDROCHLORIDE 20 MG/1
20 TABLET ORAL
Qty: 12 TABLET | Refills: 0 | Status: SHIPPED | OUTPATIENT
Start: 2025-01-27

## 2025-01-27 RX ORDER — ATROPINE SULFATE 10 MG/ML
1 SOLUTION/ DROPS OPHTHALMIC EVERY 4 HOURS PRN
DISCHARGE
Start: 2025-01-27 | End: 2025-01-28

## 2025-01-27 RX ORDER — NALOXONE HYDROCHLORIDE 0.4 MG/ML
0.1 INJECTION, SOLUTION INTRAMUSCULAR; INTRAVENOUS; SUBCUTANEOUS
Status: DISCONTINUED | OUTPATIENT
Start: 2025-01-27 | End: 2025-01-27

## 2025-01-27 RX ORDER — SENNOSIDES 8.6 MG
1 TABLET ORAL 2 TIMES DAILY PRN
Status: DISCONTINUED | OUTPATIENT
Start: 2025-01-27 | End: 2025-01-28 | Stop reason: HOSPADM

## 2025-01-27 RX ORDER — BISACODYL 10 MG
10 SUPPOSITORY, RECTAL RECTAL
Status: DISCONTINUED | OUTPATIENT
Start: 2025-01-29 | End: 2025-01-28 | Stop reason: HOSPADM

## 2025-01-27 RX ORDER — NALOXONE HYDROCHLORIDE 0.4 MG/ML
0.2 INJECTION, SOLUTION INTRAMUSCULAR; INTRAVENOUS; SUBCUTANEOUS
Status: DISCONTINUED | OUTPATIENT
Start: 2025-01-27 | End: 2025-01-27

## 2025-01-27 RX ORDER — HEPARIN SODIUM,PORCINE 10 UNIT/ML
5-10 VIAL (ML) INTRAVENOUS
Status: DISCONTINUED | OUTPATIENT
Start: 2025-01-27 | End: 2025-01-28 | Stop reason: HOSPADM

## 2025-01-27 RX ORDER — POLYETHYLENE GLYCOL 3350 17 G/17G
17 POWDER, FOR SOLUTION ORAL 2 TIMES DAILY
Status: DISCONTINUED | OUTPATIENT
Start: 2025-01-27 | End: 2025-01-28 | Stop reason: HOSPADM

## 2025-01-27 RX ORDER — AMOXICILLIN 250 MG
2 CAPSULE ORAL 2 TIMES DAILY PRN
Status: DISCONTINUED | OUTPATIENT
Start: 2025-01-27 | End: 2025-01-28 | Stop reason: HOSPADM

## 2025-01-27 RX ORDER — ONDANSETRON 4 MG/1
4 TABLET, ORALLY DISINTEGRATING ORAL EVERY 6 HOURS PRN
Status: DISCONTINUED | OUTPATIENT
Start: 2025-01-27 | End: 2025-01-28 | Stop reason: HOSPADM

## 2025-01-27 RX ORDER — HEPARIN SODIUM (PORCINE) LOCK FLUSH IV SOLN 100 UNIT/ML 100 UNIT/ML
5-10 SOLUTION INTRAVENOUS
Status: DISCONTINUED | OUTPATIENT
Start: 2025-01-27 | End: 2025-01-28 | Stop reason: HOSPADM

## 2025-01-27 RX ORDER — HEPARIN SODIUM,PORCINE 10 UNIT/ML
5-10 VIAL (ML) INTRAVENOUS EVERY 24 HOURS
Status: DISCONTINUED | OUTPATIENT
Start: 2025-01-27 | End: 2025-01-28 | Stop reason: HOSPADM

## 2025-01-27 RX ORDER — CARBOXYMETHYLCELLULOSE SODIUM 5 MG/ML
1-2 SOLUTION/ DROPS OPHTHALMIC
Status: DISCONTINUED | OUTPATIENT
Start: 2025-01-27 | End: 2025-01-28 | Stop reason: HOSPADM

## 2025-01-27 RX ORDER — AMOXICILLIN 250 MG
1 CAPSULE ORAL 2 TIMES DAILY PRN
Status: DISCONTINUED | OUTPATIENT
Start: 2025-01-27 | End: 2025-01-28 | Stop reason: HOSPADM

## 2025-01-27 RX ORDER — PROCHLORPERAZINE MALEATE 10 MG
10 TABLET ORAL EVERY 6 HOURS PRN
Status: DISCONTINUED | OUTPATIENT
Start: 2025-01-27 | End: 2025-01-28 | Stop reason: HOSPADM

## 2025-01-27 RX ORDER — FENTANYL 100 UG/1
100 PATCH TRANSDERMAL
Status: DISCONTINUED | OUTPATIENT
Start: 2025-01-27 | End: 2025-01-28 | Stop reason: HOSPADM

## 2025-01-27 RX ORDER — ONDANSETRON 2 MG/ML
4 INJECTION INTRAMUSCULAR; INTRAVENOUS EVERY 6 HOURS PRN
Status: DISCONTINUED | OUTPATIENT
Start: 2025-01-27 | End: 2025-01-28 | Stop reason: HOSPADM

## 2025-01-27 RX ORDER — POLYETHYLENE GLYCOL 3350 17 G/17G
17 POWDER, FOR SOLUTION ORAL 2 TIMES DAILY
DISCHARGE
Start: 2025-01-27 | End: 2025-01-28

## 2025-01-27 RX ORDER — MINERAL OIL/HYDROPHIL PETROLAT
OINTMENT (GRAM) TOPICAL
Status: DISCONTINUED | OUTPATIENT
Start: 2025-01-27 | End: 2025-01-28 | Stop reason: HOSPADM

## 2025-01-27 RX ORDER — OXYCODONE HYDROCHLORIDE 20 MG/1
20 TABLET ORAL EVERY 4 HOURS
Qty: 18 TABLET | Refills: 0 | Status: SHIPPED | OUTPATIENT
Start: 2025-01-27

## 2025-01-27 RX ADMIN — ACETAMINOPHEN 960 MG: 325 SUSPENSION ORAL at 08:23

## 2025-01-27 RX ADMIN — OXYCODONE HYDROCHLORIDE 20 MG: 5 TABLET ORAL at 01:09

## 2025-01-27 RX ADMIN — OXYCODONE HYDROCHLORIDE 20 MG: 5 TABLET ORAL at 21:27

## 2025-01-27 RX ADMIN — OXYCODONE HYDROCHLORIDE 20 MG: 5 TABLET ORAL at 08:20

## 2025-01-27 RX ADMIN — OXYCODONE HYDROCHLORIDE 20 MG: 5 TABLET ORAL at 16:47

## 2025-01-27 RX ADMIN — CETIRIZINE HYDROCHLORIDE 10 MG: 10 TABLET, FILM COATED ORAL at 08:22

## 2025-01-27 RX ADMIN — Medication 2 SPRAY: at 21:34

## 2025-01-27 RX ADMIN — SPIRONOLACTONE 100 MG: 100 TABLET ORAL at 08:22

## 2025-01-27 RX ADMIN — SENNOSIDES 2 TABLET: 8.6 TABLET, FILM COATED ORAL at 08:22

## 2025-01-27 RX ADMIN — OXYCODONE HYDROCHLORIDE 20 MG: 5 TABLET ORAL at 12:36

## 2025-01-27 RX ADMIN — POLYETHYLENE GLYCOL 3350 17 G: 17 POWDER, FOR SOLUTION ORAL at 08:25

## 2025-01-27 RX ADMIN — DICLOFENAC SODIUM 50 MG: 25 TABLET, DELAYED RELEASE ORAL at 08:21

## 2025-01-27 RX ADMIN — SENNOSIDES 2 TABLET: 8.6 TABLET, FILM COATED ORAL at 21:27

## 2025-01-27 RX ADMIN — OXYCODONE HYDROCHLORIDE 20 MG: 5 TABLET ORAL at 05:08

## 2025-01-27 RX ADMIN — FENTANYL 1 PATCH: 100 PATCH TRANSDERMAL at 12:44

## 2025-01-27 RX ADMIN — Medication 2 SPRAY: at 01:18

## 2025-01-27 RX ADMIN — MIRTAZAPINE 15 MG: 15 TABLET, FILM COATED ORAL at 01:14

## 2025-01-27 RX ADMIN — ACETAMINOPHEN 960 MG: 325 SUSPENSION ORAL at 14:53

## 2025-01-27 RX ADMIN — POLYETHYLENE GLYCOL 3350 17 G: 17 POWDER, FOR SOLUTION ORAL at 21:29

## 2025-01-27 RX ADMIN — DICLOFENAC SODIUM 50 MG: 25 TABLET, DELAYED RELEASE ORAL at 21:27

## 2025-01-27 RX ADMIN — Medication 5 ML: at 05:08

## 2025-01-27 RX ADMIN — MIRTAZAPINE 15 MG: 15 TABLET, FILM COATED ORAL at 21:27

## 2025-01-27 RX ADMIN — ACETAMINOPHEN 960 MG: 325 SUSPENSION ORAL at 21:27

## 2025-01-27 RX ADMIN — FENTANYL 1 PATCH: 50 PATCH TRANSDERMAL at 08:27

## 2025-01-27 ASSESSMENT — ACTIVITIES OF DAILY LIVING (ADL)
ADLS_ACUITY_SCORE: 75
ADLS_ACUITY_SCORE: 75
ADLS_ACUITY_SCORE: 82
ADLS_ACUITY_SCORE: 74
ADLS_ACUITY_SCORE: 81
ADLS_ACUITY_SCORE: 75
ADLS_ACUITY_SCORE: 74
ADLS_ACUITY_SCORE: 75
ADLS_ACUITY_SCORE: 81
ADLS_ACUITY_SCORE: 74
ADLS_ACUITY_SCORE: 75
ADLS_ACUITY_SCORE: 74
ADLS_ACUITY_SCORE: 81
ADLS_ACUITY_SCORE: 75
ADLS_ACUITY_SCORE: 82
ADLS_ACUITY_SCORE: 66
ADLS_ACUITY_SCORE: 82
ADLS_ACUITY_SCORE: 77
ADLS_ACUITY_SCORE: 75
ADLS_ACUITY_SCORE: 81
ADLS_ACUITY_SCORE: 81

## 2025-01-27 NOTE — PLAN OF CARE
[id number]  Orientation: a&ox4, flat  Aggression Stop Light:   Activity: a2 lift t/r q2hr  Diet/BS Checks: regular  Tele:  n/a  IV Access/Drains: port HL, blood return noted  Pain Management: c/o back pain. Evelia oxycodone q4hrs  -fentanyl place L side  Abnormal VS/Results: VSS on 4L nc  Bowel/Bladder: incont B/B. Ext cath in place. BM x1  Skin/Wounds: coccyx wound-mepi in place. +4 edema BLE w/ weeping and dianne. +3 edema RUE  Consults: IR, palliative  D/C Disposition: pending hospice facility placement and placement of Pleurx cath  Other Info:     -takes pills whole, sitting upright

## 2025-01-27 NOTE — H&P
[facility]    History and Physical  Hospitalist       Date of Admission:  1/26/2025    Assessment & Plan   Brady Lenz is a 59 year old male with widely metastatic colon cancer on hospice with [facility] hospice.  He has metastases extensive to the liver lung bone spleen anterior abdominal wall and extensive retroperitoneal node, history of homelessness who presents with slowly escalating shortness of breath over the last several weeks possibly longer.  He did not feel that it was well-controlled.  CT chest shows very large right pleural effusion with complete opacification of the right hemithorax and collapse of the entire right lung.  IR consultation for thoracentesis.    Widely metastatic colon cancer  Assessment-[facility].  He has metastases to liver lung bone and spleen anterior abdominal wall and extensive retroperitoneal nodes.  He has chronic but worsening lower extremity edema.  Recent ultrasound lower extremities unremarkable.  -Now presents with slowly escalating shortness of breath over the last several weeks.  -CT chest showed very large right pleural effusion resulting in complete opacification of the right hemithorax and collapse of the entire right lung.  Is also moderate left pleural effusion.  He has extensive metastatic findings to the right lower lobe spleen.  Moderate ascites metastatic skeletal disease.  Extensive metastatic liver disease has significantly progressed.  -Moderately tachypneic.  Requiring minimal oxygen.  Hemodynamically stable.  -Patient wishes to proceed with thoracentesis but wishes to continue with DNR DNI status and comfort care.  This was discussed in detail with the patient.  He under stands that he has widely metastatic from colon cancer and comfort care hospice is appropriate.  He is alert and oriented and appropriate    Plan-IR consult right thoracentesis.  Social work consult palliative care  consult to reassess his pain.  Continue his prior to admission comfort care medications.  Will continue oxygen for now.  Vital signs for now.  Comfort care and DNR/DNI status.  Patient would likely benefit with Pleurx catheter on the right this will have to be discussed tomorrow in coordination with our  and [facility] hospice.  Pleurx catheter being considered given high likelihood of recurrence of fluid.  Will also need to potentially adjust his narcotics.    Addendum-patient had 1.5 L of clear yellow fluid removed with right thoracentesis.  Patient breathing easily now and appears more comfortable.  No longer tachypneic.  He met with social work.  Case discussed with patient and social work.  Patient does not want to be followed by [facility] hospice or return to his prior facility.  He wants to remain on hospice continue DNR/DNI status and continue comfort care.  He would like to be enrolled in a different hospice agency and discharged to a different facility.  Observation status.  Though per social work he can be inpatient if required and still be discharged to a different hospice group and facility        DVT Prophylaxis: Patient comfort care  Code Status: Comfort Care, DNR/DNI    Clinically Significant Risk Factors Present on Admission               # Hypoalbuminemia: Lowest albumin = 3 g/dL at 1/26/2025  3:51 PM, will monitor as appropriate  # Coagulation Defect: INR = 1.22 (Ref range: 0.85 - 1.15) and/or PTT = N/A, will monitor for bleeding         # Anemia: based on hgb <11           # Financial/Environmental Concerns:    # Support System: poor social support noted in nursing assessment  # Housing Instability: noted in nursing assessment          Disposition: Dissipate discharge back to his TCU facility with comfort care  Medically Ready for Discharge: Anticipated Tomorrow pending coordination with [facility] hospice and placement of right Pleurx catheter.    Richard Busch MD, MD    Primary Care  Physician   Physician No Ref-Primary    Chief Complaint       History is obtained from the patient chart, EDMD    History of Present Illness   Brady Lenz is a 59 year old male with widely metastatic colon cancer [facility].  He has metastases extensive to the liver lung bone spleen anterior abdominal wall and extensive retroperitoneal node, history of homelessness who presents with slowly escalating shortness of breath over the last several weeks possibly longer.  He did not feel that it was well-controlled.  He has chronic back pain and abdominal pain related to metastases.  Pain is typically about 5-6 out of 10.  He is on fentanyl patch scheduled oxycodone every 4 hours and as needed oxycodone.  He denies any neurologic symptoms.  No headache.  No chest pain.  He has pain up around his liver and spleen which has been relatively stable.  No difficulty passing urine.  No paresthesias weakness in the legs.  He has however found it hard to walk in the last few days given his shortness of breath.  He presents to [facility] for further evaluation of his shortness of breath.  He also thinks his pain control is suboptimal.    In the emergency room patient is normotensive systolic pressures in the 112 region.  Pulse 96-1 12.  Afebrile.  Satting 99% on 4 L.  pH 7.34 pCO2 57 pO2 32 bicarb 31  Sodium 139 potassium 5 chloride 99 BUN 47 creatinine 0.7.  Albumin 3 total protein 5.8.  Alk phos 1 849, ALT 57  bilirubin 0.8.  Total bilirubin 1.1 glucose 81.  White blood cell count 9.9 hemoglobin 10.8 platelet count 333 INR 1.22  EKG shows right bundle branch block with lateral T wave inversions.    CT chest shows-  1.  Very large right pleural effusion resulting in complete opacification of the right hemithorax and collapse of the entire right lung.  2.  Moderate-sized left pleural effusion and collapse involving most of the left lower lobe.  3.  Metastatic right lower lobe pulmonary  nodules.  4.  Extensive metastatic liver disease has significantly progressed.  5.  Metastatic disease involving the spleen has progressed.  6.  Moderate amount of ascites abdomen and pelvis.  7.  Metastatic implant anterior lower abdominal wall.  8.  Metastatic skeletal disease.    EDMD spoke with patient and he would like to proceed with therapeutic right thoracentesis for relief of his shortness of breath.  EDMD spoke with interventional radiology and they will come into perform thoracentesis tonight.    In talking with patient he wishes to remain DNR/DNI and comfort cares.  He is interested in a Pleurx catheter on the right for placement possibly tomorrow if considered appropriate per [facility].  We discussed the details of comfort care and hospice again.      Past Medical History    I have reviewed this patient's medical history and updated it with pertinent information if needed.   Past Medical History:   Diagnosis Date    Asthma     Metastatic colon cancer to liver (H)        Past Surgical History   I have reviewed this patient's surgical history and updated it with pertinent information if needed.  Past Surgical History:   Procedure Laterality Date    COLECTOMY WITHOUT COLOSTOMY N/A 10/24/2023    Procedure: Converted to Open RightColectomy;  Surgeon: Jose Moe MD;  Location:  OR    IR CHEST PORT PLACEMENT > 5 YRS OF AGE  12/29/2023    LAPAROSCOPIC ASSISTED COLECTOMY N/A 10/24/2023    Procedure: Laparoscopic Peritoneal Biopsy;  Surgeon: Jose Moe MD;  Location: [facility] OR       Prior to Admission Medications   Prior to Admission Medications   Prescriptions Last Dose Informant Patient Reported? Taking?   LORazepam (ATIVAN) 0.5 MG tablet Unknown  Yes Yes   Sig: Take 0.5 mg by mouth every 4 hours as needed (terminal restlessness).   acetaminophen (TYLENOL) 32 mg/mL liquid 1/26/2025 at  2:00 PM  Yes Yes   Sig: Take 30 mLs by mouth 3 times daily.   artificial saliva (BIOTENE MT) SOLN solution  Unknown  Yes Yes   Sig: Swish and spit 2 sprays in mouth as needed for dry mouth.   cetirizine (ZYRTEC) 10 MG tablet 1/25/2025 at  8:00 PM  Yes Yes   Sig: Take 10 mg by mouth daily.   diclofenac (VOLTAREN) 50 MG EC tablet 1/26/2025 at  8:00 AM  Yes Yes   Sig: Take 50 mg by mouth 2 times daily.   fentaNYL (DURAGESIC) 50 mcg/hr 72 hr patch 1/24/2025 at  8:00 AM  Yes Yes   Sig: Place 1 patch onto the skin every 72 hours. remove old patch.   ferrous sulfate (FEROSUL) 325 (65 Fe) MG tablet 1/24/2025 at  7:00 AM  Yes Yes   Sig: Take 325 mg by mouth three times a week. On Monday, Wednesday, and Friday   fluticasone (FLONASE) 50 MCG/ACT nasal spray  8:00 AM  Yes Yes   Sig: Spray 1 spray into both nostrils daily.   hyoscyamine (LEVSIN) 0.125 MG tablet Unknown  Yes Yes   Sig: Take 0.125 mg by mouth every 4 hours as needed for other (secretions).   magnesium hydroxide (MILK OF MAGNESIA) 400 MG/5ML suspension Unknown  Yes Yes   Sig: Take 30 mLs by mouth daily as needed for constipation.   mirtazapine (REMERON) 15 MG tablet 1/25/2025 at  8:00 PM  Yes Yes   Sig: Take 15 mg by mouth at bedtime.   ondansetron (ZOFRAN) 4 MG tablet Unknown  Yes Yes   Sig: Take 4 mg by mouth 3 times daily as needed for nausea or vomiting.   oxyCODONE HCl (ROXICODONE) 20 MG TABS immediate release tablet 1/26/2025 at 12:00 PM  Yes Yes   Sig: Take 20 mg by mouth every 4 hours.   oxyCODONE HCl (ROXICODONE) 20 MG TABS immediate release tablet 1/23/2025 at  2:04 PM  Yes Yes   Sig: Take 20 mg by mouth every 2 hours as needed (cancer pain).   polyethylene glycol (MIRALAX) 17 GM/Dose powder 1/26/2025 at  2:00 PM  Yes Yes   Sig: Take 17 g by mouth 3 times daily.   sennosides (SENOKOT) 8.6 MG tablet 1/26/2025 at  8:00 AM  Yes Yes   Sig: Take 2 tablets by mouth 2 times daily.   spironolactone (CAROSPIR) 25 MG/5ML SUSP suspension 1/26/2025 at  8:00 AM  Yes Yes   Sig: Take 20 mLs by mouth daily.      Facility-Administered Medications: None     Allergies   Allergies    Allergen Reactions    Fish-Derived Products Rash    Kiwi Rash    Lactose Rash    Nuts Rash    Wheat Rash       Social History   I have reviewed this patient's social history and updated it with pertinent information if needed. Brady Lenz  reports that he has never smoked. He has never used smokeless tobacco. He reports that he does not currently use alcohol. He reports that he does not currently use drugs.    Family History   I have reviewed this patient's family history and updated it with pertinent information if needed.   No family history on file.    Review of Systems   The 10 point Review of Systems is negative other than noted in the HPI or here.     Physical Exam   Temp: 97.5  F (36.4  C) Temp src: Oral BP: (!) 122/109 Pulse: 96   Resp: (!) 36 SpO2: 100 % O2 Device: Nasal cannula Oxygen Delivery: 4 LPM  Vital Signs with Ranges  Temp:  [97.5  F (36.4  C)] 97.5  F (36.4  C)  Pulse:  [91-97] 96  Resp:  [12-36] 36  BP: (105-128)/() 122/109  SpO2:  [97 %-100 %] 100 %  170 lbs 0 oz    Constitutional: Cachectic male with moderate tachypnea but otherwise appears comfortable  Eyes: Pupils equal round reactive to light and accommodating normal sclera  HEENT: Dry mucous membrane  Respiratory: No breath sounds appreciated on the right lung.  Left lung decreased bases about 20% up otherwise clear to auscultation bilaterally.  No wheezing  Cardiovascular: Regular rate and rhythm no rubs gallops or murmurs appreciate  GI: Palpable spleen and liver with nodularity.  No guarding.  Distended.  Bowel sounds present.  Mild tenderness over liver and spleen  Lymph/Hematologic: No inguinal supraclavicular axillary or cervical lymphadenopathy  Skin: Slight hyperemia pretibial region bilaterally.  Not warm or tender.  Diffuse edema symmetric bilateral lower extremities to the knee approximately 2-3+ with 1+ edema at the thigh level  Musculoskeletal: No focal joint swelling erythema.  Neurologic: He is alert fully oriented.   Conversant.  Appropriate.  Strength 4 out of 5 symmetric throughout.  Cranial nerves II through XII grossly intact  Psychiatric: Flat affect but pleasant and cooperative    Data   Data reviewed today:  I personally reviewed laboratory studies and imaging studies performed the emergency room.  Recent Labs   Lab 01/26/25  1555 01/26/25  1551   WBC  --  9.9   HGB  --  10.8*   MCV  --  78   PLT  --  333   INR 1.22*  --    NA  --  137   POTASSIUM  --  5.0   CHLORIDE  --  99   CO2  --  28   BUN  --  47.1*   CR  --  0.74   ANIONGAP  --  10   NAT  --  9.4   GLC  --  81   ALBUMIN  --  3.0*   PROTTOTAL  --  5.8*   BILITOTAL 1.1 1.2   ALKPHOS  --  1,849*   ALT  --  57   AST  --  105*       Imaging:  Recent Results (from the past 24 hours)   CT Chest (PE) Abdomen Pelvis w Contrast    Narrative    EXAM: CT CHEST PE ABDOMEN PELVIS W CONTRAST  LOCATION: [facility]  DATE: 1/26/2025    INDICATION: Shortness of breath, widely metastatic colon cancer, tailbone pain.  COMPARISON: CT chest, abdomen and pelvis 05/03/2024  TECHNIQUE: CT chest pulmonary angiogram and routine CT abdomen pelvis with IV contrast. Arterial phase through the chest and venous phase through the abdomen and pelvis. Multiplanar reformats and MIP reconstructions were performed. Dose reduction   techniques were used.   CONTRAST: 85 mL Isovue 370    FINDINGS:  ANGIOGRAM CHEST: Pulmonary arteries are normal caliber and negative for pulmonary emboli. Thoracic aorta is negative for dissection. No CT evidence of right heart strain.     LUNGS AND PLEURA: Very large right pleural effusion which results in complete opacification of the right hemithorax and collapse of the entire right lung. 2.6 x 1.8 cm and 1.4 x 1.0 cm pulmonary nodules right lower lung field along the posterior pleural   surface.  Moderate size left pleural effusion with atelectasis involving a significant portion of the left lower lobe.    MEDIASTINUM/AXILLAE: Large right  pleural effusion results in mass effect upon the right side of the heart. No enlarged mediastinal or hilar adenopathy. Anterior right chest wall Port-A-Cath tip distal SVC.    CORONARY ARTERY CALCIFICATION: None.    HEPATOBILIARY: Extensive widespread metastatic disease throughout the liver has markedly progressed    PANCREAS: Normal.    SPLEEN: Splenomegaly has significantly decreased however a metastatic splenic lesions have progressed.    ADRENAL GLANDS: Normal.    KIDNEYS/BLADDER: Small cysts both kidneys. No hydronephrosis.    BOWEL: Colectomy. No evidence for bowel obstruction. Sigmoid diverticulosis.    LYMPH NODES: Normal.    VASCULATURE: Normal.    PELVIC ORGANS: Prostatic enlargement and calcifications. Moderate amount of free pelvic fluid. Small fluid-containing left inguinal hernia.    MUSCULOSKELETAL: Nodular soft tissue implants lower anterior abdominal wall, with a combined size of 4.6 x 2.0 cm. Sclerotic lesions involving the right ninth and 10th ribs. Old right clavicle fracture. Subtle lytic lesions involving the right and left   iliac bones. The sacrum and coccyx appear intact.      Impression    IMPRESSION:  1.  Very large right pleural effusion resulting in complete opacification of the right hemithorax and collapse of the entire right lung.  2.  Moderate-sized left pleural effusion and collapse involving most of the left lower lobe.  3.  Metastatic right lower lobe pulmonary nodules.  4.  Extensive metastatic liver disease has significantly progressed.  5.  Metastatic disease involving the spleen has progressed.  6.  Moderate amount of ascites abdomen and pelvis.  7.  Metastatic implant anterior lower abdominal wall.  8.  Metastatic skeletal disease.

## 2025-01-27 NOTE — PROGRESS NOTES
Care Management Follow Up    Length of Stay (days): 1    Expected Discharge Date: 01/28/2025     Concerns to be Addressed: discharge planning     Patient plan of care discussed at interdisciplinary rounds: Yes    Anticipated Discharge Disposition:       Anticipated Discharge Services:    Anticipated Discharge DME:      Patient/family educated on Medicare website which has current facility and service quality ratings:    Education Provided on the Discharge Plan:    Patient/Family in Agreement with the Plan:      Referrals Placed by CM/SW:    Private pay costs discussed: Not applicable    Discussed  Partnership in Safe Discharge Planning  document with patient/family: No     Handoff Completed: No, handoff not indicated or clinically appropriate    Additional Information:  SW received a call from [facility] ([phone]) who states that they will dis enroll patient from hospice services as he was admitted to the hospital. She states that they are open to taking him back if that is deemed appropriate at the time of discharge.    SW sent out referrals for new LTC.     [facility] Provider Request Status Services Address Phone Fax Patient Preferred   [facility] REFERRAL (REF'L) Pending - Request Sent -- [address] [id number] [phone] --   Current Capacity last updated by Mauri Lofton RN on 1/30/2024  2:23 PM    [facility] [phone]            [facility] (TCU) Pending - Request Sent -- [address] [place] [id number] [phone] --   Current Capacity last updated by Montserrat Samuels MA on 9/3/2024  3:06 PM    Admissions cristiano. [facility] [phone]            [facility] (LTC) Pending - Request Sent -- [address] [facility] [id number] [phone] --   Current Capacity last updated by Tonie Ramos on 4/11/2024 12:24 PM    No on-site Hemodialysis., Unable to take MA Pending. We no longer  have on site dialysis. No weekend coverage.            [facility] (SNF) Pending - Request Sent -- [address] [facility] [id number] [phone] --   [facility] Pending - Request Sent -- [address] [facility] [place] [address] [phone] --   Current Capacity last updated by Montserrat Samuels MA on 12/9/2024 12:09 PM    Please list [facility] facility preference            [facility] RE[facility] (REF[facility]) Declined  no male beds available at this time in LTC. -- -- [phone] --   Current Capacity last updated by Mauri Lofton RN on 10/31/2024  3:13 PM    CAMELIA Huerta, Nathanael Burgos, [facility] ([facility]) Declined  No LTC beds available. -- [address] [id number] [phone] --   Current Capacity last updated by Keerthi Spring MA on 1/9/2025 10:51 AM    1/9 Per Narendra Villalpando is now on hold for admissions due to COVID and Norovirus                Addendum 1040: Writer met with patient. He states he does not want to go to [facility] and he does not want [facility] as he feels that neither of them are responsive to his needs so does not want to use them at discharge. He is okay with a shared room. He asked  to reach out to Urvashi Owens [phone] his  as she has been trying to work on getting patient placed. ROSALEE called and left a message asking for a return call.     Addendum 1220: Writer spoke with Santosh with [facility] and they have a shared room for today potentially. SW sent a referral to [facility] as that is what the patient has wanted. ROSALEE set up a tentative stretcher ride for today between 4225-4214. Stretcher is indicated for hospice, pain on movement and O2 that patient is needing. PCS completed. Writer talked with [facility] and they can sign on tomorrow morning. ROSALEE messaged MD to update her. ROSALEE is waiting  for a confirmation from [facility] if they can take him for sure this afternoon. ROSALEE is also waiting to hear from doctor if discharge can happen today. 3 days of hospice medications would need to be sent with at discharge. Writer updated patient who is in agreement with this but needs to talk with Urvashi his  before agreeing. ROSALEE updated MD.     Addendum 1440: Writer spoke with . Discharge pushed to tomorrow. ROSALEE rescheduled ride for tomorrow between 0880-4652. Writer updated Malu with [facility] as well as [facility] of the change.     Next Steps: Discharge tomorrow between 7027-3993    ANA Mckinney

## 2025-01-27 NOTE — PHARMACY-ADMISSION MEDICATION HISTORY
Pharmacist Admission Medication History    Admission medication history is complete. The information provided in this note is only as accurate as the sources available at the time of the update.    Information Source(s): Facility ([facility] medication list/MAR via N/A    Pertinent Information: Medication list compiled from facility MAR    Changes made to PTA medication list:  Added: All  Deleted: Acetaminophen tabs, Lomotil, loperamide, prochlorperazine  Changed: None    Allergies reviewed with patient and updates made in EHR: yes    Medication History Completed By: Toby Guerra [facility] 1/26/2025 6:45 PM    PTA Med List   Medication Sig Last Dose/Taking    acetaminophen (TYLENOL) 32 mg/mL liquid Take 30 mLs by mouth 3 times daily. 1/26/2025 at  2:00 PM    artificial saliva (BIOTENE MT) SOLN solution Swish and spit 2 sprays in mouth as needed for dry mouth. Unknown    cetirizine (ZYRTEC) 10 MG tablet Take 10 mg by mouth daily. 1/25/2025 at  8:00 PM    diclofenac (VOLTAREN) 50 MG EC tablet Take 50 mg by mouth 2 times daily. 1/26/2025 at  8:00 AM    fentaNYL (DURAGESIC) 50 mcg/hr 72 hr patch Place 1 patch onto the skin every 72 hours. remove old patch. 1/24/2025 at  8:00 AM    ferrous sulfate (FEROSUL) 325 (65 Fe) MG tablet Take 325 mg by mouth three times a week. On Monday, Wednesday, and Friday 1/24/2025 at  7:00 AM    fluticasone (FLONASE) 50 MCG/ACT nasal spray Spray 1 spray into both nostrils daily.  8:00 AM    hyoscyamine (LEVSIN) 0.125 MG tablet Take 0.125 mg by mouth every 4 hours as needed for other (secretions). Unknown    LORazepam (ATIVAN) 0.5 MG tablet Take 0.5 mg by mouth every 4 hours as needed (terminal restlessness). Unknown    magnesium hydroxide (MILK OF MAGNESIA) 400 MG/5ML suspension Take 30 mLs by mouth daily as needed for constipation. Unknown    mirtazapine (REMERON) 15 MG tablet Take 15 mg by mouth at bedtime. 1/25/2025 at  8:00 PM    ondansetron (ZOFRAN) 4 MG tablet Take 4 mg by mouth 3 times  daily as needed for nausea or vomiting. Unknown    oxyCODONE HCl (ROXICODONE) 20 MG TABS immediate release tablet Take 20 mg by mouth every 4 hours. 1/26/2025 at 12:00 PM    oxyCODONE HCl (ROXICODONE) 20 MG TABS immediate release tablet Take 20 mg by mouth every 2 hours as needed (cancer pain). 1/23/2025 at  2:04 PM    polyethylene glycol (MIRALAX) 17 GM/Dose powder Take 17 g by mouth 3 times daily. 1/26/2025 at  2:00 PM    sennosides (SENOKOT) 8.6 MG tablet Take 2 tablets by mouth 2 times daily. 1/26/2025 at  8:00 AM    spironolactone (CAROSPIR) 25 MG/5ML SUSP suspension Take 20 mLs by mouth daily. 1/26/2025 at  8:00 AM

## 2025-01-27 NOTE — CONSULTS
Care Management Initial Consult    General Information  Assessment completed with: PatientBrady  Type of CM/SW Visit: Initial Assessment    Primary Care Provider verified and updated as needed: No   Readmission within the last 30 days: no previous admission in last 30 days      Reason for Consult: discharge planning  Advance Care Planning: Advance Care Planning Reviewed: no concerns identified          Communication Assessment  Patient's communication style: spoken language (English or Bilingual)             Cognitive  Cognitive/Neuro/Behavioral: .WDL except, speech  Level of Consciousness: alert  Arousal Level: opens eyes spontaneously  Orientation: oriented x 4  Mood/Behavior: flat affect     Speech: other (see comments) (slow to respond)    Living Environment:   People in home: facility resident  Brady  Current living Arrangements: residential facility  Name of Facility: [facility]   Able to return to prior arrangements: yes  Living Arrangement Comments: would like another facility    Family/Social Support:  Care provided by: other (see comments) (facility and hospice)  Provides care for: no one, unable/limited ability to care for self  Marital Status: Single  Support system: Facility resident(s)/Staff          Description of Support System: Supportive    Support Assessment: Adequate family and caregiver support    Current Resources:   Patient receiving home care services: No        Community Resources: Hospice  Equipment currently used at home: hospital bed, other (see comments) (lives at TCU on hospice)  Supplies currently used at home: Other (Lives at a TCU on hospice)    Employment/Financial:  Employment Status: disabled        Financial Concerns: none   Referral to Financial Worker: No       Does the patient's insurance plan have a 3 day qualifying hospital stay waiver?  No    Lifestyle & Psychosocial Needs:  Social Drivers of Health     Food Insecurity: Food Insecurity Present (11/5/2024)     Received from [facility]    Hunger Vital Sign     Worried About Running Out of Food in the Last Year: Often true     Ran Out of Food in the Last Year: Often true   Depression: Not at risk (1/4/2024)    PHQ-2     PHQ-2 Score: 1   Housing Stability: High Risk (11/5/2024)    Received from [facility]    Housing Stability     What is your housing situation today?: 1 - I do not have housing (I am staying with others, in a hotel, in a shelter, living outside on ...   Tobacco Use: Low Risk  (11/4/2024)    Received from [facility]    Patient History     Smoking Tobacco Use: Never     Smokeless Tobacco Use: Never     Passive Exposure: Not on file   Financial Resource Strain: High Risk (11/5/2024)    Received from [facility]    Overall Financial Resource Strain (CARDIA)     Difficulty of Paying Living Expenses: Very hard   Alcohol Use: Not on file   Transportation Needs: No Transportation Needs (11/5/2024)    Received from [facility]    PRAPARE - Transportation     Lack of Transportation (Medical): No     Lack of Transportation (Non-Medical): No   Physical Activity: Not on file   Interpersonal Safety: Not At Risk (11/5/2024)    Received from [facility]    Humiliation, Afraid, Rape, and Kick questionnaire     Fear of Current or Ex-Partner: No     Emotionally Abused: No     Physically Abused: No     Sexually Abused: No   Stress: Not on file   Social Connections: Not on file   Health Literacy: Not on file       Functional Status:  Prior to admission patient needed assistance:   Dependent ADLs:: Bathing, Dressing, Incontinence, Positioning, Transfers, Toileting  Dependent IADLs:: Cleaning, Cooking, Laundry, Shopping, Meal Preparation, Medication Management, Money Management, Transportation, Incontinence  Assesssment of Functional Status: Has complex medical needs, requires placement in a facility    Mental Health Status:  Mental Health Status: No Current Concerns       Chemical  Dependency Status:  Chemical Dependency Status: No Current Concerns             Values/Beliefs:  Spiritual, Cultural Beliefs, Denominational Practices, Values that affect care: no               Discussed  Partnership in Safe Discharge Planning  document with patient/family: No    Additional Information:     consulted for discharge planning. Writer received a call from physician regarding the rules if a patient is on hospice and receives medical attention. Writer reached out to [facility] to update and ask questions. Triage nurse stated facility informed her patient called 911 but she was not clear why. Based on the notes, patient called 911 for back pain and shortness of breath. Hospice was updated on patient's status of inpatient. Hospice stated if he is inpatient patient cannot be on hospice.     Writer met contacted physician and informed him on hospice. Writer met with patient at bedside. Writer introduced self and role. Writer explained the call to hospice. Patient stated okay. Writer asked patient why he called 911. Patient stated he called the nurse was not responding to him as his breaths were short. Patient stated he was scared and called 911.     Patient is independent with eating and grooming. Patient is dependent on all other cares. Writer asked about family support. Patient stated he had no favor. After talking with patient he stated he has a brother Tab Estrellaradha  he had not spoken to in 20 years. Patient requested writer to find him to let him know patient is dying.     Staff assisted in finding Iban. Iban was contacted at [phone]. Writer told Iban patient is on hospice. Iban and patient talked, Patient agreed to add brother to his contacts.    Patient stated he did not want to go back to [facility] and he did not want [facility].  Patient has a  Urvashi Owens at [phone] who recommended [facility]. Patient agreed. Patient [place]     Next Steps: discharge placement, hospice referral    Imani PEREZ  [facility]  Inpatient Care Coordination

## 2025-01-27 NOTE — ED NOTES
[facility]  ED Nurse Handoff Report    ED Chief complaint: Shortness of Breath      ED Diagnosis:   Final diagnoses:   Metastatic colon cancer to liver (H)   SOB (shortness of breath)   Hydrothorax       Code Status: Comfort Care    Allergies:   Allergies   Allergen Reactions    Fish-Derived Products Rash    Kiwi Rash    Lactose Rash    Nuts Rash    Wheat Rash       Patient Story: 59 year old male with widely metastatic colon cancer not on chemotherapy presenting from his TCU where he is on hospice for evaluation of increasing pain in his tailbone as well as slowly worsening shortness of breath over the last few weeks.  He denies overt chest pain, chest pressure or heaviness, reports no chest tightness fevers or chills.  He does have some chronic leg swelling that has not changed.  He is not on any chemo or radiation.   Focused Assessment:  SOB, labored breathing, BLE edema, wound to coccyx, c/o back and coccyx pain    Treatments and/or interventions provided: Port accessed, labs drawn, pain meds given. CT  Patient's response to treatments and/or interventions: Pain improved     To be done/followed up on inpatient unit:  IR consult, palliative consult    Does this patient have any cognitive concerns?: Forgetful    Activity level - Baseline/Home:  Total Care  Activity Level - Current:   Total Care    Patient's Preferred language: English   Needed?: No    Isolation: None  Infection: Not Applicable  Patient tested for COVID 19 prior to admission: NO  Bariatric?: No    Vital Signs:   Vitals:    01/26/25 1645 01/26/25 1700 01/26/25 1715 01/26/25 1730   BP: (!) 128/94 108/83 108/80 (!) 122/109   Pulse: 97 93 91 96   Resp: 28 15 12 (!) 36   Temp:       TempSrc:       SpO2: 99% 99% 100% 100%   Weight:       Height:           Cardiac Rhythm:Cardiac Rhythm: Normal sinus rhythm    Was the PSS-3 completed:   Yes  What interventions are required if any?               Family Comments: NA  OBS  brochure/video discussed/provided to patient/family: No               For the majority of the shift this patient's behavior was Green.   Behavioral interventions performed were NA.    ED NURSE PHONE NUMBER: [phone]

## 2025-01-27 NOTE — PLAN OF CARE
Goal Outcome Evaluation:      Plan of Care Reviewed With: patient          Outcome Evaluation: discharge placement with hospice

## 2025-01-27 NOTE — CONSULTS
Palliative Care Consultation Note  [facility]      Patient: Brady Lenz  Date of Admission:  1/26/2025    Requesting Clinician / Team: Dr. Busch/hospitalist  Reason for consult: Pain management, Symptom management     Recommendations & Counseling     GOALS OF CARE:  Comfort focused   Brady affirms decision to continue a comfort focused plan of care and discharge with hospice services.  He is hoping to be placed in a new facility and enroll in a new hospice agency, ideally [facility]    ADVANCE CARE PLANNING:  No health care directive on file. Per system policy, Surrogate Decision-makers for Patients With Diminished Decision-making Capacity offers guidance on possible decision-makers.  No person has been identified as a surrogate decision maker.   There is no POLST form on file, defer to patient and/or next of kin for decisions   Code status: No CPR- Do NOT Intubate    MEDICAL MANAGEMENT:  #Malignant pain.  He reports some improvement with being admitted to the hospital and status post thoracentesis, but feels there is still room for some adjustment.  Increase fentanyl patch to 100 mcg topical every 72 hours  Continue oxycodone 20 mg p.o. every 4 hours with 20 mg p.o. every 2 hours as needed breakthrough pain, shortness of breath  Continue Voltaren 50 mg p.o. twice daily  For constipation prophylaxis, Brady feels that his bowels are too loose.  Will thus reduce MiraLAX to twice daily from 3 times daily and continue senna 2 tablets p.o. twice daily    #Dyspnea.  Related to right pleural effusion, presumed malignant.  Status post therapeutic thoracentesis with 1500 cc removed.  Dyspnea improved postprocedure.  We talked about the potential for an indwelling Pleurx catheter, if fluid quickly reaccumulates.     PSYCHOSOCIAL/SPIRITUAL SUPPORT:  Not discussed    Palliative Care will continue to follow. Thank you for the consult and allowing us to aid in the care of Brady Lenz.    These  recommendations have been discussed with Dr. Oleary, unit  [facility].    Chart documentation was completed, in part, with [employer] voice-recognition software. Even though reviewed, some grammatical, spelling, and word errors may remain.     GABBY Dutton CNP  Securely message with Vocera (more info)  Text page via [facility] Paging/Directory     Palliative Summary/HPI     Brady LEE Delfin is a 59 year old male with a past medical history significant for widely metastatic colon cancer with mets to the liver, lung, bone, spleen, anterior abdominal wall and extensive retroperitoneal nodes currently enrolled in [facility] hospice as well as a history of homelessness who presents with slowly escalating shortness of breath over the last several weeks, possibly longer. He did not feel that it was well-controlled. CT chest shows very large right pleural effusion with complete opacification of the right hemithorax and collapse of the entire right lung. He is status post therapeutic right thoracentesis with 1500 cc removed.    Today, the patient was seen for:  Pain and symptom management    Palliative Care Summary:   Met with Brady.     I introduced our role as an extra layer of support and how we help patients and families dealing with serious, potentially life-limiting illnesses. I explained the composition of the palliative care team.  Palliative care helps patients and families navigate their care while focusing on the whole person; providing emotional, social and spiritual support  Palliative care often assists with symptom management, information sharing about what to expect from the illness, available treatment options and what effect those options may have on the disease course, and provide effective communication and caring support.    Prognosis, Goals, & Planning:    Functional Status just prior to this current hospitalization:  Lives in a nursing facility, [facility]    Prognosis, Goals, and/or  Advance Care Planning:  Comfort focused   Brady affirms decision to continue a comfort focused plan of care and discharge with hospice services.  He is hoping to be placed in a new facility and enroll in a new hospice agency, ideally [place] [facility]    Code Status was addressed today:   No already DNR/DNI    Patient's decision making preferences: with input from medical clinicians and loved ones        Patient has decision-making capacity today for complex decisions:Intact          Coping, Meaning, & Spirituality:   Mood, coping, and/or meaning in the context of serious illness were addressed today: Yes    Social:   Living situation: Resides in a nursing facility with hospice    Medications:  I have reviewed this patient's medication profile and medications from this hospitalization. Notable medications:  APAP 960 mg p.o. 3 times daily  Zyrtec  Voltaren 50 mg p.o. twice daily  Fentanyl 100 mcg patch topical every 72 hours  Remeron 15 mg p.o. nightly  Oxycodone 20 mg p.o. every 4 hours scheduled and oxycodone 20 mg p.o. every 2 hours as needed breakthrough pain, shortness of breath  MiraLAX twice daily  Senna 2 tablets p.o. twice daily  Spironolactone 100 mg p.o. daily  Dilaudid 1 mg IV every 15 minutes as needed severe pain  Ativan 0.5 mg p.o. every 4 hours as needed terminal restlessness    ROS:  Comprehensive ROS is reviewed and is negative except as here & per HPI: N/A    Physical Exam   Vital Signs with Ranges  Temp:  [97.3  F (36.3  C)-97.5  F (36.4  C)] 97.3  F (36.3  C)  Pulse:  [] 76  Resp:  [12-36] 16  BP: (102-128)/() 102/71  SpO2:  [97 %-100 %] 97 %  170 lbs 0 oz  CONSTITUTIONAL: Chronically ill profoundly cachectic man seen resting in bed in NAD, A&Ox3. Calm and cooperative.  HEENT: NCAT  RESPIRATORY: NL respiratory effort on 4 L NC  NEUROLOGIC: Appropriately responsive during interview  PSYCH: Affect flat    Data reviewed:  Recent imaging independently reviewed, my comments on pertinents:    Results for orders placed or performed during the hospital encounter of 01/26/25   CT Chest (PE) Abdomen Pelvis w Contrast    Impression    IMPRESSION:  1.  Very large right pleural effusion resulting in complete opacification of the right hemithorax and collapse of the entire right lung.  2.  Moderate-sized left pleural effusion and collapse involving most of the left lower lobe.  3.  Metastatic right lower lobe pulmonary nodules.  4.  Extensive metastatic liver disease has significantly progressed.  5.  Metastatic disease involving the spleen has progressed.  6.  Moderate amount of ascites abdomen and pelvis.  7.  Metastatic implant anterior lower abdominal wall.  8.  Metastatic skeletal disease.   US Thoracentesis    Impression    IMPRESSION:    Successful ultrasound-guided thoracentesis.     Recent lab data independently reviewed, my comments on pertinents:   Na 137  K 5  Creat 0.74  Albumin 3  Alk phos 1849  ALT 57    Bilirubin 1.1  WBC 9.9  Hgb 10.8  Plt 333  INR 1.22    Medical Decision Making   75 MINUTES SPENT BY ME on the date of service doing chart review, history, exam, documentation & further activities per the note.

## 2025-01-27 NOTE — CONSULTS
SW: Social Work is following. Please see note from 1/26. Patient came from [facility] with [facility]. It appears patient does not want to return. SW will send out more referrals but, patient may have to return to [facility] if no other beds are available.     ANA Mckinney

## 2025-01-27 NOTE — PROGRESS NOTES
RECEIVING UNIT ED HANDOFF REVIEW    ED Nurse Handoff Report was reviewed by: Stacy Cohen RN on January 26, 2025 at 11:43 PM

## 2025-01-27 NOTE — PROGRESS NOTES
[facility]    Medicine Progress Note - Hospitalist Service    Date of Admission:  1/26/2025    Assessment & Plan   Brady Lenz is a 59 year old male with widely metastatic colon cancer [facility].  He has metastases extensive to the liver, lung,bone ,spleen anterior abdominal wall and extensive retroperitoneal node, history of homelessness who presents with slowly escalating shortness of breath over the last several weeks possibly longer.  He did not feel that it was well-controlled.  CT chest shows very large right pleural effusion with complete opacification of the right hemithorax and collapse of the entire right lung.  IR consultation for thoracentesis.     Widely metastatic colon cancer  Large right pleural effusion s/p thoracentesis on 1/26  Hospice/comfort care  *[facility] 2 months.  He has metastases to liver, lung, bone, spleen, anterior abdominal wall and extensive retroperitoneal nodes.  He has chronic but worsening lower extremity edema.  Recent ultrasound lower extremities unremarkable.  *Now presents with slowly escalating shortness of breath over the last several weeks.  *CT chest showed very large right pleural effusion resulting in complete opacification of the right hemithorax and collapse of the entire right lung.  Is also moderate left pleural effusion.  He has extensive metastatic findings to the right lower lobe spleen.  Moderate ascites metastatic skeletal disease.  Extensive metastatic liver disease has significantly progressed.  *on presentation, moderately tachypneic.  Requiring minimal oxygen.  Hemodynamically stable.  *s/p right thoracentesis on 1/26 with removal of 1.5 L of clear yellow fluid. Improved symptom of shortness of breath following thoracentesis    - patient goal is to continue hospice/comfort care status  - palliative care consulted for pain/symptom management: PTA Fentanyl patch  increased to 100mcg q72 hours, continue with PTA Oxycodone 20mg q4hrs and 20mg q2 hours as needed for breakthrough pain  - Lorazepam prn available   - Patient does not want to be followed by [facility] or return to his prior facility. Care management following,  social work able to secure different facility with [facility]  - discussed with patient regarding potential discharge today, he feels with medication adjustments, prefer to see how he will do prior to discharge, anticipate discharge on 1/28 with hospice care  - could consider pleurX cathter with hospice after discharge  if effusion re accumulates quickly  - continue supplemental oxygen for comfort          Diet: Regular Diet Adult    DVT Prophylaxis: Pneumatic Compression Devices  Fairchild Catheter: Not present  Lines: PRESENT      Port a Cath 12/29/23 Single Lumen Right Chest wall-Site Assessment: WDL      Cardiac Monitoring: None  Code Status: No CPR- Do NOT Intubate      Clinically Significant Risk Factors Present on Admission               # Hypoalbuminemia: Lowest albumin = 3 g/dL at 1/26/2025  3:51 PM, will monitor as appropriate  # Coagulation Defect: INR = 1.22 (Ref range: 0.85 - 1.15) and/or PTT = N/A, will monitor for bleeding         # Anemia: based on hgb <11           # Financial/Environmental Concerns: none         Social Drivers of Health    Food Insecurity: Food Insecurity Present (11/5/2024)    Received from [facility]    Hunger Vital Sign     Worried About Running Out of Food in the Last Year: Often true     Ran Out of Food in the Last Year: Often true   Housing Stability: High Risk (11/5/2024)    Received from [facility]    Housing Stability     What is your housing situation today?: 1 - I do not have housing (I am staying with others, in a hotel, in a shelter, living outside on ...   Financial Resource Strain: High Risk (11/5/2024)    Received from [facility]    Overall Financial Resource Strain (CARDIA)      Difficulty of Paying Living Expenses: Very hard          Disposition Plan     Medically Ready for Discharge: Anticipated Tomorrow             Valerie Oleary MD  Hospitalist Service  [facility]  Securely message with Ben (more info)  Text page via giftee Paging/Directory   ______________________________________________________________________    Interval History   Reports breathing feels improved after thoracentesis and stable. Feels pain is reasonable.   Discussed with patient regarding possible discharge today, but prefers to see how he does with medication adjustment today.     Physical Exam   Vital Signs: Temp: 97.3  F (36.3  C) Temp src: Oral BP: 102/71 Pulse: 76   Resp: 16 SpO2: 97 % O2 Device: Nasal cannula Oxygen Delivery: 4 LPM  Weight: 170 lbs 0 oz    General Appearance: Alert, awake, flat affect,   Respiratory: non labored breathing, decreased breath sounds at the bases  Skin: warm and dry      Medical Decision Making       40 MINUTES SPENT BY ME on the date of service doing chart review, history, exam, documentation & further activities per the note.  MANAGEMENT DISCUSSED with the following over the past 24 hours: patient, RN, care management, palliative care   NOTE(S)/MEDICAL RECORDS REVIEWED over the past 24 hours: nursing notes, palliative care note, care management note       Data     I have personally reviewed the following data over the past 24 hrs:    9.9  \   10.8 (L)   / 333     137 99 47.1 (H) /  81   5.0 28 0.74 \     ALT: 57 AST: 105 (H) AP: 1,849 (H) TBILI: 1.1   ALB: 3.0 (L) TOT PROTEIN: 5.8 (L) LIPASE: N/A     INR:  1.22 (H) PTT:  N/A   D-dimer:  N/A Fibrinogen:  N/A       Imaging results reviewed over the past 24 hrs:   Recent Results (from the past 24 hours)   CT Chest (PE) Abdomen Pelvis w Contrast    Narrative    EXAM: CT CHEST PE ABDOMEN PELVIS W CONTRAST  LOCATION: [facility]  DATE: 1/26/2025    INDICATION: Shortness of  breath, widely metastatic colon cancer, tailbone pain.  COMPARISON: CT chest, abdomen and pelvis 05/03/2024  TECHNIQUE: CT chest pulmonary angiogram and routine CT abdomen pelvis with IV contrast. Arterial phase through the chest and venous phase through the abdomen and pelvis. Multiplanar reformats and MIP reconstructions were performed. Dose reduction   techniques were used.   CONTRAST: 85 mL Isovue 370    FINDINGS:  ANGIOGRAM CHEST: Pulmonary arteries are normal caliber and negative for pulmonary emboli. Thoracic aorta is negative for dissection. No CT evidence of right heart strain.     LUNGS AND PLEURA: Very large right pleural effusion which results in complete opacification of the right hemithorax and collapse of the entire right lung. 2.6 x 1.8 cm and 1.4 x 1.0 cm pulmonary nodules right lower lung field along the posterior pleural   surface.  Moderate size left pleural effusion with atelectasis involving a significant portion of the left lower lobe.    MEDIASTINUM/AXILLAE: Large right pleural effusion results in mass effect upon the right side of the heart. No enlarged mediastinal or hilar adenopathy. Anterior right chest wall Port-A-Cath tip distal SVC.    CORONARY ARTERY CALCIFICATION: None.    HEPATOBILIARY: Extensive widespread metastatic disease throughout the liver has markedly progressed    PANCREAS: Normal.    SPLEEN: Splenomegaly has significantly decreased however a metastatic splenic lesions have progressed.    ADRENAL GLANDS: Normal.    KIDNEYS/BLADDER: Small cysts both kidneys. No hydronephrosis.    BOWEL: Colectomy. No evidence for bowel obstruction. Sigmoid diverticulosis.    LYMPH NODES: Normal.    VASCULATURE: Normal.    PELVIC ORGANS: Prostatic enlargement and calcifications. Moderate amount of free pelvic fluid. Small fluid-containing left inguinal hernia.    MUSCULOSKELETAL: Nodular soft tissue implants lower anterior abdominal wall, with a combined size of 4.6 x 2.0 cm. Sclerotic  lesions involving the right ninth and 10th ribs. Old right clavicle fracture. Subtle lytic lesions involving the right and left   iliac bones. The sacrum and coccyx appear intact.      Impression    IMPRESSION:  1.  Very large right pleural effusion resulting in complete opacification of the right hemithorax and collapse of the entire right lung.  2.  Moderate-sized left pleural effusion and collapse involving most of the left lower lobe.  3.  Metastatic right lower lobe pulmonary nodules.  4.  Extensive metastatic liver disease has significantly progressed.  5.  Metastatic disease involving the spleen has progressed.  6.  Moderate amount of ascites abdomen and pelvis.  7.  Metastatic implant anterior lower abdominal wall.  8.  Metastatic skeletal disease.   US Thoracentesis    [facility] RADIOLOGY  LOCATION: [facility]  DATE: 1/26/2025    PROCEDURE: IMAGING GUIDED RIGHT THORACENTESIS    INTERVENTIONAL RADIOLOGIST: BARRY Babin MD.    INDICATION: Large right pleural effusion.    CONSENT: The risks, benefits and alternatives of an imaging guided thoracentesis were discussed with the patient in detail. All questions were answered. Informed consent was given to proceed with the procedure.    SEDATION: None.    COMPLICATIONS: No immediate complications.    PROCEDURE: A limited right chest ultrasound was performed which demonstrated large-volume pleural fluid.    The patient was then prepped and draped in the standard sterile fashion. The overlying skin and subcutaneous soft tissues were anesthetized utilizing 1% lidocaine. A small 3 mm transverse incision was made. Under ultrasound guidance a 5 [place] Yueh   catheter was inserted into the pleural effusion.    A total of 1500 mL of clear yellow pleural fluid was removed and sent to the lab if diagnostic analysis was requested.      Impression    IMPRESSION:    Successful ultrasound-guided thoracentesis.

## 2025-01-27 NOTE — PROGRESS NOTES
Care Management Follow Up    Length of Stay (days): 1    Expected Discharge Date: 01/28/2025     Concerns to be Addressed: discharge planning     Patient plan of care discussed at interdisciplinary rounds: No    Anticipated Discharge Disposition:                Anticipated Discharge Services:    Anticipated Discharge DME:      Patient/family educated on Medicare website which has current facility and service quality ratings:    Education Provided on the Discharge Plan:    Patient/Family in Agreement with the Plan:      Referrals Placed by CM/SW:    Private pay costs discussed: Not applicable    Discussed  Partnership in Safe Discharge Planning  document with patient/family: No     Handoff Completed: No, handoff not indicated or clinically appropriate    Additional Information:   received call from Seble with [facility] requesting information on patient hospital status. Seble stated that if patient has been admitted to hospital [facility] will pause current services. Writer unable to locate patient in ED, but was able to locate on [address]. Writer provided name and phone number for , Adrianna, on that floor to further coordinate hospice services and care plan. No further actions taken.     Next Steps: None as patient working with social work colleague, ANA Shabazz  [facility]  Inpatient Care Management

## 2025-01-28 PROCEDURE — 99239 HOSP IP/OBS DSCHRG MGMT >30: CPT | Performed by: HOSPITALIST

## 2025-01-28 PROCEDURE — 250N000013 HC RX MED GY IP 250 OP 250 PS 637: Performed by: EMERGENCY MEDICINE

## 2025-01-28 PROCEDURE — G0378 HOSPITAL OBSERVATION PER HR: HCPCS

## 2025-01-28 PROCEDURE — 250N000013 HC RX MED GY IP 250 OP 250 PS 637: Performed by: NURSE PRACTITIONER

## 2025-01-28 PROCEDURE — 250N000013 HC RX MED GY IP 250 OP 250 PS 637: Performed by: INTERNAL MEDICINE

## 2025-01-28 PROCEDURE — 250N000011 HC RX IP 250 OP 636: Performed by: INTERNAL MEDICINE

## 2025-01-28 RX ORDER — MIRTAZAPINE 15 MG/1
15 TABLET, FILM COATED ORAL AT BEDTIME
Qty: 15 TABLET | Refills: 0 | Status: SHIPPED | OUTPATIENT
Start: 2025-01-28

## 2025-01-28 RX ORDER — SALIVA STIMULANT COMB. NO.3
2 SPRAY, NON-AEROSOL (ML) MUCOUS MEMBRANE PRN
Qty: 44.3 ML | Refills: 0 | Status: SHIPPED | OUTPATIENT
Start: 2025-01-28

## 2025-01-28 RX ORDER — ATROPINE SULFATE 10 MG/ML
1 SOLUTION/ DROPS OPHTHALMIC EVERY 4 HOURS PRN
Qty: 5 ML | Refills: 1 | Status: SHIPPED | OUTPATIENT
Start: 2025-01-28

## 2025-01-28 RX ORDER — SENNOSIDES 8.6 MG
2 TABLET ORAL 2 TIMES DAILY
Qty: 15 TABLET | Refills: 0 | Status: SHIPPED | OUTPATIENT
Start: 2025-01-28

## 2025-01-28 RX ORDER — POLYETHYLENE GLYCOL 3350 17 G/17G
17 POWDER, FOR SOLUTION ORAL 2 TIMES DAILY
Qty: 116 G | Refills: 0 | Status: SHIPPED | OUTPATIENT
Start: 2025-01-28

## 2025-01-28 RX ORDER — FENTANYL 50 UG/1
1 PATCH TRANSDERMAL
Qty: 3 PATCH | Refills: 0 | Status: SHIPPED | OUTPATIENT
Start: 2025-01-28 | End: 2025-01-28

## 2025-01-28 RX ORDER — ONDANSETRON 4 MG/1
4 TABLET, FILM COATED ORAL 3 TIMES DAILY PRN
Qty: 15 TABLET | Refills: 0 | Status: SHIPPED | OUTPATIENT
Start: 2025-01-28

## 2025-01-28 RX ORDER — CETIRIZINE HYDROCHLORIDE 10 MG/1
10 TABLET ORAL DAILY
Qty: 15 TABLET | Refills: 0 | Status: SHIPPED | OUTPATIENT
Start: 2025-01-28

## 2025-01-28 RX ORDER — HYOSCYAMINE SULFATE 0.125 MG
0.12 TABLET ORAL EVERY 4 HOURS PRN
Qty: 15 TABLET | Refills: 0 | Status: SHIPPED | OUTPATIENT
Start: 2025-01-28

## 2025-01-28 RX ORDER — FENTANYL 100 UG/1
1 PATCH TRANSDERMAL
Qty: 10 PATCH | Refills: 0 | Status: SHIPPED | OUTPATIENT
Start: 2025-01-30

## 2025-01-28 RX ORDER — FLUTICASONE PROPIONATE 50 MCG
1 SPRAY, SUSPENSION (ML) NASAL DAILY
Qty: 11.1 ML | Refills: 0 | Status: SHIPPED | OUTPATIENT
Start: 2025-01-28

## 2025-01-28 RX ORDER — LORAZEPAM 0.5 MG/1
0.5 TABLET ORAL EVERY 4 HOURS PRN
Qty: 30 TABLET | Refills: 0 | Status: SHIPPED | OUTPATIENT
Start: 2025-01-28

## 2025-01-28 RX ADMIN — CETIRIZINE HYDROCHLORIDE 10 MG: 10 TABLET, FILM COATED ORAL at 08:36

## 2025-01-28 RX ADMIN — Medication 2 SPRAY: at 00:45

## 2025-01-28 RX ADMIN — Medication 5 ML: at 10:31

## 2025-01-28 RX ADMIN — ACETAMINOPHEN 960 MG: 325 SUSPENSION ORAL at 08:36

## 2025-01-28 RX ADMIN — SENNOSIDES 2 TABLET: 8.6 TABLET, FILM COATED ORAL at 08:36

## 2025-01-28 RX ADMIN — OXYCODONE HYDROCHLORIDE 20 MG: 5 TABLET ORAL at 08:36

## 2025-01-28 RX ADMIN — POLYETHYLENE GLYCOL 3350 17 G: 17 POWDER, FOR SOLUTION ORAL at 08:36

## 2025-01-28 RX ADMIN — Medication 5 ML: at 05:47

## 2025-01-28 RX ADMIN — OXYCODONE HYDROCHLORIDE 20 MG: 5 TABLET ORAL at 04:25

## 2025-01-28 RX ADMIN — DICLOFENAC SODIUM 50 MG: 25 TABLET, DELAYED RELEASE ORAL at 08:36

## 2025-01-28 RX ADMIN — OXYCODONE HYDROCHLORIDE 20 MG: 5 TABLET ORAL at 00:43

## 2025-01-28 ASSESSMENT — ACTIVITIES OF DAILY LIVING (ADL)
ADLS_ACUITY_SCORE: 82
ADLS_ACUITY_SCORE: 83
ADLS_ACUITY_SCORE: 82

## 2025-01-28 NOTE — PLAN OF CARE
Goal Outcome Evaluation:    Orientation: A&Ox4  Aggression Stop Light: Green  Activity: A1 GB/W  Diet/BS Checks: Reg  Tele:  N/A  IV Access/Drains: R port HL  Pain Management: Scheduled oxy for back pain and fentanyl patch in place  Abnormal VS/Results: Deferred d/t comfort cares  Bowel/Bladder: Cont b/b. Ext cath in place overnight  Skin/Wounds: Coccyx wound  Consults: 4+ BLE edema  D/C Disposition: 1/28 stretcher ride [id number]  Other Info:   -Comfort cares

## 2025-01-28 NOTE — DISCHARGE SUMMARY
Patient discharged at 11:55 AM to Discharged to long-term care facility  IV was discontinued. Pain at time of discharge was 4/10. Belongings returned to patient.  Discharge instructions and medications reviewed with patient.  Patient verbalized understanding and all questions were answered.  Prescriptions given to patient.  At time of discharge, patient condition was stable and left the unit by stretcher escorted by [facility] transport.

## 2025-01-28 NOTE — PLAN OF CARE
Comfort cares continued. No c/o shortness of breath this shift. 4L O2 via NC per patient request for comfort. Scheduled oxycodone and tylenol providing adequate pain relief. No PRN meds given this shift. Palliative met with patient. Fentanyl patch increased, new patch applied to L abdomen/ribcage. Port heparin locked. Sat up in chair this afternoon. Assist x1 + gait-belt + walker. Fair appetite/intake. External catheter overnight otherwise uses urinal while awake or up in chair. Medium BM this afternoon. SW following. [facility] has bed available. Plan for discharge tomorrow with hospice. Needs 3 days of comfort meds filled. Stretcher transport arranged for [phone].

## 2025-01-28 NOTE — PROGRESS NOTES
Care Management Discharge Note    Discharge Date: 01/28/2025       Discharge Disposition: Hospice, Skilled Nursing Facility    Discharge Services: None    Discharge DME: None    Discharge Transportation: health plan transportation    Private pay costs discussed: Not applicable    Does the patient's insurance plan have a 3 day qualifying hospital stay waiver?  No    PAS Confirmation Code:    Patient/family educated on Medicare website which has current facility and service quality ratings: yes    Education Provided on the Discharge Plan: Yes  Persons Notified of Discharge Plans: Patient and  Urvashi   Patient/Family in Agreement with the Plan: yes    Handoff Referral Completed: No, handoff not indicated or clinically appropriate    Additional Information:  Patient will be discharging today to [facility] with [facility]. Transport is set up for this morning between [phone]. ROSALEE faxed orders to [facility] and [facility]. ROSALEE talked to patients  Urvashi this morning and she was going to talk with patient about agreeing to go to this facility as it is a good place. Urvashi is going to work on getting all of patients belongings from [facility] and bringing them to [facility].     ANA Mckinney

## 2025-01-28 NOTE — DISCHARGE SUMMARY
[facility]    Discharge Summary  Hospitalist    Date of Admission:  1/26/2025  Date of Discharge:  1/28/2025    Discharge Diagnoses      Metastatic colon cancer to liver (H)  SOB (shortness of breath)  Hydrothorax  Hospice care patient    History of Present Illness   Brady Lenz is an 59 year old male who presented for hospice care in the setting of metastatic colon cancer    Hospital Course   Brady Lenz was admitted on 1/26/2025.  The following problems were addressed during his hospitalization:    Brady Lenz is a 59 year old male with widely metastatic colon cancer on hospice with [facility] hosp[facility].  He has metastases extensive to the liver, lung,bone ,spleen anterior abdominal wall and extensive retroperitoneal node, history of homelessness who presents with slowly escalating shortness of breath over the last several weeks possibly longer.  He did not feel that it was well-controlled.  CT chest shows very large right pleural effusion with complete opacification of the right hemithorax and collapse of the entire right lung.  IR consultation for thoracentesis.     Widely metastatic colon cancer  Large right pleural effusion s/p thoracentesis on 1/26  Hospice/comfort care  *patient has been on [facility] hospice for approximately 2 months.  He has metastases to liver, lung, bone, spleen, anterior abdominal wall and extensive retroperitoneal nodes.  He has chronic but worsening lower extremity edema.  Recent ultrasound lower extremities unremarkable.  *Now presents with slowly escalating shortness of breath over the last several weeks.  *CT chest showed very large right pleural effusion resulting in complete opacification of the right hemithorax and collapse of the entire right lung.  Is also moderate left pleural effusion.  He has extensive metastatic findings to the right lower lobe spleen.  Moderate ascites metastatic skeletal disease.  Extensive metastatic liver disease has  significantly progressed.  *on presentation, moderately tachypneic.  Requiring minimal oxygen.  Hemodynamically stable.  *s/p right thoracentesis on 1/26 with removal of 1.5 L of clear yellow fluid. Improved symptom of shortness of breath following thoracentesis    - patient goal is to continue hospice/comfort care status  - palliative care consulted for pain/symptom management: PTA Fentanyl patch increased to 100mcg q72 hours, continue with PTA Oxycodone 20mg q4hrs and 20mg q2 hours as needed for breakthrough pain  - Lorazepam prn available   - Patient does not want to be followed by [facility] hospice or return to his prior facility. Care management following,  social work able to secure different facility with [facility]  -Patient was discharged in stable condition with a supply of his medications on 1/28/2025  - could consider pleurX cathter with hospice after discharge  if effusion re accumulates quickly  - continue supplemental oxygen for comfort     Clinically Significant Risk Factors Present on Admission               # Hypoalbuminemia: Lowest albumin = 3 g/dL at 1/26/2025  3:51 PM, will monitor as appropriate  # Coagulation Defect: INR = 1.22 (Ref range: 0.85 - 1.15) and/or PTT = N/A, will monitor for bleeding                  # Financial/Environmental Concerns: none          Amada Hobbs MD, MD        Code Status   Comfort Care       Primary Care Physician   Physician No Ref-Primary    Physical Exam                      Vitals:    01/26/25 1513   Weight: 77.1 kg (170 lb)     Vital Signs with Ranges     I/O last 3 completed shifts:  In: 840 [P.O.:840]  Out: 500 [Urine:500]    Physical Exam  Constitutional:       Comments: Very ill-appearing   Musculoskeletal:      Right lower leg: Edema present.      Left lower leg: Edema present.           Discharge Disposition   Discharged to long-term care facility  Condition at discharge: Terminal    Consultations This Hospital Stay   INTERVENTIONAL RADIOLOGY  ADULT/PEDS IP CONSULT  CARE MANAGEMENT / SOCIAL WORK IP CONSULT  PALLIATIVE CARE ADULT IP CONSULT  CARE MANAGEMENT / SOCIAL WORK IP CONSULT    Time Spent on this Encounter   I, Amada Hobbs MD, personally saw the patient today and spent greater than 30 minutes discharging this patient.    Discharge Orders      General info for SNF    Length of Stay Estimate: Long Term Care  Condition at Discharge: Stable  Level of care:skilled   Rehabilitation Potential: Poor  Admission H&P remains valid and up-to-date: Yes  Recent Chemotherapy: N/A  Use Nursing Home Standing Orders: Yes     Mantoux instructions    Give two-step Mantoux (PPD) Per Facility Policy Yes     Reason for your hospital stay    Presented for shortness of breath. Found to have large pleural effusion s/p right sided thoracentesis.     Activity - Up with assistive device     Activity - Up with nursing assistance     Follow Up and recommended labs and tests    Follow up with [facility]     IV access    Port-a-Cath.     Oxygen (SNF/TCU) Discharge     Diet    Follow this diet upon discharge: Current Diet:Orders Placed This Encounter      Regular Diet Adult     Discharge Medications   Current Discharge Medication List        START taking these medications    Details   atropine 1 % ophthalmic solution Take 1 drop by mouth, place under tongue or place inside cheek every 4 hours as needed for secretions.    Associated Diagnoses: Hospice care patient      naloxone (NARCAN) 4 MG/0.1ML nasal spray Spray 1 spray (4 mg) into one nostril alternating nostrils as needed for opioid reversal. every 2-3 minutes until assistance arrives    Associated Diagnoses: Metastatic colon cancer to liver (H)           CONTINUE these medications which have CHANGED    Details   fentaNYL (DURAGESIC) 50 mcg/hr 72 hr patch Place 1 patch onto the skin every 72 hours. remove old patch.  Qty: 3 patch, Refills: 0    Associated Diagnoses: Hospice care patient      LORazepam (ATIVAN) 0.5 MG  tablet Take 1 tablet (0.5 mg) by mouth or place under tongue every 4 hours as needed for anxiety (restlessness).  Qty: 30 tablet, Refills: 0    Associated Diagnoses: Hospice care patient      !! oxyCODONE HCl (ROXICODONE) 20 MG TABS immediate release tablet Take 20 mg by mouth every 4 hours.  Qty: 18 tablet, Refills: 0    Associated Diagnoses: Hospice care patient      !! oxyCODONE HCl (ROXICODONE) 20 MG TABS immediate release tablet Take 20 mg by mouth every 2 hours as needed (cancer pain).  Qty: 12 tablet, Refills: 0    Associated Diagnoses: Hospice care patient      polyethylene glycol (MIRALAX) 17 GM/Dose powder Take 17 g by mouth 2 times daily.    Associated Diagnoses: Hospice care patient       !! - Potential duplicate medications found. Please discuss with provider.        CONTINUE these medications which have NOT CHANGED    Details   acetaminophen (TYLENOL) 32 mg/mL liquid Take 30 mLs by mouth 3 times daily.      artificial saliva (BIOTENE MT) SOLN solution Swish and spit 2 sprays in mouth as needed for dry mouth.      cetirizine (ZYRTEC) 10 MG tablet Take 10 mg by mouth daily.      diclofenac (VOLTAREN) 50 MG EC tablet Take 50 mg by mouth 2 times daily.      fluticasone (FLONASE) 50 MCG/ACT nasal spray Spray 1 spray into both nostrils daily.      hyoscyamine (LEVSIN) 0.125 MG tablet Take 0.125 mg by mouth every 4 hours as needed for other (secretions).      magnesium hydroxide (MILK OF MAGNESIA) 400 MG/5ML suspension Take 30 mLs by mouth daily as needed for constipation.      mirtazapine (REMERON) 15 MG tablet Take 15 mg by mouth at bedtime.      ondansetron (ZOFRAN) 4 MG tablet Take 4 mg by mouth 3 times daily as needed for nausea or vomiting.      sennosides (SENOKOT) 8.6 MG tablet Take 2 tablets by mouth 2 times daily.           STOP taking these medications       ferrous sulfate (FEROSUL) 325 (65 Fe) MG tablet Comments:   Reason for Stopping:         spironolactone (CAROSPIR) 25 MG/5ML SUSP suspension  Comments:   Reason for Stopping:             Allergies   Allergies   Allergen Reactions    Fish-Derived Products Rash    Kiwi Rash    Lactose Rash    Nuts Rash    Wheat Rash     Data   Recent Labs   Lab Test 01/26/25  1555 01/26/25  1551 06/03/24  0834 05/20/24  0935 11/02/23  0607 10/30/23  0645 10/25/23  2246 10/25/23  0621   WBC  --  9.9 3.1* 3.4*   < >  --    < >  --    HGB  --  10.8* 14.4 15.7   < >  --    < >  --    MCV  --  78 92 92   < >  --    < >  --    PLT  --  333 146* 200   < > 281   < >  --    INR 1.22*  --   --   --   --  1.24*  --  1.38*    < > = values in this interval not displayed.      Recent Labs   Lab Test 01/26/25  1551 05/06/24  0715 04/22/24  0851 04/08/24  0810 03/25/24  0850    140  --   --  141   POTASSIUM 5.0 4.0  --   --  4.3   CHLORIDE 99 104  --   --  103   CO2 28 20*  --   --  21*   BUN 47.1* 18.6  --   --  8.3   CR 0.74 0.62*  0.62* 0.70   < > 0.61*  0.58*   ANIONGAP 10 16*  --   --  17*   NAT 9.4 9.0  --   --  9.2   GLC 81 83  --   --  83    < > = values in this interval not displayed.         Results for orders placed or performed during the hospital encounter of 01/26/25   CT Chest (PE) Abdomen Pelvis w Contrast    Narrative    EXAM: CT CHEST PE ABDOMEN PELVIS W CONTRAST  LOCATION: [facility]  DATE: 1/26/2025    INDICATION: Shortness of breath, widely metastatic colon cancer, tailbone pain.  COMPARISON: CT chest, abdomen and pelvis 05/03/2024  TECHNIQUE: CT chest pulmonary angiogram and routine CT abdomen pelvis with IV contrast. Arterial phase through the chest and venous phase through the abdomen and pelvis. Multiplanar reformats and MIP reconstructions were performed. Dose reduction   techniques were used.   CONTRAST: 85 mL Isovue 370    FINDINGS:  ANGIOGRAM CHEST: Pulmonary arteries are normal caliber and negative for pulmonary emboli. Thoracic aorta is negative for dissection. No CT evidence of right heart strain.     LUNGS AND PLEURA: Very  large right pleural effusion which results in complete opacification of the right hemithorax and collapse of the entire right lung. 2.6 x 1.8 cm and 1.4 x 1.0 cm pulmonary nodules right lower lung field along the posterior pleural   surface.  Moderate size left pleural effusion with atelectasis involving a significant portion of the left lower lobe.    MEDIASTINUM/AXILLAE: Large right pleural effusion results in mass effect upon the right side of the heart. No enlarged mediastinal or hilar adenopathy. Anterior right chest wall Port-A-Cath tip distal SVC.    CORONARY ARTERY CALCIFICATION: None.    HEPATOBILIARY: Extensive widespread metastatic disease throughout the liver has markedly progressed    PANCREAS: Normal.    SPLEEN: Splenomegaly has significantly decreased however a metastatic splenic lesions have progressed.    ADRENAL GLANDS: Normal.    KIDNEYS/BLADDER: Small cysts both kidneys. No hydronephrosis.    BOWEL: Colectomy. No evidence for bowel obstruction. Sigmoid diverticulosis.    LYMPH NODES: Normal.    VASCULATURE: Normal.    PELVIC ORGANS: Prostatic enlargement and calcifications. Moderate amount of free pelvic fluid. Small fluid-containing left inguinal hernia.    MUSCULOSKELETAL: Nodular soft tissue implants lower anterior abdominal wall, with a combined size of 4.6 x 2.0 cm. Sclerotic lesions involving the right ninth and 10th ribs. Old right clavicle fracture. Subtle lytic lesions involving the right and left   iliac bones. The sacrum and coccyx appear intact.      Impression    IMPRESSION:  1.  Very large right pleural effusion resulting in complete opacification of the right hemithorax and collapse of the entire right lung.  2.  Moderate-sized left pleural effusion and collapse involving most of the left lower lobe.  3.  Metastatic right lower lobe pulmonary nodules.  4.  Extensive metastatic liver disease has significantly progressed.  5.  Metastatic disease involving the spleen has  progressed.  6.  Moderate amount of ascites abdomen and pelvis.  7.  Metastatic implant anterior lower abdominal wall.  8.  Metastatic skeletal disease.   US Thoracentesis    Narrative    [facility]  LOCATION: [facility]  DATE: 1/26/2025    PROCEDURE: IMAGING GUIDED RIGHT THORACENTESIS    INTERVENTIONAL RADIOLOGIST: BARRY Babin MD.    INDICATION: Large right pleural effusion.    CONSENT: The risks, benefits and alternatives of an imaging guided thoracentesis were discussed with the patient in detail. All questions were answered. Informed consent was given to proceed with the procedure.    SEDATION: None.    COMPLICATIONS: No immediate complications.    PROCEDURE: A limited right chest ultrasound was performed which demonstrated large-volume pleural fluid.    The patient was then prepped and draped in the standard sterile fashion. The overlying skin and subcutaneous soft tissues were anesthetized utilizing 1% lidocaine. A small 3 mm transverse incision was made. Under ultrasound guidance a 5 [place] Yueh   catheter was inserted into the pleural effusion.    A total of 1500 mL of clear yellow pleural fluid was removed and sent to the lab if diagnostic analysis was requested.      Impression    IMPRESSION:    Successful ultrasound-guided thoracentesis.

## 2025-01-29 ENCOUNTER — TELEPHONE (OUTPATIENT)
Facility: CLINIC | Age: 60
End: 2025-01-29
Payer: COMMERCIAL

## 2025-01-29 ENCOUNTER — DOCUMENTATION ONLY (OUTPATIENT)
Dept: GERIATRICS | Facility: CLINIC | Age: 60
End: 2025-01-29
Payer: COMMERCIAL

## 2025-01-29 NOTE — TELEPHONE ENCOUNTER
Prior Authorization Approval    Medication: FENTANYL 100 MCG/HR TD PT72  Authorization Effective Date: 1/29/2025  Authorization Expiration Date: 1/29/2025  Approved Dose/Quantity: 10 for 30  Reference #: [id number]   Insurance Company: [employer] - Phone [phone] Fax [phone]  Expected CoPay: $    CoPay Card Available:      Financial Assistance Needed:   Which Pharmacy is filling the prescription: [facility] [place], [place] - [address] [address]  Pharmacy Notified:   Patient Notified:     Approved for 1 fill for hospice transition per phone call from Laura.

## 2025-02-03 NOTE — PROGRESS NOTES
Sheila received a call from Silvestre RESENDIZ from [facility] requesting a Pleurx drain . Patient went on hospice with [facility] on 1/27. ED note does recommend a Pluerx drain. Per Ryan assessment patient has absent breath sounds on the right,  is on 5 liters oxygen, and respirations 20.     Sheilar received approval from Dr. Skinner to have order placed.     Tanisha Ward RN

## 2025-02-04 NOTE — CONSULTS
Outpatient IR Drain Referral  02/04/25    Referring Provider: Dr. Skinner   IR Referral Request: tunneled pleural drain : metastatic colon cancer and large malignant right pleural effusion who has signed up for hospice needs Pleurex placed for comfort   Procedure Approved for: IR tunneled right chest catheter placement approved, Hospice to manage.     Brief History:    Brady Lenz is a 59 year old male with history of metastatic colon cancer and large malignant right pleural effusion who has signed up for hospice needs Pleurex placed for comfort. 1500 ml clear thoracentesis 1/26/25 at [facility]. On hospice for 2 months. On 5L NC.     Pertinent Imaging Reviewed:  CT 1/26/25     IMPRESSION:  1.  Very large right pleural effusion resulting in complete opacification of the right hemithorax and collapse of the entire right lung.  2.  Moderate-sized left pleural effusion and collapse involving most of the left lower lobe.  3.  Metastatic right lower lobe pulmonary nodules.  4.  Extensive metastatic liver disease has significantly progressed.  5.  Metastatic disease involving the spleen has progressed.  6.  Moderate amount of ascites abdomen and pelvis.  7.  Metastatic implant anterior lower abdominal wall.  8.  Metastatic skeletal disease.    Case and imaging was reviewed with Dr. Fine from IR and pleurx catheter approved.     Procedure order placed.      Requesting team, Dr. Skinner, made aware of IR recommendations via Epic messaging.      GABBY Carlin CNP  Interventional Radiolog

## 2025-02-04 NOTE — PROGRESS NOTES
Writer has scheduled PleurX drain for , 2/6, checking in at 10a , Nothing to eat 8 hours prior and can drink water up til 8a.     Writer notified Silvestre RESENDIZ  with the above instructions.     Tanisha Ward RN

## 2025-02-06 ENCOUNTER — TELEPHONE (OUTPATIENT)
Dept: GERIATRICS | Facility: CLINIC | Age: 60
End: 2025-02-06
Payer: COMMERCIAL

## 2025-02-06 NOTE — TELEPHONE ENCOUNTER
Geriatrics Notification of Patient Death    Provider: GABBY Costa CNP   Place of death: [facility]   Facility Type:  LTC    Caller: Varun Ohara nurse   Date of Death: 2/5/25 @ 8:25    Patient was on Hospice care: Yes; please explain: [facility]   Patient was seen by [facility] Geriatrics provider: No This pt never was seen by Melodie.         Sofy Brady RN

## (undated) DEVICE — SOL WATER IRRIG 1000ML BOTTLE 2F7114

## (undated) DEVICE — GLOVE BIOGEL PI ULTRATOUCH SZ 7.5 41175

## (undated) DEVICE — SU VICRYL 2-0 TIE 12X18" J905T

## (undated) DEVICE — STPL RELOAD LINEAR CUT 100X3.8MM TCR10

## (undated) DEVICE — Device

## (undated) DEVICE — SOL NACL 0.9% IRRIG 1000ML BOTTLE 2F7124

## (undated) DEVICE — STPL LINEAR CUT 100MM TLC10

## (undated) DEVICE — DRAPE IOBAN INCISE 23X17" 6650EZ

## (undated) DEVICE — DRAPE MAYO STAND 23X54 8337

## (undated) DEVICE — ENDO TROCAR SLEEVE KII Z-THREADED 05X100MM CTS02

## (undated) DEVICE — ESU LIGASURE IMPACT OPEN SEALER/DVDR CVD LG JAW LF4418

## (undated) DEVICE — ESU LIGASURE LAPAROSCOPIC BLUNT TIP SEALER 5MMX37CM LF1837

## (undated) DEVICE — LINEN TOWEL PACK X5 5464

## (undated) DEVICE — KIT PATIENT POSITIONING PIGAZZI LATEX FREE 40580

## (undated) DEVICE — ENDO TROCAR OPTICAL ACCESS KII Z-THRD 05X100MM CTR03

## (undated) DEVICE — SYSTEM LAPAROVUE VISIBILITY LAPVUE10

## (undated) DEVICE — ENDO TROCAR FIRST ENTRY KII FIOS Z-THRD 11X100MM CTF33

## (undated) DEVICE — SUCTION TIP POOLE K770

## (undated) DEVICE — SU VICRYL 0 TIE 12X18" J906G

## (undated) DEVICE — STPL LINEAR CUT 75MM TLC75

## (undated) DEVICE — EVAC SYSTEM CLEAR FLOW SC082500

## (undated) DEVICE — STPL LINEAR 90 X 3.5MM TA9035S

## (undated) DEVICE — DECANTER BAG 2002S

## (undated) DEVICE — ESU GROUND PAD UNIVERSAL W/O CORD

## (undated) DEVICE — ESU ELEC BLADE 6" COATED/INSULATED E1455-6

## (undated) DEVICE — ESU ENDO SCISSORS 5MM CVD 5DCS

## (undated) DEVICE — SU PDS II 0 CT 36" Z358T

## (undated) DEVICE — GOWN IMPERVIOUS SPECIALTY XL/XLONG 39049

## (undated) DEVICE — ESU PENCIL W/HOLSTER E2350H

## (undated) DEVICE — TUBING SUCTION 12"X1/4" N612

## (undated) DEVICE — DRAPE BREAST/CHEST 29420

## (undated) DEVICE — SU MONOCRYL 4-0 PS-2 18" UND Y496G

## (undated) DEVICE — NDL INSUFFLATION 13GA 120MM C2201

## (undated) DEVICE — STPL RELOAD LINEAR 90 X 3.5MM  TA9035L

## (undated) DEVICE — ESU CORD MONOPOLAR 10'  E0510

## (undated) DEVICE — PACK MAJOR SBA15MAFSI

## (undated) DEVICE — SU VICRYL 3-0 SH CR 8X18" J774

## (undated) DEVICE — STPL SKIN 35W ROTATING HEAD PRW35

## (undated) DEVICE — PREP CHLORAPREP 26ML TINTED HI-LITE ORANGE 930815

## (undated) DEVICE — GLOVE BIOGEL PI MICRO INDICATOR UNDERGLOVE SZ 7.5 48975

## (undated) DEVICE — ESU HOLDER LAP INST DISP PURPLE LONG 330MM H-PRO-330

## (undated) RX ORDER — FENTANYL CITRATE 50 UG/ML
INJECTION, SOLUTION INTRAMUSCULAR; INTRAVENOUS
Status: DISPENSED
Start: 2023-10-24

## (undated) RX ORDER — FENTANYL CITRATE 50 UG/ML
INJECTION, SOLUTION INTRAMUSCULAR; INTRAVENOUS
Status: DISPENSED
Start: 2023-12-29

## (undated) RX ORDER — HYDROMORPHONE HYDROCHLORIDE 1 MG/ML
INJECTION, SOLUTION INTRAMUSCULAR; INTRAVENOUS; SUBCUTANEOUS
Status: DISPENSED
Start: 2023-10-24

## (undated) RX ORDER — CEFAZOLIN SODIUM 2 G/100ML
INJECTION, SOLUTION INTRAVENOUS
Status: DISPENSED
Start: 2023-12-29

## (undated) RX ORDER — LIDOCAINE HYDROCHLORIDE 10 MG/ML
INJECTION, SOLUTION INFILTRATION; PERINEURAL
Status: DISPENSED
Start: 2023-12-29

## (undated) RX ORDER — ONDANSETRON 2 MG/ML
INJECTION INTRAMUSCULAR; INTRAVENOUS
Status: DISPENSED
Start: 2023-10-24

## (undated) RX ORDER — PROPOFOL 10 MG/ML
INJECTION, EMULSION INTRAVENOUS
Status: DISPENSED
Start: 2023-10-24

## (undated) RX ORDER — GLYCOPYRROLATE 0.2 MG/ML
INJECTION, SOLUTION INTRAMUSCULAR; INTRAVENOUS
Status: DISPENSED
Start: 2023-10-24

## (undated) RX ORDER — METRONIDAZOLE 500 MG/100ML
INJECTION, SOLUTION INTRAVENOUS
Status: DISPENSED
Start: 2023-10-24

## (undated) RX ORDER — HYDROMORPHONE HCL IN WATER/PF 6 MG/30 ML
PATIENT CONTROLLED ANALGESIA SYRINGE INTRAVENOUS
Status: DISPENSED
Start: 2023-10-24

## (undated) RX ORDER — HEPARIN SODIUM 5000 [USP'U]/.5ML
INJECTION, SOLUTION INTRAVENOUS; SUBCUTANEOUS
Status: DISPENSED
Start: 2023-10-24

## (undated) RX ORDER — HEPARIN SODIUM (PORCINE) LOCK FLUSH IV SOLN 100 UNIT/ML 100 UNIT/ML
SOLUTION INTRAVENOUS
Status: DISPENSED
Start: 2023-12-29

## (undated) RX ORDER — NEOSTIGMINE METHYLSULFATE 1 MG/ML
VIAL (ML) INJECTION
Status: DISPENSED
Start: 2023-10-24

## (undated) RX ORDER — BUPIVACAINE HYDROCHLORIDE AND EPINEPHRINE 5; 5 MG/ML; UG/ML
INJECTION, SOLUTION EPIDURAL; INTRACAUDAL; PERINEURAL
Status: DISPENSED
Start: 2023-10-24

## (undated) RX ORDER — HEPARIN SODIUM (PORCINE) LOCK FLUSH IV SOLN 100 UNIT/ML 100 UNIT/ML
SOLUTION INTRAVENOUS
Status: DISPENSED
Start: 2024-05-03